# Patient Record
Sex: FEMALE | Race: WHITE | NOT HISPANIC OR LATINO | Employment: FULL TIME | URBAN - METROPOLITAN AREA
[De-identification: names, ages, dates, MRNs, and addresses within clinical notes are randomized per-mention and may not be internally consistent; named-entity substitution may affect disease eponyms.]

---

## 2017-01-05 ENCOUNTER — TELEPHONE (OUTPATIENT)
Dept: INTERNAL MEDICINE | Facility: CLINIC | Age: 47
End: 2017-01-05

## 2017-01-05 NOTE — TELEPHONE ENCOUNTER
----- Message from Ibis Rosales sent at 1/5/2017 12:30 PM EST -----  Pt calling for a refill on her warfarin (COUMADIN) 3 MG tablet    Please send to [facility] [id number] - [facility] [address] AT [facility] - [phone]  - [phone] FX  .  Pt# [phone]

## 2017-01-06 RX ORDER — WARFARIN SODIUM 3 MG/1
3 TABLET ORAL
Qty: 30 TABLET | Refills: 5 | Status: SHIPPED | OUTPATIENT
Start: 2017-01-06 | End: 2017-07-31 | Stop reason: SDUPTHER

## 2017-01-06 RX ORDER — FENOFIBRATE 90 MG/1
CAPSULE ORAL
Qty: 30 CAPSULE | Refills: 5
Start: 2017-01-06 | End: 2017-04-12 | Stop reason: SDUPTHER

## 2017-01-09 ENCOUNTER — ANTICOAGULATION VISIT (OUTPATIENT)
Dept: INTERNAL MEDICINE | Facility: CLINIC | Age: 47
End: 2017-01-09

## 2017-01-09 DIAGNOSIS — I82.401 DEEP VEIN THROMBOSIS (DVT) OF RIGHT LOWER EXTREMITY, UNSPECIFIED CHRONICITY, UNSPECIFIED VEIN (HCC): Primary | ICD-10-CM

## 2017-01-09 DIAGNOSIS — Z79.01 LONG TERM (CURRENT) USE OF ANTICOAGULANTS: ICD-10-CM

## 2017-01-09 LAB — INR PPP: 3.4

## 2017-01-09 PROCEDURE — 85610 PROTHROMBIN TIME: CPT | Performed by: NURSE PRACTITIONER

## 2017-01-09 PROCEDURE — 36416 COLLJ CAPILLARY BLOOD SPEC: CPT | Performed by: NURSE PRACTITIONER

## 2017-01-23 ENCOUNTER — ANTICOAGULATION VISIT (OUTPATIENT)
Dept: INTERNAL MEDICINE | Facility: CLINIC | Age: 47
End: 2017-01-23

## 2017-01-23 DIAGNOSIS — I82.401 DEEP VEIN THROMBOSIS (DVT) OF RIGHT LOWER EXTREMITY, UNSPECIFIED CHRONICITY, UNSPECIFIED VEIN (HCC): Primary | ICD-10-CM

## 2017-01-23 DIAGNOSIS — Z79.01 LONG TERM (CURRENT) USE OF ANTICOAGULANTS: ICD-10-CM

## 2017-01-23 LAB — INR PPP: 2.1

## 2017-01-23 PROCEDURE — 36416 COLLJ CAPILLARY BLOOD SPEC: CPT | Performed by: INTERNAL MEDICINE

## 2017-01-23 PROCEDURE — 85610 PROTHROMBIN TIME: CPT | Performed by: INTERNAL MEDICINE

## 2017-02-01 ENCOUNTER — TELEPHONE (OUTPATIENT)
Dept: INTERNAL MEDICINE | Facility: CLINIC | Age: 47
End: 2017-02-01

## 2017-02-01 NOTE — TELEPHONE ENCOUNTER
----- Message from Ibis Rosales sent at 2/1/2017  9:52 AM EST -----  KEIRA-  Pt would like to speak with you about a medication that she says is a private matter. She is at work and could not tell me about it.  She would like for you to return her call after 4:00 today when she is able to talk.    Pt# [phone]

## 2017-02-03 ENCOUNTER — OFFICE VISIT (OUTPATIENT)
Dept: INTERNAL MEDICINE | Facility: CLINIC | Age: 47
End: 2017-02-03

## 2017-02-03 ENCOUNTER — TELEPHONE (OUTPATIENT)
Dept: INTERNAL MEDICINE | Facility: CLINIC | Age: 47
End: 2017-02-03

## 2017-02-03 VITALS
WEIGHT: 247 LBS | RESPIRATION RATE: 14 BRPM | SYSTOLIC BLOOD PRESSURE: 118 MMHG | BODY MASS INDEX: 43.77 KG/M2 | DIASTOLIC BLOOD PRESSURE: 82 MMHG | HEIGHT: 63 IN

## 2017-02-03 DIAGNOSIS — E03.9 HYPOTHYROIDISM, ADULT: ICD-10-CM

## 2017-02-03 DIAGNOSIS — I82.401 DEEP VEIN THROMBOSIS (DVT) OF RIGHT LOWER EXTREMITY, UNSPECIFIED CHRONICITY, UNSPECIFIED VEIN (HCC): ICD-10-CM

## 2017-02-03 DIAGNOSIS — E66.01 MORBID OBESITY, UNSPECIFIED OBESITY TYPE (HCC): Primary | ICD-10-CM

## 2017-02-03 DIAGNOSIS — E78.2 MIXED HYPERLIPIDEMIA: ICD-10-CM

## 2017-02-03 DIAGNOSIS — Z79.01 LONG TERM (CURRENT) USE OF ANTICOAGULANTS: ICD-10-CM

## 2017-02-03 LAB
ALBUMIN SERPL-MCNC: 4.3 G/DL (ref 3.5–5.2)
ALBUMIN/GLOB SERPL: 1.3 G/DL
ALP SERPL-CCNC: 54 U/L (ref 39–117)
ALT SERPL-CCNC: 34 U/L (ref 1–33)
AST SERPL-CCNC: 27 U/L (ref 1–32)
BILIRUB SERPL-MCNC: 0.2 MG/DL (ref 0.1–1.2)
BUN SERPL-MCNC: 14 MG/DL (ref 6–20)
BUN/CREAT SERPL: 15.4 (ref 7–25)
CALCIUM SERPL-MCNC: 9.4 MG/DL (ref 8.6–10.5)
CHLORIDE SERPL-SCNC: 104 MMOL/L (ref 98–107)
CHOLEST SERPL-MCNC: 179 MG/DL (ref 0–200)
CO2 SERPL-SCNC: 27.2 MMOL/L (ref 22–29)
CREAT SERPL-MCNC: 0.91 MG/DL (ref 0.57–1)
GLOBULIN SER CALC-MCNC: 3.4 GM/DL
GLUCOSE SERPL-MCNC: 121 MG/DL (ref 65–99)
HDLC SERPL-MCNC: 30 MG/DL (ref 40–60)
INR PPP: 2.6
LDLC SERPL CALC-MCNC: 101 MG/DL (ref 0–100)
POTASSIUM SERPL-SCNC: 4.4 MMOL/L (ref 3.5–5.2)
PROT SERPL-MCNC: 7.7 G/DL (ref 6–8.5)
SODIUM SERPL-SCNC: 142 MMOL/L (ref 136–145)
TRIGL SERPL-MCNC: 242 MG/DL (ref 0–150)
TSH SERPL-ACNC: 1.31 MIU/ML (ref 0.27–4.2)
VLDLC SERPL-MCNC: 48.4 MG/DL (ref 5–40)

## 2017-02-03 PROCEDURE — 36416 COLLJ CAPILLARY BLOOD SPEC: CPT | Performed by: INTERNAL MEDICINE

## 2017-02-03 PROCEDURE — 99213 OFFICE O/P EST LOW 20 MIN: CPT | Performed by: INTERNAL MEDICINE

## 2017-02-03 PROCEDURE — 85610 PROTHROMBIN TIME: CPT | Performed by: INTERNAL MEDICINE

## 2017-02-03 NOTE — TELEPHONE ENCOUNTER
The other medication we talked about was Contrave.  She may ask her pharmacy how much that would be.  I have not seen any savings cards for Qsymia or contrave.

## 2017-02-03 NOTE — TELEPHONE ENCOUNTER
----- Message from Christy Gorman sent at 2/3/2017 11:51 AM EST -----  Contact: patient  Patient saw Dr. Salcido this morning.  Was given Rx for     Phentermine-Topiramate (QSYMIA) 7.5-46 MG capsule sustained-release 24 hr   Phentermine-Topiramate 3.75-23 MG capsule sustained-release 24 hr    Patient states insurance only going to pay $14 out of $300 and she cannot afford.  Do we have any coupons, or would another Rx be cheaper?  She stated Dr. Salcido talked to her about another med this morning.  Please advise.     Patient:  [phone]    Pharmacy:  [facility] [address] [place] [address] [address] AT [facility] - [phone]  - [phone] FX

## 2017-02-03 NOTE — PROGRESS NOTES
Chief Complaint   Patient presents with   • discuss medication        Subjective   Li Kimball is a 46 y.o. female     HPI: Obesity: she has been obese for many years. She recently started following the [employer] program but has not lost any weight.  She sits all day at work.  At home, she does not have the energy to exercise. She has pain in her left knee and has difficulty walking at times. Knee has been hurting worse in the last month.  At times, she has sensation it may give way.  She continues to have charley horses.  Gatoraid helps, but she is concerned about the sugar content.  She would like to lose weight.  She knows it would help her knees.  Also she knows that it is good for her general health.    Hyperlipidemia:  No management changes were made at her last appointment. Her most recent lipid panel was done on July 8, 2016.  Total cholesterol was 178, Triglycerides 236, HDL 33, LDL 98.  She is currently taking a fenofibrate acid.  Prudent diet and regular exercise have also been recommended. By report, there is good compliance with treatment and good tolerance.  She has not experienced statin associated myalgias, dyspepsia.  She has not had liver enzyme elevation.        Hypothyroidism: Current treatment levothyroxine. She is compliant with the medication, tolerates it well and reports good control of symptoms of hypothyroidism.  She has not noted any change in tolerance of heat or cold.  No change in hair or skin.  No constipation.  No symptoms of hyperthyroidism.           The following portions of the patient's history were reviewed and updated as appropriate: allergies, current medications, past social history, problem list, past surgical history    Review of Systems   Constitutional: Positive for fatigue. Negative for appetite change and fever.   Gastrointestinal: Negative for abdominal pain.   Musculoskeletal: Positive for arthralgias.       Visit Vitals   • /82 (BP Location: Right arm,  "Patient Position: Sitting, Cuff Size: Adult)   • Resp 14   • Ht 63\" (160 cm)   • Wt 247 lb (112 kg)   • BMI 43.75 kg/m2        Objective   Physical Exam   Constitutional: She appears well-nourished. No distress.   obese   Cardiovascular: Normal rate, regular rhythm and normal heart sounds.    Pulmonary/Chest: Effort normal and breath sounds normal. No respiratory distress. She has no wheezes. She has no rhonchi. She has no rales.   Nursing note and vitals reviewed.      Assessment/Plan   Problem List Items Addressed This Visit        Cardiovascular and Mediastinum    Mixed hyperlipidemia    Relevant Orders    Lipid Panel    DVT (deep venous thrombosis)    Relevant Orders    POC INR (Completed)       Endocrine    Hypothyroidism, adult    Relevant Orders    Comprehensive Metabolic Panel    TSH      Other Visit Diagnoses     Morbid obesity, unspecified obesity type    -  Primary    Relevant Medications    Phentermine-Topiramate (QSYMIA) 7.5-46 MG capsule sustained-release 24 hr    Phentermine-Topiramate 3.75-23 MG capsule sustained-release 24 hr    Long term (current) use of anticoagulants        Relevant Orders    POC INR (Completed)          "

## 2017-02-06 ENCOUNTER — TELEPHONE (OUTPATIENT)
Dept: INTERNAL MEDICINE | Facility: CLINIC | Age: 47
End: 2017-02-06

## 2017-02-06 NOTE — TELEPHONE ENCOUNTER
----- Message from Rachel Goodman MA sent at 2/6/2017  8:10 AM EST -----  Pt calling to say that the best affordable medication is Contrave and asks if you will pls send to local [facility] instead of [facility]

## 2017-02-06 NOTE — TELEPHONE ENCOUNTER
Please let her know that I sent in a prescription for contrite.  The first month, she will titrate up from 1 tablet daily to 2 tablets twice a day.  She should call for refill on contraceptive at the maintenance dose towards the end of this first month.

## 2017-02-08 NOTE — TELEPHONE ENCOUNTER
Clarification:  I sent in a prescription for Contrave.  The first month, she will titrate up from one tablet daily to 2 tablets twice a day.  She should call for a refill on Contrave at the maintenance dose towards the end of this first month.

## 2017-02-10 ENCOUNTER — TELEPHONE (OUTPATIENT)
Dept: INTERNAL MEDICINE | Facility: CLINIC | Age: 47
End: 2017-02-10

## 2017-02-10 NOTE — TELEPHONE ENCOUNTER
Notified patient of the lab results, explained and provided information on a low cholestrol diet and the importance of exercise

## 2017-02-10 NOTE — TELEPHONE ENCOUNTER
----- Message from Ibis Rosales sent at 2/10/2017 12:42 PM EST -----  Pt waiting for lab results from 2/3/2017. Looks like labs were mail 2/7/2017.  Pt would like verbal results called to her.  Please call pt after 4:00 with results.  Pt#[phone]  Thank you

## 2017-02-22 ENCOUNTER — TELEPHONE (OUTPATIENT)
Dept: INTERNAL MEDICINE | Facility: CLINIC | Age: 47
End: 2017-02-22

## 2017-02-22 NOTE — TELEPHONE ENCOUNTER
In order for this medication to work, she needs to be taking 2 tablets twice a day.  She may not see any results until she has been on this dose for at least 2 weeks.

## 2017-02-22 NOTE — TELEPHONE ENCOUNTER
----- Message from Ibis Rosales sent at 2/22/2017 11:15 AM EST -----  Pt taking CONTRAVE 8-90 MG tablet.  Pt is on her third week and taking 2 tablets per day. She does not want to take more than 2 pills per day.  Pt says medication is not working at suppressing her appetite and would like to know how long it will take to start working.    Pt# [phone]

## 2017-02-22 NOTE — TELEPHONE ENCOUNTER
Notified patient she needs to take 2 tablets by mouth twice daily, it may take up to 2 weeks before she sees any results. Patient verbalized understanding and states she will try 2 tablets by mouth twice daily

## 2017-03-06 ENCOUNTER — ANTICOAGULATION VISIT (OUTPATIENT)
Dept: INTERNAL MEDICINE | Facility: CLINIC | Age: 47
End: 2017-03-06

## 2017-03-06 DIAGNOSIS — I82.401 DEEP VEIN THROMBOSIS (DVT) OF RIGHT LOWER EXTREMITY, UNSPECIFIED CHRONICITY, UNSPECIFIED VEIN (HCC): Primary | ICD-10-CM

## 2017-03-06 DIAGNOSIS — Z79.01 LONG TERM (CURRENT) USE OF ANTICOAGULANTS: ICD-10-CM

## 2017-03-06 PROCEDURE — 85610 PROTHROMBIN TIME: CPT | Performed by: INTERNAL MEDICINE

## 2017-03-07 LAB — INR PPP: 2.5

## 2017-03-22 ENCOUNTER — TELEPHONE (OUTPATIENT)
Dept: INTERNAL MEDICINE | Facility: CLINIC | Age: 47
End: 2017-03-22

## 2017-03-22 NOTE — TELEPHONE ENCOUNTER
----- Message from Aruna Thomson sent at 3/22/2017  1:08 PM EDT -----  Contact: pt - Dr Salcido' pt - RE: Excedrin's / Blood thinners  Pt calling and would like a return call regarding pt on blood thinner. Pt was informed not to take Excedrin. Pt is having really bad H/A. Pt is taking Excedrin's as this is the only thing that helps and pt can not miss work. Could you please call pt to discuss what options pt has? Please advise. Thanks      Pt # [phone]

## 2017-04-03 ENCOUNTER — ANTICOAGULATION VISIT (OUTPATIENT)
Dept: INTERNAL MEDICINE | Facility: CLINIC | Age: 47
End: 2017-04-03

## 2017-04-03 DIAGNOSIS — Z79.01 LONG TERM (CURRENT) USE OF ANTICOAGULANTS: ICD-10-CM

## 2017-04-03 DIAGNOSIS — I82.401 DEEP VEIN THROMBOSIS (DVT) OF RIGHT LOWER EXTREMITY, UNSPECIFIED CHRONICITY, UNSPECIFIED VEIN (HCC): Primary | ICD-10-CM

## 2017-04-03 LAB — INR PPP: 3.7

## 2017-04-03 PROCEDURE — 85610 PROTHROMBIN TIME: CPT | Performed by: INTERNAL MEDICINE

## 2017-04-03 PROCEDURE — 36416 COLLJ CAPILLARY BLOOD SPEC: CPT | Performed by: INTERNAL MEDICINE

## 2017-04-04 RX ORDER — FENOFIBRATE 90 MG/1
CAPSULE ORAL
Qty: 30 CAPSULE | Refills: 3 | Status: SHIPPED | OUTPATIENT
Start: 2017-04-04 | End: 2017-10-02

## 2017-04-06 ENCOUNTER — TELEPHONE (OUTPATIENT)
Dept: INTERNAL MEDICINE | Facility: CLINIC | Age: 47
End: 2017-04-06

## 2017-04-06 ENCOUNTER — ANTICOAGULATION VISIT (OUTPATIENT)
Dept: INTERNAL MEDICINE | Facility: CLINIC | Age: 47
End: 2017-04-06

## 2017-04-06 DIAGNOSIS — I82.401 DEEP VEIN THROMBOSIS (DVT) OF RIGHT LOWER EXTREMITY, UNSPECIFIED CHRONICITY, UNSPECIFIED VEIN (HCC): Primary | ICD-10-CM

## 2017-04-06 DIAGNOSIS — R82.90 ABNORMAL FINDING IN URINE: ICD-10-CM

## 2017-04-06 LAB — INR PPP: 2.1

## 2017-04-06 PROCEDURE — 85610 PROTHROMBIN TIME: CPT | Performed by: INTERNAL MEDICINE

## 2017-04-06 PROCEDURE — 36416 COLLJ CAPILLARY BLOOD SPEC: CPT | Performed by: INTERNAL MEDICINE

## 2017-04-06 NOTE — TELEPHONE ENCOUNTER
----- Message from Aruna Thomson sent at 4/6/2017  8:10 AM EDT -----  Contact: pt - Dr Salcido' pt - RE: refills  Pt calling and would like to know why pt is only getting 3 refills instead of 6 months refill. Pt informs keeps getting charged by [facility] and pt does not want to have to deal w/ pharmacy. Could you please call pt to explain reason for only 3 months supply instead of the 6 months? Please advise. Thanks      Pt # [phone]

## 2017-04-12 RX ORDER — FENOFIBRATE 90 MG/1
CAPSULE ORAL
Qty: 30 CAPSULE | Refills: 5 | Status: SHIPPED | OUTPATIENT
Start: 2017-04-12 | End: 2018-05-31 | Stop reason: ALTCHOICE

## 2017-04-20 ENCOUNTER — TELEPHONE (OUTPATIENT)
Dept: INTERNAL MEDICINE | Facility: CLINIC | Age: 47
End: 2017-04-20

## 2017-04-20 NOTE — TELEPHONE ENCOUNTER
----- Message from Christy Gorman sent at 4/20/2017  9:26 AM EDT -----  Contact: Patient  Patient called requesting to speak with Dr. Salcido about her     levothyroxine (SYNTHROID, LEVOTHROID) 50 MCG tablet    She asks that Dr. Salcido call Friday afternoon after 4:00 PM due to work.  Please advise.     Patient:  [phone]    Pharmacy:  [facility] [address] [place] [address] [address] AT [facility] - [phone] [facility] [phone] FX

## 2017-04-21 DIAGNOSIS — R73.01 ELEVATED FASTING GLUCOSE: Primary | ICD-10-CM

## 2017-04-21 DIAGNOSIS — G43.009 NONINTRACTABLE MIGRAINE, UNSPECIFIED MIGRAINE TYPE: ICD-10-CM

## 2017-04-21 RX ORDER — TOPIRAMATE 25 MG/1
25 TABLET ORAL 2 TIMES DAILY
Qty: 60 TABLET | Refills: 2 | Status: SHIPPED | OUTPATIENT
Start: 2017-04-21 | End: 2018-05-31

## 2017-04-21 NOTE — TELEPHONE ENCOUNTER
Discussed with her.  She thought perhaps stopping her thyroid medication might help her lose weight.  Advised her not to stop thyroid.  She's having trouble with migraine headaches.  Topiramate prescribed.  Advised her this should help out with migraines and might help her lose weight.

## 2017-05-01 ENCOUNTER — ANTICOAGULATION VISIT (OUTPATIENT)
Dept: INTERNAL MEDICINE | Facility: CLINIC | Age: 47
End: 2017-05-01

## 2017-05-01 DIAGNOSIS — Z79.01 LONG TERM (CURRENT) USE OF ANTICOAGULANTS: ICD-10-CM

## 2017-05-01 DIAGNOSIS — I82.401 DEEP VEIN THROMBOSIS (DVT) OF RIGHT LOWER EXTREMITY, UNSPECIFIED CHRONICITY, UNSPECIFIED VEIN (HCC): Primary | ICD-10-CM

## 2017-05-01 LAB — INR PPP: 3.1

## 2017-05-01 PROCEDURE — 36416 COLLJ CAPILLARY BLOOD SPEC: CPT | Performed by: INTERNAL MEDICINE

## 2017-05-01 PROCEDURE — 85610 PROTHROMBIN TIME: CPT | Performed by: INTERNAL MEDICINE

## 2017-05-02 DIAGNOSIS — E03.9 HYPOTHYROIDISM: ICD-10-CM

## 2017-05-03 RX ORDER — LEVOTHYROXINE SODIUM 0.05 MG/1
TABLET ORAL
Qty: 30 TABLET | Refills: 3 | Status: SHIPPED | OUTPATIENT
Start: 2017-05-03 | End: 2017-10-03 | Stop reason: SDUPTHER

## 2017-05-15 ENCOUNTER — ANTICOAGULATION VISIT (OUTPATIENT)
Dept: INTERNAL MEDICINE | Facility: CLINIC | Age: 47
End: 2017-05-15

## 2017-05-15 DIAGNOSIS — I82.409 DEEP VEIN THROMBOSIS (DVT) OF LOWER EXTREMITY, UNSPECIFIED CHRONICITY, UNSPECIFIED LATERALITY, UNSPECIFIED VEIN (HCC): Primary | ICD-10-CM

## 2017-05-15 DIAGNOSIS — Z79.01 LONG TERM (CURRENT) USE OF ANTICOAGULANTS: ICD-10-CM

## 2017-05-15 LAB — INR PPP: 2.5

## 2017-05-15 PROCEDURE — 36416 COLLJ CAPILLARY BLOOD SPEC: CPT | Performed by: INTERNAL MEDICINE

## 2017-05-15 PROCEDURE — 85610 PROTHROMBIN TIME: CPT | Performed by: INTERNAL MEDICINE

## 2017-06-12 ENCOUNTER — ANTICOAGULATION VISIT (OUTPATIENT)
Dept: INTERNAL MEDICINE | Facility: CLINIC | Age: 47
End: 2017-06-12

## 2017-06-12 DIAGNOSIS — I82.401 DEEP VEIN THROMBOSIS (DVT) OF RIGHT LOWER EXTREMITY, UNSPECIFIED CHRONICITY, UNSPECIFIED VEIN (HCC): Primary | ICD-10-CM

## 2017-06-12 DIAGNOSIS — Z79.01 LONG TERM (CURRENT) USE OF ANTICOAGULANTS: ICD-10-CM

## 2017-06-12 LAB — INR PPP: 2.5

## 2017-06-12 PROCEDURE — 36416 COLLJ CAPILLARY BLOOD SPEC: CPT | Performed by: INTERNAL MEDICINE

## 2017-06-12 PROCEDURE — 85610 PROTHROMBIN TIME: CPT | Performed by: INTERNAL MEDICINE

## 2017-06-20 ENCOUNTER — TELEPHONE (OUTPATIENT)
Dept: INTERNAL MEDICINE | Facility: CLINIC | Age: 47
End: 2017-06-20

## 2017-06-20 NOTE — TELEPHONE ENCOUNTER
----- Message from Christy Gorman sent at 6/20/2017  3:37 PM EDT -----  Contact: Patient  Patient is requesting a letter she can deliver to her place of employment ([employer]) stating that she cannot stand for long periods of time due to 2 surgeries on her right ankle, and being on life-long warfarin.  She would like us to mail this to her, and to make sure the surgeries and blood thinner are mentioned in the letter.  Please advise.     Li Kimball  [address]   [place] [address] [phone] (H)

## 2017-07-10 ENCOUNTER — ANTICOAGULATION VISIT (OUTPATIENT)
Dept: INTERNAL MEDICINE | Facility: CLINIC | Age: 47
End: 2017-07-10

## 2017-07-10 DIAGNOSIS — I82.401 DEEP VEIN THROMBOSIS (DVT) OF RIGHT LOWER EXTREMITY, UNSPECIFIED CHRONICITY, UNSPECIFIED VEIN (HCC): Primary | ICD-10-CM

## 2017-07-10 DIAGNOSIS — Z79.01 LONG TERM (CURRENT) USE OF ANTICOAGULANTS: ICD-10-CM

## 2017-07-10 LAB — INR PPP: 2.9

## 2017-07-10 PROCEDURE — 85610 PROTHROMBIN TIME: CPT | Performed by: INTERNAL MEDICINE

## 2017-07-10 PROCEDURE — 36416 COLLJ CAPILLARY BLOOD SPEC: CPT | Performed by: INTERNAL MEDICINE

## 2017-07-31 RX ORDER — WARFARIN SODIUM 3 MG/1
TABLET ORAL
Qty: 30 TABLET | Refills: 3 | Status: SHIPPED | OUTPATIENT
Start: 2017-07-31 | End: 2017-11-24 | Stop reason: SDUPTHER

## 2017-08-07 ENCOUNTER — ANTICOAGULATION VISIT (OUTPATIENT)
Dept: INTERNAL MEDICINE | Facility: CLINIC | Age: 47
End: 2017-08-07

## 2017-08-07 DIAGNOSIS — Z79.01 LONG TERM (CURRENT) USE OF ANTICOAGULANTS: ICD-10-CM

## 2017-08-07 DIAGNOSIS — I82.401 DEEP VEIN THROMBOSIS (DVT) OF RIGHT LOWER EXTREMITY, UNSPECIFIED CHRONICITY, UNSPECIFIED VEIN (HCC): Primary | ICD-10-CM

## 2017-08-07 LAB — INR PPP: 2.9

## 2017-08-07 PROCEDURE — 36416 COLLJ CAPILLARY BLOOD SPEC: CPT | Performed by: INTERNAL MEDICINE

## 2017-08-07 PROCEDURE — 85610 PROTHROMBIN TIME: CPT | Performed by: INTERNAL MEDICINE

## 2017-08-16 ENCOUNTER — TELEPHONE (OUTPATIENT)
Dept: INTERNAL MEDICINE | Facility: CLINIC | Age: 47
End: 2017-08-16

## 2017-08-16 NOTE — TELEPHONE ENCOUNTER
Dr Salcido I have called patient to get more detail. She states she isn't going into detail unless its with you. She states its why she said she is off tomorrow to discuss it.

## 2017-08-16 NOTE — TELEPHONE ENCOUNTER
----- Message from Aruna Thomson sent at 8/16/2017  9:47 AM EDT -----  Contact: pt - Dr Salcido' pt - RE: blood thinner  Pt calling and would like a return call regarding pt's blood thinner. Pt would like to discuss. Could you please call pt tomorrow. Pt informs is off work tomorrow & will be easier to discuss. Please advise. thanks        Pt # [phone]  -  Pt would like call tomorrow 08/17/2017 - pt is off work and will be easier to talk.

## 2017-08-17 NOTE — TELEPHONE ENCOUNTER
Discussed with her.  She is thinking about having her eyebrows enhanced.  She has been told that the procedure is kind of like having a tattoo.  She has had tattoos before without stopping her Coumadin.  The eyebrow technician as recommended she hold Coumadin for one day, the day before the procedure.  Advised her that it would be okay to do this.

## 2017-08-21 ENCOUNTER — ANTICOAGULATION VISIT (OUTPATIENT)
Dept: INTERNAL MEDICINE | Facility: CLINIC | Age: 47
End: 2017-08-21

## 2017-08-21 DIAGNOSIS — I82.401 DEEP VEIN THROMBOSIS (DVT) OF RIGHT LOWER EXTREMITY, UNSPECIFIED CHRONICITY, UNSPECIFIED VEIN (HCC): Primary | ICD-10-CM

## 2017-08-21 DIAGNOSIS — Z79.01 LONG TERM (CURRENT) USE OF ANTICOAGULANTS: ICD-10-CM

## 2017-08-21 LAB — INR PPP: 2

## 2017-08-21 PROCEDURE — 36416 COLLJ CAPILLARY BLOOD SPEC: CPT | Performed by: INTERNAL MEDICINE

## 2017-08-21 PROCEDURE — 85610 PROTHROMBIN TIME: CPT | Performed by: INTERNAL MEDICINE

## 2017-09-18 ENCOUNTER — ANTICOAGULATION VISIT (OUTPATIENT)
Dept: INTERNAL MEDICINE | Facility: CLINIC | Age: 47
End: 2017-09-18

## 2017-09-18 DIAGNOSIS — I82.401 DEEP VEIN THROMBOSIS (DVT) OF RIGHT LOWER EXTREMITY, UNSPECIFIED CHRONICITY, UNSPECIFIED VEIN (HCC): Primary | ICD-10-CM

## 2017-09-18 DIAGNOSIS — Z79.01 LONG TERM (CURRENT) USE OF ANTICOAGULANTS: ICD-10-CM

## 2017-09-18 LAB — INR PPP: 2.1

## 2017-09-18 PROCEDURE — 36416 COLLJ CAPILLARY BLOOD SPEC: CPT | Performed by: INTERNAL MEDICINE

## 2017-09-18 PROCEDURE — 85610 PROTHROMBIN TIME: CPT | Performed by: INTERNAL MEDICINE

## 2017-09-28 ENCOUNTER — OFFICE VISIT (OUTPATIENT)
Dept: INTERNAL MEDICINE | Facility: CLINIC | Age: 47
End: 2017-09-28

## 2017-09-28 VITALS
BODY MASS INDEX: 43.77 KG/M2 | SYSTOLIC BLOOD PRESSURE: 122 MMHG | HEIGHT: 63 IN | WEIGHT: 247 LBS | DIASTOLIC BLOOD PRESSURE: 88 MMHG

## 2017-09-28 DIAGNOSIS — E78.2 MIXED HYPERLIPIDEMIA: Primary | ICD-10-CM

## 2017-09-28 DIAGNOSIS — Z79.01 LONG TERM (CURRENT) USE OF ANTICOAGULANTS: ICD-10-CM

## 2017-09-28 DIAGNOSIS — E03.9 HYPOTHYROIDISM, ADULT: ICD-10-CM

## 2017-09-28 DIAGNOSIS — I82.401 DEEP VEIN THROMBOSIS (DVT) OF RIGHT LOWER EXTREMITY, UNSPECIFIED CHRONICITY, UNSPECIFIED VEIN (HCC): ICD-10-CM

## 2017-09-28 DIAGNOSIS — R73.01 ELEVATED FASTING GLUCOSE: ICD-10-CM

## 2017-09-28 LAB
ALBUMIN SERPL-MCNC: 4.5 G/DL (ref 3.5–5.2)
ALBUMIN/GLOB SERPL: 1.5 G/DL
ALP SERPL-CCNC: 43 U/L (ref 39–117)
ALT SERPL W P-5'-P-CCNC: 32 U/L (ref 1–33)
ANION GAP SERPL CALCULATED.3IONS-SCNC: 12.1 MMOL/L
AST SERPL-CCNC: 28 U/L (ref 1–32)
BILIRUB SERPL-MCNC: 0.3 MG/DL (ref 0.1–1.2)
BUN BLD-MCNC: 16 MG/DL (ref 6–20)
BUN/CREAT SERPL: 16.2 (ref 7–25)
CALCIUM SPEC-SCNC: 9.7 MG/DL (ref 8.6–10.5)
CHLORIDE SERPL-SCNC: 104 MMOL/L (ref 98–107)
CHOLEST SERPL-MCNC: 178 MG/DL (ref 0–200)
CO2 SERPL-SCNC: 24.9 MMOL/L (ref 22–29)
CREAT BLD-MCNC: 0.99 MG/DL (ref 0.57–1)
DEPRECATED RDW RBC AUTO: 45.6 FL (ref 37–54)
ERYTHROCYTE [DISTWIDTH] IN BLOOD BY AUTOMATED COUNT: 14.3 % (ref 11.5–15)
GFR SERPL CREATININE-BSD FRML MDRD: 60 ML/MIN/1.73
GLOBULIN UR ELPH-MCNC: 3.1 GM/DL
GLUCOSE BLD-MCNC: 111 MG/DL (ref 65–99)
HBA1C MFR BLD: 6.8 % (ref 4.8–5.6)
HCT VFR BLD AUTO: 39.2 % (ref 34.1–44.9)
HDLC SERPL-MCNC: 23 MG/DL (ref 40–60)
HGB BLD-MCNC: 12.4 G/DL (ref 11.2–15.7)
INR PPP: 1.9
LDLC SERPL CALC-MCNC: 95 MG/DL (ref 0–100)
LDLC/HDLC SERPL: 4.11 {RATIO}
MCH RBC QN AUTO: 28.4 PG (ref 26–34)
MCHC RBC AUTO-ENTMCNC: 31.6 G/DL (ref 31–37)
MCV RBC AUTO: 89.7 FL (ref 80–100)
PLATELET # BLD AUTO: 299 10*3/MM3 (ref 150–450)
PMV BLD AUTO: 9.7 FL (ref 6–12)
POTASSIUM BLD-SCNC: 3.9 MMOL/L (ref 3.5–5.2)
PROT SERPL-MCNC: 7.6 G/DL (ref 6–8.5)
RBC # BLD AUTO: 4.37 10*6/MM3 (ref 3.93–5.22)
SODIUM BLD-SCNC: 141 MMOL/L (ref 136–145)
TRIGL SERPL-MCNC: 302 MG/DL (ref 0–150)
TSH SERPL DL<=0.05 MIU/L-ACNC: 1.32 MIU/ML (ref 0.27–4.2)
VLDLC SERPL-MCNC: 60.4 MG/DL (ref 5–40)
WBC NRBC COR # BLD: 5.07 10*3/MM3 (ref 5–10)

## 2017-09-28 PROCEDURE — 85610 PROTHROMBIN TIME: CPT | Performed by: INTERNAL MEDICINE

## 2017-09-28 PROCEDURE — 85027 COMPLETE CBC AUTOMATED: CPT | Performed by: INTERNAL MEDICINE

## 2017-09-28 PROCEDURE — 80053 COMPREHEN METABOLIC PANEL: CPT | Performed by: INTERNAL MEDICINE

## 2017-09-28 PROCEDURE — 80061 LIPID PANEL: CPT | Performed by: INTERNAL MEDICINE

## 2017-09-28 PROCEDURE — 99213 OFFICE O/P EST LOW 20 MIN: CPT | Performed by: INTERNAL MEDICINE

## 2017-09-28 PROCEDURE — 36415 COLL VENOUS BLD VENIPUNCTURE: CPT | Performed by: INTERNAL MEDICINE

## 2017-09-28 PROCEDURE — 84443 ASSAY THYROID STIM HORMONE: CPT | Performed by: INTERNAL MEDICINE

## 2017-09-28 PROCEDURE — 36416 COLLJ CAPILLARY BLOOD SPEC: CPT | Performed by: INTERNAL MEDICINE

## 2017-09-28 PROCEDURE — 83036 HEMOGLOBIN GLYCOSYLATED A1C: CPT | Performed by: INTERNAL MEDICINE

## 2017-09-28 NOTE — PROGRESS NOTES
"Chief Complaint   Patient presents with   • Hyperlipidemia     follow up   • Hypothyroidism        Subjective   Li Kimball is a 47 y.o. female     HPI: Hyperlipidemia:  This is a chronic problem.   No management changes were made at her last appointment.   Her most recent lipid panel was done on 2/3/2017.  Total cholesterol was 179, Triglycerides 242, HDL 30, .   Current treatment: fibric acid derivative.   Prudent diet and regular exercise have also been recommended. By report, there is good compliance with treatment and good tolerance.    She has not experienced statin associated myalgias, dyspepsia.  She has not had liver enzyme elevation.     Elevated fasting glucose:  She  has had elevated fasting glucose in the past.  She denies polyuria, polydipsia, vision change appetite change, unexplained weight loss.   No results found for: HGBA1C        Hypothyroidism: Current treatment levothyroxine. She is compliant with the medication, tolerates it well and reports good control of symptoms of hypothyroidism.  She has not noted any change in tolerance of heat or cold.  No change in hair or skin.  No constipation.  No symptoms of hyperthyroidism.      History of DVT on chronic anticoagulation: She missed 1 day of Coumadin last week.  She is worried and would like to have her pro time checked.     The following portions of the patient's history were reviewed and updated as appropriate: allergies, current medications, past social history, problem list, past surgical history    Review of Systems   Constitutional: Negative for activity change and appetite change.   HENT: Negative for nosebleeds.    Eyes: Negative for visual disturbance.   Respiratory: Negative for cough and shortness of breath.    Cardiovascular: Negative for chest pain, palpitations and leg swelling.   Neurological: Negative for headaches.   Psychiatric/Behavioral: Negative for sleep disturbance.           Objective     /88  Ht 63\" (160 " cm)  Wt 247 lb (112 kg)  BMI 43.75 kg/m2     Physical Exam   Constitutional: She is oriented to person, place, and time. She appears well-developed and well-nourished. No distress.   Neck: Normal carotid pulses present. Carotid bruit is not present.   Cardiovascular: Normal rate, regular rhythm, S1 normal, S2 normal and intact distal pulses.  Exam reveals no gallop and no friction rub.    No murmur heard.  Pulses:       Carotid pulses are 2+ on the right side, and 2+ on the left side.  Pulmonary/Chest: Effort normal and breath sounds normal. No respiratory distress. She has no wheezes. She has no rhonchi. She has no rales. Chest wall is not dull to percussion.   Musculoskeletal: She exhibits no edema.   Neurological: She is alert and oriented to person, place, and time.   Skin: Skin is warm and dry.   Nursing note and vitals reviewed.      Assessment/Plan   Problem List Items Addressed This Visit        Cardiovascular and Mediastinum    Mixed hyperlipidemia - Primary    Relevant Orders    Comprehensive Metabolic Panel    Lipid Panel    CBC (No Diff)    DVT (deep venous thrombosis)    Relevant Orders    POC INR       Endocrine    Hypothyroidism, adult    Relevant Orders    TSH Rfx On Abnormal To Free T4      Other Visit Diagnoses     Long term (current) use of anticoagulants        Elevated fasting glucose        Relevant Orders    Hemoglobin A1c

## 2017-10-02 ENCOUNTER — DOCUMENTATION (OUTPATIENT)
Dept: INTERNAL MEDICINE | Facility: CLINIC | Age: 47
End: 2017-10-02

## 2017-10-02 RX ORDER — OMEGA-3-ACID ETHYL ESTERS 1 G/1
2 CAPSULE, LIQUID FILLED ORAL DAILY
Qty: 120 CAPSULE | Refills: 5 | Status: SHIPPED | OUTPATIENT
Start: 2017-10-02 | End: 2018-05-31

## 2017-10-02 NOTE — PROGRESS NOTES
Discussed hyperlipidemia, HgbA1c of 6.8.  We will stop Antara and try Lovaza.  Advised her HgbA1c was consistent with diabetes. She will eliminate cokes, use her treadmill daily.  Try to loose weight.

## 2017-10-03 ENCOUNTER — TELEPHONE (OUTPATIENT)
Dept: INTERNAL MEDICINE | Facility: CLINIC | Age: 47
End: 2017-10-03

## 2017-10-03 DIAGNOSIS — E03.9 HYPOTHYROIDISM: ICD-10-CM

## 2017-10-03 RX ORDER — LEVOTHYROXINE SODIUM 0.05 MG/1
TABLET ORAL
Qty: 30 TABLET | Refills: 3 | Status: SHIPPED | OUTPATIENT
Start: 2017-10-03 | End: 2018-02-22 | Stop reason: SDUPTHER

## 2017-10-03 NOTE — TELEPHONE ENCOUNTER
Patient would be ok with continuing the antara but she is worried because the triglycerides are rising more. Asking if maybe there is something that has a discount card too or possibly cheap that would help her.

## 2017-10-03 NOTE — TELEPHONE ENCOUNTER
----- Message from Christy Gorman sent at 10/3/2017 12:09 PM EDT -----  Contact: Patient  Patient called.  She called her pharmacy regarding her cholesterol medicine Dr. Salcido called in yesterday.  States she cannot afford the medicine; it is $100 even after her copay.  Is there anything else that she can try?  States she really cannot afford more than $20 a month.  Please advise.     Patient:  [phone] - cell; available after 4 PM    Pharmacy:  [facility] [id number] [place] [address] [address] AT [facility] - [phone] [facility] [phone] FX

## 2017-10-11 RX ORDER — ICOSAPENT ETHYL 1000 MG/1
2 CAPSULE ORAL 2 TIMES DAILY
Qty: 60 CAPSULE | Refills: 1 | Status: SHIPPED | OUTPATIENT
Start: 2017-10-11 | End: 2018-01-08 | Stop reason: SDUPTHER

## 2017-10-11 NOTE — TELEPHONE ENCOUNTER
----- Message from Christy oGrman sent at 10/11/2017  8:59 AM EDT -----  Contact: Patient  Patient called.  She picked up her samples and coupon card from us for the Vascepa, and is requesting a script to be called in for the medication so she can see how expensive it is going to be.  Asking for it to be called in today.  Please advise.     Patient:  [phone]    Pharmacy:  [facility] [address] [place] [address] [address] AT [facility] [phone] [facility] [phone] FX

## 2017-10-12 ENCOUNTER — TELEPHONE (OUTPATIENT)
Dept: INTERNAL MEDICINE | Facility: CLINIC | Age: 47
End: 2017-10-12

## 2017-10-12 NOTE — TELEPHONE ENCOUNTER
Dr Salcido I have with Ms Kimball to clarify her medication is to be only taken 1 BID and I clarified with pharmacy as well. Patient still states medication is to big and taking it every day is to much, also asking is this something she will have to take the rest of her life. I did inform her it possibly could be something she does have to take for the rest of her life, had she changed her diet or possibly hereditary. Please advise

## 2017-10-12 NOTE — TELEPHONE ENCOUNTER
----- Message from Rachel Goodman MA sent at 10/12/2017  9:26 AM EDT -----  Pt calling and would like a returned call to discuss some concerns she has with medication Vascepa.  Tablet is very large and taking 4 daily is too much.  Pt would like to take 2 daily  Pt would like to know if she can take 2 tabs at the same time  Pt would like to know if this is a life time medication    Pt#[phone] anytime today as she is off wk

## 2017-10-13 NOTE — TELEPHONE ENCOUNTER
It is OK for her to take this one twice a day, or two daily.  I think this will be a life time medication.

## 2017-10-16 ENCOUNTER — ANTICOAGULATION VISIT (OUTPATIENT)
Dept: INTERNAL MEDICINE | Facility: CLINIC | Age: 47
End: 2017-10-16

## 2017-10-16 DIAGNOSIS — Z79.01 LONG-TERM (CURRENT) USE OF ANTICOAGULANTS: ICD-10-CM

## 2017-10-16 DIAGNOSIS — I82.401 DEEP VEIN THROMBOSIS (DVT) OF RIGHT LOWER EXTREMITY, UNSPECIFIED CHRONICITY, UNSPECIFIED VEIN (HCC): Primary | ICD-10-CM

## 2017-10-16 LAB — INR PPP: 1.6

## 2017-10-16 PROCEDURE — 85610 PROTHROMBIN TIME: CPT | Performed by: INTERNAL MEDICINE

## 2017-10-16 PROCEDURE — 36416 COLLJ CAPILLARY BLOOD SPEC: CPT | Performed by: INTERNAL MEDICINE

## 2017-11-01 ENCOUNTER — ANTICOAGULATION VISIT (OUTPATIENT)
Dept: INTERNAL MEDICINE | Facility: CLINIC | Age: 47
End: 2017-11-01

## 2017-11-01 DIAGNOSIS — I82.401 DEEP VEIN THROMBOSIS (DVT) OF RIGHT LOWER EXTREMITY, UNSPECIFIED CHRONICITY, UNSPECIFIED VEIN (HCC): Primary | ICD-10-CM

## 2017-11-01 DIAGNOSIS — Z79.01 LONG-TERM (CURRENT) USE OF ANTICOAGULANTS: ICD-10-CM

## 2017-11-01 LAB — INR PPP: 2.5

## 2017-11-01 PROCEDURE — 85610 PROTHROMBIN TIME: CPT | Performed by: INTERNAL MEDICINE

## 2017-11-01 PROCEDURE — 36416 COLLJ CAPILLARY BLOOD SPEC: CPT | Performed by: INTERNAL MEDICINE

## 2017-11-27 ENCOUNTER — TELEPHONE (OUTPATIENT)
Dept: INTERNAL MEDICINE | Facility: CLINIC | Age: 47
End: 2017-11-27

## 2017-11-27 RX ORDER — WARFARIN SODIUM 3 MG/1
3 TABLET ORAL
Qty: 30 TABLET | Refills: 2 | Status: SHIPPED | OUTPATIENT
Start: 2017-11-27 | End: 2018-05-31 | Stop reason: SDUPTHER

## 2017-11-27 RX ORDER — WARFARIN SODIUM 3 MG/1
TABLET ORAL
Qty: 30 TABLET | Refills: 2 | Status: SHIPPED | OUTPATIENT
Start: 2017-11-27 | End: 2017-11-27 | Stop reason: SDUPTHER

## 2017-11-27 NOTE — TELEPHONE ENCOUNTER
----- Message from Christy Gorman sent at 11/27/2017  8:02 AM EST -----  Contact: Patient  Patient called  over the weekend and left message.  She is out of blood thinner as of Sunday.  Please advise.     warfarin (COUMADIN) 3 MG tablet    Patient:  [phone]    Pharmacy:  [facility] [place] [address] [address] AT [facility] - [phone]  - [phone] FX

## 2017-11-28 ENCOUNTER — ANTICOAGULATION VISIT (OUTPATIENT)
Dept: INTERNAL MEDICINE | Facility: CLINIC | Age: 47
End: 2017-11-28

## 2017-11-28 DIAGNOSIS — Z79.01 LONG-TERM (CURRENT) USE OF ANTICOAGULANTS: ICD-10-CM

## 2017-11-28 DIAGNOSIS — I82.401 DEEP VEIN THROMBOSIS (DVT) OF RIGHT LOWER EXTREMITY, UNSPECIFIED CHRONICITY, UNSPECIFIED VEIN (HCC): Primary | ICD-10-CM

## 2017-11-28 LAB — INR PPP: 2.4

## 2017-11-28 PROCEDURE — 85610 PROTHROMBIN TIME: CPT | Performed by: INTERNAL MEDICINE

## 2017-11-28 PROCEDURE — 36416 COLLJ CAPILLARY BLOOD SPEC: CPT | Performed by: INTERNAL MEDICINE

## 2017-12-04 ENCOUNTER — TELEPHONE (OUTPATIENT)
Dept: INTERNAL MEDICINE | Facility: CLINIC | Age: 47
End: 2017-12-04

## 2017-12-04 NOTE — TELEPHONE ENCOUNTER
----- Message from Christy Gorman sent at 12/4/2017 12:03 PM EST -----  Contact: Patient  Patient states she is calling with a general question.  The patient is on warfarin, and wants to know if she missed a dose, should she double up the next day when she takes regular dose.  States she has not missed a dose, but just curious.  Please advise.       Patient:  [phone], please call after 4 when she is off work.  Thanks.

## 2017-12-04 NOTE — TELEPHONE ENCOUNTER
If she misses a dose of warfarin, she should not double up the next day.  She should just take her regular dose.

## 2017-12-21 ENCOUNTER — TELEPHONE (OUTPATIENT)
Dept: INTERNAL MEDICINE | Facility: CLINIC | Age: 47
End: 2017-12-21

## 2017-12-21 NOTE — TELEPHONE ENCOUNTER
----- Message from Christy Gorman sent at 12/21/2017  9:54 AM EST -----  Contact: Patient   Patient called with a billing issue.  She wants Dr. Salcido to call her because, she states, because if she continues to get bills, she will not be able to have her labs in February.  She is adamant that Dr. Salcido call her.      Patient:  [phone]

## 2017-12-22 ENCOUNTER — ANTICOAGULATION VISIT (OUTPATIENT)
Dept: INTERNAL MEDICINE | Facility: CLINIC | Age: 47
End: 2017-12-22

## 2017-12-22 DIAGNOSIS — Z79.01 LONG-TERM (CURRENT) USE OF ANTICOAGULANTS: ICD-10-CM

## 2017-12-22 DIAGNOSIS — I82.401 DEEP VEIN THROMBOSIS (DVT) OF RIGHT LOWER EXTREMITY, UNSPECIFIED CHRONICITY, UNSPECIFIED VEIN (HCC): Primary | ICD-10-CM

## 2017-12-22 LAB — INR PPP: 2.2

## 2017-12-22 PROCEDURE — 85610 PROTHROMBIN TIME: CPT | Performed by: INTERNAL MEDICINE

## 2017-12-22 PROCEDURE — 36416 COLLJ CAPILLARY BLOOD SPEC: CPT | Performed by: INTERNAL MEDICINE

## 2017-12-27 NOTE — TELEPHONE ENCOUNTER
I am aware of her billing problem and I have been given the information. I have an email to the billing department to check on what exactly is going on. This will be resolved prior to February labs. It is unnecessary to involve Dr. Salcido but I will communicate my findings to her.

## 2018-01-08 ENCOUNTER — TELEPHONE (OUTPATIENT)
Dept: INTERNAL MEDICINE | Facility: CLINIC | Age: 48
End: 2018-01-08

## 2018-01-08 DIAGNOSIS — E78.5 HYPERLIPIDEMIA, UNSPECIFIED HYPERLIPIDEMIA TYPE: Primary | ICD-10-CM

## 2018-01-08 RX ORDER — ICOSAPENT ETHYL 1000 MG/1
2 CAPSULE ORAL DAILY
Qty: 30 CAPSULE | Refills: 0 | COMMUNITY
Start: 2018-01-08 | End: 2018-05-31 | Stop reason: SDUPTHER

## 2018-01-08 NOTE — TELEPHONE ENCOUNTER
----- Message from Nathaly Ken sent at 1/8/2018 12:07 PM EST -----  Contact: pt  Pt would like to see if we have 2018 coupons for  icosapent ethyl (VASCEPA) 1 g capsule capsule    She is out of medication         Pt# [phone]

## 2018-01-22 ENCOUNTER — ANTICOAGULATION VISIT (OUTPATIENT)
Dept: INTERNAL MEDICINE | Facility: CLINIC | Age: 48
End: 2018-01-22

## 2018-01-22 DIAGNOSIS — Z79.01 LONG-TERM (CURRENT) USE OF ANTICOAGULANTS: ICD-10-CM

## 2018-01-22 DIAGNOSIS — I82.401 DEEP VEIN THROMBOSIS (DVT) OF RIGHT LOWER EXTREMITY, UNSPECIFIED CHRONICITY, UNSPECIFIED VEIN (HCC): Primary | ICD-10-CM

## 2018-01-22 LAB — INR PPP: 2.6

## 2018-01-22 PROCEDURE — 36416 COLLJ CAPILLARY BLOOD SPEC: CPT | Performed by: INTERNAL MEDICINE

## 2018-01-22 PROCEDURE — 85610 PROTHROMBIN TIME: CPT | Performed by: INTERNAL MEDICINE

## 2018-02-09 RX ORDER — WARFARIN SODIUM 3 MG/1
TABLET ORAL
Qty: 30 TABLET | Refills: 5 | Status: SHIPPED | OUTPATIENT
Start: 2018-02-09 | End: 2018-07-13 | Stop reason: SDUPTHER

## 2018-02-19 ENCOUNTER — ANTICOAGULATION VISIT (OUTPATIENT)
Dept: INTERNAL MEDICINE | Facility: CLINIC | Age: 48
End: 2018-02-19

## 2018-02-19 DIAGNOSIS — I82.401 DEEP VEIN THROMBOSIS (DVT) OF RIGHT LOWER EXTREMITY, UNSPECIFIED CHRONICITY, UNSPECIFIED VEIN (HCC): Primary | ICD-10-CM

## 2018-02-19 DIAGNOSIS — Z79.01 LONG-TERM (CURRENT) USE OF ANTICOAGULANTS: ICD-10-CM

## 2018-02-19 LAB — INR PPP: 2.4

## 2018-02-19 PROCEDURE — 85610 PROTHROMBIN TIME: CPT | Performed by: INTERNAL MEDICINE

## 2018-02-19 PROCEDURE — 36416 COLLJ CAPILLARY BLOOD SPEC: CPT | Performed by: INTERNAL MEDICINE

## 2018-02-22 DIAGNOSIS — E03.9 HYPOTHYROIDISM: ICD-10-CM

## 2018-02-23 RX ORDER — LEVOTHYROXINE SODIUM 0.05 MG/1
TABLET ORAL
Qty: 30 TABLET | Refills: 0 | Status: SHIPPED | OUTPATIENT
Start: 2018-02-23 | End: 2018-03-29 | Stop reason: SDUPTHER

## 2018-03-26 ENCOUNTER — TELEPHONE (OUTPATIENT)
Dept: INTERNAL MEDICINE | Facility: CLINIC | Age: 48
End: 2018-03-26

## 2018-03-26 NOTE — TELEPHONE ENCOUNTER
----- Message from Christy Gorman sent at 3/26/2018 10:14 AM EDT -----  Contact: Patient   Patient called requesting a note stating she can only wear her black tennis shoes to work.      Patient has hx of right fibula fracture and DVT, and has ankle edema, and this is the only footwear she can tolerate.  Work requires a doctor's note due to the dress code.  Please advise.  Mail to the patient please.      Patient:  [phone] - after 4 PM  [address]   [facility] [address]

## 2018-03-29 DIAGNOSIS — E03.9 HYPOTHYROIDISM: ICD-10-CM

## 2018-03-29 RX ORDER — LEVOTHYROXINE SODIUM 0.05 MG/1
TABLET ORAL
Qty: 30 TABLET | Refills: 5 | Status: SHIPPED | OUTPATIENT
Start: 2018-03-29 | End: 2018-06-20 | Stop reason: SDUPTHER

## 2018-04-26 ENCOUNTER — TELEPHONE (OUTPATIENT)
Dept: INTERNAL MEDICINE | Facility: CLINIC | Age: 48
End: 2018-04-26

## 2018-04-26 NOTE — TELEPHONE ENCOUNTER
----- Message from Nasreen De La O sent at 4/26/2018 12:19 PM EDT -----  Contact: Patient  Patient calling about Hep A wants to know if its safe for her to take because she is on blood thinners. Please advise:    Patient:[phone]

## 2018-05-21 RX ORDER — ICOSAPENT ETHYL 1000 MG/1
CAPSULE ORAL
Qty: 60 CAPSULE | Refills: 3 | Status: SHIPPED | OUTPATIENT
Start: 2018-05-21 | End: 2018-09-28 | Stop reason: SDUPTHER

## 2018-05-31 ENCOUNTER — OFFICE VISIT (OUTPATIENT)
Dept: INTERNAL MEDICINE | Facility: CLINIC | Age: 48
End: 2018-05-31

## 2018-05-31 VITALS
SYSTOLIC BLOOD PRESSURE: 108 MMHG | BODY MASS INDEX: 43.41 KG/M2 | DIASTOLIC BLOOD PRESSURE: 70 MMHG | WEIGHT: 245 LBS | HEIGHT: 63 IN

## 2018-05-31 DIAGNOSIS — E03.9 HYPOTHYROIDISM, ADULT: ICD-10-CM

## 2018-05-31 DIAGNOSIS — R73.01 ELEVATED FASTING GLUCOSE: ICD-10-CM

## 2018-05-31 DIAGNOSIS — E78.2 MIXED HYPERLIPIDEMIA: Primary | ICD-10-CM

## 2018-05-31 DIAGNOSIS — Z79.01 LONG-TERM (CURRENT) USE OF ANTICOAGULANTS: ICD-10-CM

## 2018-05-31 DIAGNOSIS — I82.401 DEEP VEIN THROMBOSIS (DVT) OF RIGHT LOWER EXTREMITY, UNSPECIFIED CHRONICITY, UNSPECIFIED VEIN (HCC): ICD-10-CM

## 2018-05-31 LAB
ALBUMIN SERPL-MCNC: 3.8 G/DL (ref 3.5–5.2)
ALBUMIN/GLOB SERPL: 1.1 G/DL
ALP SERPL-CCNC: 76 U/L (ref 39–117)
ALT SERPL W P-5'-P-CCNC: 44 U/L (ref 1–33)
ANION GAP SERPL CALCULATED.3IONS-SCNC: 14 MMOL/L
AST SERPL-CCNC: 49 U/L (ref 1–32)
BILIRUB SERPL-MCNC: 0.3 MG/DL (ref 0.1–1.2)
BUN BLD-MCNC: 14 MG/DL (ref 6–20)
BUN/CREAT SERPL: 20.9 (ref 7–25)
CALCIUM SPEC-SCNC: 9 MG/DL (ref 8.6–10.5)
CHLORIDE SERPL-SCNC: 104 MMOL/L (ref 98–107)
CHOLEST SERPL-MCNC: 206 MG/DL (ref 0–200)
CO2 SERPL-SCNC: 23 MMOL/L (ref 22–29)
CREAT BLD-MCNC: 0.67 MG/DL (ref 0.57–1)
GFR SERPL CREATININE-BSD FRML MDRD: 94 ML/MIN/1.73
GLOBULIN UR ELPH-MCNC: 3.5 GM/DL
GLUCOSE BLD-MCNC: 126 MG/DL (ref 65–99)
HBA1C MFR BLD: 6.64 % (ref 4.8–5.6)
HDLC SERPL-MCNC: 28 MG/DL (ref 40–60)
LDLC SERPL CALC-MCNC: 117 MG/DL (ref 0–100)
LDLC/HDLC SERPL: 4.16 {RATIO}
POTASSIUM BLD-SCNC: 4.1 MMOL/L (ref 3.5–5.2)
PROT SERPL-MCNC: 7.3 G/DL (ref 6–8.5)
SODIUM BLD-SCNC: 141 MMOL/L (ref 136–145)
TRIGL SERPL-MCNC: 307 MG/DL (ref 0–150)
TSH SERPL DL<=0.05 MIU/L-ACNC: 2.22 MIU/ML (ref 0.27–4.2)
VLDLC SERPL-MCNC: 61.4 MG/DL (ref 5–40)

## 2018-05-31 PROCEDURE — 99213 OFFICE O/P EST LOW 20 MIN: CPT | Performed by: INTERNAL MEDICINE

## 2018-05-31 PROCEDURE — 84443 ASSAY THYROID STIM HORMONE: CPT | Performed by: INTERNAL MEDICINE

## 2018-05-31 PROCEDURE — 80053 COMPREHEN METABOLIC PANEL: CPT | Performed by: INTERNAL MEDICINE

## 2018-05-31 PROCEDURE — 80061 LIPID PANEL: CPT | Performed by: INTERNAL MEDICINE

## 2018-05-31 PROCEDURE — 83036 HEMOGLOBIN GLYCOSYLATED A1C: CPT | Performed by: INTERNAL MEDICINE

## 2018-05-31 NOTE — PROGRESS NOTES
Chief Complaint   Patient presents with   • Hyperlipidemia     follow up   • Hypothyroidism       Subjective   Li Kimball is a 47 y.o. female.     Hyperlipidemia   This is a chronic problem. Recent lipid tests were reviewed and are variable. Exacerbating diseases include hypothyroidism and obesity. There are no known factors aggravating her hyperlipidemia. Associated symptoms include shortness of breath. Pertinent negatives include no chest pain. Current antihyperlipidemic treatment includes herbal therapy. Compliance problems: She is only taking Astepro once a day.  The capsules are large and difficult to swallow.  Risk factors for coronary artery disease include dyslipidemia, obesity and a sedentary lifestyle.   Hypothyroidism   Pertinent negatives include no chest pain or coughing.         Hypothyroidism: Current treatment levothyroxine. She is compliant with the medication, tolerates it well and reports good control of symptoms of hypothyroidism.  She has not noted any change in tolerance of heat or cold.  No change in hair or skin.  No constipation.  No symptoms of hyperthyroidism.        She is only taking Vascepa once a day.  Capsules are too big. Sometimes feels short of breath not associated with any specific time or activity.  Work is stressful and she has headaches at work, not at home. She has nose bleeds.    Elevated fasting glucose:  She  has had elevated fasting glucose in the past.  She denies polyuria, polydipsia, vision change appetite change, unexplained weight loss.   Lab Results   Component Value Date    HGBA1C 6.80 (H) 09/28/2017        The following portions of the patient's history were reviewed and updated as appropriate: allergies, current medications, past family history, past medical history, past social history, past surgical history and problem list.    Review of Systems   Constitutional: Negative for appetite change.   HENT: Positive for nosebleeds.    Eyes: Negative for blurred  "vision and double vision.   Respiratory: Positive for shortness of breath. Negative for cough and wheezing.         She has not noted any specific precipitating, aggravating or relieving factors in regard to shortness of breath.   Cardiovascular: Negative for chest pain, palpitations and leg swelling.   Psychiatric/Behavioral: Negative for sleep disturbance.         Current Outpatient Prescriptions:   •  levothyroxine (SYNTHROID, LEVOTHROID) 50 MCG tablet, TAKE 1 TABLET BY MOUTH DAILY, Disp: 30 tablet, Rfl: 5  •  VASCEPA 1 g capsule capsule, TAKE 1 CAPSULE BY MOUTH TWICE DAILY, Disp: 60 capsule, Rfl: 3  •  warfarin (COUMADIN) 3 MG tablet, TAKE 1 TABLET BY MOUTH DAILY, Disp: 30 tablet, Rfl: 5        Objective     /70   Ht 160 cm (63\")   Wt 111 kg (245 lb)   BMI 43.40 kg/m²     Physical Exam   Constitutional: She is oriented to person, place, and time. She appears well-developed and well-nourished. No distress.   Neck: Normal carotid pulses present. Carotid bruit is not present.   Cardiovascular: Regular rhythm, S1 normal and S2 normal.  Exam reveals no gallop and no friction rub.    No murmur heard.  Pulses:       Carotid pulses are 2+ on the right side, and 2+ on the left side.  Pulmonary/Chest: Effort normal and breath sounds normal. She has no wheezes. She has no rhonchi. She has no rales. Chest wall is not dull to percussion.   Musculoskeletal: She exhibits no edema.   Neurological: She is alert and oriented to person, place, and time.   Skin: Skin is warm and dry.   Nursing note and vitals reviewed.        Assessment/Plan   Li was seen today for hyperlipidemia and hypothyroidism.    Diagnoses and all orders for this visit:    Mixed hyperlipidemia    Hypothyroidism, adult  -     Comprehensive Metabolic Panel; Future  -     Lipid Panel; Future  -     TSH Rfx On Abnormal To Free T4; Future  -     Comprehensive Metabolic Panel  -     Lipid Panel  -     TSH Rfx On Abnormal To Free T4    Elevated fasting " glucose  -     Hemoglobin A1c; Future  -     Hemoglobin A1c    Deep vein thrombosis (DVT) of right lower extremity, unspecified chronicity, unspecified vein    Long-term (current) use of anticoagulants  -     Cancel: POCT INR

## 2018-06-08 ENCOUNTER — TELEPHONE (OUTPATIENT)
Dept: INTERNAL MEDICINE | Facility: CLINIC | Age: 48
End: 2018-06-08

## 2018-06-08 NOTE — TELEPHONE ENCOUNTER
Discussed with her.  Advised her hemoglobin A1c was in the diabetic range.  Encouraged her to follow prudent diet, avoiding sweets, refined carbohydrates.  Triglyceride level was high.  She is trying to take Vascepa twice a day.

## 2018-06-08 NOTE — TELEPHONE ENCOUNTER
Patient has received labs in mail and has questions about her blood sugar and cholesterol wanted to discuss with you! (Dr Salcido), I tried to ask questions but she is insistent

## 2018-06-08 NOTE — TELEPHONE ENCOUNTER
----- Message from Nathaly Ken sent at 6/7/2018 10:23 AM EDT -----  Contact: pt  Pt would like a call tomorrow regarding her lab results.      Pt# Phone:  H:[phone]

## 2018-06-19 ENCOUNTER — OFFICE VISIT (OUTPATIENT)
Dept: INTERNAL MEDICINE | Facility: CLINIC | Age: 48
End: 2018-06-19

## 2018-06-19 VITALS
DIASTOLIC BLOOD PRESSURE: 80 MMHG | HEART RATE: 90 BPM | OXYGEN SATURATION: 98 % | BODY MASS INDEX: 43.05 KG/M2 | SYSTOLIC BLOOD PRESSURE: 126 MMHG | WEIGHT: 243 LBS

## 2018-06-19 DIAGNOSIS — Z86.718 HISTORY OF DVT OF LOWER EXTREMITY: Primary | ICD-10-CM

## 2018-06-19 DIAGNOSIS — H81.13 BENIGN PAROXYSMAL POSITIONAL VERTIGO DUE TO BILATERAL VESTIBULAR DISORDER: ICD-10-CM

## 2018-06-19 DIAGNOSIS — Z86.711 HISTORY OF PULMONARY EMBOLUS (PE): ICD-10-CM

## 2018-06-19 DIAGNOSIS — G43.909 MIGRAINE WITHOUT STATUS MIGRAINOSUS, NOT INTRACTABLE, UNSPECIFIED MIGRAINE TYPE: ICD-10-CM

## 2018-06-19 DIAGNOSIS — E03.9 HYPOTHYROIDISM, ADULT: ICD-10-CM

## 2018-06-19 PROCEDURE — 99214 OFFICE O/P EST MOD 30 MIN: CPT | Performed by: NURSE PRACTITIONER

## 2018-06-19 NOTE — PROGRESS NOTES
Subjective   Li Kimball is a 47 y.o. female who presents for fu regarding history of DVT with bilateral leg/right ankle pain.    She has had 2 ankle surgeries in the past, one in 2014 and on in 2015. She developed a DVT and PE post-operatively and is managed on life-long Coumadin. She is limited on her ability to stand for long periods of time and/or repetitively bend, twist. Her job position is changing which will require her to stand and/or lift and she is concerned about her abilities.      Ankle Pain    The pain is present in the right ankle. The quality of the pain is described as aching. The pain is mild. Associated symptoms include an inability to bear weight (for long periods of time) and a loss of motion. Treatments tried: has had surgery x2.        The following portions of the patient's history were reviewed and updated as appropriate: allergies, current medications, past social history and problem list.    Past Medical History:   Diagnosis Date   • Asthma    • BPPV (benign paroxysmal positional vertigo)    • DVT (deep venous thrombosis)     Right   • Hypothyroid    • Migraines    • Mixed hyperlipidemia          Current Outpatient Prescriptions:   •  levothyroxine (SYNTHROID, LEVOTHROID) 50 MCG tablet, Take 1 tablet by mouth Daily., Disp: 30 tablet, Rfl: 5  •  VASCEPA 1 g capsule capsule, TAKE 1 CAPSULE BY MOUTH TWICE DAILY, Disp: 60 capsule, Rfl: 3  •  warfarin (COUMADIN) 3 MG tablet, TAKE 1 TABLET BY MOUTH DAILY, Disp: 30 tablet, Rfl: 5    Allergies   Allergen Reactions   • Codeine        Review of Systems   Constitutional: Negative for chills, fatigue, fever and unexpected weight change.   HENT: Negative for congestion, ear pain, postnasal drip, sinus pressure, sore throat and trouble swallowing.    Eyes: Negative for visual disturbance.   Respiratory: Negative for cough, chest tightness and wheezing.    Cardiovascular: Negative for chest pain, palpitations and leg swelling.   Gastrointestinal:  Negative for abdominal pain, blood in stool, nausea and vomiting.   Genitourinary: Negative for dysuria, frequency and urgency.   Musculoskeletal: Positive for arthralgias and myalgias. Negative for joint swelling.   Skin: Negative for color change.   Neurological: Positive for weakness. Negative for syncope and headaches.   Hematological: Does not bruise/bleed easily.       Objective   Vitals:    06/19/18 1337   BP: 126/80   BP Location: Left arm   Patient Position: Sitting   Cuff Size: Large Adult   Pulse: 90   SpO2: 98%   Weight: 110 kg (243 lb)     Physical Exam   Constitutional: She appears well-developed and well-nourished. She is cooperative. She does not have a sickly appearance. She does not appear ill.   Pt is crying during office visit and is visibly upset   HENT:   Head: Normocephalic.   Right Ear: Hearing, tympanic membrane and external ear normal.   Left Ear: Hearing, tympanic membrane and external ear normal.   Nose: Nose normal. No mucosal edema, rhinorrhea, sinus tenderness or nasal deformity. Right sinus exhibits no maxillary sinus tenderness and no frontal sinus tenderness. Left sinus exhibits no maxillary sinus tenderness and no frontal sinus tenderness.   Mouth/Throat: Oropharynx is clear and moist and mucous membranes are normal. Normal dentition.   Eyes: Conjunctivae and lids are normal. Right eye exhibits no discharge and no exudate. Left eye exhibits no discharge and no exudate.   Neck: Trachea normal and normal range of motion. Carotid bruit is not present. No edema present. No thyroid mass and no thyromegaly present.   Cardiovascular: Regular rhythm, normal heart sounds and normal pulses.    No murmur heard.  Pulmonary/Chest: Breath sounds normal. No respiratory distress. She has no decreased breath sounds. She has no wheezes. She has no rhonchi. She has no rales.   Lymphadenopathy:        Head (right side): No submental, no submandibular, no tonsillar, no preauricular, no posterior  auricular and no occipital adenopathy present.        Head (left side): No submental, no submandibular, no tonsillar, no preauricular, no posterior auricular and no occipital adenopathy present.   Neurological: She is alert.   Skin: Skin is warm, dry and intact. No cyanosis. Nails show no clubbing.       Assessment/Plan   Diagnoses and all orders for this visit:    History of DVT of lower extremity  Comments:  managed on Coumadin    History of pulmonary embolus (PE)    Migraine without status migrainosus, not intractable, unspecified migraine type  Comments:  reports increased headaches due to increased anxiety re: job    Hypothyroidism, adult  Comments:  TSH 2.2 on 5/2018  Orders:  -     levothyroxine (SYNTHROID, LEVOTHROID) 50 MCG tablet; Take 1 tablet by mouth Daily.    Benign paroxysmal positional vertigo due to bilateral vestibular disorder    She has been well-managed on current medication, limited on her ability to stand for long periods of time. I will dictate letter for her employer.

## 2018-06-20 RX ORDER — LEVOTHYROXINE SODIUM 0.05 MG/1
50 TABLET ORAL DAILY
Qty: 30 TABLET | Refills: 5
Start: 2018-06-20 | End: 2018-11-07 | Stop reason: SDUPTHER

## 2018-07-05 ENCOUNTER — TELEPHONE (OUTPATIENT)
Dept: ORTHOPEDIC SURGERY | Facility: CLINIC | Age: 48
End: 2018-07-05

## 2018-07-05 NOTE — TELEPHONE ENCOUNTER
PATIENT SIGNED A RELEASE FOR RECORDS AND WAS TO COME AND , PATIENT CALLED BACK TODAY AND NOW WANTS RECORDS MAILED, PUT RECORDS IN MAIL TODAY 7-5-18.

## 2018-07-16 RX ORDER — WARFARIN SODIUM 3 MG/1
TABLET ORAL
Qty: 30 TABLET | Refills: 5 | Status: SHIPPED | OUTPATIENT
Start: 2018-07-16 | End: 2018-12-17 | Stop reason: SDUPTHER

## 2018-08-31 DIAGNOSIS — E03.9 HYPOTHYROIDISM: ICD-10-CM

## 2018-08-31 RX ORDER — LEVOTHYROXINE SODIUM 0.05 MG/1
TABLET ORAL
Qty: 90 TABLET | Refills: 1 | Status: SHIPPED | OUTPATIENT
Start: 2018-08-31 | End: 2018-12-21 | Stop reason: SDUPTHER

## 2018-09-05 ENCOUNTER — TELEPHONE (OUTPATIENT)
Dept: INTERNAL MEDICINE | Facility: CLINIC | Age: 48
End: 2018-09-05

## 2018-09-05 NOTE — TELEPHONE ENCOUNTER
----- Message from Aruna Thomson sent at 9/5/2018  8:41 AM EDT -----  Contact: pt - Dr Salcido' pt - RE: pt's Rx's / medication // Changed // covered  Pt calling and would like a return call regarding her medication. She informs has new job, new insurance. She informs is out of medication and pharmacy will not release Rx's to pt. Pharmacy informs called in as 90 day not 30 day, also Rx's are very expensive as [employer] covered to where BC/BS is not covering - due to Blood Pressure Rx is needing a PA. She informs that PA for was faxed to office to be completed. Could you please call either pharmacy or pt to discuss how pt can get Rx's released, changed covered? Please advise. Thanks    [facility] [address] [place] [address] [address] AT [facility] - [phone] [facility] [phone] FX    Pt # [phone] before 12 noon

## 2018-09-05 NOTE — TELEPHONE ENCOUNTER
I have informed patient I will put PA in that we just received request today. Pa has been approved [id number], pharmacy notified cost for patient will be $9 and I have left message on vm for patient

## 2018-09-26 ENCOUNTER — HOSPITAL ENCOUNTER (EMERGENCY)
Facility: HOSPITAL | Age: 48
Discharge: HOME | End: 2018-09-26
Attending: EMERGENCY MEDICINE | Admitting: EMERGENCY MEDICINE

## 2018-09-26 ENCOUNTER — APPOINTMENT (OUTPATIENT)
Dept: CT IMAGING | Facility: HOSPITAL | Age: 48
End: 2018-09-26

## 2018-09-26 VITALS
HEART RATE: 84 BPM | RESPIRATION RATE: 16 BRPM | BODY MASS INDEX: 39.05 KG/M2 | TEMPERATURE: 97.6 F | SYSTOLIC BLOOD PRESSURE: 131 MMHG | WEIGHT: 243 LBS | HEIGHT: 66 IN | DIASTOLIC BLOOD PRESSURE: 77 MMHG | OXYGEN SATURATION: 97 %

## 2018-09-26 DIAGNOSIS — R10.32 LEFT LOWER QUADRANT PAIN: Primary | ICD-10-CM

## 2018-09-26 DIAGNOSIS — N20.0 KIDNEY STONE ON LEFT SIDE: ICD-10-CM

## 2018-09-26 LAB
ALBUMIN SERPL-MCNC: 4.1 G/DL (ref 3.5–5.2)
ALBUMIN/GLOB SERPL: 1.1 G/DL
ALP SERPL-CCNC: 79 U/L (ref 39–117)
ALT SERPL W P-5'-P-CCNC: 47 U/L (ref 1–33)
ANION GAP SERPL CALCULATED.3IONS-SCNC: 12.7 MMOL/L
AST SERPL-CCNC: 44 U/L (ref 1–32)
BACTERIA UR QL AUTO: ABNORMAL /HPF
BASOPHILS # BLD AUTO: 0.03 10*3/MM3 (ref 0–0.2)
BASOPHILS NFR BLD AUTO: 0.3 % (ref 0–1.5)
BILIRUB SERPL-MCNC: 0.4 MG/DL (ref 0.1–1.2)
BILIRUB UR QL STRIP: NEGATIVE
BUN BLD-MCNC: 15 MG/DL (ref 6–20)
BUN/CREAT SERPL: 17.4 (ref 7–25)
CALCIUM SPEC-SCNC: 9.1 MG/DL (ref 8.6–10.5)
CHLORIDE SERPL-SCNC: 104 MMOL/L (ref 98–107)
CLARITY UR: CLEAR
CO2 SERPL-SCNC: 24.3 MMOL/L (ref 22–29)
COLOR UR: YELLOW
CREAT BLD-MCNC: 0.86 MG/DL (ref 0.57–1)
DEPRECATED RDW RBC AUTO: 46 FL (ref 37–54)
EOSINOPHIL # BLD AUTO: 0.07 10*3/MM3 (ref 0–0.7)
EOSINOPHIL NFR BLD AUTO: 0.6 % (ref 0.3–6.2)
ERYTHROCYTE [DISTWIDTH] IN BLOOD BY AUTOMATED COUNT: 14.1 % (ref 11.7–13)
GFR SERPL CREATININE-BSD FRML MDRD: 70 ML/MIN/1.73
GLOBULIN UR ELPH-MCNC: 3.8 GM/DL
GLUCOSE BLD-MCNC: 153 MG/DL (ref 65–99)
GLUCOSE UR STRIP-MCNC: NEGATIVE MG/DL
HCG SERPL QL: NEGATIVE
HCT VFR BLD AUTO: 42 % (ref 35.6–45.5)
HGB BLD-MCNC: 13.1 G/DL (ref 11.9–15.5)
HGB UR QL STRIP.AUTO: ABNORMAL
HOLD SPECIMEN: NORMAL
HYALINE CASTS UR QL AUTO: ABNORMAL /LPF
IMM GRANULOCYTES # BLD: 0.03 10*3/MM3 (ref 0–0.03)
IMM GRANULOCYTES NFR BLD: 0.3 % (ref 0–0.5)
INR PPP: 2.91 (ref 0.9–1.1)
KETONES UR QL STRIP: NEGATIVE
LEUKOCYTE ESTERASE UR QL STRIP.AUTO: NEGATIVE
LIPASE SERPL-CCNC: 40 U/L (ref 13–60)
LYMPHOCYTES # BLD AUTO: 1.8 10*3/MM3 (ref 0.9–4.8)
LYMPHOCYTES NFR BLD AUTO: 15.8 % (ref 19.6–45.3)
MCH RBC QN AUTO: 27.8 PG (ref 26.9–32)
MCHC RBC AUTO-ENTMCNC: 31.2 G/DL (ref 32.4–36.3)
MCV RBC AUTO: 89 FL (ref 80.5–98.2)
MONOCYTES # BLD AUTO: 0.65 10*3/MM3 (ref 0.2–1.2)
MONOCYTES NFR BLD AUTO: 5.7 % (ref 5–12)
NEUTROPHILS # BLD AUTO: 8.82 10*3/MM3 (ref 1.9–8.1)
NEUTROPHILS NFR BLD AUTO: 77.3 % (ref 42.7–76)
NITRITE UR QL STRIP: NEGATIVE
PH UR STRIP.AUTO: 6.5 [PH] (ref 5–8)
PLATELET # BLD AUTO: 282 10*3/MM3 (ref 140–500)
PMV BLD AUTO: 9.9 FL (ref 6–12)
POTASSIUM BLD-SCNC: 4.1 MMOL/L (ref 3.5–5.2)
PROT SERPL-MCNC: 7.9 G/DL (ref 6–8.5)
PROT UR QL STRIP: NEGATIVE
PROTHROMBIN TIME: 29.9 SECONDS (ref 11.7–14.2)
RBC # BLD AUTO: 4.72 10*6/MM3 (ref 3.9–5.2)
RBC # UR: ABNORMAL /HPF
REF LAB TEST METHOD: ABNORMAL
SODIUM BLD-SCNC: 141 MMOL/L (ref 136–145)
SP GR UR STRIP: >=1.03 (ref 1–1.03)
SQUAMOUS #/AREA URNS HPF: ABNORMAL /HPF
UROBILINOGEN UR QL STRIP: ABNORMAL
WBC NRBC COR # BLD: 11.4 10*3/MM3 (ref 4.5–10.7)
WBC UR QL AUTO: ABNORMAL /HPF
WHOLE BLOOD HOLD SPECIMEN: NORMAL
WHOLE BLOOD HOLD SPECIMEN: NORMAL

## 2018-09-26 PROCEDURE — 74177 CT ABD & PELVIS W/CONTRAST: CPT

## 2018-09-26 PROCEDURE — 81001 URINALYSIS AUTO W/SCOPE: CPT | Performed by: NURSE PRACTITIONER

## 2018-09-26 PROCEDURE — 85025 COMPLETE CBC W/AUTO DIFF WBC: CPT | Performed by: NURSE PRACTITIONER

## 2018-09-26 PROCEDURE — 96374 THER/PROPH/DIAG INJ IV PUSH: CPT

## 2018-09-26 PROCEDURE — 25010000002 ONDANSETRON PER 1 MG: Performed by: NURSE PRACTITIONER

## 2018-09-26 PROCEDURE — 84703 CHORIONIC GONADOTROPIN ASSAY: CPT | Performed by: NURSE PRACTITIONER

## 2018-09-26 PROCEDURE — 25010000002 HYDROMORPHONE PER 4 MG: Performed by: NURSE PRACTITIONER

## 2018-09-26 PROCEDURE — 83690 ASSAY OF LIPASE: CPT | Performed by: NURSE PRACTITIONER

## 2018-09-26 PROCEDURE — 96361 HYDRATE IV INFUSION ADD-ON: CPT

## 2018-09-26 PROCEDURE — 25010000002 IOPAMIDOL 61 % SOLUTION: Performed by: EMERGENCY MEDICINE

## 2018-09-26 PROCEDURE — 85610 PROTHROMBIN TIME: CPT | Performed by: NURSE PRACTITIONER

## 2018-09-26 PROCEDURE — 96375 TX/PRO/DX INJ NEW DRUG ADDON: CPT

## 2018-09-26 PROCEDURE — 80053 COMPREHEN METABOLIC PANEL: CPT | Performed by: NURSE PRACTITIONER

## 2018-09-26 PROCEDURE — 99284 EMERGENCY DEPT VISIT MOD MDM: CPT

## 2018-09-26 RX ORDER — HYDROCODONE BITARTRATE AND ACETAMINOPHEN 5; 325 MG/1; MG/1
1 TABLET ORAL EVERY 6 HOURS PRN
Qty: 9 TABLET | Refills: 0 | Status: SHIPPED | OUTPATIENT
Start: 2018-09-26 | End: 2018-11-07

## 2018-09-26 RX ORDER — SODIUM CHLORIDE 0.9 % (FLUSH) 0.9 %
10 SYRINGE (ML) INJECTION AS NEEDED
Status: DISCONTINUED | OUTPATIENT
Start: 2018-09-26 | End: 2018-09-26 | Stop reason: HOSPADM

## 2018-09-26 RX ORDER — ONDANSETRON 2 MG/ML
4 INJECTION INTRAMUSCULAR; INTRAVENOUS ONCE
Status: COMPLETED | OUTPATIENT
Start: 2018-09-26 | End: 2018-09-26

## 2018-09-26 RX ORDER — ONDANSETRON 4 MG/1
4 TABLET, ORALLY DISINTEGRATING ORAL EVERY 8 HOURS PRN
Qty: 10 TABLET | Refills: 0 | Status: SHIPPED | OUTPATIENT
Start: 2018-09-26 | End: 2018-11-07

## 2018-09-26 RX ORDER — TAMSULOSIN HYDROCHLORIDE 0.4 MG/1
1 CAPSULE ORAL NIGHTLY
Qty: 7 CAPSULE | Refills: 0 | Status: SHIPPED | OUTPATIENT
Start: 2018-09-26 | End: 2018-10-03

## 2018-09-26 RX ADMIN — HYDROMORPHONE HYDROCHLORIDE 0.5 MG: 1 INJECTION, SOLUTION INTRAMUSCULAR; INTRAVENOUS; SUBCUTANEOUS at 10:45

## 2018-09-26 RX ADMIN — ONDANSETRON 4 MG: 2 INJECTION INTRAMUSCULAR; INTRAVENOUS at 10:45

## 2018-09-26 RX ADMIN — IOPAMIDOL 85 ML: 612 INJECTION, SOLUTION INTRAVENOUS at 11:30

## 2018-09-26 RX ADMIN — SODIUM CHLORIDE 500 ML: 9 INJECTION, SOLUTION INTRAVENOUS at 10:39

## 2018-10-01 RX ORDER — ICOSAPENT ETHYL 1000 MG/1
CAPSULE ORAL
Qty: 60 CAPSULE | Refills: 5 | Status: SHIPPED | OUTPATIENT
Start: 2018-10-01 | End: 2018-11-14 | Stop reason: SDUPTHER

## 2018-10-16 ENCOUNTER — ANTICOAGULATION VISIT (OUTPATIENT)
Dept: INTERNAL MEDICINE | Facility: CLINIC | Age: 48
End: 2018-10-16

## 2018-10-16 ENCOUNTER — CLINICAL SUPPORT (OUTPATIENT)
Dept: INTERNAL MEDICINE | Facility: CLINIC | Age: 48
End: 2018-10-16

## 2018-10-16 DIAGNOSIS — Z79.01 LONG TERM (CURRENT) USE OF ANTICOAGULANTS: ICD-10-CM

## 2018-10-16 DIAGNOSIS — I82.401 DEEP VEIN THROMBOSIS (DVT) OF RIGHT LOWER EXTREMITY, UNSPECIFIED CHRONICITY, UNSPECIFIED VEIN (HCC): Primary | ICD-10-CM

## 2018-10-16 DIAGNOSIS — Z23 NEED FOR INFLUENZA VACCINATION: Primary | ICD-10-CM

## 2018-10-16 DIAGNOSIS — I82.401 DEEP VEIN THROMBOSIS (DVT) OF RIGHT LOWER EXTREMITY, UNSPECIFIED CHRONICITY, UNSPECIFIED VEIN (HCC): ICD-10-CM

## 2018-10-16 PROCEDURE — 90674 CCIIV4 VAC NO PRSV 0.5 ML IM: CPT | Performed by: INTERNAL MEDICINE

## 2018-10-16 PROCEDURE — 85610 PROTHROMBIN TIME: CPT | Performed by: INTERNAL MEDICINE

## 2018-10-16 PROCEDURE — 90471 IMMUNIZATION ADMIN: CPT | Performed by: INTERNAL MEDICINE

## 2018-10-16 PROCEDURE — 36416 COLLJ CAPILLARY BLOOD SPEC: CPT | Performed by: INTERNAL MEDICINE

## 2018-10-25 LAB — INR PPP: 2.4 (ref 0.9–1.1)

## 2018-11-07 ENCOUNTER — OFFICE VISIT (OUTPATIENT)
Dept: INTERNAL MEDICINE | Facility: CLINIC | Age: 48
End: 2018-11-07

## 2018-11-07 ENCOUNTER — ANTICOAGULATION VISIT (OUTPATIENT)
Dept: INTERNAL MEDICINE | Facility: CLINIC | Age: 48
End: 2018-11-07

## 2018-11-07 ENCOUNTER — APPOINTMENT (OUTPATIENT)
Dept: WOMENS IMAGING | Facility: HOSPITAL | Age: 48
End: 2018-11-07

## 2018-11-07 VITALS
DIASTOLIC BLOOD PRESSURE: 78 MMHG | HEIGHT: 63 IN | BODY MASS INDEX: 43.05 KG/M2 | HEART RATE: 95 BPM | SYSTOLIC BLOOD PRESSURE: 126 MMHG | WEIGHT: 243 LBS | OXYGEN SATURATION: 98 %

## 2018-11-07 DIAGNOSIS — Z79.01 LONG TERM (CURRENT) USE OF ANTICOAGULANTS: ICD-10-CM

## 2018-11-07 DIAGNOSIS — I82.401 DEEP VEIN THROMBOSIS (DVT) OF RIGHT LOWER EXTREMITY, UNSPECIFIED CHRONICITY, UNSPECIFIED VEIN (HCC): Primary | ICD-10-CM

## 2018-11-07 DIAGNOSIS — M25.561 ACUTE PAIN OF RIGHT KNEE: Primary | ICD-10-CM

## 2018-11-07 LAB — INR PPP: 2.4 (ref 0.9–1.1)

## 2018-11-07 PROCEDURE — 85610 PROTHROMBIN TIME: CPT | Performed by: INTERNAL MEDICINE

## 2018-11-07 PROCEDURE — 73560 X-RAY EXAM OF KNEE 1 OR 2: CPT | Performed by: NURSE PRACTITIONER

## 2018-11-07 PROCEDURE — 73560 X-RAY EXAM OF KNEE 1 OR 2: CPT | Performed by: RADIOLOGY

## 2018-11-07 PROCEDURE — 36416 COLLJ CAPILLARY BLOOD SPEC: CPT | Performed by: INTERNAL MEDICINE

## 2018-11-07 PROCEDURE — 99213 OFFICE O/P EST LOW 20 MIN: CPT | Performed by: NURSE PRACTITIONER

## 2018-11-07 PROCEDURE — 93793 ANTICOAG MGMT PT WARFARIN: CPT | Performed by: INTERNAL MEDICINE

## 2018-11-07 NOTE — PATIENT INSTRUCTIONS
Knee Pain, Adult  Knee pain in adults is common. It can be caused by many things, including:  · Arthritis.  · A fluid-filled sac (cyst) or growth in your knee.  · An infection in your knee.  · An injury that will not heal.  · Damage, swelling, or irritation of the tissues that support your knee.    Knee pain is usually not a sign of a serious problem. The pain may go away on its own with time and rest. If it does not, a health care provider may order tests to find the cause of the pain. These may include:  · Imaging tests, such as an X-ray, MRI, or ultrasound.  · Joint aspiration. In this test, fluid is removed from the knee.  · Arthroscopy. In this test, a lighted tube is inserted into knee and an image is projected onto a TV screen.  · A biopsy. In this test, a sample of tissue is removed from the body and studied under a microscope.    Follow these instructions at home:  Pay attention to any changes in your symptoms. Take these actions to relieve your pain.  Activity  · Rest your knee.  · Do not do things that cause pain or make pain worse.  · Avoid high-impact activities or exercises, such as running, jumping rope, or doing jumping jacks.  General instructions  · Take over-the-counter and prescription medicines only as told by your health care provider.  · Raise (elevate) your knee above the level of your heart when you are sitting or lying down.  · Sleep with a pillow under your knee.  · If directed, apply ice to the knee:  ? Put ice in a plastic bag.  ? Place a towel between your skin and the bag.  ? Leave the ice on for 20 minutes, 2-3 times a day.  · Ask your health care provider if you should wear an elastic knee support.  · Lose weight if you are overweight. Extra weight can put pressure on your knee.  · Do not use any products that contain nicotine or tobacco, such as cigarettes and e-cigarettes. Smoking may slow the healing of any bone and joint problems that you may have. If you need help quitting, ask  your health care provider.  Contact a health care provider if:  · Your knee pain continues, changes, or gets worse.  · You have a fever along with knee pain.  · Your knee jonh or locks up.  · Your knee swells, and the swelling becomes worse.  Get help right away if:  · Your knee feels warm to the touch.  · You cannot move your knee.  · You have severe pain in your knee.  · You have chest pain.  · You have trouble breathing.  Summary  · Knee pain in adults is common. It can be caused by many things, including, arthritis, infection, cysts, or injury.  · Knee pain is usually not a sign of a serious problem, but if it does not go away, a health care provider may perform tests to know the cause of the pain.  · Pay attention to any changes in your symptoms. Relieve your pain with rest, medicines, light activity, and use of ice.  · Get help if your pain continues or becomes very severe, or if your knee jonh or locks up, or if you have chest pain or trouble breathing.  This information is not intended to replace advice given to you by your health care provider. Make sure you discuss any questions you have with your health care provider.  Document Released: 10/14/2008 Document Revised: 12/08/2017 Document Reviewed: 12/08/2017  [employer] Interactive Patient Education © 2018 [employer]

## 2018-11-07 NOTE — PROGRESS NOTES
Subjective   Li Kimball is a 48 y.o. female.     She reports no injury to the knee.      Knee Pain    The incident occurred 5 to 7 days ago. There was no injury mechanism. The pain is present in the right knee. The quality of the pain is described as aching. The pain is at a severity of 8/10. The pain is moderate. The pain has been constant since onset. Pertinent negatives include no numbness or tingling. The symptoms are aggravated by weight bearing (bending ). She has tried ice (excedrin, aspercreme, heat, ice ) for the symptoms. The treatment provided mild relief.        The following portions of the patient's history were reviewed and updated as appropriate: allergies, current medications, past family history, past medical history, past social history, past surgical history and problem list.    Review of Systems   Constitutional: Negative for activity change, appetite change, fatigue and fever.   Respiratory: Negative for cough, shortness of breath and wheezing.    Cardiovascular: Negative for chest pain, palpitations and leg swelling.   Musculoskeletal: Positive for arthralgias (right knee ). Negative for joint swelling.   Neurological: Negative for tingling and numbness.       Objective   Physical Exam   Constitutional: She is oriented to person, place, and time. She appears well-developed and well-nourished.   HENT:   Head: Normocephalic.   Nose: Nose normal.   Cardiovascular: Regular rhythm and normal heart sounds.  Exam reveals no S3 and no S4.    No murmur heard.  Pulmonary/Chest: Effort normal and breath sounds normal. She has no decreased breath sounds. She has no wheezes. She has no rhonchi. She has no rales.   Musculoskeletal: She exhibits no edema.        Right knee: She exhibits decreased range of motion. She exhibits no swelling and no erythema. Tenderness found. Patellar tendon tenderness noted.        Left knee: She exhibits normal range of motion, no swelling and no erythema. No tenderness  found. No medial joint line tenderness noted.   No redness, streaking or swelling noted  Pain along patella with extension and flexion of knee    Neurological: She is alert and oriented to person, place, and time. Gait normal.   Reflex Scores:       Patellar reflexes are 2+ on the right side and 2+ on the left side.  Skin: Skin is warm and dry.   Psychiatric: She has a normal mood and affect.       Assessment/Plan   Li was seen today for knee pain.    Diagnoses and all orders for this visit:    Acute pain of right knee  -     XR Knee AP & Lateral  -     diclofenac (VOLTAREN) 1 % gel gel; Apply 4 g topically to the appropriate area as directed 2 (Two) Times a Day.    Xray of knee with mild degenerative changes. Sent for final review. She apply brace for support. Will try topical diclofenac and tylenol prn. If symptoms persist will refer to ortho.

## 2018-11-09 ENCOUNTER — TELEPHONE (OUTPATIENT)
Dept: INTERNAL MEDICINE | Facility: CLINIC | Age: 48
End: 2018-11-09

## 2018-11-09 DIAGNOSIS — M25.561 ACUTE PAIN OF RIGHT KNEE: Primary | ICD-10-CM

## 2018-11-09 NOTE — TELEPHONE ENCOUNTER
Ms. Kimball was given her results today of her x-ray and she asked that Dr. Salcido give her a call as she questions if she has a blood clot.     She stated she hasn't taken any Tylenol only Excedrin Migraine for her knee pain and the diclofenac gel.

## 2018-11-09 NOTE — TELEPHONE ENCOUNTER
Discussed with her.  Recommended PT. She does not think that would help.  She will continue current conservative treatment.  Refer to ortho.

## 2018-11-09 NOTE — TELEPHONE ENCOUNTER
----- Message from Nasreen De La O sent at 11/9/2018 10:27 AM EST -----  Contact: Patient  Patient would like a call back from Dr. Salcido regarding her results from her x-rays and Would like to know if you can get a blood clot being on blood thinner? Patient is in a lot of pain and would like a call as soon as possible. Please advise    Patient:[phone]

## 2018-11-14 RX ORDER — ICOSAPENT ETHYL 1000 MG/1
1 CAPSULE ORAL 2 TIMES DAILY
Qty: 60 CAPSULE | Refills: 5 | Status: SHIPPED | OUTPATIENT
Start: 2018-11-14 | End: 2019-01-17

## 2018-11-14 NOTE — TELEPHONE ENCOUNTER
Please ask her to make an appointment to review knee pain and have labs done. She will need to fast for labs.    ----- Message from Nasreen De La O sent at 11/14/2018  9:18 AM EST -----  Contact: Patient  Patient Calling and would like to talk to Dr. Salcido about her prescription VASCEPA 1 g capsule capsule and her knee pain. Patient would also like to Have labs done. Please advise    Patient:[phone]

## 2018-11-15 ENCOUNTER — TELEPHONE (OUTPATIENT)
Dept: INTERNAL MEDICINE | Facility: CLINIC | Age: 48
End: 2018-11-15

## 2018-11-15 NOTE — TELEPHONE ENCOUNTER
----- Message from Aruna Thomson sent at 11/15/2018  2:07 PM EST -----  Contact: pt - Dr Salcido' pt - RE: return call  Pt calling and would like a return call regarding discussing the med's that are causing joint pain. She would like to discuss other Rx's she can take.    Also, she would like to discuss having labs drawn. She would like to have sugar, cholesterol, etc. Could you please call pt to discuss? Please advise. Thanks    Pt is wanting to have all this done before insurance expires. Could you please call pt to discuss?    Patient:[phone]

## 2018-11-16 DIAGNOSIS — E03.9 HYPOTHYROIDISM, ADULT: ICD-10-CM

## 2018-11-16 DIAGNOSIS — R73.01 ELEVATED FASTING GLUCOSE: ICD-10-CM

## 2018-11-16 DIAGNOSIS — E78.2 MIXED HYPERLIPIDEMIA: Primary | ICD-10-CM

## 2018-11-16 NOTE — TELEPHONE ENCOUNTER
Patient states her insurance is expiring, she needs to come early one morning for labs and then see Dr Salcido. Her insurance ends at the end of November. She would like to be seen and have all labs before then. Please advise when you can see patient. Patient states earlier the better as she can not fast for a long period of time

## 2018-11-16 NOTE — TELEPHONE ENCOUNTER
I put orders in her chart for blood work.  She will need to have a lab appointment.  Okay to schedule her for an  appointment sometime after that.

## 2018-11-19 ENCOUNTER — OFFICE VISIT (OUTPATIENT)
Dept: ORTHOPEDIC SURGERY | Facility: CLINIC | Age: 48
End: 2018-11-19

## 2018-11-19 VITALS — BODY MASS INDEX: 43.05 KG/M2 | HEIGHT: 63 IN | WEIGHT: 243 LBS

## 2018-11-19 DIAGNOSIS — M17.10 ARTHRITIS OF KNEE: Primary | ICD-10-CM

## 2018-11-19 DIAGNOSIS — M70.41 PREPATELLAR BURSITIS OF RIGHT KNEE: ICD-10-CM

## 2018-11-19 PROCEDURE — 99203 OFFICE O/P NEW LOW 30 MIN: CPT | Performed by: ORTHOPAEDIC SURGERY

## 2018-11-19 PROCEDURE — 20610 DRAIN/INJ JOINT/BURSA W/O US: CPT | Performed by: ORTHOPAEDIC SURGERY

## 2018-11-19 RX ADMIN — LIDOCAINE HYDROCHLORIDE 2 ML: 20 INJECTION, SOLUTION EPIDURAL; INFILTRATION; INTRACAUDAL; PERINEURAL at 10:56

## 2018-11-19 RX ADMIN — METHYLPREDNISOLONE ACETATE 80 MG: 80 INJECTION, SUSPENSION INTRA-ARTICULAR; INTRALESIONAL; INTRAMUSCULAR; SOFT TISSUE at 10:56

## 2018-11-19 NOTE — PROGRESS NOTES
"  Patient: Li Kimball    YOB: 1970    Medical Record Number: [id number]    Chief Complaints:  Right knee pain    History of Present Illness:     48 y.o. female patient who presents for evaluation of her right knee pain.  Reports pain began approximately 3 weeks ago without injury or precipitating event.  She describes the pain as mild to moderate, constant, aching, and sharp with movement.  Pain is worse with driving and quick movements.  She gets mild temporary relief from using ice, diclofenac gel, and straightening the leg.  She states, \"it feels like it is going to give out\".  She has associated feeling of tightness, but denies swelling, clicking, or popping.  Overall, she feels the pain is steadily improving.  She has Voltaren gel at home.  She admits that she has not been using that diligently.    Allergies:   Allergies   Allergen Reactions   • Codeine        Home Medications:      Current Outpatient Medications:   •  icosapent ethyl (VASCEPA) 1 g capsule capsule, Take 1 g by mouth 2 (Two) Times a Day., Disp: 60 capsule, Rfl: 5  •  levothyroxine (SYNTHROID, LEVOTHROID) 50 MCG tablet, TAKE 1 TABLET BY MOUTH DAILY, Disp: 90 tablet, Rfl: 1  •  warfarin (COUMADIN) 3 MG tablet, TAKE 1 TABLET BY MOUTH DAILY, Disp: 30 tablet, Rfl: 5    Past Medical History:   Diagnosis Date   • Asthma    • BPPV (benign paroxysmal positional vertigo)    • DVT (deep venous thrombosis) ([facility])     Right   • Hypothyroid    • Migraines    • Mixed hyperlipidemia        Past Surgical History:   Procedure Laterality Date   • ANKLE SURGERY Right 2015    Removal of hardware   • FOOT SURGERY Left 2006    Fracture , postop DVT and PE   • LAPAROSCOPIC TUBAL LIGATION     • ORIF ANKLE FRACTURE Right 2014    DVT and PE perioperatively   • VENA CAVA FILTER INSERTION  2014       Social History     Occupational History   • Not on file   Tobacco Use   • Smoking status: Never Smoker   • Smokeless tobacco: Never Used   Substance and " "Sexual Activity   • Alcohol use: No   • Drug use: No   • Sexual activity: Not on file      Social History     Social History Narrative   • Not on file       Family History   Problem Relation Age of Onset   • Other Mother         Vertigo       Review of Systems:      Constitutional: Denies fever, shaking or chills   Eyes: Denies change in visual acuity   HEENT: Denies nasal congestion or sore throat   Respiratory: Denies cough or shortness of breath   Cardiovascular: Denies chest pain or edema  Endocrine: Denies tremors, palpitations, intolerance of heat or cold, polyuria, polydipsia.  GI: Denies abdominal pain, nausea, vomiting, bloody stools or diarrhea  : Denies frequency, urgency, incontinence, retention, or nocturia.  Musculoskeletal: Denies numbness, tingling or loss of motor function except as above  Integument: Denies rash, lesion or ulceration   Neurologic: Denies headache or focal weakness, deficits  Heme: Denies spontaneous or excessive bleeding, epistaxis, hematuria, melena, fatigue, enlarged or tender lymph nodes.      All other pertinent positives and negatives as noted above in HPI.    Physical Exam: 48 y.o. female    Vitals:    11/19/18 1010   Weight: 110 kg (243 lb)   Height: 160 cm (62.99\")   Heart rate: 80  Respiratory rate: 12    General:  Patient is awake and alert.  Appears in no acute distress or discomfort.    Psych:  Affect and demeanor are appropriate.    Eyes:  Conjunctiva and sclera appear grossly normal.  Eyes track well and EOM seem to be intact.    Ears:  No gross abnormalities.  Hearing adequate for the exam.    Cardiovascular:  Regular rate and rhythm.    Lungs:  Good chest expansion.  Breathing unlabored.    Lymph:  No palpable masses or adenopathy in the affected extremity    Extremities:  Right knee:  Skin is benign.  No gross abnormalities on inspection.  No palpable masses or adenopathy.  No effusion.  Mild to moderate medial joint line tenderness.  Mild anterior tenderness " over the prepatellar bursa where she has some subtle crepitus.  Full motion.  There is discomfort with hyperflexion.  No instability.  Medial and lateral Jose Roberto's both cause her mild discomfort but neither test is frankly positive.  Good strength with hip flexion, knee extension, ankle and toe plantarflexion and dorsiflexion.  Sensation is intact distally.  Brisk capillary refill in the toes with good skin turgor.  Palpable pedal pulses.         Radiology:   Report for right knee AP and lateral x-rays is reviewed.  No images available. She did not want the xrays repeated.  The xrays are read as negative.  The patient tells me that these were non-weight bearing xrays.    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  She has some anterior crepitus and clearly has a component of prepatellar bursitis.  She also seems to have some synovitis of the joint.  I recommend that we try an injection for the knee.  She has a history of clots and thrombophilia and would be potentially high risk with any surgery.  We should do everything we can to avoid surgery for her.  She agrees.  The risks, benefits and alternatives to an injection were discussed.  She consented.  I encouraged her to use the Voltaren gel for the prepatellar bursitis.  If the injection fails to provide her significant relief and her symptoms persist, I told her I'll be happy to see her back and reevaluate.      MD Dena Hoang, APRN    11/19/2018    CC to Jacinta Salcido MD    Large Joint Arthrocentesis: R knee  Date/Time: 11/19/2018 10:56 AM  Consent given by: patient  Site marked: site marked  Timeout: Immediately prior to procedure a time out was called to verify the correct patient, procedure, equipment, support staff and site/side marked as required   Supporting Documentation  Indications: pain and joint swelling    Procedure Details  Location: knee - R knee  Preparation: Patient was prepped and draped in the usual sterile fashion  Needle gauge: 21 g.  Approach: anterolateral  Medications administered: 2 mL lidocaine PF 2% 2 %; 80 mg methylPREDNISolone acetate 80 MG/ML  Patient tolerance: patient tolerated the procedure well with no immediate complications

## 2018-11-20 ENCOUNTER — LAB (OUTPATIENT)
Dept: INTERNAL MEDICINE | Facility: CLINIC | Age: 48
End: 2018-11-20

## 2018-11-20 DIAGNOSIS — E78.2 MIXED HYPERLIPIDEMIA: ICD-10-CM

## 2018-11-20 DIAGNOSIS — R73.01 ELEVATED FASTING GLUCOSE: ICD-10-CM

## 2018-11-20 DIAGNOSIS — E03.9 HYPOTHYROIDISM, ADULT: ICD-10-CM

## 2018-11-20 LAB
ALBUMIN SERPL-MCNC: 4 G/DL (ref 3.5–5.2)
ALBUMIN/GLOB SERPL: 1 G/DL
ALP SERPL-CCNC: 79 U/L (ref 39–117)
ALT SERPL-CCNC: 39 U/L (ref 1–33)
AST SERPL-CCNC: 35 U/L (ref 1–32)
BILIRUB SERPL-MCNC: 0.3 MG/DL (ref 0.1–1.2)
BUN SERPL-MCNC: 13 MG/DL (ref 6–20)
BUN/CREAT SERPL: 19.1 (ref 7–25)
CALCIUM SERPL-MCNC: 9.8 MG/DL (ref 8.6–10.5)
CHLORIDE SERPL-SCNC: 103 MMOL/L (ref 98–107)
CHOLEST SERPL-MCNC: 236 MG/DL (ref 0–200)
CO2 SERPL-SCNC: 28.4 MMOL/L (ref 22–29)
CREAT SERPL-MCNC: 0.68 MG/DL (ref 0.57–1)
DEPRECATED RDW RBC AUTO: 46.3 FL (ref 37–54)
ERYTHROCYTE [DISTWIDTH] IN BLOOD BY AUTOMATED COUNT: 14.6 % (ref 11.5–15)
GLOBULIN SER CALC-MCNC: 3.9 GM/DL
GLUCOSE SERPL-MCNC: 130 MG/DL (ref 65–99)
HBA1C MFR BLD: 6.49 % (ref 4.8–5.6)
HCT VFR BLD AUTO: 40 % (ref 34.1–44.9)
HDLC SERPL-MCNC: 30 MG/DL (ref 40–60)
HGB BLD-MCNC: 13 G/DL (ref 11.2–15.7)
LDLC SERPL CALC-MCNC: 146 MG/DL (ref 0–100)
MCH RBC QN AUTO: 28.6 PG (ref 26–34)
MCHC RBC AUTO-ENTMCNC: 32.5 G/DL (ref 31–37)
MCV RBC AUTO: 87.9 FL (ref 80–100)
PLATELET # BLD AUTO: 327 10*3/MM3 (ref 150–450)
PMV BLD AUTO: 9.9 FL (ref 6–12)
POTASSIUM SERPL-SCNC: 4.3 MMOL/L (ref 3.5–5.2)
PROT SERPL-MCNC: 7.9 G/DL (ref 6–8.5)
RBC # BLD AUTO: 4.55 10*6/MM3 (ref 3.93–5.22)
SODIUM SERPL-SCNC: 141 MMOL/L (ref 136–145)
TRIGL SERPL-MCNC: 298 MG/DL (ref 0–150)
TSH SERPL-ACNC: 1.03 MIU/ML (ref 0.27–4.2)
VLDLC SERPL-MCNC: 59.6 MG/DL (ref 5–40)
WBC NRBC COR # BLD: 5.86 10*3/MM3 (ref 5–10)

## 2018-11-20 PROCEDURE — 85027 COMPLETE CBC AUTOMATED: CPT | Performed by: INTERNAL MEDICINE

## 2018-11-20 RX ORDER — LIDOCAINE HYDROCHLORIDE 20 MG/ML
2 INJECTION, SOLUTION EPIDURAL; INFILTRATION; INTRACAUDAL; PERINEURAL
Status: COMPLETED | OUTPATIENT
Start: 2018-11-19 | End: 2018-11-19

## 2018-11-20 RX ORDER — METHYLPREDNISOLONE ACETATE 80 MG/ML
80 INJECTION, SUSPENSION INTRA-ARTICULAR; INTRALESIONAL; INTRAMUSCULAR; SOFT TISSUE
Status: COMPLETED | OUTPATIENT
Start: 2018-11-19 | End: 2018-11-19

## 2018-12-07 ENCOUNTER — ANTICOAGULATION VISIT (OUTPATIENT)
Dept: INTERNAL MEDICINE | Facility: CLINIC | Age: 48
End: 2018-12-07

## 2018-12-07 DIAGNOSIS — I82.401 DEEP VEIN THROMBOSIS (DVT) OF RIGHT LOWER EXTREMITY, UNSPECIFIED CHRONICITY, UNSPECIFIED VEIN (HCC): Primary | ICD-10-CM

## 2018-12-07 DIAGNOSIS — Z79.01 LONG TERM (CURRENT) USE OF ANTICOAGULANTS: ICD-10-CM

## 2018-12-07 LAB — INR PPP: 2.5 (ref 0.9–1.1)

## 2018-12-07 PROCEDURE — 36416 COLLJ CAPILLARY BLOOD SPEC: CPT | Performed by: INTERNAL MEDICINE

## 2018-12-07 PROCEDURE — 85610 PROTHROMBIN TIME: CPT | Performed by: INTERNAL MEDICINE

## 2018-12-17 RX ORDER — WARFARIN SODIUM 3 MG/1
TABLET ORAL
Qty: 30 TABLET | Refills: 3 | Status: SHIPPED | OUTPATIENT
Start: 2018-12-17 | End: 2019-04-07 | Stop reason: SDUPTHER

## 2018-12-21 ENCOUNTER — TELEPHONE (OUTPATIENT)
Dept: INTERNAL MEDICINE | Facility: CLINIC | Age: 48
End: 2018-12-21

## 2018-12-21 DIAGNOSIS — E03.9 HYPOTHYROIDISM: ICD-10-CM

## 2018-12-21 RX ORDER — LEVOTHYROXINE SODIUM 0.05 MG/1
50 TABLET ORAL DAILY
Qty: 90 TABLET | Refills: 0 | Status: SHIPPED | OUTPATIENT
Start: 2018-12-21 | End: 2019-05-04 | Stop reason: SDUPTHER

## 2018-12-21 NOTE — TELEPHONE ENCOUNTER
----- Message from Nathaly Ken sent at 12/21/2018  9:16 AM EST -----  Contact: pt  Pt is calling for a refill     FOR  levothyroxine (SYNTHROID, LEVOTHROID) 50 MCG tablet---- w/ refills?      Patient requests RX SENT TO   [facility] [address] [facility] [address] AT [facility] - [phone] [facility] [phone] FX      Caller# [phone]     Please and thank you.

## 2018-12-21 NOTE — TELEPHONE ENCOUNTER
Only 90 days given to pt with no refills. Pt is overdue foe f/u giuliana with pcp, last OV 05/31 .

## 2019-01-08 ENCOUNTER — ANTICOAGULATION VISIT (OUTPATIENT)
Dept: INTERNAL MEDICINE | Facility: CLINIC | Age: 49
End: 2019-01-08

## 2019-01-08 DIAGNOSIS — I82.401 DEEP VEIN THROMBOSIS (DVT) OF RIGHT LOWER EXTREMITY, UNSPECIFIED CHRONICITY, UNSPECIFIED VEIN (HCC): Primary | ICD-10-CM

## 2019-01-08 DIAGNOSIS — Z79.01 LONG TERM (CURRENT) USE OF ANTICOAGULANTS: ICD-10-CM

## 2019-01-08 LAB — INR PPP: 2 (ref 0.9–1.1)

## 2019-01-08 PROCEDURE — 93793 ANTICOAG MGMT PT WARFARIN: CPT | Performed by: INTERNAL MEDICINE

## 2019-01-08 PROCEDURE — 85610 PROTHROMBIN TIME: CPT | Performed by: INTERNAL MEDICINE

## 2019-01-08 PROCEDURE — 36416 COLLJ CAPILLARY BLOOD SPEC: CPT | Performed by: INTERNAL MEDICINE

## 2019-01-16 ENCOUNTER — TELEPHONE (OUTPATIENT)
Dept: INTERNAL MEDICINE | Facility: CLINIC | Age: 49
End: 2019-01-16

## 2019-01-16 NOTE — TELEPHONE ENCOUNTER
, I called pharmacy for more info, VASCEPA does not need PA, pt got new insurance this year and only will pay $10 and she has to pay $160 for one month supply. Pt not welling to pay that amount at this point, I offered samples but she said she will run out of it eventually and need more, so she is asking for you to change it.  Please change her VASCEPA .

## 2019-01-16 NOTE — TELEPHONE ENCOUNTER
----- Message from Aruna Thomson sent at 1/14/2019 10:10 AM EST -----  Contact: pt - Dr Salcido' pt - RE: new medication  Pt calling and would like a return call regarding Rx - icosapent ethyl (VASCEPA) 1 g capsule capsule. She informs has new insurance, [employer]. She informs that insurance will not cover and was informed by pharmacy that Rx is $160.00. She informs can not afford. She would like to know if there is a discount card for this medication.     her new insurance will only cover $10 of,   icosapent ethyl (VASCEPA) 1 g capsule capsule   Possibly ROS[facility] jean, alternative.     Pt # [phone]

## 2019-01-17 ENCOUNTER — TELEPHONE (OUTPATIENT)
Dept: INTERNAL MEDICINE | Facility: CLINIC | Age: 49
End: 2019-01-17

## 2019-01-17 RX ORDER — OMEGA-3-ACID ETHYL ESTERS 1 G/1
2 CAPSULE, LIQUID FILLED ORAL 2 TIMES DAILY
Qty: 120 CAPSULE | Refills: 5 | Status: SHIPPED | OUTPATIENT
Start: 2019-01-17 | End: 2020-01-10

## 2019-01-17 NOTE — TELEPHONE ENCOUNTER
----- Message from Nasreen De La O sent at 1/17/2019 11:31 AM EST -----  Contact: Patient  Patient calling states she talked to her insurance company and the lovaza is not covered, but the generic omga3 acid is covered. If this is ok to take she would like a prescription sent to the pharmacy. Please advise    Patient:[phone]    Pharmacy:[facility] [address] [place] [address] CARL ENCISO AT [facility] - [phone] [facility] [phone]

## 2019-01-29 ENCOUNTER — TELEPHONE (OUTPATIENT)
Dept: INTERNAL MEDICINE | Facility: CLINIC | Age: 49
End: 2019-01-29

## 2019-01-29 NOTE — TELEPHONE ENCOUNTER
----- Message from Nathaly Ken sent at 1/29/2019  9:00 AM EST -----  Contact: Pt   Pt will need to have a note for work regarding shoe wear due to ankles.  Her new employer needs an updated copy of a Dr signed note.      Pt# [phone]     Okay to mail.

## 2019-02-07 ENCOUNTER — ANTICOAGULATION VISIT (OUTPATIENT)
Dept: INTERNAL MEDICINE | Facility: CLINIC | Age: 49
End: 2019-02-07

## 2019-02-07 DIAGNOSIS — Z79.01 LONG TERM (CURRENT) USE OF ANTICOAGULANTS: ICD-10-CM

## 2019-02-07 DIAGNOSIS — I82.401 DEEP VEIN THROMBOSIS (DVT) OF RIGHT LOWER EXTREMITY, UNSPECIFIED CHRONICITY, UNSPECIFIED VEIN (HCC): Primary | ICD-10-CM

## 2019-02-07 LAB — INR PPP: 1.7 (ref 0.9–1.1)

## 2019-02-07 PROCEDURE — 93793 ANTICOAG MGMT PT WARFARIN: CPT | Performed by: INTERNAL MEDICINE

## 2019-02-07 PROCEDURE — 36416 COLLJ CAPILLARY BLOOD SPEC: CPT | Performed by: INTERNAL MEDICINE

## 2019-02-07 PROCEDURE — 85610 PROTHROMBIN TIME: CPT | Performed by: INTERNAL MEDICINE

## 2019-02-26 ENCOUNTER — ANTICOAGULATION VISIT (OUTPATIENT)
Dept: INTERNAL MEDICINE | Facility: CLINIC | Age: 49
End: 2019-02-26

## 2019-02-26 DIAGNOSIS — Z79.01 LONG TERM (CURRENT) USE OF ANTICOAGULANTS: ICD-10-CM

## 2019-02-26 DIAGNOSIS — I82.401 DEEP VEIN THROMBOSIS (DVT) OF RIGHT LOWER EXTREMITY, UNSPECIFIED CHRONICITY, UNSPECIFIED VEIN (HCC): Primary | ICD-10-CM

## 2019-02-26 LAB — INR PPP: 3.1 (ref 0.9–1.1)

## 2019-02-26 PROCEDURE — 36416 COLLJ CAPILLARY BLOOD SPEC: CPT | Performed by: INTERNAL MEDICINE

## 2019-02-26 PROCEDURE — 85610 PROTHROMBIN TIME: CPT | Performed by: INTERNAL MEDICINE

## 2019-03-20 ENCOUNTER — ANTICOAGULATION VISIT (OUTPATIENT)
Dept: INTERNAL MEDICINE | Facility: CLINIC | Age: 49
End: 2019-03-20

## 2019-03-20 DIAGNOSIS — Z79.01 LONG TERM (CURRENT) USE OF ANTICOAGULANTS: ICD-10-CM

## 2019-03-20 DIAGNOSIS — I82.401 DEEP VEIN THROMBOSIS (DVT) OF RIGHT LOWER EXTREMITY, UNSPECIFIED CHRONICITY, UNSPECIFIED VEIN (HCC): Primary | ICD-10-CM

## 2019-03-20 LAB — INR PPP: 2.3 (ref 0.9–1.1)

## 2019-03-20 PROCEDURE — 85610 PROTHROMBIN TIME: CPT | Performed by: INTERNAL MEDICINE

## 2019-03-20 PROCEDURE — 36416 COLLJ CAPILLARY BLOOD SPEC: CPT | Performed by: INTERNAL MEDICINE

## 2019-03-20 PROCEDURE — 93793 ANTICOAG MGMT PT WARFARIN: CPT | Performed by: INTERNAL MEDICINE

## 2019-03-29 ENCOUNTER — TELEPHONE (OUTPATIENT)
Dept: INTERNAL MEDICINE | Facility: CLINIC | Age: 49
End: 2019-03-29

## 2019-03-29 NOTE — TELEPHONE ENCOUNTER
----- Message from Aruna Thomson sent at 3/29/2019  9:09 AM EDT -----  Contact: pt - Dr Salcido' pt - RE: new Rx  Pt calling and would like a return call regarding her migraines. She would like to know if Rx: Topamax could be called to her pharmacy. Could you please call pt to discuss? Please advise. Thanks      [facility] [address] [place] [address] CARL ENCISO AT [facility] [phone] [facility] [phone]     Pt # [phone]

## 2019-04-02 ENCOUNTER — TELEPHONE (OUTPATIENT)
Dept: INTERNAL MEDICINE | Facility: CLINIC | Age: 49
End: 2019-04-02

## 2019-04-02 NOTE — TELEPHONE ENCOUNTER
----- Message from Nathaly Ken sent at 4/2/2019  9:10 AM EDT -----  Contact: Pt - KARY  Pt has had a nose bleed yesterday at work and over night and today.  Being that the clinic is almost over this morning and Dr Salcido is out of the office,   Patient was advised to go to urgent care given she is currently on warfarin.    Pt#[phone]

## 2019-04-05 ENCOUNTER — OFFICE VISIT (OUTPATIENT)
Dept: INTERNAL MEDICINE | Facility: CLINIC | Age: 49
End: 2019-04-05

## 2019-04-05 VITALS
WEIGHT: 241 LBS | SYSTOLIC BLOOD PRESSURE: 124 MMHG | BODY MASS INDEX: 42.7 KG/M2 | HEIGHT: 63 IN | DIASTOLIC BLOOD PRESSURE: 88 MMHG | TEMPERATURE: 97.6 F

## 2019-04-05 DIAGNOSIS — I82.402 ACUTE DEEP VEIN THROMBOSIS (DVT) OF LEFT LOWER EXTREMITY, UNSPECIFIED VEIN (HCC): ICD-10-CM

## 2019-04-05 DIAGNOSIS — Z86.718 HISTORY OF DVT OF LOWER EXTREMITY: ICD-10-CM

## 2019-04-05 DIAGNOSIS — R51.9 NONINTRACTABLE HEADACHE, UNSPECIFIED CHRONICITY PATTERN, UNSPECIFIED HEADACHE TYPE: ICD-10-CM

## 2019-04-05 DIAGNOSIS — Z79.01 LONG TERM (CURRENT) USE OF ANTICOAGULANTS: ICD-10-CM

## 2019-04-05 DIAGNOSIS — R04.0 EPISTAXIS, RECURRENT: Primary | ICD-10-CM

## 2019-04-05 LAB
BASOPHILS # BLD AUTO: 0.04 10*3/MM3 (ref 0–0.2)
BASOPHILS NFR BLD AUTO: 0.5 % (ref 0–1.5)
DEPRECATED RDW RBC AUTO: 46.4 FL (ref 37–54)
EOSINOPHIL # BLD AUTO: 0.13 10*3/MM3 (ref 0–0.4)
EOSINOPHIL NFR BLD AUTO: 1.6 % (ref 0.3–6.2)
ERYTHROCYTE [DISTWIDTH] IN BLOOD BY AUTOMATED COUNT: 14.6 % (ref 12.3–15.4)
HCT VFR BLD AUTO: 39.1 % (ref 34–46.6)
HGB BLD-MCNC: 12.4 G/DL (ref 12–15.9)
INR PPP: 2.9 (ref 0.9–1.1)
LYMPHOCYTES # BLD AUTO: 2.83 10*3/MM3 (ref 0.7–3.1)
LYMPHOCYTES NFR BLD AUTO: 35.2 % (ref 19.6–45.3)
MCH RBC QN AUTO: 28.7 PG (ref 26.6–33)
MCHC RBC AUTO-ENTMCNC: 31.7 G/DL (ref 31.5–35.7)
MCV RBC AUTO: 90.5 FL (ref 79–97)
MONOCYTES # BLD AUTO: 0.5 10*3/MM3 (ref 0.1–0.9)
MONOCYTES NFR BLD AUTO: 6.2 % (ref 5–12)
NEUTROPHILS # BLD AUTO: 4.55 10*3/MM3 (ref 1.4–7)
NEUTROPHILS NFR BLD AUTO: 56.5 % (ref 42.7–76)
PLATELET # BLD AUTO: 348 10*3/MM3 (ref 140–450)
PMV BLD AUTO: 9.5 FL (ref 6–12)
RBC # BLD AUTO: 4.32 10*6/MM3 (ref 3.77–5.28)
WBC NRBC COR # BLD: 8.05 10*3/MM3 (ref 3.4–10.8)

## 2019-04-05 PROCEDURE — 85025 COMPLETE CBC W/AUTO DIFF WBC: CPT | Performed by: NURSE PRACTITIONER

## 2019-04-05 PROCEDURE — 99213 OFFICE O/P EST LOW 20 MIN: CPT | Performed by: NURSE PRACTITIONER

## 2019-04-05 PROCEDURE — 36416 COLLJ CAPILLARY BLOOD SPEC: CPT | Performed by: NURSE PRACTITIONER

## 2019-04-05 PROCEDURE — 85610 PROTHROMBIN TIME: CPT | Performed by: NURSE PRACTITIONER

## 2019-04-05 NOTE — PROGRESS NOTES
Subjective   Li Kimball is a 48 y.o. female.     She presents today with nosebleeds and headache that are both occurring daily.  She has been on warfarin long term for prior DVTs.      Headache    This is a new problem. The current episode started 1 to 4 weeks ago. The problem occurs daily. The problem has been unchanged. The pain is located in the bilateral (frontal) region. The quality of the pain is described as band-like and squeezing. The pain is at a severity of 9/10 (at it's worst). Associated symptoms include sinus pressure. Pertinent negatives include no abdominal pain, coughing, fever, nausea, sore throat, swollen glands or vomiting. Associated symptoms comments: congestion. She has tried acetaminophen for the symptoms. The treatment provided significant relief. (On warfarin)   Nose Bleed    The bleeding has been from both nares. This is a new problem. The current episode started in the past 7 days (5 days). The problem occurs daily (2 a day for hours). The problem has been gradually worsening. The bleeding is associated with anticoagulants. Treatments tried: claritin, ocean spray. The treatment provided no relief. on warfarin        The following portions of the patient's history were reviewed and updated as appropriate: allergies, current medications, past family history, past medical history, past social history, past surgical history and problem list.    Review of Systems   Constitutional: Negative for fever.   HENT: Positive for congestion (nasal), nosebleeds, sinus pressure and sinus pain. Negative for postnasal drip and sore throat.    Respiratory: Negative for cough, chest tightness and shortness of breath.    Cardiovascular: Positive for leg swelling. Negative for chest pain and palpitations.   Gastrointestinal: Negative for abdominal pain, nausea and vomiting.   Neurological: Positive for headaches.       Objective   Physical Exam   Constitutional: She appears well-developed and  well-nourished.   HENT:   Head: Normocephalic and atraumatic.   Right Ear: Hearing and tympanic membrane normal.   Left Ear: Hearing and tympanic membrane normal.   Nose: Mucosal edema present. Epistaxis is observed. Right sinus exhibits no maxillary sinus tenderness and no frontal sinus tenderness. Left sinus exhibits no maxillary sinus tenderness and no frontal sinus tenderness.   Cardiovascular: Normal rate, regular rhythm and normal heart sounds.   No murmur heard.  Pulmonary/Chest: Effort normal and breath sounds normal. No respiratory distress.   Musculoskeletal: Normal range of motion.   Neurological: She is alert.   Skin: Skin is warm and dry.   Psychiatric: She has a normal mood and affect. Her behavior is normal. Judgment and thought content normal.   Vitals reviewed.      Assessment/Plan   Li was seen today for headache and nose bleed.    Diagnoses and all orders for this visit:    Epistaxis, recurrent  -     CBC Auto Differential  -   Afrin nasal spray-2 sprays each nostril twice daily x 3 days  -  Use humidifier in bedroom  - Change dosage of warfarin.  Was taking 3 mg x 5 days and 4.5 mg x 2 days. Now take 3 mg x 6 days and 4.5 mg x 1 day.  -  Apply neosporin to nostrils with cotton tip applicator daily.    Acute deep vein thrombosis (DVT) of left lower extremity, unspecified vein (CMS/HCC)  -     Protime-INR, Fingerstick- 2.9 today.  Up from 2.3 on 3/20    Long term (current) use of anticoagulants  -     Protime-INR, Fingerstick; Future  -     Protime-INR, Fingerstick    History of DVT of lower extremity  -     Protime-INR, Fingerstick; Future  -     Protime-INR, Fingerstick    Nonintractable headache, unspecified chronicity pattern, unspecified headache type    - Could be r/t nosebleeds, use Excedrin Migraine with caution due to increased bleeding risk.     She is following up with Dr. Salcido on Monday 4/8.

## 2019-04-08 ENCOUNTER — TELEPHONE (OUTPATIENT)
Dept: INTERNAL MEDICINE | Facility: CLINIC | Age: 49
End: 2019-04-08

## 2019-04-08 ENCOUNTER — OFFICE VISIT (OUTPATIENT)
Dept: INTERNAL MEDICINE | Facility: CLINIC | Age: 49
End: 2019-04-08

## 2019-04-08 VITALS
HEIGHT: 63 IN | DIASTOLIC BLOOD PRESSURE: 74 MMHG | SYSTOLIC BLOOD PRESSURE: 106 MMHG | WEIGHT: 240 LBS | BODY MASS INDEX: 42.52 KG/M2

## 2019-04-08 DIAGNOSIS — R04.0 EPISTAXIS, RECURRENT: Primary | ICD-10-CM

## 2019-04-08 DIAGNOSIS — G43.019 INTRACTABLE MIGRAINE WITHOUT AURA AND WITHOUT STATUS MIGRAINOSUS: ICD-10-CM

## 2019-04-08 PROCEDURE — 99213 OFFICE O/P EST LOW 20 MIN: CPT | Performed by: INTERNAL MEDICINE

## 2019-04-08 RX ORDER — ATORVASTATIN CALCIUM 10 MG/1
10 TABLET, FILM COATED ORAL DAILY
Qty: 30 TABLET | Refills: 3 | Status: SHIPPED | OUTPATIENT
Start: 2019-04-08 | End: 2019-07-28 | Stop reason: SDUPTHER

## 2019-04-08 RX ORDER — TOPIRAMATE 25 MG/1
25 TABLET ORAL NIGHTLY
Qty: 30 TABLET | Refills: 3 | Status: SHIPPED | OUTPATIENT
Start: 2019-04-08 | End: 2019-07-28 | Stop reason: SDUPTHER

## 2019-04-08 RX ORDER — WARFARIN SODIUM 3 MG/1
TABLET ORAL
Qty: 30 TABLET | Refills: 3 | Status: SHIPPED | OUTPATIENT
Start: 2019-04-08 | End: 2019-07-18 | Stop reason: SDUPTHER

## 2019-04-08 NOTE — PROGRESS NOTES
"Chief Complaint   Patient presents with   • Nose Bleed     nose bleeds and headaches       Subjective   Li Kimball is a 48 y.o. female.     History of Present Illness     Nose bleeds stared a week ago.  Start spontaneously.  Warfarin was reduced. INR was 2.9.  Has nasal congestion.  She tried afrin, claritin, humidifier.  She has bleeding from both nostrils. She reports the nose bleeds last several hours.     Migraines:  She has been having migraines for couple of weeks.  Can happen daily across forehead.  No aura. Just starts hurting. She has some photo and phonophobia with the headaches. She sleeps OK.  No diet change. No blurred or double vision. She has been taking excedrin migraine, 2 tablets which usually helps.     The following portions of the patient's history were reviewed and updated as appropriate: allergies, current medications, past family history, past medical history, past social history, past surgical history and problem list.    Review of Systems   Constitutional: Negative for appetite change and fever.   HENT: Negative for postnasal drip and rhinorrhea.    Eyes: Negative for blurred vision and double vision.   Gastrointestinal: Negative for nausea and vomiting.   Neurological: Positive for headache. Negative for dizziness.         Current Outpatient Medications:   •  levothyroxine (SYNTHROID, LEVOTHROID) 50 MCG tablet, Take 1 tablet by mouth Daily., Disp: 90 tablet, Rfl: 0  •  omega-3 acid ethyl esters (LOVAZA) 1 g capsule, Take 2 capsules by mouth 2 (Two) Times a Day., Disp: 120 capsule, Rfl: 5  •  topiramate (TOPAMAX) 25 MG tablet, Take 1 tablet by mouth Every Night., Disp: 30 tablet, Rfl: 3  •  warfarin (COUMADIN) 3 MG tablet, TAKE 1 TABLET BY MOUTH DAILY, Disp: 30 tablet, Rfl: 3        Objective     /74   Ht 158.8 cm (62.5\")   Wt 109 kg (240 lb)   BMI 43.20 kg/m²     Physical Exam   Constitutional: She is oriented to person, place, and time. She appears well-developed and " well-nourished. No distress.   Neck: Normal carotid pulses present. Carotid bruit is not present.   Cardiovascular: Regular rhythm, S1 normal and S2 normal. Exam reveals no gallop and no friction rub.   No murmur heard.  Pulses:       Carotid pulses are 2+ on the right side, and 2+ on the left side.  Pulmonary/Chest: Effort normal and breath sounds normal. She has no wheezes. She has no rhonchi. She has no rales. Chest wall is not dull to percussion.   Musculoskeletal: She exhibits no edema.   Neurological: She is alert and oriented to person, place, and time.   Skin: Skin is warm and dry.   Nursing note and vitals reviewed.        Assessment/Plan   Li was seen today for nose bleed.    Diagnoses and all orders for this visit:    Epistaxis, recurrent  -     Ambulatory Referral to ENT (Otolaryngology)    Intractable migraine without aura and without status migrainosus    Other orders  -     topiramate (TOPAMAX) 25 MG tablet; Take 1 tablet by mouth Every Night.

## 2019-04-08 NOTE — TELEPHONE ENCOUNTER
"----- Message from Juanis Phipps sent at 4/8/2019  2:49 PM EDT -----  Contact: patient  Li Kimball stopped on her way out today.  She talked to Dr. Salcido about her headaches being stressed related during her visit.  She forgot to ask Dr. Salcido about a letter for work stating that she cannot have \"extra stressors at work,\" nothing out of the ordinary, it causes her headaches, makes them worse.  She would like to pick this up tomorrow (Tuesday, 08/09/2019) morning if possible.      Patient call back is:  [phone]    Thank you,    Juanis"

## 2019-04-24 ENCOUNTER — ANTICOAGULATION VISIT (OUTPATIENT)
Dept: INTERNAL MEDICINE | Facility: CLINIC | Age: 49
End: 2019-04-24

## 2019-04-24 DIAGNOSIS — I82.402 ACUTE DEEP VEIN THROMBOSIS (DVT) OF LEFT LOWER EXTREMITY, UNSPECIFIED VEIN (HCC): Primary | ICD-10-CM

## 2019-04-24 DIAGNOSIS — Z79.01 LONG TERM (CURRENT) USE OF ANTICOAGULANTS: ICD-10-CM

## 2019-04-24 LAB — INR PPP: 2.4 (ref 0.9–1.1)

## 2019-04-24 PROCEDURE — 85610 PROTHROMBIN TIME: CPT | Performed by: INTERNAL MEDICINE

## 2019-04-24 PROCEDURE — 36416 COLLJ CAPILLARY BLOOD SPEC: CPT | Performed by: INTERNAL MEDICINE

## 2019-05-04 DIAGNOSIS — E03.9 HYPOTHYROIDISM: ICD-10-CM

## 2019-05-06 RX ORDER — LEVOTHYROXINE SODIUM 0.05 MG/1
TABLET ORAL
Qty: 90 TABLET | Refills: 0 | Status: SHIPPED | OUTPATIENT
Start: 2019-05-06 | End: 2020-01-10 | Stop reason: SDUPTHER

## 2019-05-20 ENCOUNTER — TELEPHONE (OUTPATIENT)
Dept: INTERNAL MEDICINE | Facility: CLINIC | Age: 49
End: 2019-05-20

## 2019-05-20 NOTE — TELEPHONE ENCOUNTER
----- Message from Christy Gorman sent at 5/20/2019  9:23 AM EDT -----  Contact: Patient  Patient called stating insurance will not cover the     omega-3 acid ethyl esters (LOVAZA) 1 g capsule    Patient is still taking the atorvastatin, and would like to know what else she could take in place of Lovaza.  States her mom takes simvastatin 80 and wants to know if this is an option.  Please advise.    Patient:  [phone]; will be at work after 11:30 but can LV    Pharmacy:  [facility] [address] [place] [address] [address] AT [facility] - [phone] [facility] [phone] FX

## 2019-05-22 ENCOUNTER — TELEPHONE (OUTPATIENT)
Dept: INTERNAL MEDICINE | Facility: CLINIC | Age: 49
End: 2019-05-22

## 2019-05-22 NOTE — TELEPHONE ENCOUNTER
She should continue the same dose of atorvastatin.    Her medication list here says she is taking Lovaza not Vascepa,

## 2019-05-22 NOTE — TELEPHONE ENCOUNTER
Dr Salcido patient asking does the atorvastatin need to be increased. She has already taken the vascepa. She would like to speak to you. Is available before 11 am in the morning she works until 8 pm

## 2019-05-22 NOTE — TELEPHONE ENCOUNTER
Patient returning a call that was left on her voice mail.  Patient states she would like to speak with Dr Salcido in regards to her cholesterol medications.  Please contact patient before 11:00am to discuss.    Patient #  [phone]

## 2019-05-22 NOTE — TELEPHONE ENCOUNTER
Please see previous messages.    She should continue atorvastatin.    She was to check to see if her insurance covered Vascepa.

## 2019-05-22 NOTE — TELEPHONE ENCOUNTER
Left message on vm that she is to continue the atorvastatin and check if insurance will cover vascepa

## 2019-05-23 ENCOUNTER — TELEPHONE (OUTPATIENT)
Dept: INTERNAL MEDICINE | Facility: CLINIC | Age: 49
End: 2019-05-23

## 2019-05-23 NOTE — TELEPHONE ENCOUNTER
Dr Salcido I have replied messages to Ms Kimball. She took the vascepa last dose  was October 2018. She wants to be sure the atorvastatin is enough for her Cholesterol without the omega to help. She states she is concerned because her cholesterol is so high. She as well has asked to speak to you.

## 2019-05-24 ENCOUNTER — ANTICOAGULATION VISIT (OUTPATIENT)
Dept: INTERNAL MEDICINE | Facility: CLINIC | Age: 49
End: 2019-05-24

## 2019-05-24 DIAGNOSIS — Z79.01 LONG TERM (CURRENT) USE OF ANTICOAGULANTS: ICD-10-CM

## 2019-05-24 DIAGNOSIS — I82.402 ACUTE DEEP VEIN THROMBOSIS (DVT) OF LEFT LOWER EXTREMITY, UNSPECIFIED VEIN (HCC): Primary | ICD-10-CM

## 2019-05-24 LAB — INR PPP: 2.1 (ref 0.9–1.1)

## 2019-05-24 PROCEDURE — 36416 COLLJ CAPILLARY BLOOD SPEC: CPT | Performed by: INTERNAL MEDICINE

## 2019-05-24 PROCEDURE — 85610 PROTHROMBIN TIME: CPT | Performed by: INTERNAL MEDICINE

## 2019-05-24 NOTE — TELEPHONE ENCOUNTER
I spoke with her.  She needs an appointment to see me in July or August and have fasting lab work.

## 2019-05-24 NOTE — TELEPHONE ENCOUNTER
Dr Salcido has spoke with patient and she needs to be set up for appt in July or August with fasting blood work

## 2019-05-28 DIAGNOSIS — E03.9 HYPOTHYROIDISM: ICD-10-CM

## 2019-05-29 RX ORDER — LEVOTHYROXINE SODIUM 0.05 MG/1
TABLET ORAL
Qty: 90 TABLET | Refills: 1 | Status: SHIPPED | OUTPATIENT
Start: 2019-05-29 | End: 2019-11-17 | Stop reason: SDUPTHER

## 2019-06-13 ENCOUNTER — TELEPHONE (OUTPATIENT)
Dept: INTERNAL MEDICINE | Facility: CLINIC | Age: 49
End: 2019-06-13

## 2019-06-13 NOTE — TELEPHONE ENCOUNTER
----- Message from Nathaly Ken sent at 6/13/2019  9:27 AM EDT -----  Contact: Pt -KARY  Pt is calling regarding BS and Cholesterol.  She is not able to get this until October, due to insurance.      Pt# [phone]

## 2019-06-13 NOTE — TELEPHONE ENCOUNTER
She will need a 30-minute appointment with me in October.  She will need to come fasting and we will do her blood work.

## 2019-06-26 ENCOUNTER — ANTICOAGULATION VISIT (OUTPATIENT)
Dept: INTERNAL MEDICINE | Facility: CLINIC | Age: 49
End: 2019-06-26

## 2019-06-26 DIAGNOSIS — Z79.01 LONG TERM (CURRENT) USE OF ANTICOAGULANTS: ICD-10-CM

## 2019-06-26 DIAGNOSIS — I82.402 ACUTE DEEP VEIN THROMBOSIS (DVT) OF LEFT LOWER EXTREMITY, UNSPECIFIED VEIN (HCC): Primary | ICD-10-CM

## 2019-06-26 LAB — INR PPP: 2 (ref 0.9–1.1)

## 2019-06-26 PROCEDURE — 36416 COLLJ CAPILLARY BLOOD SPEC: CPT | Performed by: INTERNAL MEDICINE

## 2019-06-26 PROCEDURE — 85610 PROTHROMBIN TIME: CPT | Performed by: INTERNAL MEDICINE

## 2019-06-26 PROCEDURE — 93793 ANTICOAG MGMT PT WARFARIN: CPT | Performed by: INTERNAL MEDICINE

## 2019-07-19 RX ORDER — WARFARIN SODIUM 3 MG/1
TABLET ORAL
Qty: 30 TABLET | Refills: 0 | Status: SHIPPED | OUTPATIENT
Start: 2019-07-19 | End: 2019-08-13 | Stop reason: SDUPTHER

## 2019-07-25 ENCOUNTER — ANTICOAGULATION VISIT (OUTPATIENT)
Dept: INTERNAL MEDICINE | Facility: CLINIC | Age: 49
End: 2019-07-25

## 2019-07-25 DIAGNOSIS — Z86.718 HISTORY OF DVT OF LOWER EXTREMITY: Primary | ICD-10-CM

## 2019-07-25 DIAGNOSIS — Z79.01 LONG TERM (CURRENT) USE OF ANTICOAGULANTS: ICD-10-CM

## 2019-07-25 LAB — INR PPP: 3 (ref 0.9–1.1)

## 2019-07-25 PROCEDURE — 36416 COLLJ CAPILLARY BLOOD SPEC: CPT | Performed by: INTERNAL MEDICINE

## 2019-07-25 PROCEDURE — 85610 PROTHROMBIN TIME: CPT | Performed by: INTERNAL MEDICINE

## 2019-07-25 PROCEDURE — 93793 ANTICOAG MGMT PT WARFARIN: CPT | Performed by: INTERNAL MEDICINE

## 2019-07-29 RX ORDER — ATORVASTATIN CALCIUM 10 MG/1
10 TABLET, FILM COATED ORAL DAILY
Qty: 30 TABLET | Refills: 5 | Status: SHIPPED | OUTPATIENT
Start: 2019-07-29 | End: 2020-01-09

## 2019-07-29 RX ORDER — TOPIRAMATE 25 MG/1
25 TABLET ORAL NIGHTLY
Qty: 30 TABLET | Refills: 5 | Status: SHIPPED | OUTPATIENT
Start: 2019-07-29 | End: 2020-01-09

## 2019-08-07 NOTE — TELEPHONE ENCOUNTER
Dr Omari Kimball has not seen you since May 31rst was to come in for 4 month follow up but has been into acute last with Anabelle 11-7-18 for knee pain.   Spoke to patient. He has been taking his Brilinta as prescribed.   Refill sent to his [facility]. Will wait to see if PA is needed.

## 2019-08-13 RX ORDER — WARFARIN SODIUM 3 MG/1
TABLET ORAL
Qty: 30 TABLET | Refills: 0 | Status: SHIPPED | OUTPATIENT
Start: 2019-08-13 | End: 2019-09-08 | Stop reason: SDUPTHER

## 2019-08-29 ENCOUNTER — ANTICOAGULATION VISIT (OUTPATIENT)
Dept: INTERNAL MEDICINE | Facility: CLINIC | Age: 49
End: 2019-08-29

## 2019-08-29 DIAGNOSIS — Z86.718 HISTORY OF DVT OF LOWER EXTREMITY: Primary | ICD-10-CM

## 2019-08-29 DIAGNOSIS — Z79.01 LONG TERM (CURRENT) USE OF ANTICOAGULANTS: ICD-10-CM

## 2019-08-29 LAB — INR PPP: 2.9 (ref 0.9–1.1)

## 2019-08-29 PROCEDURE — 85610 PROTHROMBIN TIME: CPT | Performed by: INTERNAL MEDICINE

## 2019-08-29 PROCEDURE — 36416 COLLJ CAPILLARY BLOOD SPEC: CPT | Performed by: INTERNAL MEDICINE

## 2019-09-09 RX ORDER — WARFARIN SODIUM 3 MG/1
TABLET ORAL
Qty: 30 TABLET | Refills: 5 | Status: SHIPPED | OUTPATIENT
Start: 2019-09-09 | End: 2020-02-24

## 2019-11-17 DIAGNOSIS — E03.9 HYPOTHYROIDISM: ICD-10-CM

## 2019-11-18 RX ORDER — LEVOTHYROXINE SODIUM 0.05 MG/1
TABLET ORAL
Qty: 90 TABLET | Refills: 0 | OUTPATIENT
Start: 2019-11-18

## 2019-11-18 RX ORDER — LEVOTHYROXINE SODIUM 0.05 MG/1
TABLET ORAL
Qty: 90 TABLET | Refills: 0 | Status: SHIPPED | OUTPATIENT
Start: 2019-11-18 | End: 2020-06-02

## 2019-11-26 ENCOUNTER — TELEPHONE (OUTPATIENT)
Dept: INTERNAL MEDICINE | Facility: CLINIC | Age: 49
End: 2019-11-26

## 2019-12-06 ENCOUNTER — ANTICOAGULATION VISIT (OUTPATIENT)
Dept: INTERNAL MEDICINE | Facility: CLINIC | Age: 49
End: 2019-12-06

## 2019-12-06 DIAGNOSIS — Z86.718 HISTORY OF DVT OF LOWER EXTREMITY: Primary | ICD-10-CM

## 2019-12-06 DIAGNOSIS — Z79.01 LONG TERM (CURRENT) USE OF ANTICOAGULANTS: ICD-10-CM

## 2019-12-06 DIAGNOSIS — I82.402 ACUTE DEEP VEIN THROMBOSIS (DVT) OF LEFT LOWER EXTREMITY, UNSPECIFIED VEIN (HCC): ICD-10-CM

## 2019-12-06 LAB — INR PPP: 2.2 (ref 0.9–1.1)

## 2019-12-06 PROCEDURE — 93793 ANTICOAG MGMT PT WARFARIN: CPT | Performed by: INTERNAL MEDICINE

## 2019-12-06 PROCEDURE — 85610 PROTHROMBIN TIME: CPT | Performed by: INTERNAL MEDICINE

## 2019-12-06 PROCEDURE — 36416 COLLJ CAPILLARY BLOOD SPEC: CPT | Performed by: INTERNAL MEDICINE

## 2020-01-02 ENCOUNTER — OFFICE VISIT (OUTPATIENT)
Dept: INTERNAL MEDICINE | Facility: CLINIC | Age: 50
End: 2020-01-02

## 2020-01-02 ENCOUNTER — TELEPHONE (OUTPATIENT)
Dept: INTERNAL MEDICINE | Facility: CLINIC | Age: 50
End: 2020-01-02

## 2020-01-02 VITALS
BODY MASS INDEX: 43.94 KG/M2 | DIASTOLIC BLOOD PRESSURE: 80 MMHG | HEIGHT: 63 IN | TEMPERATURE: 98.1 F | WEIGHT: 248 LBS | SYSTOLIC BLOOD PRESSURE: 142 MMHG | HEART RATE: 99 BPM | OXYGEN SATURATION: 98 %

## 2020-01-02 DIAGNOSIS — G43.019 INTRACTABLE MIGRAINE WITHOUT AURA AND WITHOUT STATUS MIGRAINOSUS: Primary | ICD-10-CM

## 2020-01-02 PROCEDURE — 99213 OFFICE O/P EST LOW 20 MIN: CPT | Performed by: NURSE PRACTITIONER

## 2020-01-02 RX ORDER — FLUTICASONE PROPIONATE 50 MCG
2 SPRAY, SUSPENSION (ML) NASAL DAILY
Qty: 1 BOTTLE | Refills: 3 | Status: SHIPPED | OUTPATIENT
Start: 2020-01-02 | End: 2020-02-01

## 2020-01-02 RX ORDER — SUMATRIPTAN 50 MG/1
TABLET, FILM COATED ORAL
Qty: 2 TABLET | Refills: 0 | Status: SHIPPED | OUTPATIENT
Start: 2020-01-02 | End: 2020-01-10 | Stop reason: SDUPTHER

## 2020-01-02 NOTE — PROGRESS NOTES
"Subjective     Li Kimball is a 49 y.o. female.         Patient presents with:  Headache: on going headache for 9 days    PMH of migraine. Only take topamax. Stopped taking it daily weeks ago but started taking it daily in last 4-5 days. No recent head trauma.    Headache    This is a new problem. Episode onset: 9 days. The problem occurs intermittently. The problem has been unchanged. The pain is located in the right unilateral region. The pain radiates to the right neck. The quality of the pain is described as sharp. Associated symptoms include photophobia. Pertinent negatives include no blurred vision, dizziness, fever, loss of balance, nausea, numbness, sinus pressure or vomiting. Associated symptoms comments: Feels hot. Treatments tried: exedrin migraine. The treatment provided no relief. Her past medical history is significant for migraine headaches.        The following portions of the patient's history were reviewed and updated as appropriate: allergies, current medications, past social history and problem list.    Review of Systems   Constitutional: Positive for fatigue. Negative for fever.   HENT: Negative for postnasal drip, sinus pressure and sinus pain.    Eyes: Positive for photophobia. Negative for blurred vision.   Respiratory: Negative for shortness of breath.    Cardiovascular: Negative for chest pain.   Gastrointestinal: Negative for nausea and vomiting.   Neurological: Positive for headaches. Negative for dizziness, numbness and loss of balance.       Objective     /80   Pulse 99   Temp 98.1 °F (36.7 °C)   Ht 158.8 cm (62.5\")   Wt 112 kg (248 lb)   SpO2 98%   BMI 44.64 kg/m²     Physical Exam   Constitutional: She appears well-developed and well-nourished.   HENT:   Head: Normocephalic and atraumatic.   Nose: Right sinus exhibits frontal sinus tenderness. Right sinus exhibits no maxillary sinus tenderness. Left sinus exhibits no maxillary sinus tenderness and no frontal sinus " tenderness.   Eyes: Pupils are equal, round, and reactive to light. EOM are normal.   Cardiovascular: Normal rate, regular rhythm and normal heart sounds.   No murmur heard.  Pulmonary/Chest: Effort normal and breath sounds normal. No respiratory distress.   Musculoskeletal: Normal range of motion.   Neurological: She is alert. She has normal strength. No cranial nerve deficit. Coordination and gait normal.   Skin: Skin is warm and dry.   Psychiatric: She has a normal mood and affect. Her behavior is normal. Judgment and thought content normal.   Vitals reviewed.      Assessment/Plan     Li was seen today for headache.    Diagnoses and all orders for this visit:    Intractable migraine without aura and without status migrainosus  -     SUMAtriptan (IMITREX) 50 MG tablet; Take one tablet at onset of headache. May repeat dose one time in 2 hours if headache not relieved.  -     fluticasone (FLONASE) 50 MCG/ACT nasal spray; 2 sprays into the nostril(s) as directed by provider Daily for 30 days.    She will also take daily oral antihistamine. There is a possibility her h/a is worsened by sinus problems.    She will call our office on Monday to give up date.     She was given 2 sumatriptans, she will take one today and can take one tomorrow if h/a persists.    If more severe sx develop she will go to ER.    We discussed the possibility of needing head imaging if h/a persists.    She is scheduled for routine f/u with Dr. Salcido in 1 week.       Continue topamax.

## 2020-01-09 RX ORDER — TOPIRAMATE 25 MG/1
25 TABLET ORAL NIGHTLY
Qty: 30 TABLET | Refills: 5 | Status: SHIPPED | OUTPATIENT
Start: 2020-01-09 | End: 2020-09-03

## 2020-01-09 RX ORDER — ATORVASTATIN CALCIUM 10 MG/1
10 TABLET, FILM COATED ORAL DAILY
Qty: 30 TABLET | Refills: 5 | Status: SHIPPED | OUTPATIENT
Start: 2020-01-09 | End: 2020-07-01

## 2020-01-10 ENCOUNTER — OFFICE VISIT (OUTPATIENT)
Dept: INTERNAL MEDICINE | Facility: CLINIC | Age: 50
End: 2020-01-10

## 2020-01-10 ENCOUNTER — ANTICOAGULATION VISIT (OUTPATIENT)
Dept: INTERNAL MEDICINE | Facility: CLINIC | Age: 50
End: 2020-01-10

## 2020-01-10 VITALS
SYSTOLIC BLOOD PRESSURE: 120 MMHG | DIASTOLIC BLOOD PRESSURE: 80 MMHG | BODY MASS INDEX: 43.59 KG/M2 | WEIGHT: 246 LBS | HEIGHT: 63 IN

## 2020-01-10 DIAGNOSIS — Z79.01 LONG TERM (CURRENT) USE OF ANTICOAGULANTS: ICD-10-CM

## 2020-01-10 DIAGNOSIS — Z23 NEED FOR TDAP VACCINATION: ICD-10-CM

## 2020-01-10 DIAGNOSIS — G43.909 MIGRAINE WITHOUT STATUS MIGRAINOSUS, NOT INTRACTABLE, UNSPECIFIED MIGRAINE TYPE: ICD-10-CM

## 2020-01-10 DIAGNOSIS — Z86.718 HISTORY OF DVT OF LOWER EXTREMITY: ICD-10-CM

## 2020-01-10 DIAGNOSIS — E78.2 MIXED HYPERLIPIDEMIA: Primary | ICD-10-CM

## 2020-01-10 DIAGNOSIS — R73.09 ELEVATED GLUCOSE LEVEL: ICD-10-CM

## 2020-01-10 LAB
ALBUMIN SERPL-MCNC: 4.1 G/DL (ref 3.5–5.2)
ALBUMIN/GLOB SERPL: 1.1 G/DL
ALP SERPL-CCNC: 102 U/L (ref 39–117)
ALT SERPL W P-5'-P-CCNC: 31 U/L (ref 1–33)
ANION GAP SERPL CALCULATED.3IONS-SCNC: 13 MMOL/L (ref 5–15)
AST SERPL-CCNC: 22 U/L (ref 1–32)
BILIRUB SERPL-MCNC: 0.3 MG/DL (ref 0.2–1.2)
BUN BLD-MCNC: 12 MG/DL (ref 6–20)
BUN/CREAT SERPL: 17.6 (ref 7–25)
CALCIUM SPEC-SCNC: 9.6 MG/DL (ref 8.6–10.5)
CHLORIDE SERPL-SCNC: 105 MMOL/L (ref 98–107)
CHOLEST SERPL-MCNC: 152 MG/DL (ref 0–200)
CO2 SERPL-SCNC: 23 MMOL/L (ref 22–29)
CREAT BLD-MCNC: 0.68 MG/DL (ref 0.57–1)
GFR SERPL CREATININE-BSD FRML MDRD: 92 ML/MIN/1.73
GLOBULIN UR ELPH-MCNC: 3.9 GM/DL
GLUCOSE BLD-MCNC: 122 MG/DL (ref 65–99)
HBA1C MFR BLD: 6.97 % (ref 4.8–5.6)
HDLC SERPL-MCNC: 31 MG/DL (ref 40–60)
INR PPP: 2.3 (ref 0.9–1.1)
LDLC SERPL CALC-MCNC: 70 MG/DL (ref 0–100)
LDLC/HDLC SERPL: 2.26 {RATIO}
POTASSIUM BLD-SCNC: 4.3 MMOL/L (ref 3.5–5.2)
PROT SERPL-MCNC: 8 G/DL (ref 6–8.5)
SODIUM BLD-SCNC: 141 MMOL/L (ref 136–145)
TRIGL SERPL-MCNC: 255 MG/DL (ref 0–150)
VLDLC SERPL-MCNC: 51 MG/DL (ref 5–40)

## 2020-01-10 PROCEDURE — 80061 LIPID PANEL: CPT | Performed by: INTERNAL MEDICINE

## 2020-01-10 PROCEDURE — 85610 PROTHROMBIN TIME: CPT | Performed by: INTERNAL MEDICINE

## 2020-01-10 PROCEDURE — 80053 COMPREHEN METABOLIC PANEL: CPT | Performed by: INTERNAL MEDICINE

## 2020-01-10 PROCEDURE — 90715 TDAP VACCINE 7 YRS/> IM: CPT | Performed by: INTERNAL MEDICINE

## 2020-01-10 PROCEDURE — 99213 OFFICE O/P EST LOW 20 MIN: CPT | Performed by: INTERNAL MEDICINE

## 2020-01-10 PROCEDURE — 83036 HEMOGLOBIN GLYCOSYLATED A1C: CPT | Performed by: INTERNAL MEDICINE

## 2020-01-10 PROCEDURE — 90471 IMMUNIZATION ADMIN: CPT | Performed by: INTERNAL MEDICINE

## 2020-01-10 PROCEDURE — 36416 COLLJ CAPILLARY BLOOD SPEC: CPT | Performed by: INTERNAL MEDICINE

## 2020-01-10 RX ORDER — SUMATRIPTAN 50 MG/1
TABLET, FILM COATED ORAL
Qty: 9 TABLET | Refills: 5 | Status: SHIPPED | OUTPATIENT
Start: 2020-01-10 | End: 2020-09-03

## 2020-01-10 NOTE — PROGRESS NOTES
Chief Complaint   Patient presents with   • Hyperlipidemia     follow up   • Headache     discuss headaches       Subjective   Li Kimball is a 49 y.o. female.     Hyperlipidemia   This is a chronic problem. Recent lipid tests were reviewed and are variable. Exacerbating diseases include hypothyroidism and obesity. She has no history of diabetes. There are no known factors aggravating her hyperlipidemia. Pertinent negatives include no chest pain or shortness of breath. Current antihyperlipidemic treatment includes diet change, exercise and statins. The current treatment provides moderate improvement of lipids. Compliance problems include adherence to diet and adherence to exercise.    Headache    This is a recurrent problem. The current episode started 1 to 4 weeks ago. The problem occurs daily. The problem has been resolved. Pertinent negatives include no blurred vision, coughing, nausea or vomiting. Her past medical history is significant for obesity.      Pre-diabetes/Elevated fasting glucose:  This is a chronic problem.  Patient has been advised to follow prudent diet, avoid sugar, exercise regularly.  She denies polyuria, polydipsia, vision change appetite change, unexplained weight loss.   Lab Results   Component Value Date    HGBA1C 6.49 (H) 11/20/2018          The following portions of the patient's history were reviewed and updated as appropriate: allergies, current medications, past family history, past medical history, past social history, past surgical history and problem list.    Review of Systems   Constitutional: Negative for appetite change.   HENT: Negative for nosebleeds.    Eyes: Negative for blurred vision and double vision.   Respiratory: Negative for cough and shortness of breath.    Cardiovascular: Negative for chest pain, palpitations and leg swelling.   Gastrointestinal: Negative for nausea and vomiting.   Neurological: Positive for headache.         Current Outpatient Medications:   •   "atorvastatin (LIPITOR) 10 MG tablet, TAKE 1 TABLET BY MOUTH DAILY, Disp: 30 tablet, Rfl: 5  •  fluticasone (FLONASE) 50 MCG/ACT nasal spray, 2 sprays into the nostril(s) as directed by provider Daily for 30 days., Disp: 1 bottle, Rfl: 3  •  levothyroxine (SYNTHROID, LEVOTHROID) 50 MCG tablet, TAKE 1 TABLET BY MOUTH EVERY DAY, Disp: 90 tablet, Rfl: 0  •  SUMAtriptan (IMITREX) 50 MG tablet, Take one tablet at onset of headache. May repeat dose one time in 2 hours if headache not relieved., Disp: 9 tablet, Rfl: 5  •  topiramate (TOPAMAX) 25 MG tablet, TAKE 1 TABLET BY MOUTH EVERY NIGHT, Disp: 30 tablet, Rfl: 5  •  warfarin (COUMADIN) 3 MG tablet, TAKE 1 TABLET BY MOUTH DAILY, Disp: 30 tablet, Rfl: 5        Objective     /80 (BP Location: Right arm, Patient Position: Sitting, Cuff Size: Large Adult)   Ht 158.8 cm (62.5\")   Wt 112 kg (246 lb)   BMI 44.28 kg/m²     Physical Exam   Constitutional: She is oriented to person, place, and time. She appears well-developed and well-nourished. No distress.   Neck: Normal carotid pulses present. Carotid bruit is not present.   Cardiovascular: Regular rhythm, S1 normal and S2 normal. Exam reveals no gallop and no friction rub.   No murmur heard.  Pulses:       Carotid pulses are 2+ on the right side, and 2+ on the left side.  Pulmonary/Chest: Effort normal and breath sounds normal. She has no wheezes. She has no rhonchi. She has no rales. Chest wall is not dull to percussion.   Musculoskeletal: She exhibits no edema.   Neurological: She is alert and oriented to person, place, and time.   Skin: Skin is warm and dry.   Nursing note and vitals reviewed.        Assessment/Plan   Li was seen today for hyperlipidemia and headache.    Diagnoses and all orders for this visit:    Mixed hyperlipidemia  -     Comprehensive Metabolic Panel; Future  -     Lipid Panel; Future    Migraine without status migrainosus, not intractable, unspecified migraine type  -     SUMAtriptan " (IMITREX) 50 MG tablet; Take one tablet at onset of headache. May repeat dose one time in 2 hours if headache not relieved.    Elevated glucose level  -     Hemoglobin A1c; Future    Need for Tdap vaccination  -     Tdap Vaccine Greater Than or Equal To 8yo IM

## 2020-01-14 ENCOUNTER — TELEPHONE (OUTPATIENT)
Dept: INTERNAL MEDICINE | Facility: CLINIC | Age: 50
End: 2020-01-14

## 2020-01-14 DIAGNOSIS — E11.9 DIET-CONTROLLED DIABETES MELLITUS (HCC): Primary | ICD-10-CM

## 2020-01-14 NOTE — TELEPHONE ENCOUNTER
----- Message from Jacinta Salcido MD sent at 1/10/2020  5:10 PM EST -----  Please let her know recent lab work shows a high glucose and high hemoglobin A1c.  The hemoglobin A1c is in the diabetic range.  I recommend she go to diabetic teaching at [facility].    Okay to send her a copy of the results.    INR is good.  Chemistry test results (Comprehensive Metabolic Panel) show: High glucose.  Normal kidney tests, liver tests.  Total cholesterol and LDL cholesterol levels are good.  Triglycerides somewhat high.  HDL low.  Hemoglobin A1c (3 month average blood sugar) is is in the diabetic range.

## 2020-01-14 NOTE — TELEPHONE ENCOUNTER
I put an order in her chart referring her to diabetic teaching at [facility].  I do not know if they have Saturday classes.

## 2020-01-14 NOTE — TELEPHONE ENCOUNTER
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

## 2020-01-15 NOTE — TELEPHONE ENCOUNTER
Left message on vm that she will hear from scheduling for the diabetic teaching. Also that her labs have been sent in mail, that her triglycerides are still high but total cholesterol was slightly better

## 2020-01-15 NOTE — TELEPHONE ENCOUNTER
PT WAS ASKING ABOUT THE DIABETIC  CLASSES AND GETTING THEM STARTED AS SOON AS POSSIBLE     PT ALSO ASKED ABOUT HER CHOLESTEROL TEST AND  THYROID TEST    PLEASE ADVISE AND GIVE CALL

## 2020-01-24 ENCOUNTER — HOSPITAL ENCOUNTER (OUTPATIENT)
Dept: DIABETES SERVICES | Facility: HOSPITAL | Age: 50
Discharge: HOME | End: 2020-01-24

## 2020-02-13 ENCOUNTER — ANTICOAGULATION VISIT (OUTPATIENT)
Dept: INTERNAL MEDICINE | Facility: CLINIC | Age: 50
End: 2020-02-13

## 2020-02-13 DIAGNOSIS — Z79.01 LONG TERM (CURRENT) USE OF ANTICOAGULANTS: ICD-10-CM

## 2020-02-13 DIAGNOSIS — Z86.718 HISTORY OF DVT OF LOWER EXTREMITY: Primary | ICD-10-CM

## 2020-02-13 LAB — INR PPP: 2.1 (ref 0.9–1.1)

## 2020-02-13 PROCEDURE — 36416 COLLJ CAPILLARY BLOOD SPEC: CPT | Performed by: INTERNAL MEDICINE

## 2020-02-13 PROCEDURE — 93793 ANTICOAG MGMT PT WARFARIN: CPT | Performed by: INTERNAL MEDICINE

## 2020-02-13 PROCEDURE — 85610 PROTHROMBIN TIME: CPT | Performed by: INTERNAL MEDICINE

## 2020-02-24 RX ORDER — WARFARIN SODIUM 3 MG/1
TABLET ORAL
Qty: 30 TABLET | Refills: 5 | Status: SHIPPED | OUTPATIENT
Start: 2020-02-24 | End: 2020-03-23

## 2020-03-03 ENCOUNTER — TELEPHONE (OUTPATIENT)
Dept: ORTHOPEDIC SURGERY | Facility: CLINIC | Age: 50
End: 2020-03-03

## 2020-03-03 ENCOUNTER — TELEPHONE (OUTPATIENT)
Dept: INTERNAL MEDICINE | Facility: CLINIC | Age: 50
End: 2020-03-03

## 2020-03-03 NOTE — TELEPHONE ENCOUNTER
PATIENT SAID HER RIGHT KNEE IS HURTING AGAIN. NO INJURY, NXR. SAW YOU IN 2018 FOR SAME THING ONE TIME AND YOU GAVE HER A NAYLA INJ. WANTS TO BE WORKED IN THIS WEEK, IN A LOT OF PAIN. CAN YOU SEE OR [facility]?

## 2020-03-03 NOTE — TELEPHONE ENCOUNTER
I can't see her this week.  OK to work in next week or she could go to [facility] this week.  Leave it up to her.

## 2020-03-03 NOTE — TELEPHONE ENCOUNTER
PATIENT ADVISED [facility] CANNOT SEE HER THIS WEEK, CAN SEE NEXT WEEK OR CAN SEE THE NP IN [facility] THIS WEEK. SHE CHOSE TO GO TO [facility] THIS WEEK

## 2020-03-04 ENCOUNTER — TELEPHONE (OUTPATIENT)
Dept: INTERNAL MEDICINE | Facility: CLINIC | Age: 50
End: 2020-03-04

## 2020-03-04 NOTE — TELEPHONE ENCOUNTER
Patient called stating that she is going to Dr.Scott Fernandez's office tomorrow(03/04) at 11:00 AM to get her knee checked. Patient states he is in the building next to us on the 1st floor.     Patient would like to know if the results from her X-Ray that was completed on 11/07/2018 could be faxed to his office so that they can reference this information. Patient would need this done today because she will be seeing someone in the office tomorrow and the provider that she is seeing will need this information handy. Please advise.     Patient would like someone to leave her a voicemail on her phone when this is done: [phone].    The Xray can be faxed to 's office at: [phone] ( ATTN: Helen Marin ) .

## 2020-03-04 NOTE — TELEPHONE ENCOUNTER
Left message for patient. [facility] is on [employer] and can see 11/2018 report. Advised her that they would be doing new xrays and likely not concerned about 2018 xray reports.

## 2020-03-05 ENCOUNTER — OFFICE VISIT (OUTPATIENT)
Dept: ORTHOPEDIC SURGERY | Facility: CLINIC | Age: 50
End: 2020-03-05

## 2020-03-05 VITALS — HEIGHT: 63 IN | TEMPERATURE: 97.7 F | WEIGHT: 252 LBS | BODY MASS INDEX: 44.65 KG/M2

## 2020-03-05 DIAGNOSIS — Z79.01 ANTICOAGULATED ON COUMADIN: ICD-10-CM

## 2020-03-05 DIAGNOSIS — M17.11 PRIMARY OSTEOARTHRITIS OF RIGHT KNEE: ICD-10-CM

## 2020-03-05 DIAGNOSIS — I82.401 DEEP VEIN THROMBOSIS (DVT) OF RIGHT LOWER EXTREMITY, UNSPECIFIED CHRONICITY, UNSPECIFIED VEIN (HCC): ICD-10-CM

## 2020-03-05 DIAGNOSIS — M25.561 RIGHT KNEE PAIN, UNSPECIFIED CHRONICITY: Primary | ICD-10-CM

## 2020-03-05 PROCEDURE — 99214 OFFICE O/P EST MOD 30 MIN: CPT | Performed by: NURSE PRACTITIONER

## 2020-03-05 PROCEDURE — 20610 DRAIN/INJ JOINT/BURSA W/O US: CPT | Performed by: NURSE PRACTITIONER

## 2020-03-05 PROCEDURE — 73562 X-RAY EXAM OF KNEE 3: CPT | Performed by: NURSE PRACTITIONER

## 2020-03-05 RX ORDER — METHYLPREDNISOLONE ACETATE 80 MG/ML
80 INJECTION, SUSPENSION INTRA-ARTICULAR; INTRALESIONAL; INTRAMUSCULAR; SOFT TISSUE
Status: COMPLETED | OUTPATIENT
Start: 2020-03-05 | End: 2020-03-05

## 2020-03-05 RX ADMIN — METHYLPREDNISOLONE ACETATE 80 MG: 80 INJECTION, SUSPENSION INTRA-ARTICULAR; INTRALESIONAL; INTRAMUSCULAR; SOFT TISSUE at 11:49

## 2020-03-05 NOTE — PATIENT INSTRUCTIONS
"Journal for Nurse Practitioners, 15(4), 263-267. Retrieved October 7, 2019 from [email or web address].com/[email or web address]\">   Knee Exercises  Ask your health care provider which exercises are safe for you. Do exercises exactly as told by your health care provider and adjust them as directed. It is normal to feel mild stretching, pulling, tightness, or discomfort as you do these exercises. Stop right away if you feel sudden pain or your pain gets worse. Do not begin these exercises until told by your health care provider.  Stretching and range-of-motion exercises  These exercises warm up your muscles and joints and improve the movement and flexibility of your knee. These exercises also help to relieve pain and swelling.  Knee extension, prone  1. Lie on your abdomen (prone position) on a bed.  2. Place your left / right knee just beyond the edge of the surface so your knee is not on the bed. You can put a towel under your left / right thigh just above your kneecap for comfort.  3. Relax your leg muscles and allow gravity to straighten your knee (extension). You should feel a stretch behind your left / right knee.  4. Hold this position for __________ seconds.  5. Scoot up so your knee is supported between repetitions.  Repeat __________ times. Complete this exercise __________ times a day.  Knee flexion, active    1. Lie on your back with both legs straight. If this causes back discomfort, bend your left / right knee so your foot is flat on the floor.  2. Slowly slide your left / right heel back toward your buttocks. Stop when you feel a gentle stretch in the front of your knee or thigh (flexion).  3. Hold this position for __________ seconds.  4. Slowly slide your left / right heel back to the starting position.  Repeat __________ times. Complete this exercise __________ times a day.  Quadriceps stretch, prone    1. Lie on your abdomen on a firm surface, such as a bed or padded floor.  2. Bend your left / right knee and hold " your ankle. If you cannot reach your ankle or pant leg, loop a belt around your foot and grab the belt instead.  3. Gently pull your heel toward your buttocks. Your knee should not slide out to the side. You should feel a stretch in the front of your thigh and knee (quadriceps).  4. Hold this position for __________ seconds.  Repeat __________ times. Complete this exercise __________ times a day.  Hamstring, supine  1. Lie on your back (supine position).  2. Loop a belt or towel over the ball of your left / right foot. The ball of your foot is on the walking surface, right under your toes.  3. Straighten your left / right knee and slowly pull on the belt to raise your leg until you feel a gentle stretch behind your knee (hamstring).  ? Do not let your knee bend while you do this.  ? Keep your other leg flat on the floor.  4. Hold this position for __________ seconds.  Repeat __________ times. Complete this exercise __________ times a day.  Strengthening exercises  These exercises build strength and endurance in your knee. Endurance is the ability to use your muscles for a long time, even after they get tired.  Quadriceps, isometric  This exercise stretches the muscles in front of your thigh (quadriceps) without moving your knee joint (isometric).  1. Lie on your back with your left / right leg extended and your other knee bent. Put a rolled towel or small pillow under your knee if told by your health care provider.  2. Slowly tense the muscles in the front of your left / right thigh. You should see your kneecap slide up toward your hip or see increased dimpling just above the knee. This motion will push the back of the knee toward the floor.  3. For __________ seconds, hold the muscle as tight as you can without increasing your pain.  4. Relax the muscles slowly and completely.  Repeat __________ times. Complete this exercise __________ times a day.  Straight leg raises  This exercise stretches the muscles in front  "of your thigh (quadriceps) and the muscles that move your hips (hip flexors).  1. Lie on your back with your left / right leg extended and your other knee bent.  2. Tense the muscles in the front of your left / right thigh. You should see your kneecap slide up or see increased dimpling just above the knee. Your thigh may even shake a bit.  3. Keep these muscles tight as you raise your leg 4-6 inches (10-15 cm) off the floor. Do not let your knee bend.  4. Hold this position for __________ seconds.  5. Keep these muscles tense as you lower your leg.  6. Relax your muscles slowly and completely after each repetition.  Repeat __________ times. Complete this exercise __________ times a day.  Hamstring, isometric  1. Lie on your back on a firm surface.  2. Bend your left / right knee about __________ degrees.  3. Dig your left / right heel into the surface as if you are trying to pull it toward your buttocks. Tighten the muscles in the back of your thighs (hamstring) to \"dig\" as hard as you can without increasing any pain.  4. Hold this position for __________ seconds.  5. Release the tension gradually and allow your muscles to relax completely for __________ seconds after each repetition.  Repeat __________ times. Complete this exercise __________ times a day.  Hamstring curls  If told by your health care provider, do this exercise while wearing ankle weights. Begin with __________ lb weights. Then increase the weight by 1 lb (0.5 kg) increments. Do not wear ankle weights that are more than __________ lb.  1. Lie on your abdomen with your legs straight.  2. Bend your left / right knee as far as you can without feeling pain. Keep your hips flat against the floor.  3. Hold this position for __________ seconds.  4. Slowly lower your leg to the starting position.  Repeat __________ times. Complete this exercise __________ times a day.  Squats  This exercise strengthens the muscles in front of your thigh and knee " (quadriceps).  1.  front of a table, with your feet and knees pointing straight ahead. You may rest your hands on the table for balance but not for support.  2. Slowly bend your knees and lower your hips like you are going to sit in a chair.  ? Keep your weight over your heels, not over your toes.  ? Keep your lower legs upright so they are parallel with the table legs.  ? Do not let your hips go lower than your knees.  ? Do not bend lower than told by your health care provider.  ? If your knee pain increases, do not bend as low.  3. Hold the squat position for __________ seconds.  4. Slowly push with your legs to return to standing. Do not use your hands to pull yourself to standing.  Repeat __________ times. Complete this exercise __________ times a day.  Wall slides  This exercise strengthens the muscles in front of your thigh and knee (quadriceps).  1. Lean your back against a smooth wall or door, and walk your feet out 18-24 inches (46-61 cm) from it.  2. Place your feet hip-width apart.  3. Slowly slide down the wall or door until your knees bend __________ degrees. Keep your knees over your heels, not over your toes. Keep your knees in line with your hips.  4. Hold this position for __________ seconds.  Repeat __________ times. Complete this exercise __________ times a day.  Straight leg raises  This exercise strengthens the muscles that rotate the leg at the hip and move it away from your body (hip abductors).  1. Lie on your side with your left / right leg in the top position. Lie so your head, shoulder, knee, and hip line up. You may bend your bottom knee to help you keep your balance.  2. Roll your hips slightly forward so your hips are stacked directly over each other and your left / right knee is facing forward.  3. Leading with your heel, lift your top leg 4-6 inches (10-15 cm). You should feel the muscles in your outer hip lifting.  ? Do not let your foot drift forward.  ? Do not let your knee  roll toward the ceiling.  4. Hold this position for __________ seconds.  5. Slowly return your leg to the starting position.  6. Let your muscles relax completely after each repetition.  Repeat __________ times. Complete this exercise __________ times a day.  Straight leg raises  This exercise stretches the muscles that move your hips away from the front of the pelvis (hip extensors).  1. Lie on your abdomen on a firm surface. You can put a pillow under your hips if that is more comfortable.  2. Tense the muscles in your buttocks and lift your left / right leg about 4-6 inches (10-15 cm). Keep your knee straight as you lift your leg.  3. Hold this position for __________ seconds.  4. Slowly lower your leg to the starting position.  5. Let your leg relax completely after each repetition.  Repeat __________ times. Complete this exercise __________ times a day.  This information is not intended to replace advice given to you by your health care provider. Make sure you discuss any questions you have with your health care provider.  Document Released: 11/01/2006 Document Revised: 10/08/2019 Document Reviewed: 10/08/2019  Elsevier Interactive Patient Education © 2020 [employer]

## 2020-03-05 NOTE — PROGRESS NOTES
Patient Name: Li Kimball   YOB: 1970  Referring Primary Care Physician: Jacinta Salcido MD  BMI: Body mass index is 44.64 kg/m².    Chief Complaint:    Chief Complaint   Patient presents with   • Right Knee - Establish Care, Pain        HPI: Has seen [facility] in November 2018 and had a cortisone shot and felt better and presents today with new onset right knee pain after twisting. Pt has a desk job.   History of DVT after ankle fracture and is on coumadin permanently/has filter.    Li Kimball is a 49 y.o. female who presents today for evaluation of   Chief Complaint   Patient presents with   • Right Knee - Establish Care, Pain       This problem is new to this examiner.     Subjective   Medications:   Home Medications:  Current Outpatient Medications on File Prior to Visit   Medication Sig   • atorvastatin (LIPITOR) 10 MG tablet TAKE 1 TABLET BY MOUTH DAILY   • levothyroxine (SYNTHROID, LEVOTHROID) 50 MCG tablet TAKE 1 TABLET BY MOUTH EVERY DAY   • SUMAtriptan (IMITREX) 50 MG tablet Take one tablet at onset of headache. May repeat dose one time in 2 hours if headache not relieved.   • topiramate (TOPAMAX) 25 MG tablet TAKE 1 TABLET BY MOUTH EVERY NIGHT   • warfarin (COUMADIN) 3 MG tablet TAKE 1 TABLET BY MOUTH DAILY     No current facility-administered medications on file prior to visit.      Current Medications:  Scheduled Meds:  Continuous Infusions:  No current facility-administered medications for this visit.   PRN Meds:.    I have reviewed the patient's medical history in detail and updated the computerized patient record.  Review and summarization of old records includes:    Past Medical History:   Diagnosis Date   • Asthma    • BPPV (benign paroxysmal positional vertigo)    • DVT (deep venous thrombosis) (CMS/[facility])     Right   • Hypothyroid    • Migraines    • Mixed hyperlipidemia         Past Surgical History:   Procedure Laterality Date   • ANKLE SURGERY Right 2015    Removal of  "hardware   • FOOT SURGERY Left 2006    Fracture , postop DVT and PE   • LAPAROSCOPIC TUBAL LIGATION     • ORIF ANKLE FRACTURE Right 2014    DVT and PE perioperatively   • VENA CAVA FILTER INSERTION  2014        Social History     Occupational History   • Not on file   Tobacco Use   • Smoking status: Never Smoker   • Smokeless tobacco: Never Used   Substance and Sexual Activity   • Alcohol use: No   • Drug use: No   • Sexual activity: Not on file      Social History     Social History Narrative   • Not on file        Family History   Problem Relation Age of Onset   • Other Mother         Vertigo       ROS: 14 point review of systems was performed and all other systems were reviewed and are negative except for documented findings in HPI and today's encounter.     Allergies:   Allergies   Allergen Reactions   • Codeine      Constitutional:  Denies fever, shaking or chills   Eyes:  Denies change in visual acuity   HENT:  Denies nasal congestion or sore throat   Respiratory:  Denies cough or shortness of breath   Cardiovascular:  Denies chest pain or severe LE edema   GI:  Denies abdominal pain, nausea, vomiting, bloody stools or diarrhea   Musculoskeletal:  Numbness, tingling, pain, or loss of motor function only as noted above in history of present illness.  : Denies painful urination or hematuria  Integument:  Denies rash, lesion or ulceration   Neurologic:  Denies headache or focal weakness  Endocrine:  Denies lymphadenopathy  Psych:  Denies confusion or change in mental status   Hem:  Denies active bleeding    OBJECTIVE:  Physical Exam:   Temp 97.7 °F (36.5 °C) (Temporal)   Ht 160 cm (63\")   Wt 114 kg (252 lb)   BMI 44.64 kg/m²     General Appearance:    Alert, cooperative, in no acute distress                  Eyes: conjunctiva clear  ENT: external ears and nose atraumatic  CV: no peripheral edema  Resp: normal respiratory effort  Skin: no rashes or wounds; normal turgor  Psych: mood and affect " appropriate  Lymph: no nodes appreciated  Neuro: gross sensation intact  Vascular:  Palpable peripheral pulse in noted extremity  Musculoskeletal Extremities: Tender to palpation medial joint line with effusion patellofemoral crepitation skin is warm dry intact calves are soft and nontender skin good pulses movement and sensation hips are nontender Jose Roberto's is negative ligamentous exam appears stable    Radiology:   Tricompartmental arthritis no comparison - done for pain right knee 3 views    Assessment:     ICD-10-CM ICD-9-CM   1. Right knee pain, unspecified chronicity M25.561 719.46   2. Primary osteoarthritis of right knee M17.11 715.16   3. Deep vein thrombosis (DVT) of right lower extremity, unspecified chronicity, unspecified vein (CMS/[facility]) I82.401 453.40   4. Anticoagulated on Coumadin Z79.01 V58.61        Large Joint Arthrocentesis: R knee  Date/Time: 3/5/2020 11:49 AM  Consent given by: patient  Site marked: site marked  Timeout: Immediately prior to procedure a time out was called to verify the correct patient, procedure, equipment, support staff and site/side marked as required   Supporting Documentation  Indications: pain and joint swelling   Procedure Details  Location: knee - R knee  Preparation: Patient was prepped and draped in the usual sterile fashion  Needle gauge: 21 G.  Approach: anterolateral  Medications administered: 2 mL lidocaine (cardiac); 80 mg methylPREDNISolone acetate 80 MG/ML  Patient tolerance: patient tolerated the procedure well with no immediate complications             Plan: Biomechanics of pertinent body area discussed.  Risks, benefits, alternatives, comparisons, and complications of accepted medicines, injections, recommendations, surgical procedures, and therapies explained and education provided in laymen's terms. Natural history and expected course of this patient's diagnosis discussed along with evaluation of therapies. Questions answered. When appropriate I also  discussed proper use of cane, walker, trekking poles.   MEDICATIONS:  Prescription, OTC and Monitoring of Medications per orders to address ortho complaints; Evaluation and discussion of safety, precautions, side effects, and warnings given especially of long term NSAID or steroid therapy.    RICE: Rest, ice, compression, and elevation therapy, Cryotherapy/brachy therapy, and or OTC linaments as indicated with instructions.       3/5/2020    Much of this encounter note is an electronic transcription/translation of spoken language to printed text. The electronic translation of spoken language may permit erroneous, or at times, nonsensical words or phrases to be inadvertently transcribed; Although I have reviewed the note for such errors, some may still exist

## 2020-03-13 ENCOUNTER — TELEPHONE (OUTPATIENT)
Dept: INTERNAL MEDICINE | Facility: CLINIC | Age: 50
End: 2020-03-13

## 2020-03-13 ENCOUNTER — ANTICOAGULATION VISIT (OUTPATIENT)
Dept: INTERNAL MEDICINE | Facility: CLINIC | Age: 50
End: 2020-03-13

## 2020-03-13 DIAGNOSIS — Z86.718 HISTORY OF DVT OF LOWER EXTREMITY: Primary | ICD-10-CM

## 2020-03-13 DIAGNOSIS — Z79.01 LONG TERM (CURRENT) USE OF ANTICOAGULANTS: ICD-10-CM

## 2020-03-13 LAB
INR PPP: 1.6 (ref 0.9–1.1)
PROTHROMBIN TIME: ABNORMAL S

## 2020-03-13 PROCEDURE — 36416 COLLJ CAPILLARY BLOOD SPEC: CPT | Performed by: INTERNAL MEDICINE

## 2020-03-13 PROCEDURE — 85610 PROTHROMBIN TIME: CPT | Performed by: INTERNAL MEDICINE

## 2020-03-13 NOTE — TELEPHONE ENCOUNTER
Left message on vm that Dr Salcido can change her medication and as well she needs an annual exam in June or July

## 2020-03-13 NOTE — TELEPHONE ENCOUNTER
We could switch to either Eliquis or Xarelto.  If we switch to Eliquis, she would have to take 2.5 mg twice a day.  If we do Xarelto, she would take 10 mg daily.    She is due for annual exam.  She needs a 45-minute appointment for annual exam and have fasting lab.  She could do this in June or July.

## 2020-03-13 NOTE — TELEPHONE ENCOUNTER
----- Message from Juanis Phipps sent at 3/13/2020 10:01 AM EDT -----  Li stopped at .  She would like to change her blood thinner to one that doesn't have to be monitored each month.  Her insurance covers Eliquis and Xarelto if possible.  If she is eligible for this, she will make an appointment with Dr Salcido but wanted to talk to her or her medical assistant first.    Please try to return call before 3:30, mornings are the best.          [employer] [id number] [facility] [address] AT [facility] [address] - [phone]  - [phone] (Phone)  [phone] (Fax)    Patient call back at:  [phone]    Thank you,    Juanis

## 2020-03-17 ENCOUNTER — TELEPHONE (OUTPATIENT)
Dept: INTERNAL MEDICINE | Facility: CLINIC | Age: 50
End: 2020-03-17

## 2020-03-17 NOTE — TELEPHONE ENCOUNTER
PATIENT IS REQUESTING A PHONE CALL IN THE AM BEFORE 10. PATIENT HAS QUESTIONS IN REGARDS TO SWITCHING HER BLOOD THINNER.     1. HOW OFTEN DO I CHECK BLOOD WHEN I DO THE  SWITCH?    2. HOW DO I KNOW IF ITS (MEDICINE) WORKING?    3. CAN I EAT WHATEVER I WANT TO EAT LIKE GREEN LEAFY FOODS?

## 2020-03-17 NOTE — TELEPHONE ENCOUNTER
You do not have to have regular blood work with either Eliquis or Xarelto.  There is a standard dose that medical trials show is effective.  There are no blood tests to determine the level of the medication.  Yes,  you can eat green leafy vegetables.    If you want to make the switch, you would hold your Coumadin for 1 or 2 days and then start.

## 2020-03-19 NOTE — TELEPHONE ENCOUNTER
It would be okay to wait until after she has her INR checked on 3/30/2020 before starting the new medication.  There are no blood tests for these medications.

## 2020-03-19 NOTE — TELEPHONE ENCOUNTER
Patient states her blood was to thick last time so she is concerned about switching was thinking she could possibly change after she gets it checked on 3-30-20, her question is how will she know if the new med is working can she get her blood checked every 6 months or once a year with her appointment with you.

## 2020-03-23 NOTE — TELEPHONE ENCOUNTER
I sent in a prescription for Xarelto 10 mg, 1 once a day.  She could start this medication without coming in for her INR.    She will need to stop the warfarin.  The next day start the Xarelto.

## 2020-03-24 ENCOUNTER — TELEPHONE (OUTPATIENT)
Dept: INTERNAL MEDICINE | Facility: CLINIC | Age: 50
End: 2020-03-24

## 2020-03-24 NOTE — TELEPHONE ENCOUNTER
Patient marin in regarding her blood thiner FIGUEROA-\  Has way to high if a copay-  Asking for ELIQUIS, since it is much cheaper if she is able to take medication

## 2020-03-30 ENCOUNTER — TELEPHONE (OUTPATIENT)
Dept: INTERNAL MEDICINE | Facility: CLINIC | Age: 50
End: 2020-03-30

## 2020-03-30 ENCOUNTER — ANTICOAGULATION VISIT (OUTPATIENT)
Dept: INTERNAL MEDICINE | Facility: CLINIC | Age: 50
End: 2020-03-30

## 2020-03-30 DIAGNOSIS — R73.09 ELEVATED GLUCOSE LEVEL: Primary | ICD-10-CM

## 2020-03-30 DIAGNOSIS — Z86.718 HISTORY OF DVT OF LOWER EXTREMITY: ICD-10-CM

## 2020-03-30 DIAGNOSIS — Z79.01 LONG TERM (CURRENT) USE OF ANTICOAGULANTS: ICD-10-CM

## 2020-03-30 LAB — INR PPP: 2.3 (ref 0.9–1.1)

## 2020-03-30 PROCEDURE — 85610 PROTHROMBIN TIME: CPT | Performed by: INTERNAL MEDICINE

## 2020-03-30 PROCEDURE — 36416 COLLJ CAPILLARY BLOOD SPEC: CPT | Performed by: INTERNAL MEDICINE

## 2020-03-30 NOTE — TELEPHONE ENCOUNTER
----- Message from Juanis Phipps sent at 3/30/2020  9:41 AM EDT -----  Contact: patient was in for jake Jefferson said that she was told before she starts on the Eliquis that she needs to skip her Warfarin either ONE day or TWO days but she is not sure which.  Please advise.      Her phone is  [phone]    Thank you,    Juanis

## 2020-04-24 ENCOUNTER — TRANSCRIBE ORDERS (OUTPATIENT)
Dept: ADMINISTRATIVE | Facility: HOSPITAL | Age: 50
End: 2020-04-24

## 2020-04-24 DIAGNOSIS — Z95.828 ACQUIRED PORTAL-SYSTEMIC SHUNT: Primary | ICD-10-CM

## 2020-05-11 ENCOUNTER — TELEPHONE (OUTPATIENT)
Dept: INTERNAL MEDICINE | Facility: CLINIC | Age: 50
End: 2020-05-11

## 2020-05-13 ENCOUNTER — TELEPHONE (OUTPATIENT)
Dept: INTERNAL MEDICINE | Facility: CLINIC | Age: 50
End: 2020-05-13

## 2020-05-13 NOTE — TELEPHONE ENCOUNTER
"PATIENT WAS CALLING TO SEE IF DR CROCKETT WOULD ADVISE HER TO START TAKING METFORMIN.      PATIENT STATED \" I KNOW MY SUGAR IS NOT GOOD AND I'VE HEARD IT HELPS PATIENTS LOSE WEIGHT ALSO\"      PATIENT WAS ALSO CURIOUS IF THIS IS A MEDICATION SHE CAN TAKE EVEN WITH HER BEING ON A ELIQUIS. PATIENT THOUGHT SHE WAS ON METFORMIN PREVIOUSLY AND THOUGHT SHE REMEMBERED GETTING NAUSEATED BUT SHE WANTED TO TRY IT AGAIN.      PATIENT CAN BE REACHED [phone]   [facility]   "

## 2020-05-17 NOTE — TELEPHONE ENCOUNTER
We could start metformin.  She is due for CPE and fasting lab in July. She should make an appointment and we can discuss further

## 2020-05-19 RX ORDER — METFORMIN HYDROCHLORIDE 500 MG/1
500 TABLET, EXTENDED RELEASE ORAL
Qty: 90 TABLET | Refills: 1 | Status: SHIPPED | OUTPATIENT
Start: 2020-05-19 | End: 2020-09-03

## 2020-05-19 NOTE — TELEPHONE ENCOUNTER
PATIENT CALLED STATING THAT SHE LEFT A MESSAGE LAST WEEK FOR DR. CROCKETT LAST WEDNESDAY (SEE NOTE BELOW FROM SHAHEED ON 5/13/20), BUT SHE HAS NOT HEARD ANYTHING IN RESPONSE YET SO SHE WANTED TO CHECK ON THE STATUS OF THE MESSAGE.    THE PATIENT STATED THAT SHE HAD MENTIONED TO SHAHEED THAT SHE WOULD LIKE DR. CROCKETT TO WRITE HER A PRESCRIPTION FOR METFORMIN TO HELP CONTROL HER BLOOD SUGAR LEVEL AND HELP HER LOSE WEIGHT.    THE PATIENT STATED THAT DR. CROCKETT HAS NEVER PRESCRIBED HER METFORMIN, BUT SHE HAS TAKEN IT BEFORE (THROUGH [facility]) AND WOULD LIKE TO TRY IT AGAIN. THE PATIENT STATED THAT WHEN SHE WENT TO [facility], THEY PRESCRIBED HER METFORMIN AND ANOTHER MEDICATION (SHE DOES NOT KNOW THE NAME OF IT), TO TAKE IN COMBINATION WITH EACH OTHER, BUT IT MADE HER NAUSEATED, SO SHE STOPPED TAKING IT. THE PATIENT STATED THAT SHE DOES NOT KNOW THE DOSAGE OF THE METFORMIN THAT SHE TOOK OR HOW LONG SHE TOOK IT FOR (LESS THAN A WEEK), HOWEVER, SHE STATED THAT IT HAS BEEN ABOUT 6 OR 7 MONTHS AGO SINCE SHE STOPPED TAKING IT.    THE PATIENT STATED THAT SHE DOES NOT FEEL LIKE SHE TOOK THE METFORMIN LONG ENOUGH FOR IT TO BE EFFECTIVE, BUT SHE WOULD LIKE TO START TAKING IT NOW, SPECIFICALLY BECAUSE SHE IS CURRENTLY WORKING FROM HOME DUE TO COVID-19. THE PATIENT STATED THAT SHE WOULD LIKE TO START TAKING THIS MEDICATION WHILE SHE IS WORKING FROM HOME, SO THAT SHE CAN DEAL WITH THE SIDE EFFECTS AND THE NAUSEA AT HOME, RATHER THAN AT WORK.    ADDITIONALLY, THE PATIENT STATED THAT HER BLOOD SUGAR HAS BEEN VERY HIGH RECENTLY (SHE IS NOT SURE EXACTLY HOW HIGH) AND SHE HAS NOT BEEN DOING WELL WITH HER DIET AT ALL DUE TO BEING QUARANTINED. THE PATIENT STATED THAT SHE KNOWS THAT HER BLOOD SUGAR WAS HIGH THE LAST TIME THAT SHE WAS IN OFFICE, AND SHE KNOWS THAT IT IS NOT ANY BETTER RIGHT NOW, SO SHE WOULD LIKE TO START TAKING THE METFORMIN AS SOON AS POSSIBLE TO GET HER SUGAR TO A MANAGEABLE LEVEL. THE PATIENT STATED  THAT SHE IS ALSO GOING THROUGH [facility] RIGHT NOW, AND SHE READ ONLINE THAT IT CAN CAUSE YOU TO GAIN 10-15 POUNDS. THE PATIENT STATED THAT SHE BELIEVES THE METFORMIN WILL HELP HER LOSE WEIGHT AND, IN TURN, THAT WILL HELP HER SUGAR LEVELS TO COME DOWN.    THE PATIENT REQUESTED FOR THE PRESCRIPTION FOR THE METFORMIN TO BE SENT TO THE PHARMACY AT [facility] [address] (PHONE NUMBER: [phone]).      LASTLY, THE PATIENT STATED THAT SHE HAS 3 QUESTIONS FOR DR. CROCKETT ABOUT TAKING METFORMIN:    1. THE PATIENT STATED THAT SHE WOULD LIKE TO KNOW HOW LONG, AFTER STARTING TO TAKE METFORMIN, THE SIDE EFFECTS AND NAUSEA TYPICALLY LASTS.    2. THE PATIENT STATED THAT SHE TAKES A BLOOD THINNER AND WOULD LIKE TO KNOW IF METFORMIN WOULD INTERACT WITH THAT MEDICATION IN ANY WAY.    3. THE PATIENT STATED THAT SHE WOULD LIKE TO KNOW IF METFORMIN IS SOMETHING THAT SHE COULD TAKE TEMPORARILY, FOR ABOUT 5 OR 6 MONTHS, TO GET HER BLOOD SUGAR AND WEIGHT TO MANAGEABLE LEVEL AND THEN WEAN OFF OF. THE PATIENT STATED THAT SHE DOES NOT WANT TO TAKE THIS MEDICATION FOR THE REST OF HER LIFE.      PLEASE CALL THE PATIENT [phone] WHEN THIS MESSAGE HAS BEEN RECEIVED AND ADVISE HER REGARDING THIS PRESCRIPTION REQUEST AND THE QUESTIONS ASSOCIATED WITH IT.

## 2020-05-19 NOTE — TELEPHONE ENCOUNTER
Discussed at length with patient. Physical made for October with fasting labs to be done that day.

## 2020-05-26 ENCOUNTER — DOCUMENTATION (OUTPATIENT)
Dept: INTERNAL MEDICINE | Facility: CLINIC | Age: 50
End: 2020-05-26

## 2020-05-26 NOTE — PROGRESS NOTES
She called tonight with pain similar to previous kidney stone. I advised her to go to ER for eval & treatment.

## 2020-06-02 DIAGNOSIS — E03.9 HYPOTHYROIDISM: ICD-10-CM

## 2020-06-02 RX ORDER — LEVOTHYROXINE SODIUM 0.05 MG/1
TABLET ORAL
Qty: 90 TABLET | Refills: 1 | Status: SHIPPED | OUTPATIENT
Start: 2020-06-02 | End: 2020-11-25

## 2020-06-21 ENCOUNTER — HOSPITAL ENCOUNTER (EMERGENCY)
Facility: HOSPITAL | Age: 50
Discharge: HOME | End: 2020-06-21
Attending: EMERGENCY MEDICINE | Admitting: EMERGENCY MEDICINE

## 2020-06-21 ENCOUNTER — APPOINTMENT (OUTPATIENT)
Dept: CT IMAGING | Facility: HOSPITAL | Age: 50
End: 2020-06-21

## 2020-06-21 VITALS
TEMPERATURE: 99 F | RESPIRATION RATE: 16 BRPM | BODY MASS INDEX: 42.52 KG/M2 | SYSTOLIC BLOOD PRESSURE: 121 MMHG | DIASTOLIC BLOOD PRESSURE: 76 MMHG | HEIGHT: 63 IN | WEIGHT: 240 LBS | HEART RATE: 90 BPM | OXYGEN SATURATION: 99 %

## 2020-06-21 DIAGNOSIS — R10.30 LOWER ABDOMINAL PAIN: Primary | ICD-10-CM

## 2020-06-21 LAB
ALBUMIN SERPL-MCNC: 4.2 G/DL (ref 3.5–5.2)
ALBUMIN/GLOB SERPL: 1.2 G/DL
ALP SERPL-CCNC: 94 U/L (ref 39–117)
ALT SERPL W P-5'-P-CCNC: 38 U/L (ref 1–33)
ANION GAP SERPL CALCULATED.3IONS-SCNC: 13.4 MMOL/L (ref 5–15)
AST SERPL-CCNC: 36 U/L (ref 1–32)
BASOPHILS # BLD AUTO: 0.04 10*3/MM3 (ref 0–0.2)
BASOPHILS NFR BLD AUTO: 0.4 % (ref 0–1.5)
BILIRUB SERPL-MCNC: 0.4 MG/DL (ref 0.2–1.2)
BILIRUB UR QL STRIP: NEGATIVE
BUN BLD-MCNC: 12 MG/DL (ref 6–20)
BUN/CREAT SERPL: 18.2 (ref 7–25)
CALCIUM SPEC-SCNC: 9 MG/DL (ref 8.6–10.5)
CHLORIDE SERPL-SCNC: 103 MMOL/L (ref 98–107)
CLARITY UR: CLEAR
CO2 SERPL-SCNC: 23.6 MMOL/L (ref 22–29)
COLOR UR: YELLOW
CREAT BLD-MCNC: 0.66 MG/DL (ref 0.57–1)
DEPRECATED RDW RBC AUTO: 43.1 FL (ref 37–54)
EOSINOPHIL # BLD AUTO: 0.05 10*3/MM3 (ref 0–0.4)
EOSINOPHIL NFR BLD AUTO: 0.5 % (ref 0.3–6.2)
ERYTHROCYTE [DISTWIDTH] IN BLOOD BY AUTOMATED COUNT: 13.8 % (ref 12.3–15.4)
GFR SERPL CREATININE-BSD FRML MDRD: 95 ML/MIN/1.73
GLOBULIN UR ELPH-MCNC: 3.4 GM/DL
GLUCOSE BLD-MCNC: 133 MG/DL (ref 65–99)
GLUCOSE UR STRIP-MCNC: NEGATIVE MG/DL
HCT VFR BLD AUTO: 37.3 % (ref 34–46.6)
HGB BLD-MCNC: 12.5 G/DL (ref 12–15.9)
HGB UR QL STRIP.AUTO: NEGATIVE
HOLD SPECIMEN: NORMAL
HOLD SPECIMEN: NORMAL
IMM GRANULOCYTES # BLD AUTO: 0.06 10*3/MM3 (ref 0–0.05)
IMM GRANULOCYTES NFR BLD AUTO: 0.6 % (ref 0–0.5)
KETONES UR QL STRIP: NEGATIVE
LEUKOCYTE ESTERASE UR QL STRIP.AUTO: NEGATIVE
LIPASE SERPL-CCNC: 25 U/L (ref 13–60)
LYMPHOCYTES # BLD AUTO: 2.4 10*3/MM3 (ref 0.7–3.1)
LYMPHOCYTES NFR BLD AUTO: 22.3 % (ref 19.6–45.3)
MCH RBC QN AUTO: 28.9 PG (ref 26.6–33)
MCHC RBC AUTO-ENTMCNC: 33.5 G/DL (ref 31.5–35.7)
MCV RBC AUTO: 86.1 FL (ref 79–97)
MONOCYTES # BLD AUTO: 0.67 10*3/MM3 (ref 0.1–0.9)
MONOCYTES NFR BLD AUTO: 6.2 % (ref 5–12)
NEUTROPHILS # BLD AUTO: 7.55 10*3/MM3 (ref 1.7–7)
NEUTROPHILS NFR BLD AUTO: 70 % (ref 42.7–76)
NITRITE UR QL STRIP: NEGATIVE
NRBC BLD AUTO-RTO: 0 /100 WBC (ref 0–0.2)
PH UR STRIP.AUTO: 7.5 [PH] (ref 5–8)
PLATELET # BLD AUTO: 313 10*3/MM3 (ref 140–450)
PMV BLD AUTO: 9.3 FL (ref 6–12)
POTASSIUM BLD-SCNC: 3.8 MMOL/L (ref 3.5–5.2)
PROT SERPL-MCNC: 7.6 G/DL (ref 6–8.5)
PROT UR QL STRIP: NEGATIVE
RBC # BLD AUTO: 4.33 10*6/MM3 (ref 3.77–5.28)
SODIUM BLD-SCNC: 140 MMOL/L (ref 136–145)
SP GR UR STRIP: 1.02 (ref 1–1.03)
UROBILINOGEN UR QL STRIP: NORMAL
WBC NRBC COR # BLD: 10.77 10*3/MM3 (ref 3.4–10.8)
WHOLE BLOOD HOLD SPECIMEN: NORMAL
WHOLE BLOOD HOLD SPECIMEN: NORMAL

## 2020-06-21 PROCEDURE — 74177 CT ABD & PELVIS W/CONTRAST: CPT

## 2020-06-21 PROCEDURE — 85025 COMPLETE CBC W/AUTO DIFF WBC: CPT

## 2020-06-21 PROCEDURE — 99283 EMERGENCY DEPT VISIT LOW MDM: CPT

## 2020-06-21 PROCEDURE — 25010000002 KETOROLAC TROMETHAMINE PER 15 MG: Performed by: PHYSICIAN ASSISTANT

## 2020-06-21 PROCEDURE — 80053 COMPREHEN METABOLIC PANEL: CPT

## 2020-06-21 PROCEDURE — 81003 URINALYSIS AUTO W/O SCOPE: CPT

## 2020-06-21 PROCEDURE — 96374 THER/PROPH/DIAG INJ IV PUSH: CPT

## 2020-06-21 PROCEDURE — 25010000002 IOPAMIDOL 61 % SOLUTION: Performed by: EMERGENCY MEDICINE

## 2020-06-21 PROCEDURE — 83690 ASSAY OF LIPASE: CPT

## 2020-06-21 RX ORDER — METHOCARBAMOL 750 MG/1
750 TABLET, FILM COATED ORAL 3 TIMES DAILY
Qty: 21 TABLET | Refills: 0 | Status: SHIPPED | OUTPATIENT
Start: 2020-06-21 | End: 2020-06-21

## 2020-06-21 RX ORDER — SODIUM CHLORIDE 0.9 % (FLUSH) 0.9 %
10 SYRINGE (ML) INJECTION AS NEEDED
Status: DISCONTINUED | OUTPATIENT
Start: 2020-06-21 | End: 2020-06-22 | Stop reason: HOSPADM

## 2020-06-21 RX ORDER — CYCLOBENZAPRINE HCL 10 MG
10 TABLET ORAL 3 TIMES DAILY
Qty: 21 TABLET | Refills: 0 | Status: SHIPPED | OUTPATIENT
Start: 2020-06-21 | End: 2020-09-03

## 2020-06-21 RX ORDER — KETOROLAC TROMETHAMINE 15 MG/ML
15 INJECTION, SOLUTION INTRAMUSCULAR; INTRAVENOUS ONCE
Status: COMPLETED | OUTPATIENT
Start: 2020-06-21 | End: 2020-06-21

## 2020-06-21 RX ADMIN — IOPAMIDOL 85 ML: 612 INJECTION, SOLUTION INTRAVENOUS at 21:06

## 2020-06-21 RX ADMIN — KETOROLAC TROMETHAMINE 15 MG: 15 INJECTION, SOLUTION INTRAMUSCULAR; INTRAVENOUS at 21:25

## 2020-06-21 RX ADMIN — SODIUM CHLORIDE 1000 ML: 9 INJECTION, SOLUTION INTRAVENOUS at 21:25

## 2020-06-22 NOTE — ED PROVIDER NOTES
Pt presents to the ED c/o  intermittent left lower quadrant pain for the last month, was seen approximately 3 weeks ago at [facility] and had a CT scan that was reportedly unremarkable.  Had some improvement for couple weeks and then pain worsened earlier today.  No significant fevers, nausea, vomiting.  Has had some diarrhea as well.  Pains are worse with certain movements.     On exam,   General: Awake, alert, no acute distress  HEENT: Mucous membranes moist, atraumatic, normocephalic, EOMI  Neck: Full ROM  Pulm: Symmetric, nonlabored, lungs CTAB  Cardiovascular: Regular rate and rhythm, normal S1/S2, intact distal pulses  GI: Soft, mild left lower quadrant tenderness, nondistended, no rebound, no guarding, bowel sounds present  MSK: Full ROM, no deformity  Skin: Warm, dry  Neuro: Alert and oriented x 3, GCS 15, moving all extremities, no focal deficits  Psych: Calm, cooperative      Surgical mask and gloves used during this encounter. Patient in surgical mask.      Plan:   ED Course as of Jun 21 2228   Sun Jun 21, 2020 2216 WBC: 10.77 [RC]   2216 Lipase: 25 [RC]   2217 Leukocytes, UA: Negative [RC]   2217 Nitrite, UA: Negative [RC]   2217 Protein, UA: Negative [RC]   2217 Glucose(!): 133 [RC]   2217 I have viewed the CT abdomen pelvis with IV contrast and see no acute findings.    [RC]      ED Course User Index  [RC] Pradeep Khan III, PA     This point no emergent abnormalities on the labs or imaging today, no evidence of any diverticulitis, bowel obstruction, colitis, UTI, kidney stones, ovarian cyst, among others.  Muscle strain certainly higher possibility at this point.  Have advised heating pads, light stretching, and close PCP follow-up this week.  Emergency department return for worsening symptoms as needed.     Attestation:  The JOHN and I have discussed this patient's history, physical exam, and treatment plan.  I have reviewed the documentation and personally had a face to face  interaction with the patient. I affirm the documentation and agree with the treatment and plan.  The attached note describes my personal findings.          David Rapp MD  06/22/20 0020

## 2020-06-22 NOTE — DISCHARGE INSTRUCTIONS
Apply heating pad/warm compresses to the affected area 3-4 times a day for the next 5 days as needed.    Return to the ER with any further concerns, should your condition change/worsen, or should you develop a fever.

## 2020-06-22 NOTE — ED PROVIDER NOTES
EMERGENCY DEPARTMENT ENCOUNTER    Room Number:  05/05  Date of encounter:  6/21/2020  PCP: Jacinta Salcido MD  Historian: Patient      HPI:  Chief Complaint: Left lower quadrant abdominal pain diarrhea  A complete HPI/ROS/PMH/PSH/SH/FH are unobtainable due to: Nothing    Context: Li Kimball is a 49 y.o. female who presents to the ED c/o left lower quadrant pain for approximately a month.  Patient describes the pain as a sharp 8 out of 10 in the left lower quadrant that radiates to the back.  Over the past 24 hours she has developed diarrhea associated with the pain.  She denies associated dysuria, fever, chills chest pain, cough, shortness of breath, nausea, vomiting.  She denies being on antibiotics recently.  He states he was seen approximately 3 weeks ago and [facility] had a CT scan of the abdomen pelvis and was told there were no abnormal findings.      PAST MEDICAL HISTORY  Active Ambulatory Problems     Diagnosis Date Noted   • Hypothyroidism, adult    • Mixed hyperlipidemia    • BPPV (benign paroxysmal positional vertigo)    • Asthma    • Migraines    • DVT (deep venous thrombosis) (CMS/[facility])      Resolved Ambulatory Problems     Diagnosis Date Noted   • No Resolved Ambulatory Problems     Past Medical History:   Diagnosis Date   • Hypothyroid          PAST SURGICAL HISTORY  Past Surgical History:   Procedure Laterality Date   • ANKLE SURGERY Right 2015    Removal of hardware   • FOOT SURGERY Left 2006    Fracture , postop DVT and PE   • LAPAROSCOPIC TUBAL LIGATION     • ORIF ANKLE FRACTURE Right 2014    DVT and PE perioperatively   • VENA CAVA FILTER INSERTION  2014         FAMILY HISTORY  Family History   Problem Relation Age of Onset   • Other Mother         Vertigo         SOCIAL HISTORY  Social History     Socioeconomic History   • Marital status:      Spouse name: Not on file   • Number of children: Not on file   • Years of education: Not on file   • Highest education level: Not on  file   Tobacco Use   • Smoking status: Never Smoker   • Smokeless tobacco: Never Used   Substance and Sexual Activity   • Alcohol use: No   • Drug use: No         ALLERGIES  Codeine        REVIEW OF SYSTEMS  Review of Systems     All systems reviewed and negative except for those discussed in HPI.       PHYSICAL EXAM    I have reviewed the triage vital signs and nursing notes.    ED Triage Vitals   Temp Heart Rate Resp BP SpO2   06/21/20 1917 06/21/20 1917 06/21/20 1917 06/21/20 1934 06/21/20 1917   99 °F (37.2 °C) 95 18 (!) 167/107 100 %      Temp src Heart Rate Source Patient Position BP Location FiO2 (%)   06/21/20 1917 06/21/20 1917 06/21/20 1934 06/21/20 1934 --   Tympanic Monitor Lying Right arm        Physical Exam  GENERAL: No acute distress  HENT: nares patent, mucous membranes normal  EYES: no scleral icterus, conjunctiva normal  CV: regular rhythm, regular rate, sounds normal, +2 palpable pedal pulses bilaterally, no pedal edema RESPIRATORY: normal effort, lungs CTA B  ABDOMEN: TTP left lower quadrant no guarding no rebound, no masses/hernia, BS normal  : No CVA tenderness  MUSCULOSKELETAL: no deformity  NEURO: alert, moves all extremities, follows commands, face symmetrical, speech normal  SKIN: warm, dry, no obvious skin rash to the area of concern        LAB RESULTS  Recent Results (from the past 24 hour(s))   Comprehensive Metabolic Panel    Collection Time: 06/21/20  7:50 PM   Result Value Ref Range    Glucose 133 (H) 65 - 99 mg/dL    BUN 12 6 - 20 mg/dL    Creatinine 0.66 0.57 - 1.00 mg/dL    Sodium 140 136 - 145 mmol/L    Potassium 3.8 3.5 - 5.2 mmol/L    Chloride 103 98 - 107 mmol/L    CO2 23.6 22.0 - 29.0 mmol/L    Calcium 9.0 8.6 - 10.5 mg/dL    Total Protein 7.6 6.0 - 8.5 g/dL    Albumin 4.20 3.50 - 5.20 g/dL    ALT (SGPT) 38 (H) 1 - 33 U/L    AST (SGOT) 36 (H) 1 - 32 U/L    Alkaline Phosphatase 94 39 - 117 U/L    Total Bilirubin 0.4 0.2 - 1.2 mg/dL    eGFR Non African Amer 95 >60 mL/min/1.73     Globulin 3.4 gm/dL    A/G Ratio 1.2 g/dL    BUN/Creatinine Ratio 18.2 7.0 - 25.0    Anion Gap 13.4 5.0 - 15.0 mmol/L   Lipase    Collection Time: 06/21/20  7:50 PM   Result Value Ref Range    Lipase 25 13 - 60 U/L   Urinalysis With Microscopic If Indicated (No Culture) - Urine, Clean Catch    Collection Time: 06/21/20  7:50 PM   Result Value Ref Range    Color, UA Yellow Yellow, Straw    Appearance, UA Clear Clear    pH, UA 7.5 5.0 - 8.0    Specific Gravity, UA 1.020 1.005 - 1.030    Glucose, UA Negative Negative    Ketones, UA Negative Negative    Bilirubin, UA Negative Negative    Blood, UA Negative Negative    Protein, UA Negative Negative    Leuk Esterase, UA Negative Negative    Nitrite, UA Negative Negative    Urobilinogen, UA 0.2 E.U./dL 0.2 - 1.0 E.U./dL   Light Blue Top    Collection Time: 06/21/20  7:50 PM   Result Value Ref Range    Extra Tube hold for add-on    Green Top (Gel)    Collection Time: 06/21/20  7:50 PM   Result Value Ref Range    Extra Tube Hold for add-ons.    Lavender Top    Collection Time: 06/21/20  7:50 PM   Result Value Ref Range    Extra Tube hold for add-on    Gold Top - SST    Collection Time: 06/21/20  7:50 PM   Result Value Ref Range    Extra Tube Hold for add-ons.    CBC Auto Differential    Collection Time: 06/21/20  7:50 PM   Result Value Ref Range    WBC 10.77 3.40 - 10.80 10*3/mm3    RBC 4.33 3.77 - 5.28 10*6/mm3    Hemoglobin 12.5 12.0 - 15.9 g/dL    Hematocrit 37.3 34.0 - 46.6 %    MCV 86.1 79.0 - 97.0 fL    MCH 28.9 26.6 - 33.0 pg    MCHC 33.5 31.5 - 35.7 g/dL    RDW 13.8 12.3 - 15.4 %    RDW-SD 43.1 37.0 - 54.0 fl    MPV 9.3 6.0 - 12.0 fL    Platelets 313 140 - 450 10*3/mm3    Neutrophil % 70.0 42.7 - 76.0 %    Lymphocyte % 22.3 19.6 - 45.3 %    Monocyte % 6.2 5.0 - 12.0 %    Eosinophil % 0.5 0.3 - 6.2 %    Basophil % 0.4 0.0 - 1.5 %    Immature Grans % 0.6 (H) 0.0 - 0.5 %    Neutrophils, Absolute 7.55 (H) 1.70 - 7.00 10*3/mm3    Lymphocytes, Absolute 2.40 0.70 - 3.10  10*3/mm3    Monocytes, Absolute 0.67 0.10 - 0.90 10*3/mm3    Eosinophils, Absolute 0.05 0.00 - 0.40 10*3/mm3    Basophils, Absolute 0.04 0.00 - 0.20 10*3/mm3    Immature Grans, Absolute 0.06 (H) 0.00 - 0.05 10*3/mm3    nRBC 0.0 0.0 - 0.2 /100 WBC       Ordered the above labs and independently reviewed the results.        RADIOLOGY  Ct Abdomen Pelvis With Contrast    Result Date: 6/21/2020  CT ABDOMEN PELVIS W CONTRAST-  CLINICAL HISTORY: Left lower quadrant pain  TECHNIQUE: Spiral CT images were acquired through the abdomen and pelvis with IV contrast and were reconstructed in 3 mm thick axial slices.  Radiation dose reduction techniques were utilized, including automated exposure control and exposure modulation based on body size.  COMPARISON: CT scan of the abdomen and pelvis dated 09/26/2018.  FINDINGS: The liver, spleen, pancreas, and adrenal glands appear within normal limits. There is single tiny nonobstructing calculi in the lower poles of each kidney. There is no hydronephrosis or hydroureter. The stomach and small and large bowel are unremarkable except for a few diverticuli in the sigmoid colon. There is no CT evidence of diverticulitis. There are no hernias. No abnormal masses or fluid collections are identified in the abdomen or pelvis. An inferior vena cava embolus filter is in place.      Minimal sigmoid diverticulosis without evidence of diverticulitis. Solitary tiny bilateral lower pole nonobstructing renal calculi. Otherwise unremarkable CT scan of the abdomen and pelvis.  This report was finalized on 6/21/2020 9:39 PM by Dr. Elmer Melissa M.D.        I ordered the above noted radiological studies. Reviewed by me and discussed with radiologist.  See dictation for official radiology interpretation.      PROCEDURES    Procedures      MEDICATIONS GIVEN IN ER    Medications   sodium chloride 0.9 % flush 10 mL (has no administration in time range)   sodium chloride 0.9 % bolus 1,000 mL (1,000 mL  Intravenous New Bag 6/21/20 2125)   ketorolac (TORADOL) injection 15 mg (15 mg Intravenous Given 6/21/20 2125)   iopamidol (ISOVUE-300) 61 % injection 100 mL (85 mL Intravenous Given by Other 6/21/20 2106)         PROGRESS, DATA ANALYSIS, CONSULTS, AND MEDICAL DECISION MAKING    All labs have been independently reviewed by me.  All radiology studies have been reviewed by me and discussed with radiologist dictating the report.   EKG's independently viewed and interpreted by me.  Discussion below represents my analysis of pertinent findings related to patient's condition, differential diagnosis, treatment plan and final disposition.    DDX includes but is not limited to: Ovarian torsion, ovarian cyst, diverticulitis, colitis, hernia, appendicitis, epiploic appendagitis, musculoskeletal pain.  CT abdomen pelvis is unremarkable.  Given that this pain is been ongoing for a month I suspect that the pain is a more benign process such as musculoskeletal in nature.  We will treat conservatively and have follow-up with her PCP for further evaluation.  Dr. Rapp my supervising physician has seen and evaluated the patient and agrees with this course care at this time.         Prior to seeing patient I performed extensive hand washing, saw to it that the patient was wearing a face mask.  Before entering into the room I wore gloves, glasses, and a face mask.  Prior to leaving the room I doffed my gear and performed handwashing.    AS OF 22:15 VITALS:    BP - (!) 167/107  HR - 95  TEMP - 99 °F (37.2 °C) (Tympanic)  O2 SATS - 100%        DIAGNOSIS  Final diagnoses:   Lower abdominal pain         DISPOSITION  DISCHARGE    Patient discharged in stable condition.    Reviewed implications of results, diagnosis, meds, responsibility to follow up, warning signs and symptoms of possible worsening, potential complications and reasons to return to ER.    Patient/Family voiced understanding of above instructions.    Discussed plan for  discharge, as there is no emergent indication for admission. Patient referred to primary care provider for BP management due to today's BP. Pt/family is agreeable and understands need for follow up and repeat testing.  Pt is aware that discharge does not mean that nothing is wrong but it indicates no emergency is present that requires admission and they must continue care with follow-up as given below or physician of their choice.     FOLLOW-UP  Jacinta Salcido MD  [address] [address]  [phone]    Schedule an appointment as soon as possible for a visit   For further evaluation and treatment         Medication List      New Prescriptions    methocarbamol 750 MG tablet  Commonly known as:  ROBAXIN  Take 1 tablet by mouth 3 (Three) Times a Day.                   Pradeep Khan III, PA  06/21/20 1067

## 2020-06-23 ENCOUNTER — TELEPHONE (OUTPATIENT)
Dept: INTERNAL MEDICINE | Facility: CLINIC | Age: 50
End: 2020-06-23

## 2020-06-23 NOTE — TELEPHONE ENCOUNTER
Patient scheduled for tomorrow with Bridgette Paez, she has been to two different ER departments without any relief. Patient advised if any worsening of pain or symptoms she is to go straight to ER. On going one month, no fever, nausea or vomiting. 30 minutes provided for patient

## 2020-06-23 NOTE — TELEPHONE ENCOUNTER
PATIENT STATE: left of body and part of her back are in pain. its been going on for a month. She says she in a lot of pain. She would love a call back asap Please advise     PATIENT CAN BE REACHED ON:[phone]

## 2020-06-24 ENCOUNTER — OFFICE VISIT (OUTPATIENT)
Dept: INTERNAL MEDICINE | Facility: CLINIC | Age: 50
End: 2020-06-24

## 2020-06-24 ENCOUNTER — HOSPITAL ENCOUNTER (OUTPATIENT)
Dept: ULTRASOUND IMAGING | Facility: HOSPITAL | Age: 50
Discharge: HOME | End: 2020-06-24
Admitting: NURSE PRACTITIONER

## 2020-06-24 VITALS
OXYGEN SATURATION: 99 % | BODY MASS INDEX: 43.41 KG/M2 | SYSTOLIC BLOOD PRESSURE: 122 MMHG | HEART RATE: 82 BPM | HEIGHT: 63 IN | DIASTOLIC BLOOD PRESSURE: 80 MMHG | WEIGHT: 245 LBS

## 2020-06-24 DIAGNOSIS — R10.32 LLQ PAIN: ICD-10-CM

## 2020-06-24 DIAGNOSIS — R10.9 ABDOMINAL PAIN, UNSPECIFIED ABDOMINAL LOCATION: ICD-10-CM

## 2020-06-24 DIAGNOSIS — R10.32 LLQ PAIN: Primary | ICD-10-CM

## 2020-06-24 DIAGNOSIS — R82.90 ABNORMAL URINE FINDINGS: ICD-10-CM

## 2020-06-24 LAB
BACTERIA UR QL AUTO: ABNORMAL /HPF
BASOPHILS # BLD AUTO: 0.04 10*3/MM3 (ref 0–0.2)
BASOPHILS NFR BLD AUTO: 0.6 % (ref 0–1.5)
BILIRUB UR QL CFM: NEGATIVE
BILIRUB UR QL STRIP: ABNORMAL
CLARITY UR: ABNORMAL
COLOR UR: YELLOW
DEPRECATED RDW RBC AUTO: 45.6 FL (ref 37–54)
EOSINOPHIL # BLD AUTO: 0.13 10*3/MM3 (ref 0–0.4)
EOSINOPHIL NFR BLD AUTO: 2 % (ref 0.3–6.2)
ERYTHROCYTE [DISTWIDTH] IN BLOOD BY AUTOMATED COUNT: 14.2 % (ref 12.3–15.4)
GLUCOSE UR STRIP-MCNC: NEGATIVE MG/DL
HCT VFR BLD AUTO: 38.8 % (ref 34–46.6)
HGB BLD-MCNC: 12.6 G/DL (ref 12–15.9)
HGB UR QL STRIP.AUTO: NEGATIVE
HYALINE CASTS UR QL AUTO: ABNORMAL /LPF
KETONES UR QL STRIP: NEGATIVE
LEUKOCYTE ESTERASE UR QL STRIP.AUTO: NEGATIVE
LYMPHOCYTES # BLD AUTO: 2.34 10*3/MM3 (ref 0.7–3.1)
LYMPHOCYTES NFR BLD AUTO: 36.1 % (ref 19.6–45.3)
MCH RBC QN AUTO: 28.7 PG (ref 26.6–33)
MCHC RBC AUTO-ENTMCNC: 32.5 G/DL (ref 31.5–35.7)
MCV RBC AUTO: 88.4 FL (ref 79–97)
MONOCYTES # BLD AUTO: 0.59 10*3/MM3 (ref 0.1–0.9)
MONOCYTES NFR BLD AUTO: 9.1 % (ref 5–12)
MUCOUS THREADS URNS QL MICRO: ABNORMAL /HPF
NEUTROPHILS # BLD AUTO: 3.38 10*3/MM3 (ref 1.7–7)
NEUTROPHILS NFR BLD AUTO: 52.2 % (ref 42.7–76)
NITRITE UR QL STRIP: NEGATIVE
PH UR STRIP.AUTO: 5.5 [PH] (ref 5–8)
PLATELET # BLD AUTO: 336 10*3/MM3 (ref 140–450)
PMV BLD AUTO: 9.6 FL (ref 6–12)
PROT UR QL STRIP: ABNORMAL
RBC # BLD AUTO: 4.39 10*6/MM3 (ref 3.77–5.28)
RBC # UR: ABNORMAL /HPF
REF LAB TEST METHOD: ABNORMAL
SP GR UR STRIP: >=1.03 (ref 1–1.03)
SQUAMOUS #/AREA URNS HPF: ABNORMAL /HPF
UROBILINOGEN UR QL STRIP: ABNORMAL
WBC NRBC COR # BLD: 6.48 10*3/MM3 (ref 3.4–10.8)
WBC UR QL AUTO: ABNORMAL /HPF

## 2020-06-24 PROCEDURE — 85025 COMPLETE CBC W/AUTO DIFF WBC: CPT | Performed by: NURSE PRACTITIONER

## 2020-06-24 PROCEDURE — 81001 URINALYSIS AUTO W/SCOPE: CPT | Performed by: NURSE PRACTITIONER

## 2020-06-24 PROCEDURE — 93975 VASCULAR STUDY: CPT

## 2020-06-24 PROCEDURE — 76856 US EXAM PELVIC COMPLETE: CPT

## 2020-06-24 PROCEDURE — 76830 TRANSVAGINAL US NON-OB: CPT

## 2020-06-24 PROCEDURE — 36415 COLL VENOUS BLD VENIPUNCTURE: CPT | Performed by: NURSE PRACTITIONER

## 2020-06-24 PROCEDURE — 99214 OFFICE O/P EST MOD 30 MIN: CPT | Performed by: NURSE PRACTITIONER

## 2020-06-24 RX ORDER — HYDROCODONE BITARTRATE AND ACETAMINOPHEN 5; 325 MG/1; MG/1
1 TABLET ORAL EVERY 6 HOURS PRN
COMMUNITY
End: 2020-12-03

## 2020-06-24 RX ORDER — DICYCLOMINE HCL 20 MG
20 TABLET ORAL EVERY 6 HOURS PRN
Qty: 30 TABLET | Refills: 1 | Status: SHIPPED | OUTPATIENT
Start: 2020-06-24 | End: 2020-09-03

## 2020-06-24 NOTE — PROGRESS NOTES
Subjective     Li Kimball is a 49 y.o. female.         She presents with intermittent sharp LLQ pain x 1 month. Pt has been seen in ER twice for the pain. Labs, urine, and CT abdomen have all been unremarkable. CT did show diverticulosis without diverticulosis. She was given [place] at [facility] ER and given muscle relaxer at [facility] ER. Pain pill seems to take edge off and muscle relaxer does not help. She has not had a BM in 4 days. She did take ex-lax yesterday and had a small BM but hasn't felt relief. Up until 4 days ago BMs have been normal. Pain is not associated with movement, it is random. Pain is starting to radiate to L flank.   Abdominal Pain   This is a new problem. The current episode started 1 to 4 weeks ago. The onset quality is sudden. The problem occurs intermittently. The problem has been gradually worsening. The pain is located in the LLQ. The pain is at a severity of 10/10 (at it's worst). The pain is severe. Associated symptoms include constipation. Pertinent negatives include no diarrhea, dysuria, fever, frequency, hematuria, melena, myalgias, nausea or vomiting. The pain is relieved by nothing. Prior diagnostic workup includes CT scan.        The following portions of the patient's history were reviewed and updated as appropriate: allergies, current medications, past social history and problem list.    Past Medical History:   Diagnosis Date   • Asthma    • BPPV (benign paroxysmal positional vertigo)    • DVT (deep venous thrombosis) (CMS/[facility])     Right   • Hypothyroid    • Migraines    • Mixed hyperlipidemia          Current Outpatient Medications:   •  apixaban (ELIQUIS) 2.5 MG tablet tablet, Take 1 tablet by mouth Every 12 (Twelve) Hours., Disp: 60 tablet, Rfl: 5  •  atorvastatin (LIPITOR) 10 MG tablet, TAKE 1 TABLET BY MOUTH DAILY, Disp: 30 tablet, Rfl: 5  •  cyclobenzaprine (FLEXERIL) 10 MG tablet, Take 1 tablet by mouth 3 (Three) Times a Day., Disp: 21 tablet, Rfl: 0  •   "levothyroxine (SYNTHROID, LEVOTHROID) 50 MCG tablet, TAKE 1 TABLET BY MOUTH EVERY DAY, Disp: 90 tablet, Rfl: 1  •  SUMAtriptan (IMITREX) 50 MG tablet, Take one tablet at onset of headache. May repeat dose one time in 2 hours if headache not relieved., Disp: 9 tablet, Rfl: 5  •  topiramate (TOPAMAX) 25 MG tablet, TAKE 1 TABLET BY MOUTH EVERY NIGHT, Disp: 30 tablet, Rfl: 5  •  metFORMIN ER (GLUCOPHAGE-XR) 500 MG 24 hr tablet, Take 1 tablet by mouth Daily With Breakfast., Disp: 90 tablet, Rfl: 1    Allergies   Allergen Reactions   • Codeine Nausea And Vomiting       Review of Systems   Constitutional: Negative for fatigue and fever.   Respiratory: Negative for cough and shortness of breath.    Cardiovascular: Negative for chest pain and palpitations.   Gastrointestinal: Positive for abdominal pain and constipation. Negative for abdominal distention, blood in stool, diarrhea, melena, nausea and vomiting.   Genitourinary: Negative for difficulty urinating, dysuria, flank pain, frequency and hematuria.   Musculoskeletal: Negative for myalgias.       Objective     /80   Pulse 82   Ht 160 cm (63\")   Wt 111 kg (245 lb)   LMP  (LMP Unknown)   SpO2 99%   BMI 43.40 kg/m²   Wt Readings from Last 3 Encounters:   06/24/20 111 kg (245 lb)   06/21/20 109 kg (240 lb)   03/05/20 114 kg (252 lb)     Temp Readings from Last 3 Encounters:   06/21/20 99 °F (37.2 °C) (Tympanic)   03/05/20 97.7 °F (36.5 °C) (Temporal)   01/02/20 98.1 °F (36.7 °C)     BP Readings from Last 3 Encounters:   06/24/20 122/80   06/21/20 121/76   01/10/20 120/80     Pulse Readings from Last 3 Encounters:   06/24/20 82   06/21/20 90   01/02/20 99       Physical Exam   Constitutional: She appears well-developed and well-nourished.   HENT:   Head: Normocephalic and atraumatic.   Cardiovascular: Normal rate, regular rhythm and normal heart sounds.   No murmur heard.  Pulmonary/Chest: Effort normal and breath sounds normal. No respiratory distress. "   Abdominal: Normal appearance and bowel sounds are normal. There is no tenderness.   Musculoskeletal: Normal range of motion.   Neurological: She is alert.   Skin: Skin is warm and dry.   Psychiatric: She has a normal mood and affect. Her behavior is normal. Judgment and thought content normal.   Vitals reviewed.      Assessment/Plan     Li was seen today for abdominal pain.    Diagnoses and all orders for this visit:    LLQ pain  -     CBC & Differential  -     US Pelvis Complete; Future  -     CBC Auto Differential  -     US Non-ob Transvaginal; Future  -     US Testicular or Ovarian Vascular Complete; Future  -     Urine Culture - Urine, Urine, Clean Catch  -     US Testicular or Ovarian Vascular Complete; Future  -     US Non-ob Transvaginal; Future  -     dicyclomine (BENTYL) 20 MG tablet; Take 1 tablet by mouth Every 6 (Six) Hours As Needed (abdominal pain).  -     Ambulatory Referral to Gastroenterology    Abdominal pain, unspecified abdominal location  -     Urinalysis With Microscopic If Indicated (No Culture) - Urine, Clean Catch; Future  -     Urinalysis With Microscopic If Indicated (No Culture) - Urine, Clean Catch  -     Urinalysis, Microscopic Only - Urine, Clean Catch; Future  -     Urinalysis, Microscopic Only - Urine, Clean Catch  -     Ictotest, Urine - Urine, Clean Catch; Future  -     Ictotest, Urine - Urine, Clean Catch  -     Urine Culture - Urine, Urine, Clean Catch    Abnormal urine findings  -     Urine Culture - Urine, Urine, Clean Catch    Stat US results called to me-L ovary size on upper end of normal. Discussed with pt over the phone.    We will get her in with GI and move her appt with GYN up.    Will try Bentyl to see if this helps with pain for now.    Return for worsening of sx.    I personally reviewed ER notes and other pertinent information.    I spent over 30 minutes reviewing charts, completing work up, over 15 minutes of the 30 was spent in direct communication with the  pt.

## 2020-06-25 ENCOUNTER — TELEPHONE (OUTPATIENT)
Dept: INTERNAL MEDICINE | Facility: CLINIC | Age: 50
End: 2020-06-25

## 2020-06-25 NOTE — TELEPHONE ENCOUNTER
Bridgette, I saw the ultrasounds are in chart.  Is it okay to go ahead and fax to Dr. Chou?  I also will tell her the GI referral takes longer than one day to complete and they should call her to schedule.

## 2020-06-25 NOTE — TELEPHONE ENCOUNTER
Patient states she was seen yesterday for pain.  She states she had 3 ultrasounds at the hospital also.  She is wanting the results of the ultrasounds sent to Dr. Sepideh Chou @[address] [place] [address] [address]. [phone].    Patient has an appointment with Dr. Chou on 07/07/20      She is also checking on a referral to a GI Specialist.    Patient call back [phone]

## 2020-06-26 LAB
BACTERIA UR CULT: NORMAL
BACTERIA UR CULT: NORMAL

## 2020-07-01 RX ORDER — ATORVASTATIN CALCIUM 10 MG/1
10 TABLET, FILM COATED ORAL DAILY
Qty: 30 TABLET | Refills: 5 | Status: SHIPPED | OUTPATIENT
Start: 2020-07-01 | End: 2020-09-28

## 2020-07-28 ENCOUNTER — APPOINTMENT (OUTPATIENT)
Dept: CT IMAGING | Facility: HOSPITAL | Age: 50
End: 2020-07-28

## 2020-07-28 ENCOUNTER — APPOINTMENT (OUTPATIENT)
Dept: GENERAL RADIOLOGY | Facility: HOSPITAL | Age: 50
End: 2020-07-28

## 2020-07-28 ENCOUNTER — HOSPITAL ENCOUNTER (EMERGENCY)
Facility: HOSPITAL | Age: 50
Discharge: HOME | End: 2020-07-28
Attending: EMERGENCY MEDICINE | Admitting: EMERGENCY MEDICINE

## 2020-07-28 VITALS
DIASTOLIC BLOOD PRESSURE: 96 MMHG | OXYGEN SATURATION: 100 % | HEART RATE: 92 BPM | TEMPERATURE: 97.7 F | SYSTOLIC BLOOD PRESSURE: 118 MMHG | RESPIRATION RATE: 16 BRPM

## 2020-07-28 DIAGNOSIS — R10.9 RIGHT-SIDED ABDOMINAL PAIN OF UNKNOWN CAUSE: Primary | ICD-10-CM

## 2020-07-28 LAB
ALBUMIN SERPL-MCNC: 4.2 G/DL (ref 3.5–5.2)
ALBUMIN/GLOB SERPL: 1.1 G/DL
ALP SERPL-CCNC: 98 U/L (ref 39–117)
ALT SERPL W P-5'-P-CCNC: 36 U/L (ref 1–33)
ANION GAP SERPL CALCULATED.3IONS-SCNC: 8.1 MMOL/L (ref 5–15)
AST SERPL-CCNC: 29 U/L (ref 1–32)
BACTERIA UR QL AUTO: ABNORMAL /HPF
BASOPHILS # BLD AUTO: 0.03 10*3/MM3 (ref 0–0.2)
BASOPHILS NFR BLD AUTO: 0.3 % (ref 0–1.5)
BILIRUB SERPL-MCNC: 0.3 MG/DL (ref 0–1.2)
BILIRUB UR QL STRIP: NEGATIVE
BUN SERPL-MCNC: 11 MG/DL (ref 6–20)
BUN/CREAT SERPL: 16.7 (ref 7–25)
CALCIUM SPEC-SCNC: 9.4 MG/DL (ref 8.6–10.5)
CHLORIDE SERPL-SCNC: 106 MMOL/L (ref 98–107)
CLARITY UR: ABNORMAL
CO2 SERPL-SCNC: 26.9 MMOL/L (ref 22–29)
COLOR UR: YELLOW
CREAT SERPL-MCNC: 0.66 MG/DL (ref 0.57–1)
DEPRECATED RDW RBC AUTO: 44.2 FL (ref 37–54)
EOSINOPHIL # BLD AUTO: 0.06 10*3/MM3 (ref 0–0.4)
EOSINOPHIL NFR BLD AUTO: 0.7 % (ref 0.3–6.2)
ERYTHROCYTE [DISTWIDTH] IN BLOOD BY AUTOMATED COUNT: 13.9 % (ref 12.3–15.4)
GFR SERPL CREATININE-BSD FRML MDRD: 95 ML/MIN/1.73
GLOBULIN UR ELPH-MCNC: 3.7 GM/DL
GLUCOSE SERPL-MCNC: 156 MG/DL (ref 65–99)
GLUCOSE UR STRIP-MCNC: NEGATIVE MG/DL
HCG SERPL QL: NEGATIVE
HCT VFR BLD AUTO: 41.1 % (ref 34–46.6)
HGB BLD-MCNC: 13 G/DL (ref 12–15.9)
HGB UR QL STRIP.AUTO: ABNORMAL
HOLD SPECIMEN: NORMAL
HOLD SPECIMEN: NORMAL
HYALINE CASTS UR QL AUTO: ABNORMAL /LPF
IMM GRANULOCYTES # BLD AUTO: 0.04 10*3/MM3 (ref 0–0.05)
IMM GRANULOCYTES NFR BLD AUTO: 0.4 % (ref 0–0.5)
INR PPP: 1.04 (ref 0.9–1.1)
KETONES UR QL STRIP: NEGATIVE
LEUKOCYTE ESTERASE UR QL STRIP.AUTO: NEGATIVE
LYMPHOCYTES # BLD AUTO: 1.88 10*3/MM3 (ref 0.7–3.1)
LYMPHOCYTES NFR BLD AUTO: 20.7 % (ref 19.6–45.3)
MCH RBC QN AUTO: 27.7 PG (ref 26.6–33)
MCHC RBC AUTO-ENTMCNC: 31.6 G/DL (ref 31.5–35.7)
MCV RBC AUTO: 87.6 FL (ref 79–97)
MONOCYTES # BLD AUTO: 0.52 10*3/MM3 (ref 0.1–0.9)
MONOCYTES NFR BLD AUTO: 5.7 % (ref 5–12)
NEUTROPHILS NFR BLD AUTO: 6.55 10*3/MM3 (ref 1.7–7)
NEUTROPHILS NFR BLD AUTO: 72.2 % (ref 42.7–76)
NITRITE UR QL STRIP: NEGATIVE
NRBC BLD AUTO-RTO: 0 /100 WBC (ref 0–0.2)
PH UR STRIP.AUTO: 6 [PH] (ref 5–8)
PLATELET # BLD AUTO: 321 10*3/MM3 (ref 140–450)
PMV BLD AUTO: 9.5 FL (ref 6–12)
POTASSIUM SERPL-SCNC: 4 MMOL/L (ref 3.5–5.2)
PROT SERPL-MCNC: 7.9 G/DL (ref 6–8.5)
PROT UR QL STRIP: NEGATIVE
PROTHROMBIN TIME: 13.5 SECONDS (ref 11.7–14.2)
RBC # BLD AUTO: 4.69 10*6/MM3 (ref 3.77–5.28)
RBC # UR: ABNORMAL /HPF
REF LAB TEST METHOD: ABNORMAL
SODIUM SERPL-SCNC: 141 MMOL/L (ref 136–145)
SP GR UR STRIP: >=1.03 (ref 1–1.03)
SQUAMOUS #/AREA URNS HPF: ABNORMAL /HPF
UROBILINOGEN UR QL STRIP: ABNORMAL
WBC # BLD AUTO: 9.08 10*3/MM3 (ref 3.4–10.8)
WBC UR QL AUTO: ABNORMAL /HPF
WHOLE BLOOD HOLD SPECIMEN: NORMAL
WHOLE BLOOD HOLD SPECIMEN: NORMAL

## 2020-07-28 PROCEDURE — 96375 TX/PRO/DX INJ NEW DRUG ADDON: CPT

## 2020-07-28 PROCEDURE — 25010000002 KETOROLAC TROMETHAMINE PER 15 MG: Performed by: NURSE PRACTITIONER

## 2020-07-28 PROCEDURE — 85025 COMPLETE CBC W/AUTO DIFF WBC: CPT | Performed by: NURSE PRACTITIONER

## 2020-07-28 PROCEDURE — 25010000002 ONDANSETRON PER 1 MG: Performed by: NURSE PRACTITIONER

## 2020-07-28 PROCEDURE — 85610 PROTHROMBIN TIME: CPT | Performed by: NURSE PRACTITIONER

## 2020-07-28 PROCEDURE — 80053 COMPREHEN METABOLIC PANEL: CPT | Performed by: NURSE PRACTITIONER

## 2020-07-28 PROCEDURE — 84703 CHORIONIC GONADOTROPIN ASSAY: CPT | Performed by: NURSE PRACTITIONER

## 2020-07-28 PROCEDURE — 74177 CT ABD & PELVIS W/CONTRAST: CPT

## 2020-07-28 PROCEDURE — 99284 EMERGENCY DEPT VISIT MOD MDM: CPT

## 2020-07-28 PROCEDURE — 25010000002 IOPAMIDOL 61 % SOLUTION: Performed by: EMERGENCY MEDICINE

## 2020-07-28 PROCEDURE — 71045 X-RAY EXAM CHEST 1 VIEW: CPT

## 2020-07-28 PROCEDURE — 81001 URINALYSIS AUTO W/SCOPE: CPT | Performed by: NURSE PRACTITIONER

## 2020-07-28 PROCEDURE — 96374 THER/PROPH/DIAG INJ IV PUSH: CPT

## 2020-07-28 RX ORDER — ONDANSETRON 2 MG/ML
4 INJECTION INTRAMUSCULAR; INTRAVENOUS ONCE
Status: COMPLETED | OUTPATIENT
Start: 2020-07-28 | End: 2020-07-28

## 2020-07-28 RX ORDER — SODIUM CHLORIDE 0.9 % (FLUSH) 0.9 %
10 SYRINGE (ML) INJECTION AS NEEDED
Status: DISCONTINUED | OUTPATIENT
Start: 2020-07-28 | End: 2020-07-28 | Stop reason: HOSPADM

## 2020-07-28 RX ORDER — KETOROLAC TROMETHAMINE 15 MG/ML
15 INJECTION, SOLUTION INTRAMUSCULAR; INTRAVENOUS ONCE
Status: COMPLETED | OUTPATIENT
Start: 2020-07-28 | End: 2020-07-28

## 2020-07-28 RX ORDER — HYDROCODONE BITARTRATE AND ACETAMINOPHEN 7.5; 325 MG/1; MG/1
1 TABLET ORAL ONCE
Status: COMPLETED | OUTPATIENT
Start: 2020-07-28 | End: 2020-07-28

## 2020-07-28 RX ADMIN — IOPAMIDOL 85 ML: 612 INJECTION, SOLUTION INTRAVENOUS at 16:35

## 2020-07-28 RX ADMIN — HYDROCODONE BITARTRATE AND ACETAMINOPHEN 1 TABLET: 7.5; 325 TABLET ORAL at 18:37

## 2020-07-28 RX ADMIN — KETOROLAC TROMETHAMINE 15 MG: 15 INJECTION, SOLUTION INTRAMUSCULAR; INTRAVENOUS at 15:27

## 2020-07-28 RX ADMIN — ONDANSETRON 4 MG: 2 INJECTION INTRAMUSCULAR; INTRAVENOUS at 15:27

## 2020-07-28 NOTE — ED PROVIDER NOTES
EMERGENCY DEPARTMENT ENCOUNTER    Room Number:  06/06  Date seen:  7/28/2020  Time seen: 3:00 PM  PCP: Jacinta Salcido MD  Historian: patient, EMS, mother, prior records    HPI:  Chief complaint:right sided abdominal pain  A complete HPI/ROS/PMH/PSH/SH/FH are unobtainable due to: not applicable  Context:Li Kimball is a 49 y.o. female who presents to the ED with c/o acute onset of right sided abdominal pain that began 4 hours ago, described as severe and stabbing.  It is not made better or worse by anything.  States she has tried pain pills, muscle relaxors.  States these are only a bandaid and she wants to find out what is wrong.  She states she was seen here 3 weeks ago for similar but it was on left side.  She denies strenuous activity, no vomiting or diarrhea and has no urinary or vaginal complaints.  Had not had period for 1 year but had a day of spotting about 2 weeks ago.  She has also seen her PCP about this.     Patient was placed in face mask in first look. Patient was wearing facemask when I entered the room and throughout our encounter. I wore full protective equipment throughout this patient encounter including a face mask, eye shield and gloves. Hand hygiene/washing of hands was performed before donning protective equipment and after removal when leaving the room.    MEDICAL RECORD REVIEW  Pt with visit here several weeks ago for left sided abdominal pain    ALLERGIES  Codeine    PAST MEDICAL HISTORY  Active Ambulatory Problems     Diagnosis Date Noted   • Hypothyroidism, adult    • Mixed hyperlipidemia    • BPPV (benign paroxysmal positional vertigo)    • Asthma    • Migraines    • DVT (deep venous thrombosis) (CMS/[facility])      Resolved Ambulatory Problems     Diagnosis Date Noted   • No Resolved Ambulatory Problems     Past Medical History:   Diagnosis Date   • Hypothyroid        PAST SURGICAL HISTORY  Past Surgical History:   Procedure Laterality Date   • ANKLE SURGERY Right 2015    Removal of  hardware   • FOOT SURGERY Left 2006    Fracture , postop DVT and PE   • LAPAROSCOPIC TUBAL LIGATION     • ORIF ANKLE FRACTURE Right 2014    DVT and PE perioperatively   • VENA CAVA FILTER INSERTION  2014       FAMILY HISTORY  Family History   Problem Relation Age of Onset   • Other Mother         Vertigo       SOCIAL HISTORY  Social History     Socioeconomic History   • Marital status:      Spouse name: Not on file   • Number of children: Not on file   • Years of education: Not on file   • Highest education level: Not on file   Tobacco Use   • Smoking status: Never Smoker   • Smokeless tobacco: Never Used   Substance and Sexual Activity   • Alcohol use: No   • Drug use: No       REVIEW OF SYSTEMS  Review of Systems    All systems reviewed and negative except for those discussed in HPI.     PHYSICAL EXAM    ED Triage Vitals [07/28/20 1434]   Temp Heart Rate Resp BP SpO2   97.7 °F (36.5 °C) 86 -- 153/93 100 %      Temp src Heart Rate Source Patient Position BP Location FiO2 (%)   Tympanic -- -- -- --     Physical Exam    I have reviewed the triage vital signs and nursing notes.      GENERAL: appears very uncomfortable, not toxic  HENT: nares patent, mm moist  EYES: no scleral icterus  NECK: no ROM limitations  CV: regular rhythm, regular rate, no murmur, no rubs and no gallups  RESPIRATORY: normal effort, CTAB  ABDOMEN: soft, no CVA tenderness, BS normal, no focal abdominal pain.   : deferred  MUSCULOSKELETAL: no deformity  NEURO: alert, moves all extremities, follows commands  SKIN: warm, dry    LAB RESULTS  Recent Results (from the past 24 hour(s))   Urinalysis With Microscopic If Indicated (No Culture) - Urine, Clean Catch    Collection Time: 07/28/20  3:16 PM   Result Value Ref Range    Color, UA Yellow Yellow, Straw    Appearance, UA Hazy (A) Clear    pH, UA 6.0 5.0 - 8.0    Specific Gravity, UA >=1.030 1.005 - 1.030    Glucose, UA Negative Negative    Ketones, UA Negative Negative    Bilirubin, UA  Negative Negative    Blood, UA Large (3+) (A) Negative    Protein, UA Negative Negative    Leuk Esterase, UA Negative Negative    Nitrite, UA Negative Negative    Urobilinogen, UA 0.2 E.U./dL 0.2 - 1.0 E.U./dL   Urinalysis, Microscopic Only - Urine, Clean Catch    Collection Time: 07/28/20  3:16 PM   Result Value Ref Range    RBC, UA 21-30 (A) None Seen, 0-2 /HPF    WBC, UA 3-5 (A) None Seen, 0-2 /HPF    Bacteria, UA None Seen None Seen /HPF    Squamous Epithelial Cells, UA 3-6 (A) None Seen, 0-2 /HPF    Hyaline Casts, UA 0-2 None Seen /LPF    Methodology Automated Microscopy    Comprehensive Metabolic Panel    Collection Time: 07/28/20  3:22 PM   Result Value Ref Range    Glucose 156 (H) 65 - 99 mg/dL    BUN 11 6 - 20 mg/dL    Creatinine 0.66 0.57 - 1.00 mg/dL    Sodium 141 136 - 145 mmol/L    Potassium 4.0 3.5 - 5.2 mmol/L    Chloride 106 98 - 107 mmol/L    CO2 26.9 22.0 - 29.0 mmol/L    Calcium 9.4 8.6 - 10.5 mg/dL    Total Protein 7.9 6.0 - 8.5 g/dL    Albumin 4.20 3.50 - 5.20 g/dL    ALT (SGPT) 36 (H) 1 - 33 U/L    AST (SGOT) 29 1 - 32 U/L    Alkaline Phosphatase 98 39 - 117 U/L    Total Bilirubin 0.3 0.0 - 1.2 mg/dL    eGFR Non African Amer 95 >60 mL/min/1.73    Globulin 3.7 gm/dL    A/G Ratio 1.1 g/dL    BUN/Creatinine Ratio 16.7 7.0 - 25.0    Anion Gap 8.1 5.0 - 15.0 mmol/L   Protime-INR    Collection Time: 07/28/20  3:22 PM   Result Value Ref Range    Protime 13.5 11.7 - 14.2 Seconds    INR 1.04 0.90 - 1.10   hCG, Serum, Qualitative    Collection Time: 07/28/20  3:22 PM   Result Value Ref Range    HCG Qualitative Negative Negative   CBC Auto Differential    Collection Time: 07/28/20  3:22 PM   Result Value Ref Range    WBC 9.08 3.40 - 10.80 10*3/mm3    RBC 4.69 3.77 - 5.28 10*6/mm3    Hemoglobin 13.0 12.0 - 15.9 g/dL    Hematocrit 41.1 34.0 - 46.6 %    MCV 87.6 79.0 - 97.0 fL    MCH 27.7 26.6 - 33.0 pg    MCHC 31.6 31.5 - 35.7 g/dL    RDW 13.9 12.3 - 15.4 %    RDW-SD 44.2 37.0 - 54.0 fl    MPV 9.5 6.0 -  12.0 fL    Platelets 321 140 - 450 10*3/mm3    Neutrophil % 72.2 42.7 - 76.0 %    Lymphocyte % 20.7 19.6 - 45.3 %    Monocyte % 5.7 5.0 - 12.0 %    Eosinophil % 0.7 0.3 - 6.2 %    Basophil % 0.3 0.0 - 1.5 %    Immature Grans % 0.4 0.0 - 0.5 %    Neutrophils, Absolute 6.55 1.70 - 7.00 10*3/mm3    Lymphocytes, Absolute 1.88 0.70 - 3.10 10*3/mm3    Monocytes, Absolute 0.52 0.10 - 0.90 10*3/mm3    Eosinophils, Absolute 0.06 0.00 - 0.40 10*3/mm3    Basophils, Absolute 0.03 0.00 - 0.20 10*3/mm3    Immature Grans, Absolute 0.04 0.00 - 0.05 10*3/mm3    nRBC 0.0 0.0 - 0.2 /100 WBC   Light Blue Top    Collection Time: 07/28/20  3:22 PM   Result Value Ref Range    Extra Tube hold for add-on    Green Top (Gel)    Collection Time: 07/28/20  3:22 PM   Result Value Ref Range    Extra Tube Hold for add-ons.    Lavender Top    Collection Time: 07/28/20  3:22 PM   Result Value Ref Range    Extra Tube hold for add-on    Gold Top - SST    Collection Time: 07/28/20  3:22 PM   Result Value Ref Range    Extra Tube Hold for add-ons.          RADIOLOGY RESULTS  XR Chest 1 View   Final Result   No focal pulmonary consolidation. Follow-up/further   evaluation can be obtained as clinical indications persist.       This report was finalized on 7/28/2020 4:00 PM by Dr. Andrez Vance M.D.          CT Abdomen Pelvis With Contrast    (Results Pending)         PROGRESS, DATA ANALYSIS, CONSULTS AND MEDICAL DECISION MAKING  All labs have been independently reviewed by me.  All radiology studies have been reviewed by me and discussed with radiologist dictating the report.  EKG's independently viewed and interpreted by me unless stated otherwise. Discussion below represents my analysis of pertinent findings related to patient's condition, differential diagnosis, treatment plan and final disposition.     ED Course as of Jul 28 1729   Tue Jul 28, 2020   1553 Discussed patient with Dr. Raman.    Per patient, her pain is improved at this time.      [EW]   1636 I viewed chest x-ray on PACS.  No acute infiltrates.    [EW]   1718 Discussed CT scan with Dr. García.  No acute findings.  She has some renal stones, diverticulosis and IVC filter in place.  Will discuss with patient and provide reassurance.     [EW]   1724 I updated patient on results of imaging.  She could have passed a stone PTA or while here, but no stones present.  OB/GYN has seen patient and recommended GI follow up.  I will give her this information as well. Her abdominal reexam is normal.     [EW]      ED Course User Index  [EW] Van Babita Monte, APRN     DDX: Differential diagnosis includes but is not limited to:  - hepatobiliary pathology such as cholecystitis, cholangitis, and symptomatic cholelithiasis  - Pancreatitis  - Dyspepsia  - Small bowel obstruction  - Appendicitis  - Diverticulitis  - UTI including pyelonephritis  - Ureteral stone  - Zoster  - Colitis, including infectious and ischemic  - Atypical ACS    MDM:  Pt's pain improved greatly while in ER.  Abdominal exam and reexam are not focal.  This is not new situation.  CT scan not worrisome.  Will plan for her to follow up with GI.      1530:  Reviewed pt's history and workup with Dr. Raman.  After a bedside evaluation, Dr. Raman agrees with the plan of care.    The patient's history, physical exam, and lab findings were discussed with the physician, who also performed a face to face history and physical exam.  I discussed all results and noted any abnormalities with patient.  Discussed absoute need to recheck abnormalities with their family physician.  I answered any of the patient's questions.  Discussed plan for discharge, as there is no emergent indication for admission.  Pt is agreeable and understands need for follow up and repeat testing.  Pt is aware that discharge does not mean that nothing is wrong but it indicates no emergency is present and they must continue care with their family physician.  Pt is discharged with  instructions to follow up with primary care doctor to have their blood pressure rechecked.         Disposition vitals:  /74   Pulse 79   Temp 97.7 °F (36.5 °C) (Tympanic)   SpO2 98%       DIAGNOSIS  Final diagnoses:   Right-sided abdominal pain of unknown cause       FOLLOW UP   Bridgette Webb MD  [address] [id number]  [phone]    Schedule an appointment as soon as possible for a visit            Babita Araujo, DANIEL  07/28/20 3489

## 2020-07-28 NOTE — ED NOTES
Patient to er with c/o right flank pain and some left flank pain that started today and increasing. Patient reported increase in nausea. Patient transported per ems from home. Patient has mask on in triage along with with staff.      Degonda, Janet, RN  07/28/20 4919

## 2020-07-28 NOTE — ED PROVIDER NOTES
Brief history of present illness: 49-year-old female with intermittent bilateral lower abdominal pain for 2 months had been pain-free for about 3 weeks until this morning.  No exacerbating relieving factors.  Pain was moderate upon arrival but is significantly improved now.  No change in bowel habits, dysuria or hematuria, vomiting or diarrhea.  Patient is previously been seen in the emergency department and by her OB/GYN for similar.    Physical exam:   No acute distress.  Nontoxic-appearing.  No respiratory distress.  Pink warm and well-perfused.  Morbid obesity is noted with large habitus and soft abdomen.  No focal tenderness or rebound.    MDM:    Results for orders placed or performed during the hospital encounter of 07/28/20   Urinalysis With Microscopic If Indicated (No Culture) - Urine, Clean Catch   Result Value Ref Range    Color, UA Yellow Yellow, Straw    Appearance, UA Hazy (A) Clear    pH, UA 6.0 5.0 - 8.0    Specific Gravity, UA >=1.030 1.005 - 1.030    Glucose, UA Negative Negative    Ketones, UA Negative Negative    Bilirubin, UA Negative Negative    Blood, UA Large (3+) (A) Negative    Protein, UA Negative Negative    Leuk Esterase, UA Negative Negative    Nitrite, UA Negative Negative    Urobilinogen, UA 0.2 E.U./dL 0.2 - 1.0 E.U./dL   CBC Auto Differential   Result Value Ref Range    WBC 9.08 3.40 - 10.80 10*3/mm3    RBC 4.69 3.77 - 5.28 10*6/mm3    Hemoglobin 13.0 12.0 - 15.9 g/dL    Hematocrit 41.1 34.0 - 46.6 %    MCV 87.6 79.0 - 97.0 fL    MCH 27.7 26.6 - 33.0 pg    MCHC 31.6 31.5 - 35.7 g/dL    RDW 13.9 12.3 - 15.4 %    RDW-SD 44.2 37.0 - 54.0 fl    MPV 9.5 6.0 - 12.0 fL    Platelets 321 140 - 450 10*3/mm3    Neutrophil % 72.2 42.7 - 76.0 %    Lymphocyte % 20.7 19.6 - 45.3 %    Monocyte % 5.7 5.0 - 12.0 %    Eosinophil % 0.7 0.3 - 6.2 %    Basophil % 0.3 0.0 - 1.5 %    Immature Grans % 0.4 0.0 - 0.5 %    Neutrophils, Absolute 6.55 1.70 - 7.00 10*3/mm3    Lymphocytes, Absolute 1.88 0.70 -  3.10 10*3/mm3    Monocytes, Absolute 0.52 0.10 - 0.90 10*3/mm3    Eosinophils, Absolute 0.06 0.00 - 0.40 10*3/mm3    Basophils, Absolute 0.03 0.00 - 0.20 10*3/mm3    Immature Grans, Absolute 0.04 0.00 - 0.05 10*3/mm3    nRBC 0.0 0.0 - 0.2 /100 WBC   Urinalysis, Microscopic Only - Urine, Clean Catch   Result Value Ref Range    RBC, UA 21-30 (A) None Seen, 0-2 /HPF    WBC, UA 3-5 (A) None Seen, 0-2 /HPF    Bacteria, UA None Seen None Seen /HPF    Squamous Epithelial Cells, UA 3-6 (A) None Seen, 0-2 /HPF    Hyaline Casts, UA 0-2 None Seen /LPF    Methodology Automated Microscopy      Agree with plan for CT evaluation of this recurrent discomfort.  If no clear life threat is identified today, recommend close outpatient follow-up with gastroenterology has already recommended previously.    I have seen and personally evaluated this patient, discussed the case with the treating advanced practice provider, and reviewed their note. I was involved in the medical decision making during the evaluation, testing and disposition planning for this patient.     Suleiman Raman MD  07/28/20 2875

## 2020-07-28 NOTE — DISCHARGE INSTRUCTIONS
Increase your water intake    Take your home medications as needed for discomfort    Return Precautions    Although you are being discharged from the ED today, I encourage you to return for worsening symptoms.  Things can, and do, change such that treatment at home with medication may not be adequate.      Specifically, return for any of the following:    Chest pain, shortness of breath, pain or nausea and vomiting not controlled by medications provided.    Please make a follow up with your Primary Care Provider for a blood pressure recheck.

## 2020-07-29 ENCOUNTER — DOCUMENTATION (OUTPATIENT)
Dept: INTERNAL MEDICINE | Facility: CLINIC | Age: 50
End: 2020-07-29

## 2020-07-29 ENCOUNTER — TELEPHONE (OUTPATIENT)
Dept: INTERNAL MEDICINE | Facility: CLINIC | Age: 50
End: 2020-07-29

## 2020-07-29 DIAGNOSIS — N20.0 NEPHROLITHIASIS: Primary | ICD-10-CM

## 2020-07-29 NOTE — TELEPHONE ENCOUNTER
Discussed  with her extensively.  Reviewed CT scan and lab results from her room visits.  Advised her that although the CT scans showed diverticulosis, no diverticulitis was identified.  Yesterday, she did have a significant amount of blood on urine microscopic.  The CT scans have demonstrated some small kidney stones.  It is possible she might of passed one yesterday.  She is still having some discomfort.  Encouraged her to drink plenty of water.  She has a prescription for Bentyl at home.  Advised her she could take that.  She reports constipation.  Advised her she could take senna for that.  She will keep her appointment with gastroenterology next week.  Referral placed for urology consultation as well.

## 2020-07-29 NOTE — TELEPHONE ENCOUNTER
Pt informed, states she does not eat those things anyway.  I did advise to increase fiber intake.  She wanted to know if there is anything she can do or take until she sees Dr. Darby.  She said she has been to the ER twice in three weeks and both times they told her she had diverticulitis.  They have never sent her home with scripts for pain or antibiotic.  Pt also concerned about blood in urine.

## 2020-07-29 NOTE — TELEPHONE ENCOUNTER
Patient called and stated she is still having sharp pain on right side. Patient went to the ER yesterday. Patient was told she is having blood in urine and that she has Diverticulitis. Patient is concerned because the only thing they tell her to do for the diverticulitis is to change her diet, but she doesn't know what she should be eating. Patient would like education on it.  Patient has appointment on 8/4 with Dr. Darby to do a scope     Please advise     [phone]

## 2020-08-04 ENCOUNTER — OFFICE VISIT (OUTPATIENT)
Dept: GASTROENTEROLOGY | Facility: CLINIC | Age: 50
End: 2020-08-04

## 2020-08-04 VITALS — BODY MASS INDEX: 44.1 KG/M2 | TEMPERATURE: 97 F | HEIGHT: 63 IN | WEIGHT: 248.9 LBS

## 2020-08-04 DIAGNOSIS — D12.6 ADENOMATOUS POLYP OF COLON, UNSPECIFIED PART OF COLON: Primary | ICD-10-CM

## 2020-08-04 PROCEDURE — 99203 OFFICE O/P NEW LOW 30 MIN: CPT | Performed by: INTERNAL MEDICINE

## 2020-08-04 NOTE — PROGRESS NOTES
Chief Complaint   Patient presents with   • Abdominal Pain       Li Kimball is a 49 y.o. female who presents with abdominal pain constipation    49-year-old female with intermittent abdominal pain of 6 months duration.  She has visited the emergency room 3 times.  Found to have blood in her urine on urinalysis, and kidney stones.  Otherwise normal CAT scan.  Pain is lower quadrants bilaterally does not radiate.  Worse with movement better at rest.  Pain is relieved with bowel movements.  There is no blood in the stool.  No nausea, vomiting.    Urology consultation pending  Gynecology work-up unrevealing per patient      Past Medical History:   Diagnosis Date   • Asthma    • BPPV (benign paroxysmal positional vertigo)    • Chronic kidney disease     kidney stones   • DVT (deep venous thrombosis) (CMS/HCC)     Right   • Hypothyroid    • Migraines    • Mixed hyperlipidemia        Past Surgical History:   Procedure Laterality Date   • ANKLE SURGERY Right 2015    Removal of hardware   • FOOT SURGERY Left 2006    Fracture , postop DVT and PE   • LAPAROSCOPIC TUBAL LIGATION     • ORIF ANKLE FRACTURE Right 2014    DVT and PE perioperatively   • VENA CAVA FILTER INSERTION  2014         Current Outpatient Medications:   •  apixaban (ELIQUIS) 2.5 MG tablet tablet, Take 1 tablet by mouth Every 12 (Twelve) Hours., Disp: 60 tablet, Rfl: 5  •  atorvastatin (LIPITOR) 10 MG tablet, TAKE 1 TABLET BY MOUTH DAILY, Disp: 30 tablet, Rfl: 5  •  cyclobenzaprine (FLEXERIL) 10 MG tablet, Take 1 tablet by mouth 3 (Three) Times a Day., Disp: 21 tablet, Rfl: 0  •  dicyclomine (BENTYL) 20 MG tablet, Take 1 tablet by mouth Every 6 (Six) Hours As Needed (abdominal pain)., Disp: 30 tablet, Rfl: 1  •  HYDROcodone-acetaminophen (NORCO) 5-325 MG per tablet, Take 1 tablet by mouth Every 6 (Six) Hours As Needed., Disp: , Rfl:   •  levothyroxine (SYNTHROID, LEVOTHROID) 50 MCG tablet, TAKE 1 TABLET BY MOUTH EVERY DAY, Disp: 90 tablet, Rfl: 1  •   metFORMIN ER (GLUCOPHAGE-XR) 500 MG 24 hr tablet, Take 1 tablet by mouth Daily With Breakfast., Disp: 90 tablet, Rfl: 1  •  SUMAtriptan (IMITREX) 50 MG tablet, Take one tablet at onset of headache. May repeat dose one time in 2 hours if headache not relieved., Disp: 9 tablet, Rfl: 5  •  topiramate (TOPAMAX) 25 MG tablet, TAKE 1 TABLET BY MOUTH EVERY NIGHT, Disp: 30 tablet, Rfl: 5    Allergies   Allergen Reactions   • Codeine Nausea And Vomiting       Social History     Socioeconomic History   • Marital status:      Spouse name: Not on file   • Number of children: Not on file   • Years of education: Not on file   • Highest education level: Not on file   Tobacco Use   • Smoking status: Never Smoker   • Smokeless tobacco: Never Used   Substance and Sexual Activity   • Alcohol use: No   • Drug use: No       Family History   Problem Relation Age of Onset   • Other Mother         Vertigo       Review of Systems   Gastrointestinal: Positive for abdominal distention, abdominal pain and constipation.   All other systems reviewed and are negative.      Vitals:    08/04/20 1015   Temp: 97 °F (36.1 °C)       Physical Exam   Constitutional: She is oriented to person, place, and time. She appears well-developed and well-nourished.   HENT:   Head: Normocephalic and atraumatic.   Eyes: Pupils are equal, round, and reactive to light.   Cardiovascular: Normal rate, regular rhythm and normal heart sounds.   Pulmonary/Chest: Effort normal and breath sounds normal.   Abdominal: Soft. Bowel sounds are normal. She exhibits no shifting dullness, no distension, no pulsatile liver, no fluid wave, no abdominal bruit, no ascites, no pulsatile midline mass and no mass. There is no hepatosplenomegaly. There is no tenderness. There is no rigidity and no guarding. No hernia.   Musculoskeletal: Normal range of motion.   Neurological: She is alert and oriented to person, place, and time.   Skin: Skin is warm and dry.   Psychiatric: She has a  normal mood and affect. Her behavior is normal. Thought content normal.   Nursing note and vitals reviewed.      Problem list    Lower abdominal cramping bilateral  Kidney stones  Blood in the urine  Constipation      Assessment/Plan    Ct Abdomen Pelvis With Contrast    Result Date: 7/28/2020  Hepatic steatosis, nephrolithiasis, and incidentally noted inferior vena cava filter. No acute abnormality identified. I called the findings to Babita Araujo in the emergency department at around 5:20 PM.  This report was finalized on 7/28/2020 7:39 PM by Dr. Glen García M.D.      Xr Chest 1 View    Result Date: 7/28/2020  No focal pulmonary consolidation. Follow-up/further evaluation can be obtained as clinical indications persist.  This report was finalized on 7/28/2020 4:00 PM by Dr. Andrez Vance M.D.        No notes on file       Plan for colonoscopy for abdominal pain  She should follow-up with urology consultants  Further recommendations after colonoscopy is complete

## 2020-08-13 ENCOUNTER — OUTSIDE FACILITY SERVICE (OUTPATIENT)
Dept: GASTROENTEROLOGY | Facility: CLINIC | Age: 50
End: 2020-08-13

## 2020-08-13 PROCEDURE — 45381 COLONOSCOPY SUBMUCOUS NJX: CPT | Performed by: INTERNAL MEDICINE

## 2020-08-13 PROCEDURE — 45380 COLONOSCOPY AND BIOPSY: CPT | Performed by: INTERNAL MEDICINE

## 2020-08-14 ENCOUNTER — TELEPHONE (OUTPATIENT)
Dept: GASTROENTEROLOGY | Facility: CLINIC | Age: 50
End: 2020-08-14

## 2020-08-14 DIAGNOSIS — K56.699 SIGMOID STRICTURE (HCC): Primary | ICD-10-CM

## 2020-08-14 NOTE — TELEPHONE ENCOUNTER
----- Message from [place] Mason sent at 8/14/2020 11:03 AM EDT -----  Regarding: Questions   Contact: [phone]  Had a procedure yesterday, had really bad stomach cramps and they told her it would go away in a couple hours, states her stomach pretty much hurt all day yesterday, and it's feeling a little better today but it still hurts. Would like the details of what the doctor said because she doesn't fully remember. Thinks he said something about more x-rays and another surgery?

## 2020-08-14 NOTE — TELEPHONE ENCOUNTER
Called pt and pt reports that her stomach hurt really bad yesterday . Today it is a little better but is still having intermittent pain.  Pt states she did not talk with MD but her mother told she needs an xray and some kind of surgery.  Asked pt if she has looked at dc papers and pt states no.  Advised pt will send message to Dr Darby and in the meantime if pain increases to go to ER.  Also advised pt to find her dc papers and the answers to her questions may be in there.  Pt verb understanding.

## 2020-08-17 NOTE — TELEPHONE ENCOUNTER
Please tell Li I did talk to her she just was on propofol and does not remember but I spent a long time with her mother and the mother seemed to understand that the plan      is as follows:       Please schedule single contrast barium enema for sigmoid stricture   Please refer 1 week after the barium enema is scheduled to [facility] any doctor for mid sigmoid stricture and abdominal pain     Please let me know when you have talked to her and scheduled the barium enema  --per Dr olson.     Called pt and advised of the above. Advised she can call [facility] scheduling at [phone] to arrange barium enema.  Verb understanding.     Message sent to Asha GRANT to send surgical referral.

## 2020-08-18 NOTE — TELEPHONE ENCOUNTER
Barium Enema is scheduled for 8/24.    Referral has been placed with Dr. Verdin's office, their office will contact patient to schedule.

## 2020-08-21 NOTE — PROGRESS NOTES
Patient is scheduled for barium enema and appointment with Dr. Verdin no need to call her at this time

## 2020-08-24 ENCOUNTER — HOSPITAL ENCOUNTER (OUTPATIENT)
Dept: GENERAL RADIOLOGY | Facility: HOSPITAL | Age: 50
Discharge: HOME | End: 2020-08-24
Admitting: INTERNAL MEDICINE

## 2020-08-24 PROCEDURE — 74280 X-RAY XM COLON 2CNTRST STD: CPT

## 2020-08-24 RX ADMIN — BARIUM SULFATE 1500 ML: 1.05 SUSPENSION ORAL; RECTAL at 09:45

## 2020-08-31 ENCOUNTER — TELEPHONE (OUTPATIENT)
Dept: GASTROENTEROLOGY | Facility: CLINIC | Age: 50
End: 2020-08-31

## 2020-09-03 ENCOUNTER — TELEPHONE (OUTPATIENT)
Dept: SURGERY | Facility: CLINIC | Age: 50
End: 2020-09-03

## 2020-09-03 ENCOUNTER — OFFICE VISIT (OUTPATIENT)
Dept: SURGERY | Facility: CLINIC | Age: 50
End: 2020-09-03

## 2020-09-03 VITALS — WEIGHT: 247 LBS | BODY MASS INDEX: 43.77 KG/M2 | HEIGHT: 63 IN

## 2020-09-03 DIAGNOSIS — K56.699 COLONIC STRICTURE (HCC): Primary | ICD-10-CM

## 2020-09-03 PROCEDURE — 99244 OFF/OP CNSLTJ NEW/EST MOD 40: CPT | Performed by: SURGERY

## 2020-09-03 NOTE — TELEPHONE ENCOUNTER
"Pt called inquiring about what medication to hold prior to procedure. Also regarding weight loss, she has in the past used \"[employer]\". She is wanting to know if this would be ok to start again.  "

## 2020-09-04 ENCOUNTER — TELEPHONE (OUTPATIENT)
Dept: INTERNAL MEDICINE | Facility: CLINIC | Age: 50
End: 2020-09-04

## 2020-09-04 NOTE — TELEPHONE ENCOUNTER
Patient called in and is requesting a call back from doctor arguelles  Regarding a surgery and procedure about her large intestines .  Patient needs to loose weight for surgery . Patient stated their are pills that control her appitite and is on blood thinner    apixaban (ELIQUIS) 2.5 MG tablet tablet      she is looking to add [employer] weight probiotics and prebiotic's and wants to know if it will effect her blood thinner .            Patient call back [phone]

## 2020-09-04 NOTE — H&P (VIEW-ONLY)
SUMMARY (A/P):    49-year-old lady with likely diverticulitis induced stricture of the sigmoid colon.  Although there was a long segment of narrowing of the sigmoid colon, there is only a very short segment of significant narrowing on barium enema (which was traversed by the colonoscope).  Ultimately, the best treatment for her would be laparoscopic sigmoid colectomy.  She is at fairly high risk of any surgical intervention due to her BMI of 43.8 and history of DVT and pulmonary embolism.  She understands the risk of surgical intervention to include but not be limited to bleeding, infection, conversion to open procedure, and postoperative anastomotic leak requiring reoperation and temporary colostomy.  I did discuss with her the option of colonoscopy with possible balloon dilation of the area of significant stricturing.  She understands that this comes with the risk of perforation that would require emergency surgery.  She also understands that this would be a temporary and not permanent solution if it worked at all.  The only benefit of doing this would be that if reasonable dilation could be achieved then we could avoid immediate surgical intervention and perhaps allow her time to improve dietary habits and lose weight to decrease her risks associated with surgical intervention.  She has scheduled accordingly.      CC:    Colonic stricture, referred for consultation by Dr. Darby    HPI:    49-year-old lady presents with 2-month history of initially severe left lower quadrant pain associated with constipation.  In the last few weeks her abdominal pain has substantially decreased to the point of near resolution now.  She is having bowel function although her stools seem smaller and thinner.  She reports no nausea or vomiting.    RADIOLOGY/ENDOSCOPY:    • Colonoscopy 8/13/2020: Severe diverticulosis in the rectosigmoid and sigmoid colon, narrowing of the colon in association with the diverticular opening, evidence  of diverticular spasm, stricture at 28 cm proximal to the anus biopsied and tattooed.  Report describes that the stricture could be traversed with an EGD scope and the level of the hepatic flexure was reached.  • Barium enema 8/24/2020: Few typical diverticula arising from the sigmoid colon.  There is a focal smooth stricture noted just at the on the diverticula with no associated mass or mucosal abnormality.  In addition the caliber of the mid sigmoid is less than anticipated compared to the remaining colon.  I reviewed the images and concur.    LABS:    • Pathology on colonic stricture biopsies demonstrated mucosal architectural alterations, no malignancy.  • CMP 7/28/2020: Glucose 156, ALT 36, otherwise normal  • CBC 7/28/2020 normal    PHYSICAL EXAM:   • Constitutional: Well-developed well-nourished, no acute distress  • Vital signs: Weight 247 pounds, height 63 inches, BMI 43.8  • Eyes: Conjunctiva normal, sclera nonicteric  • ENMT: Hearing grossly normal, oral mucosa moist  • Neck: Supple, no palpable mass, trachea midline  • Respiratory: Clear to auscultation, normal inspiratory effort  • Cardiovascular: Regular rate, no murmur, no carotid bruit, no peripheral edema, no jugular venous distention  • Gastrointestinal: Soft, marked truncal obesity, nontender, no palpable mass, no hepatosplenomegaly, negative for hernia, bowel sounds normal  • Lymphatics (palpable nodes):  cervical-negative, axillary-negative  • Skin:  Warm, dry, no rash on visualized skin surfaces  • Musculoskeletal: Symmetric strength, normal gait  • Psychiatric: Alert and oriented ×3, normal affect     ALLERGIES:   • Codeine-nausea, vomiting    MEDICATIONS:   • Eliquis  • Lipitor  • Synthroid    PMH:    • DVT and pulmonary embolism 6 years ago  • Hypothyroid  • Migraines  • Hyperlipidemia    PSH:    • Right ankle surgery  • Vena cava filter  • Laparoscopic tubal ligation    FAMILY HISTORY:    • Negative for colorectal cancer (possibly maternal  aunt but she is not sure)    SOCIAL HISTORY:   • Denies tobacco use  • Denies alcohol use    ROS:    Influenza Like Illness: no fever, no  cough, no  sore throat, no  body aches, no loss of sense of taste or smell, no known exposure to person with Covid-19.  All other systems reviewed and negative other than presenting complaints.    ROBERTO SABA M.D.

## 2020-09-08 RX ORDER — APIXABAN 2.5 MG/1
TABLET, FILM COATED ORAL
Qty: 60 TABLET | Refills: 5 | Status: SHIPPED | OUTPATIENT
Start: 2020-09-08 | End: 2021-02-08 | Stop reason: SDUPTHER

## 2020-09-11 ENCOUNTER — TRANSCRIBE ORDERS (OUTPATIENT)
Dept: SLEEP MEDICINE | Facility: HOSPITAL | Age: 50
End: 2020-09-11

## 2020-09-11 DIAGNOSIS — Z01.818 OTHER SPECIFIED PRE-OPERATIVE EXAMINATION: Primary | ICD-10-CM

## 2020-09-15 ENCOUNTER — LAB (OUTPATIENT)
Dept: LAB | Facility: HOSPITAL | Age: 50
End: 2020-09-15

## 2020-09-15 DIAGNOSIS — Z01.818 OTHER SPECIFIED PRE-OPERATIVE EXAMINATION: ICD-10-CM

## 2020-09-15 PROCEDURE — C9803 HOPD COVID-19 SPEC COLLECT: HCPCS

## 2020-09-15 PROCEDURE — U0004 COV-19 TEST NON-CDC HGH THRU: HCPCS

## 2020-09-16 LAB — SARS-COV-2 RNA RESP QL NAA+PROBE: NOT DETECTED

## 2020-09-17 ENCOUNTER — ANESTHESIA EVENT (OUTPATIENT)
Dept: GASTROENTEROLOGY | Facility: HOSPITAL | Age: 50
End: 2020-09-17

## 2020-09-17 ENCOUNTER — HOSPITAL ENCOUNTER (OUTPATIENT)
Facility: HOSPITAL | Age: 50
Setting detail: HOSPITAL OUTPATIENT SURGERY
Discharge: HOME | End: 2020-09-17
Attending: SURGERY | Admitting: SURGERY

## 2020-09-17 ENCOUNTER — ANESTHESIA (OUTPATIENT)
Dept: GASTROENTEROLOGY | Facility: HOSPITAL | Age: 50
End: 2020-09-17

## 2020-09-17 VITALS
DIASTOLIC BLOOD PRESSURE: 94 MMHG | TEMPERATURE: 98.1 F | SYSTOLIC BLOOD PRESSURE: 140 MMHG | OXYGEN SATURATION: 99 % | RESPIRATION RATE: 16 BRPM | BODY MASS INDEX: 42.16 KG/M2 | HEART RATE: 82 BPM | WEIGHT: 238 LBS

## 2020-09-17 PROCEDURE — 45386 COLONOSCOPY W/BALLOON DILAT: CPT | Performed by: SURGERY

## 2020-09-17 PROCEDURE — 25010000002 PROPOFOL 10 MG/ML EMULSION: Performed by: ANESTHESIOLOGY

## 2020-09-17 PROCEDURE — C1726 CATH, BAL DIL, NON-VASCULAR: HCPCS | Performed by: SURGERY

## 2020-09-17 RX ORDER — SODIUM CHLORIDE 0.9 % (FLUSH) 0.9 %
10 SYRINGE (ML) INJECTION AS NEEDED
Status: DISCONTINUED | OUTPATIENT
Start: 2020-09-17 | End: 2020-09-17 | Stop reason: HOSPADM

## 2020-09-17 RX ORDER — LIDOCAINE HYDROCHLORIDE 10 MG/ML
0.5 INJECTION, SOLUTION INFILTRATION; PERINEURAL ONCE AS NEEDED
Status: DISCONTINUED | OUTPATIENT
Start: 2020-09-17 | End: 2020-09-17 | Stop reason: HOSPADM

## 2020-09-17 RX ORDER — PROPOFOL 10 MG/ML
VIAL (ML) INTRAVENOUS CONTINUOUS PRN
Status: DISCONTINUED | OUTPATIENT
Start: 2020-09-17 | End: 2020-09-17 | Stop reason: SURG

## 2020-09-17 RX ORDER — LIDOCAINE HYDROCHLORIDE 20 MG/ML
INJECTION, SOLUTION INFILTRATION; PERINEURAL AS NEEDED
Status: DISCONTINUED | OUTPATIENT
Start: 2020-09-17 | End: 2020-09-17 | Stop reason: SURG

## 2020-09-17 RX ORDER — PROPOFOL 10 MG/ML
VIAL (ML) INTRAVENOUS AS NEEDED
Status: DISCONTINUED | OUTPATIENT
Start: 2020-09-17 | End: 2020-09-17 | Stop reason: SURG

## 2020-09-17 RX ORDER — SODIUM CHLORIDE, SODIUM LACTATE, POTASSIUM CHLORIDE, CALCIUM CHLORIDE 600; 310; 30; 20 MG/100ML; MG/100ML; MG/100ML; MG/100ML
1000 INJECTION, SOLUTION INTRAVENOUS CONTINUOUS
Status: DISCONTINUED | OUTPATIENT
Start: 2020-09-17 | End: 2020-09-17 | Stop reason: HOSPADM

## 2020-09-17 RX ADMIN — PROPOFOL 150 MG: 10 INJECTION, EMULSION INTRAVENOUS at 14:01

## 2020-09-17 RX ADMIN — SODIUM CHLORIDE, POTASSIUM CHLORIDE, SODIUM LACTATE AND CALCIUM CHLORIDE 1000 ML: 600; 310; 30; 20 INJECTION, SOLUTION INTRAVENOUS at 13:33

## 2020-09-17 RX ADMIN — LIDOCAINE HYDROCHLORIDE 100 MG: 20 INJECTION, SOLUTION INFILTRATION; PERINEURAL at 14:02

## 2020-09-17 RX ADMIN — PROPOFOL 150 MCG/KG/MIN: 10 INJECTION, EMULSION INTRAVENOUS at 14:02

## 2020-09-17 NOTE — OP NOTE
PREOPERATIVE DIAGNOSIS:  Colonic stricture    POSTOPERATIVE DIAGNOSIS AND FINDINGS:  Sigmoid colon stricture    PROCEDURE:  Colonoscopy to cecum balloon dilation to 20 mm    SURGEON:  Walter Verdin MD    ANESTHESIA:  MAC    SPECIMEN(S):  none    DESCRIPTION:  In decubitus position digital rectal exam was normal. Colonoscope inserted under direct visualization of lumen to level of submucosal tattoo immediately distal to tight stricture of the colon.  I could not pass the adult colonoscope through the stricture.  A balloon was inserted and inflated sequentially to 20 mm and held for 45 seconds.  Following removal of the balloon I was able to relatively easily navigate past the stricture and get to the cecum confirmed by visualization of the appendiceal orifice and ileocecal valve.  The scope was slowly withdrawn circumferentially examining all mucosal surfaces and there were no other abnormalities.  The area of the stricture was again evaluated and again the scope could be easily passed back and forth through that area.  Remainder of the colon appeared normal.  Tolerated well.    RECOMMENDATIONS:  We will follow clinical response to balloon dilation    Walter Verdin M.D.

## 2020-09-17 NOTE — ANESTHESIA PREPROCEDURE EVALUATION
Anesthesia Evaluation     Patient summary reviewed and Nursing notes reviewed   NPO Solid Status: > 8 hours  NPO Liquid Status: > 2 hours           Airway   Mallampati: II  Neck ROM: full  No difficulty expected  Dental - normal exam     Pulmonary     breath sounds clear to auscultation  Cardiovascular     Rhythm: regular    (+) DVT, hyperlipidemia,       Neuro/Psych  (+) headaches,     GI/Hepatic/Renal/Endo    (+) obesity, morbid obesity,  renal disease,     Musculoskeletal     Abdominal   (+) obese,    Substance History      OB/GYN          Other                        Anesthesia Plan    ASA 3     MAC     intravenous induction     Anesthetic plan, all risks, benefits, and alternatives have been provided, discussed and informed consent has been obtained with: patient.

## 2020-09-17 NOTE — ANESTHESIA POSTPROCEDURE EVALUATION
Patient: Li Kimball    Procedure Summary     Date: 09/17/20 Room / [facility]    Anesthesia Start: 1357 Anesthesia Stop: 1429    Procedure: COLONOSCOPY to cecum with 20mm colonic balloon dilation (N/A ) Diagnosis:       Colonic stricture (CMS/HCC)      (Colonic stricture (CMS/HCC) [K56.699])    Surgeon: Walter Verdin MD Provider: Ahmet Perez MD    Anesthesia Type: MAC ASA Status: 3          Anesthesia Type: MAC    Vitals  Vitals Value Taken Time   /87 09/17/20 1438   Temp     Pulse 85 09/17/20 1438   Resp 16 09/17/20 1438   SpO2 100 % 09/17/20 1438           Post Anesthesia Care and Evaluation    Patient location during evaluation: bedside  Patient participation: complete - patient participated  Level of consciousness: sleepy but conscious  Pain score: 0  Pain management: adequate  Airway patency: patent  Anesthetic complications: No anesthetic complications    Cardiovascular status: acceptable  Respiratory status: acceptable  Hydration status: acceptable    Comments: /87 (BP Location: Left arm, Patient Position: Lying)   Pulse 85   Temp 36.7 °C (98.1 °F) (Oral)   Resp 16   Wt 108 kg (238 lb)   SpO2 100%   BMI 42.16 kg/m²

## 2020-09-17 NOTE — DISCHARGE INSTRUCTIONS
For the next 24 hours patient needs to be with a responsible adult.    For 24 hours DO NOT drive, operate machinery, appliances, drink alcohol, make important decisions or sign legal documents.    Start with a light or bland diet if you are feeling sick to your stomach otherwise advance to regular diet as tolerated.    Follow recommendations on procedure report if provided by your doctor.    Call Dr Verdin for problems [phone]    Problems may include but not limited to: large amounts of bleeding, trouble breathing, repeated vomiting, severe unrelieved pain, fever or chills.

## 2020-09-18 ENCOUNTER — TELEPHONE (OUTPATIENT)
Dept: INTERNAL MEDICINE | Facility: CLINIC | Age: 50
End: 2020-09-18

## 2020-09-18 NOTE — TELEPHONE ENCOUNTER
PATIENT CALLED TO ASK IF SHE CAN TAKE KETO TRIM WITH HER BLOOD THINNER. WANTS TO MAKE SURE THAT IT DOESN'T INTERFERE WITH HER MEDICATION     IT IS OK TO LEAVE A MESSAGE.     PLEASE ADVISE    [phone]

## 2020-09-21 NOTE — TELEPHONE ENCOUNTER
Pt informed.  She was asking if the KetoTrim may be more effective than the Health Weight Pre and Probiotic she has been using.  I advised we weren't sure, but that she could try the KetoTrim for a couple of weeks to see how she does with it.  She has lost 7-8 lbs since changing diet and taking the probiotic.   She is decreasing calorie intake, walking, increased fiber intake.

## 2020-09-21 NOTE — TELEPHONE ENCOUNTER
I looked online and it is a weight loss supplement that is supposed to help with reaching ketosis.

## 2020-09-28 RX ORDER — ATORVASTATIN CALCIUM 10 MG/1
10 TABLET, FILM COATED ORAL DAILY
Qty: 30 TABLET | Refills: 5 | Status: SHIPPED | OUTPATIENT
Start: 2020-09-28 | End: 2020-12-03

## 2020-09-30 ENCOUNTER — TELEPHONE (OUTPATIENT)
Dept: INTERNAL MEDICINE | Facility: CLINIC | Age: 50
End: 2020-09-30

## 2020-09-30 NOTE — TELEPHONE ENCOUNTER
PATIENT IS CALLING BECAUSE SHE NEEDS A CERTIFIED MEDICAL CERTIFICATE; THIS NEEDS TO HAVE PATIENT'S NAME AND . SHE WOULD LIKE THIS INFO MAILED TO ADDRESS ON FILE.    IF MORE INFO WAS NEEDED PLEASE CALL PATIENT.    BEST #: [phone]

## 2020-10-07 NOTE — TELEPHONE ENCOUNTER
Pt informed this was sent about a week ago and it sometimes takes the mail 7-10 days to be delivered since it goes through hospital.

## 2020-10-13 ENCOUNTER — TELEPHONE (OUTPATIENT)
Dept: SURGERY | Facility: CLINIC | Age: 50
End: 2020-10-13

## 2020-10-13 NOTE — TELEPHONE ENCOUNTER
Called the patient and informed her of Dr. Verdin's response and recommendation. Put in 1 year recall for a c-scope. She knows to call prior to then should she develop constipation.

## 2020-10-13 NOTE — TELEPHONE ENCOUNTER
----- Message from Walter Verdin MD sent at 10/13/2020  1:27 PM EDT -----  Regarding: RE: Follow-up  The reason I wanted her to call with a follow-up is that she has a significant stricture in her colon likely related to chronic diverticulitis.  I was able to perform balloon dilation and I just wanted to make sure that this resulted in good symptomatic relief.  Let her know that if she starts feeling constipated again to call sooner than later so we can repeat the procedure and hopefully still avoid surgery.  She should get another colonoscopy in 1 year either way to check this area-put in the computer for reminder.  ----- Message -----  From: Maria G Chaparro MA  Sent: 10/12/2020   9:35 AM EDT  To: Walter Verdin MD  Subject: Follow-up                                        Patient called the office with the understanding she should contact our office to follow-up after her c-scope on 9/17/20. States she is feeling well-not having any symptoms currently. She does not want to have surgery. She is a bit confused on what she needed to follow-up for. I understand the c-scope was intended to help avoid surgery while she tries to lose weight. Does she need any other follow-up with you or should she simply call the office if she develops abd pain again?

## 2020-11-25 DIAGNOSIS — E03.9 HYPOTHYROIDISM: ICD-10-CM

## 2020-11-25 RX ORDER — LEVOTHYROXINE SODIUM 0.05 MG/1
TABLET ORAL
Qty: 90 TABLET | Refills: 0 | Status: SHIPPED | OUTPATIENT
Start: 2020-11-25 | End: 2021-02-08 | Stop reason: SDUPTHER

## 2020-12-03 ENCOUNTER — OFFICE VISIT (OUTPATIENT)
Dept: INTERNAL MEDICINE | Facility: CLINIC | Age: 50
End: 2020-12-03

## 2020-12-03 VITALS
BODY MASS INDEX: 40.57 KG/M2 | HEART RATE: 81 BPM | SYSTOLIC BLOOD PRESSURE: 114 MMHG | OXYGEN SATURATION: 100 % | DIASTOLIC BLOOD PRESSURE: 78 MMHG | WEIGHT: 229 LBS

## 2020-12-03 DIAGNOSIS — E03.9 HYPOTHYROIDISM, UNSPECIFIED TYPE: ICD-10-CM

## 2020-12-03 DIAGNOSIS — E78.2 MIXED HYPERLIPIDEMIA: Primary | ICD-10-CM

## 2020-12-03 PROCEDURE — 99213 OFFICE O/P EST LOW 20 MIN: CPT | Performed by: INTERNAL MEDICINE

## 2020-12-03 RX ORDER — PRAVASTATIN SODIUM 10 MG
10 TABLET ORAL DAILY
Qty: 90 TABLET | Refills: 1 | Status: SHIPPED | OUTPATIENT
Start: 2020-12-03 | End: 2021-02-08 | Stop reason: SDUPTHER

## 2020-12-03 NOTE — PROGRESS NOTES
Chief Complaint   Patient presents with   • Hyperlipidemia   • Hypothyroidism       Subjective   Li Kimball is a 50 y.o. female.     History of Present Illness     Hyperlipidemia:    This is a chronic problem.  Her most recent lipid panel showed:  Lab Results   Component Value Date    CHOL 152 01/10/2020    CHLPL 160 10/03/2019    TRIG 255 (H) 01/10/2020    HDL 31 (L) 01/10/2020    LDL 70 01/10/2020     Current treatment: atorvastatin.  Prudent diet and regular exercise have also been recommended.  No management changes were made at her last appointment.   By report, there is good compliance with treatment, good tolerance of treatment.  She has not experienced statin associated myalgias, dyspepsia.  She has not had liver enzyme elevation. She has noticed hair loss.       Hypothyroidism:  This is a chronic problem.  Symptoms do not include unexpected weight changes, changes in heat/cold tolerance, fatigue, weakness, constipation, goiter, appetite change, change in hair/skin.  Current treatment: Levothyroxine.  By report, there is good compliance with treatment, good tolerance of treatment and good symptom control.             The following portions of the patient's history were reviewed and updated as appropriate: allergies, current medications, past family history, past medical history, past social history, past surgical history and problem list.    Review of Systems   Constitutional: Negative for appetite change.   HENT: Negative for nosebleeds.    Eyes: Negative for blurred vision and double vision.   Respiratory: Negative for cough and shortness of breath.    Cardiovascular: Negative for chest pain, palpitations and leg swelling.   Skin:        Hair loss         Current Outpatient Medications:   •  ELIQUIS 2.5 MG tablet tablet, TAKE 1 TABLET BY MOUTH EVERY 12 HOURS, Disp: 60 tablet, Rfl: 5  •  levothyroxine (SYNTHROID, LEVOTHROID) 50 MCG tablet, TAKE 1 TABLET BY MOUTH EVERY DAY, Disp: 90 tablet, Rfl: 0  •   Unable to find, 1 each 1 (One) Time. Med Name: weight loss medication from [facility], Disp: , Rfl:   •  pravastatin (PRAVACHOL) 10 MG tablet, Take 1 tablet by mouth Daily., Disp: 90 tablet, Rfl: 1        Objective     /78 (BP Location: Right arm, Patient Position: Sitting, Cuff Size: Large Adult)   Pulse 81   Wt 104 kg (229 lb)   SpO2 100%   BMI 40.57 kg/m²     Physical Exam  Vitals signs and nursing note reviewed.   Constitutional:       General: She is not in acute distress.     Appearance: She is well-developed.   Neck:      Vascular: Normal carotid pulses. No carotid bruit.   Cardiovascular:      Rate and Rhythm: Regular rhythm.      Pulses:           Carotid pulses are 2+ on the right side and 2+ on the left side.     Heart sounds: S1 normal and S2 normal. No murmur. No friction rub. No gallop.    Pulmonary:      Effort: Pulmonary effort is normal.      Breath sounds: Normal breath sounds. No wheezing, rhonchi or rales.   Chest:      Chest wall: There is no dullness to percussion.   Skin:     General: Skin is warm and dry.   Neurological:      Mental Status: She is alert and oriented to person, place, and time.           Assessment/Plan   Diagnoses and all orders for this visit:    1. Mixed hyperlipidemia (Primary)  -     Comprehensive Metabolic Panel  -     Lipid Panel With / Chol / HDL Ratio    2. Hypothyroidism, unspecified type  -     T3, Free  -     T4, Free  -     TSH    Other orders  -     pravastatin (PRAVACHOL) 10 MG tablet; Take 1 tablet by mouth Daily.  Dispense: 90 tablet; Refill: 1      Discussed hair loss.  May be due to atorvastatin. Will stop this and start pravastatin.

## 2020-12-04 LAB
ALBUMIN SERPL-MCNC: 4 G/DL (ref 3.5–5.2)
ALBUMIN/GLOB SERPL: 1.3 G/DL
ALP SERPL-CCNC: 103 U/L (ref 39–117)
ALT SERPL-CCNC: 29 U/L (ref 1–33)
AST SERPL-CCNC: 22 U/L (ref 1–32)
BILIRUB SERPL-MCNC: 0.5 MG/DL (ref 0–1.2)
BUN SERPL-MCNC: 15 MG/DL (ref 6–20)
BUN/CREAT SERPL: 20.8 (ref 7–25)
CALCIUM SERPL-MCNC: 9.3 MG/DL (ref 8.6–10.5)
CHLORIDE SERPL-SCNC: 104 MMOL/L (ref 98–107)
CHOLEST SERPL-MCNC: 114 MG/DL (ref 0–200)
CHOLEST/HDLC SERPL: 3.45 {RATIO}
CO2 SERPL-SCNC: 25.8 MMOL/L (ref 22–29)
CREAT SERPL-MCNC: 0.72 MG/DL (ref 0.57–1)
GLOBULIN SER CALC-MCNC: 3 GM/DL
GLUCOSE SERPL-MCNC: 108 MG/DL (ref 65–99)
HDLC SERPL-MCNC: 33 MG/DL (ref 40–60)
LDLC SERPL CALC-MCNC: 54 MG/DL (ref 0–100)
POTASSIUM SERPL-SCNC: 4 MMOL/L (ref 3.5–5.2)
PROT SERPL-MCNC: 7 G/DL (ref 6–8.5)
SODIUM SERPL-SCNC: 140 MMOL/L (ref 136–145)
T3FREE SERPL-MCNC: 2.9 PG/ML (ref 2–4.4)
T4 FREE SERPL-MCNC: 0.98 NG/DL (ref 0.93–1.7)
TRIGL SERPL-MCNC: 156 MG/DL (ref 0–150)
TSH SERPL DL<=0.005 MIU/L-ACNC: 1.62 UIU/ML (ref 0.27–4.2)
VLDLC SERPL CALC-MCNC: 27 MG/DL (ref 5–40)

## 2020-12-11 ENCOUNTER — TELEPHONE (OUTPATIENT)
Dept: INTERNAL MEDICINE | Facility: CLINIC | Age: 50
End: 2020-12-11

## 2020-12-11 NOTE — TELEPHONE ENCOUNTER
It is possible atorvastatin was causing hair loss so we stopped atorvastatin and started pravastatin instead.  It will take a few weeks to see if this makes a difference.  If it does not, I will need to recommend a dermatology consultation.

## 2020-12-11 NOTE — TELEPHONE ENCOUNTER
PATIENT RECEIVED HER LAB RESULTS TODAY AND WANTED TO ASK A FEW QUESTIONS ABOUT GLUCOSE LEVELS    SHE SAYS SHE HAS LOST 20 POUNDS AND WANTED TO KNOW IF HER NUMBERS HAD IMPROVED THIS TIME OVER LAST TIME     [phone]

## 2020-12-11 NOTE — TELEPHONE ENCOUNTER
Discussed lab questions. With everything being better she still has concerns of her hair loss and what can be causing it

## 2020-12-14 DIAGNOSIS — L65.9 HAIR LOSS: Primary | ICD-10-CM

## 2020-12-14 NOTE — TELEPHONE ENCOUNTER
More questions, regarding hair loss, asked about her thyroid medication, I informed her that its not the medication but people that has thyroid problems do have some hair loss. She asked why she is on the medication if her labs are ok, I informed her that she has hypothyroidism. She also ask which dermatologist are you recommending because she wants to get it done before the end of the year

## 2021-02-08 ENCOUNTER — TELEPHONE (OUTPATIENT)
Dept: INTERNAL MEDICINE | Facility: CLINIC | Age: 51
End: 2021-02-08

## 2021-02-08 DIAGNOSIS — E03.9 HYPOTHYROIDISM: ICD-10-CM

## 2021-02-08 RX ORDER — LEVOTHYROXINE SODIUM 0.05 MG/1
50 TABLET ORAL DAILY
Qty: 90 TABLET | Refills: 0 | Status: SHIPPED | OUTPATIENT
Start: 2021-02-08 | End: 2021-02-17

## 2021-02-08 RX ORDER — PRAVASTATIN SODIUM 10 MG
10 TABLET ORAL DAILY
Qty: 90 TABLET | Refills: 1 | Status: SHIPPED | OUTPATIENT
Start: 2021-02-08 | End: 2021-06-29 | Stop reason: SDUPTHER

## 2021-02-08 NOTE — TELEPHONE ENCOUNTER
Caller: Li Kimball    Relationship to patient: Self    Best call back number: [phone]    Patient is needing:    PATIENT CALLED IN STATING SHE NEEDS A REFILL ON HER METFORMIN, MEDICATION IS NOT ON MEDICATION LIST AND IM UN ABLE TO REQUEST A REFILL.    PATIENT STATED DR CROCKETT PRESCRIBED THE METFORMIN MEDICATION IN MAY 2020 AND PATIENT IS NEEDING A REFILL.       PLEASE CALL AND ADVISE PATIENT:

## 2021-02-08 NOTE — TELEPHONE ENCOUNTER
Caller: Li Kimball    Relationship: Self    Best call back number: [phone]    Medication needed:   Requested Prescriptions     Pending Prescriptions Disp Refills   • apixaban (Eliquis) 2.5 MG tablet tablet 60 tablet 5     Sig: Take 1 tablet by mouth Every 12 (Twelve) Hours.   • pravastatin (PRAVACHOL) 10 MG tablet 90 tablet 1     Sig: Take 1 tablet by mouth Daily.   • levothyroxine (SYNTHROID, LEVOTHROID) 50 MCG tablet 90 tablet 0     Sig: Take 1 tablet by mouth Daily.       What details did the patient provide when requesting the medication:     PATIENT ISN'T RUNNING LOW ON THESE 3 BOTTLES. PATIENT HAS ENOUGH FOR THE NEXT WEEK OR 2. PATIENT STATED SHE TAKES THESE MEDICATIONS EVERY SINGLE DAY AND THE BOTTLES ALL 3, SAY NO REFILLS.    Does the patient have less than a 3 day supply:  [] Yes  [x] No    What is the patient's preferred pharmacy:      [id number] [address] AT [facility] - [phone]

## 2021-02-08 NOTE — TELEPHONE ENCOUNTER
Caller: Li Kimball    Relationship to patient: Self    Best call back number: [phone]    Patient is needing:     PATIENT STATED SHE'S BEEN GOING TO   [facility] AND THEY CHECKED HER AC1 AND IT READ 6.1, A MONTH AGO.     PATIENT STATED DR CROCKETT  PRESCRIBED THE PATIENT WITH:  METFORMIN 1X A DAY   AND [facility] IS WANTING HER TO TAKE IT 2X A DAY AND PATIENT IS CURIOUS IF THIS IS OKAY.     PLEASE CALL AND ADVISE PATIENT.

## 2021-02-09 NOTE — TELEPHONE ENCOUNTER
Spoke with patient, she is aware she will need an appointment. Patient states she will make an appointment if her weight loss center will not prescribe the medication.     She wanted to make sure Dr arguelles was aware that she did try metformin back in May of 2020 but had GI intolerance, but she started taking the medication again in December at the recommendation of weight loss center but she started taking it with the largest meal of the day and was able to tolerate the medication.   Notified patient she will need an appointment for follow up before Dr Arguelles would give an refills and patient verbalized understanding

## 2021-02-17 DIAGNOSIS — E03.9 HYPOTHYROIDISM: ICD-10-CM

## 2021-02-17 RX ORDER — LEVOTHYROXINE SODIUM 0.05 MG/1
TABLET ORAL
Qty: 90 TABLET | Refills: 0 | Status: SHIPPED | OUTPATIENT
Start: 2021-02-17 | End: 2021-09-13 | Stop reason: SDUPTHER

## 2021-03-10 ENCOUNTER — TRANSCRIBE ORDERS (OUTPATIENT)
Dept: ADMINISTRATIVE | Facility: HOSPITAL | Age: 51
End: 2021-03-10

## 2021-03-10 DIAGNOSIS — Z95.828 PRESENCE OF VENA CAVA FILTER: Primary | ICD-10-CM

## 2021-03-18 ENCOUNTER — TELEPHONE (OUTPATIENT)
Dept: INTERNAL MEDICINE | Facility: CLINIC | Age: 51
End: 2021-03-18

## 2021-03-18 NOTE — TELEPHONE ENCOUNTER
PATIENT STATES DR PALOMO IS RETIRING. DR PALOMO PLACED PATIENTS FILTER.    IF PATIENT WANTS TO GET A CT SCAN ON HER FILTER IN THE FUTURE, PATIENT WANTS TO KNOW IF DR CROCKETT WILL PLACE THOSE ORDERS SINCE DR PALOMO IS RETIRING.    PATIENT WOULD LIKE A CALL BACK.    PLEASE ADVISE: [phone]

## 2021-03-18 NOTE — TELEPHONE ENCOUNTER
Do you want to follow her for the filter or should she get established with a new Vascular Dr @ Dr Hall's office?

## 2021-04-09 ENCOUNTER — APPOINTMENT (OUTPATIENT)
Dept: CT IMAGING | Facility: HOSPITAL | Age: 51
End: 2021-04-09

## 2021-04-11 ENCOUNTER — IMMUNIZATION (OUTPATIENT)
Dept: VACCINE CLINIC | Facility: HOSPITAL | Age: 51
End: 2021-04-11

## 2021-04-11 PROCEDURE — 91300 HC SARSCOV02 VAC 30MCG/0.3ML IM: CPT | Performed by: INTERNAL MEDICINE

## 2021-04-11 PROCEDURE — 0001A: CPT | Performed by: INTERNAL MEDICINE

## 2021-04-26 ENCOUNTER — APPOINTMENT (OUTPATIENT)
Dept: CT IMAGING | Facility: HOSPITAL | Age: 51
End: 2021-04-26

## 2021-05-02 ENCOUNTER — IMMUNIZATION (OUTPATIENT)
Dept: VACCINE CLINIC | Facility: HOSPITAL | Age: 51
End: 2021-05-02

## 2021-05-02 PROCEDURE — 0002A: CPT | Performed by: INTERNAL MEDICINE

## 2021-05-02 PROCEDURE — 91300 HC SARSCOV02 VAC 30MCG/0.3ML IM: CPT | Performed by: INTERNAL MEDICINE

## 2021-06-14 ENCOUNTER — OFFICE VISIT (OUTPATIENT)
Dept: INTERNAL MEDICINE | Facility: CLINIC | Age: 51
End: 2021-06-14

## 2021-06-14 VITALS
HEART RATE: 88 BPM | BODY MASS INDEX: 36.5 KG/M2 | SYSTOLIC BLOOD PRESSURE: 108 MMHG | TEMPERATURE: 98 F | HEIGHT: 63 IN | WEIGHT: 206 LBS | DIASTOLIC BLOOD PRESSURE: 64 MMHG | OXYGEN SATURATION: 99 %

## 2021-06-14 DIAGNOSIS — E11.9 TYPE 2 DIABETES MELLITUS WITHOUT COMPLICATION, WITHOUT LONG-TERM CURRENT USE OF INSULIN (HCC): Primary | ICD-10-CM

## 2021-06-14 DIAGNOSIS — E03.9 HYPOTHYROIDISM, ADULT: ICD-10-CM

## 2021-06-14 DIAGNOSIS — E78.2 MIXED HYPERLIPIDEMIA: ICD-10-CM

## 2021-06-14 PROCEDURE — 99214 OFFICE O/P EST MOD 30 MIN: CPT | Performed by: INTERNAL MEDICINE

## 2021-06-14 RX ORDER — PHENTERMINE HYDROCHLORIDE 37.5 MG/1
37.5 TABLET ORAL
COMMUNITY
Start: 2021-04-03 | End: 2022-03-04

## 2021-06-14 RX ORDER — METFORMIN HYDROCHLORIDE 500 MG/1
500 TABLET, EXTENDED RELEASE ORAL DAILY
Qty: 90 TABLET | Refills: 1 | Status: SHIPPED | OUTPATIENT
Start: 2021-06-14 | End: 2022-03-23 | Stop reason: SDUPTHER

## 2021-06-14 NOTE — PROGRESS NOTES
Chief Complaint   Patient presents with   • Hyperlipidemia   • Hypothyroidism   • Diabetes       Subjective   Li Kimball is a 50 y.o. female.     History of Present Illness     Diabetes:  This is a chronic problem, currently treated with metformin.    She denies polyuria, polydipsia, vision change appetite change, unexplained weight loss.   Lab Results   Component Value Date    HGBA1C 6.97 (H) 01/10/2020    She recently resumed metformin.       Hypothyroidism:  This is a chronic problem.  Current treatment: Levothyroxine.  By report, there is good compliance with treatment, good tolerance of treatment and good symptom control.   Lab Results   Component Value Date    TSH 1.620 12/03/2020     Hyperlipidemia:    This is a chronic problem currently treated with pravastatin.  Her most recent lipid panel showed:  Lab Results   Component Value Date    CHOL 152 01/10/2020    CHLPL 114 12/03/2020    TRIG 156 (H) 12/03/2020    HDL 33 (L) 12/03/2020    LDL 54 12/03/2020                The following portions of the patient's history were reviewed and updated as appropriate: allergies, current medications, past family history, past medical history, past social history, past surgical history and problem list.      Current Outpatient Medications:   •  apixaban (Eliquis) 2.5 MG tablet tablet, Take 1 tablet by mouth Every 12 (Twelve) Hours., Disp: 60 tablet, Rfl: 5  •  levothyroxine (SYNTHROID, LEVOTHROID) 50 MCG tablet, TAKE 1 TABLET BY MOUTH EVERY DAY, Disp: 90 tablet, Rfl: 0  •  phentermine (ADIPEX-P) 37.5 MG tablet, , Disp: , Rfl:   •  pravastatin (PRAVACHOL) 10 MG tablet, Take 1 tablet by mouth Daily., Disp: 90 tablet, Rfl: 1  •  Unable to find, 1 each 1 (One) Time. Med Name: weight loss medication from [facility], Disp: , Rfl:   •  metFORMIN ER (GLUCOPHAGE-XR) 500 MG 24 hr tablet, Take 1 tablet by mouth Daily., Disp: 90 tablet, Rfl: 1          Review of Systems   Constitutional: Negative for appetite change.  "  HENT: Negative for nosebleeds.    Eyes: Negative for blurred vision and double vision.   Respiratory: Negative for cough and shortness of breath.    Cardiovascular: Negative for chest pain, palpitations and leg swelling.           Objective     /64   Pulse 88   Temp 98 °F (36.7 °C)   Ht 160 cm (63\")   Wt 93.4 kg (206 lb)   SpO2 99%   BMI 36.49 kg/m²       Physical Exam  Vitals and nursing note reviewed.   Constitutional:       General: She is not in acute distress.     Appearance: She is well-developed.   Neck:      Vascular: Normal carotid pulses. No carotid bruit.   Cardiovascular:      Rate and Rhythm: Regular rhythm.      Pulses:           Carotid pulses are 2+ on the right side and 2+ on the left side.     Heart sounds: S1 normal and S2 normal. No murmur heard.   No friction rub. No gallop.    Pulmonary:      Effort: Pulmonary effort is normal.      Breath sounds: Normal breath sounds. No wheezing, rhonchi or rales.   Skin:     General: Skin is warm and dry.   Neurological:      Mental Status: She is alert and oriented to person, place, and time.           Assessment/Plan   Diagnoses and all orders for this visit:    1. Type 2 diabetes mellitus without complication, without long-term current use of insulin (CMS/[facility]) (Primary)  -     Hemoglobin A1c  -     Microalbumin / Creatinine Urine Ratio - Urine, Clean Catch    2. Mixed hyperlipidemia  -     Comprehensive Metabolic Panel  -     Lipid Panel With / Chol / HDL Ratio    3. Hypothyroidism, adult  -     T4, Free  -     TSH    Other orders  -     metFORMIN ER (GLUCOPHAGE-XR) 500 MG 24 hr tablet; Take 1 tablet by mouth Daily.  Dispense: 90 tablet; Refill: 1               "

## 2021-06-15 LAB
ALBUMIN SERPL-MCNC: 4.4 G/DL (ref 3.5–5.2)
ALBUMIN/CREAT UR: 8 MG/G CREAT (ref 0–29)
ALBUMIN/GLOB SERPL: 1.5 G/DL
ALP SERPL-CCNC: 102 U/L (ref 39–117)
ALT SERPL-CCNC: 19 U/L (ref 1–33)
AST SERPL-CCNC: 15 U/L (ref 1–32)
BILIRUB SERPL-MCNC: 0.4 MG/DL (ref 0–1.2)
BUN SERPL-MCNC: 16 MG/DL (ref 6–20)
BUN/CREAT SERPL: 21.9 (ref 7–25)
CALCIUM SERPL-MCNC: 9.2 MG/DL (ref 8.6–10.5)
CHLORIDE SERPL-SCNC: 106 MMOL/L (ref 98–107)
CHOLEST SERPL-MCNC: 166 MG/DL (ref 0–200)
CHOLEST/HDLC SERPL: 4.74 {RATIO}
CO2 SERPL-SCNC: 26.1 MMOL/L (ref 22–29)
CREAT SERPL-MCNC: 0.73 MG/DL (ref 0.57–1)
CREAT UR-MCNC: 175.9 MG/DL
GLOBULIN SER CALC-MCNC: 2.9 GM/DL
GLUCOSE SERPL-MCNC: 95 MG/DL (ref 65–99)
HBA1C MFR BLD: 6 % (ref 4.8–5.6)
HDLC SERPL-MCNC: 35 MG/DL (ref 40–60)
LDLC SERPL CALC-MCNC: 101 MG/DL (ref 0–100)
MICROALBUMIN UR-MCNC: 14.6 UG/ML
POTASSIUM SERPL-SCNC: 4.2 MMOL/L (ref 3.5–5.2)
PROT SERPL-MCNC: 7.3 G/DL (ref 6–8.5)
SODIUM SERPL-SCNC: 142 MMOL/L (ref 136–145)
T4 FREE SERPL-MCNC: 0.88 NG/DL (ref 0.93–1.7)
TRIGL SERPL-MCNC: 173 MG/DL (ref 0–150)
TSH SERPL DL<=0.005 MIU/L-ACNC: 1.77 UIU/ML (ref 0.27–4.2)
VLDLC SERPL CALC-MCNC: 30 MG/DL (ref 5–40)

## 2021-06-25 ENCOUNTER — TELEPHONE (OUTPATIENT)
Dept: INTERNAL MEDICINE | Facility: CLINIC | Age: 51
End: 2021-06-25

## 2021-06-25 DIAGNOSIS — E78.2 MIXED HYPERLIPIDEMIA: Primary | ICD-10-CM

## 2021-06-25 NOTE — TELEPHONE ENCOUNTER
TEST RESULTS IN MAIL AND WANTED TO KNOW:     WHAT A1C LAST TIME SO SHE CAN COMPARE THE TWO     DO YOU WANT TO INCREASE HER CHOLESTEROL MEDICATION (PRAVOSTATIN) OR CHANGE, AS HER CHOLESTEROL IS HIGHER THIS TIME     PLEASE ADVISE   Li Kimball (Self) [phone] (H)     [id number] [address] AT [facility] - [phone] [facility] [phone]   [phone]

## 2021-06-29 RX ORDER — PRAVASTATIN SODIUM 20 MG
20 TABLET ORAL DAILY
Qty: 90 TABLET | Refills: 1 | Status: SHIPPED | OUTPATIENT
Start: 2021-06-29 | End: 2021-11-29

## 2021-06-29 NOTE — TELEPHONE ENCOUNTER
Pt advised and would like a new RX for Pravastatin 20mg sent to her [facility] which I have attached

## 2021-07-12 ENCOUNTER — TELEPHONE (OUTPATIENT)
Dept: INTERNAL MEDICINE | Facility: CLINIC | Age: 51
End: 2021-07-12

## 2021-07-12 NOTE — TELEPHONE ENCOUNTER
Call made to [facility] (Dr. David Duong). Pt reports she is having a surgical procedure on 7/27/21 and was instructed to stop taking her Eliquis 2.5 mg 2 days prior to her procedure.    I told Ms. Kimball I would have contact Dr. Duong office's to see if there is an official form that has to signed by Dr. Salcido giving the ok to stop the Eliquis.     I spoke to the surgery scheduler and was informed she has 4 spots that will need to be removed 2 on her face 1 on her chest and 1 on her leg.    She will have two procedures at two different times to remove the spots and biopsy.    The form will be faxed to our office tomorrow per the surgery scheduler.

## 2021-07-12 NOTE — TELEPHONE ENCOUNTER
PATIENT STATES THAT IT IS RECOMMENDED SHE GOES OFF OF BLOOD THINNER MEDICATION FOR FOR 2 DAYS FOR SKIN CANCER REMOVAL ON CHEST ON 7/27/2021 AND ANOTHER TO BE DETERMINED FOR REMOVAL FROM FACE.     PLEASE ADVISE [phone]

## 2021-07-16 ENCOUNTER — TELEPHONE (OUTPATIENT)
Dept: INTERNAL MEDICINE | Facility: CLINIC | Age: 51
End: 2021-07-16

## 2021-07-16 NOTE — TELEPHONE ENCOUNTER
Appointment was not scheduled as a physical. It was a scheduled office visit. A physical was not completed by Dr. Salcido. Unable to code as a physical if a CPE was not truly done. HUB may share if patient calls back.

## 2021-07-16 NOTE — TELEPHONE ENCOUNTER
Caller: Li Kimball    Relationship: Self    Best call back number: [phone] (H)    What is the best time to reach you: ANYTIME    Who are you requesting to speak with (clinical staff, provider,  specific staff member): DR CROCKETT    What was the call regarding: June 14,21 OFFICE VISIT PATIENT STATES RECEIVED A BILL STATING VISIT WAS CODED WRONG PATIENT STATES NEEDS TO BE LISTED AS A WELLNESS VISIT    Do you require a callback: YES

## 2021-07-27 NOTE — TELEPHONE ENCOUNTER
PATIENT CALLED STATING SHE HAS YET TO HEAR BACK REGARDING THIS BILLING ISSUE. HUB INFORMED PATIENT OF MESSAGE. PATIENT VERBALIZED UNDERSTANDING BUT IS STILL UPSET AS SHE HAS TO PAY THE BILL AND HER INSURANCE DOES NOT COVER THE BILL. PATIENT STATES FROM NOW ON SHE CAN ONLY COME IN ONCE A YEAR SO INSURANCE CAN COVER HER APPOINTMENTS.     PLEASE ADVISE     PT CALL BACK   [phone]

## 2021-07-28 ENCOUNTER — PREP FOR SURGERY (OUTPATIENT)
Dept: OTHER | Facility: HOSPITAL | Age: 51
End: 2021-07-28

## 2021-07-28 ENCOUNTER — TELEPHONE (OUTPATIENT)
Dept: INTERNAL MEDICINE | Facility: CLINIC | Age: 51
End: 2021-07-28

## 2021-07-28 DIAGNOSIS — Z12.11 SCREEN FOR COLON CANCER: Primary | ICD-10-CM

## 2021-07-28 NOTE — TELEPHONE ENCOUNTER
Caller: Li Kimball    Relationship to patient: Self    Best call back number: [phone]    Patient is needing: WOMEN'S HEALTH PREBIOTIC AND PROBIOTIC. PATIENT WANTS TO KNOW IF SHE CAN TAKE THIS DIETARY SUPPLEMENT WITH THE BLOOD THINNER AS WELL. CALLBACK REQUESTED

## 2021-08-02 ENCOUNTER — TELEPHONE (OUTPATIENT)
Dept: INTERNAL MEDICINE | Facility: CLINIC | Age: 51
End: 2021-08-02

## 2021-08-02 NOTE — TELEPHONE ENCOUNTER
Pt's results were mailed but she never received, we reviewed together on the phone and I also mailed her the letter again.

## 2021-08-02 NOTE — TELEPHONE ENCOUNTER
Caller: Li Kimball    Relationship: Self    Best call back number: [phone]    What form or medical record are you requesting: LAB RESULTS FROM June ESPECIALLY THE A1C       How would you like to receive the form or medical records (pick-up, mail, fax): MAIL     If mail, what is the address:   [address]  [place] [address]    Timeframe paperwork needed: ASAP

## 2021-08-11 PROBLEM — Z12.11 SCREEN FOR COLON CANCER: Status: ACTIVE | Noted: 2021-08-11

## 2021-08-13 RX ORDER — APIXABAN 2.5 MG/1
TABLET, FILM COATED ORAL
Qty: 60 TABLET | Refills: 5 | Status: SHIPPED | OUTPATIENT
Start: 2021-08-13 | End: 2021-09-13

## 2021-09-13 DIAGNOSIS — E03.9 HYPOTHYROIDISM: ICD-10-CM

## 2021-09-13 RX ORDER — APIXABAN 2.5 MG/1
TABLET, FILM COATED ORAL
Qty: 60 TABLET | Refills: 2 | Status: SHIPPED | OUTPATIENT
Start: 2021-09-13 | End: 2021-09-20

## 2021-09-13 RX ORDER — LEVOTHYROXINE SODIUM 0.05 MG/1
50 TABLET ORAL DAILY
Qty: 90 TABLET | Refills: 0 | Status: SHIPPED | OUTPATIENT
Start: 2021-09-13 | End: 2022-01-14

## 2021-09-13 NOTE — TELEPHONE ENCOUNTER
Caller: Li Kimball    Relationship: Self    Best call back number: [phone]     Medication needed:   Requested Prescriptions     Pending Prescriptions Disp Refills   • levothyroxine (SYNTHROID, LEVOTHROID) 50 MCG tablet 90 tablet 0     Sig: Take 1 tablet by mouth Daily.       When do you need the refill by: 09/14/21    What additional details did the patient provide when requesting the medication:     Does the patient have less than a 3 day supply:  [] Yes  [x] No    What is the patient's preferred pharmacy: [id number] [address] [facility] [address] [facility] [phone] [facility] [phone]

## 2021-09-15 ENCOUNTER — TELEPHONE (OUTPATIENT)
Dept: INTERNAL MEDICINE | Facility: CLINIC | Age: 51
End: 2021-09-15

## 2021-09-20 RX ORDER — WARFARIN SODIUM 5 MG/1
TABLET ORAL
Qty: 30 TABLET | Refills: 1 | Status: SHIPPED | OUTPATIENT
Start: 2021-09-20 | End: 2021-09-21

## 2021-10-27 RX ORDER — BISACODYL 5 MG/1
5 TABLET, DELAYED RELEASE ORAL DAILY PRN
COMMUNITY
End: 2022-03-04

## 2021-10-28 ENCOUNTER — ANESTHESIA (OUTPATIENT)
Dept: GASTROENTEROLOGY | Facility: HOSPITAL | Age: 51
End: 2021-10-28

## 2021-10-28 ENCOUNTER — HOSPITAL ENCOUNTER (OUTPATIENT)
Facility: HOSPITAL | Age: 51
Setting detail: HOSPITAL OUTPATIENT SURGERY
Discharge: HOME | End: 2021-10-28
Attending: SURGERY | Admitting: SURGERY

## 2021-10-28 ENCOUNTER — ANESTHESIA EVENT (OUTPATIENT)
Dept: GASTROENTEROLOGY | Facility: HOSPITAL | Age: 51
End: 2021-10-28

## 2021-10-28 VITALS
WEIGHT: 195.7 LBS | OXYGEN SATURATION: 100 % | SYSTOLIC BLOOD PRESSURE: 162 MMHG | BODY MASS INDEX: 34.68 KG/M2 | HEART RATE: 91 BPM | HEIGHT: 63 IN | DIASTOLIC BLOOD PRESSURE: 45 MMHG | RESPIRATION RATE: 16 BRPM

## 2021-10-28 DIAGNOSIS — Z12.11 SCREEN FOR COLON CANCER: ICD-10-CM

## 2021-10-28 LAB
B-HCG UR QL: NEGATIVE
EXPIRATION DATE: NORMAL
INTERNAL NEGATIVE CONTROL: NEGATIVE
INTERNAL POSITIVE CONTROL: POSITIVE
Lab: NORMAL

## 2021-10-28 PROCEDURE — 25010000002 GLUCAGON (RDNA) PER 1 MG: Performed by: SURGERY

## 2021-10-28 PROCEDURE — 25010000002 PROPOFOL 10 MG/ML EMULSION: Performed by: ANESTHESIOLOGY

## 2021-10-28 PROCEDURE — 45380 COLONOSCOPY AND BIOPSY: CPT | Performed by: SURGERY

## 2021-10-28 PROCEDURE — S0260 H&P FOR SURGERY: HCPCS | Performed by: SURGERY

## 2021-10-28 PROCEDURE — 88305 TISSUE EXAM BY PATHOLOGIST: CPT | Performed by: SURGERY

## 2021-10-28 PROCEDURE — 81025 URINE PREGNANCY TEST: CPT | Performed by: SURGERY

## 2021-10-28 RX ORDER — PROPOFOL 10 MG/ML
VIAL (ML) INTRAVENOUS CONTINUOUS PRN
Status: DISCONTINUED | OUTPATIENT
Start: 2021-10-28 | End: 2021-10-28 | Stop reason: SURG

## 2021-10-28 RX ORDER — PROPOFOL 10 MG/ML
VIAL (ML) INTRAVENOUS AS NEEDED
Status: DISCONTINUED | OUTPATIENT
Start: 2021-10-28 | End: 2021-10-28 | Stop reason: SURG

## 2021-10-28 RX ORDER — SODIUM CHLORIDE, SODIUM LACTATE, POTASSIUM CHLORIDE, CALCIUM CHLORIDE 600; 310; 30; 20 MG/100ML; MG/100ML; MG/100ML; MG/100ML
1000 INJECTION, SOLUTION INTRAVENOUS CONTINUOUS
Status: DISCONTINUED | OUTPATIENT
Start: 2021-10-28 | End: 2021-10-28 | Stop reason: HOSPADM

## 2021-10-28 RX ORDER — LIDOCAINE HYDROCHLORIDE 20 MG/ML
INJECTION, SOLUTION INFILTRATION; PERINEURAL AS NEEDED
Status: DISCONTINUED | OUTPATIENT
Start: 2021-10-28 | End: 2021-10-28 | Stop reason: SURG

## 2021-10-28 RX ADMIN — SODIUM CHLORIDE, POTASSIUM CHLORIDE, SODIUM LACTATE AND CALCIUM CHLORIDE 1000 ML: 600; 310; 30; 20 INJECTION, SOLUTION INTRAVENOUS at 10:46

## 2021-10-28 RX ADMIN — Medication 150 MCG/KG/MIN: at 12:20

## 2021-10-28 RX ADMIN — PROPOFOL 75 MG: 10 INJECTION, EMULSION INTRAVENOUS at 12:19

## 2021-10-28 RX ADMIN — LIDOCAINE HYDROCHLORIDE 60 MG: 20 INJECTION, SOLUTION INFILTRATION; PERINEURAL at 12:18

## 2021-10-28 RX ADMIN — PROPOFOL 50 MG: 10 INJECTION, EMULSION INTRAVENOUS at 12:25

## 2021-10-28 NOTE — ANESTHESIA POSTPROCEDURE EVALUATION
Patient: Li Kimball    Procedure Summary     Date: 10/28/21 Room / Location:  [facility]    Anesthesia Start: 1215 Anesthesia Stop: 1238    Procedure: COLONOSCOPY into cecum with bx (N/A ) Diagnosis:       Screen for colon cancer      (Screen for colon cancer [Z12.11])    Surgeons: Walter Verdin MD Provider: Сергей Kaplan MD    Anesthesia Type: MAC ASA Status: 2          Anesthesia Type: MAC    Vitals  Vitals Value Taken Time   /45 10/28/21 1256   Temp     Pulse 91 10/28/21 1256   Resp 16 10/28/21 1256   SpO2 100 % 10/28/21 1256           Post Anesthesia Care and Evaluation    Patient location during evaluation: PHASE II  Patient participation: complete - patient participated  Level of consciousness: awake and alert  Pain management: adequate  Airway patency: patent  Anesthetic complications: No anesthetic complications  PONV Status: none  Cardiovascular status: acceptable and hemodynamically stable  Respiratory status: acceptable, nonlabored ventilation and spontaneous ventilation  Hydration status: acceptable

## 2021-10-28 NOTE — ANESTHESIA PREPROCEDURE EVALUATION
Anesthesia Evaluation     Patient summary reviewed                Airway   No difficulty expected  Dental      Pulmonary    (+) asthma,  Cardiovascular     Rhythm: regular    (+) DVT,       Neuro/Psych  GI/Hepatic/Renal/Endo    (+)   thyroid problem hypothyroidism    Musculoskeletal     Abdominal    Substance History      OB/GYN          Other                        Anesthesia Plan    ASA 2     MAC       Anesthetic plan, all risks, benefits, and alternatives have been provided, discussed and informed consent has been obtained with: patient.

## 2021-11-01 LAB
LAB AP CASE REPORT: NORMAL
PATH REPORT.FINAL DX SPEC: NORMAL
PATH REPORT.GROSS SPEC: NORMAL

## 2021-11-08 NOTE — PROGRESS NOTES
Spoke with patient regarding the results of her colonoscopy.  Surprisingly her descending colon stricture that we dilated at her initial colonoscopy a year ago has stayed widely patent and she is having no significant symptoms.  She is to call me if her symptoms recur.  I recommended 5-year surveillance colonoscopy-put in computer for reminder.

## 2021-11-27 DIAGNOSIS — E78.2 MIXED HYPERLIPIDEMIA: ICD-10-CM

## 2021-11-29 RX ORDER — PRAVASTATIN SODIUM 20 MG
20 TABLET ORAL DAILY
Qty: 90 TABLET | Refills: 1 | Status: SHIPPED | OUTPATIENT
Start: 2021-11-29 | End: 2022-02-16 | Stop reason: SDUPTHER

## 2021-12-14 ENCOUNTER — OFFICE VISIT (OUTPATIENT)
Dept: INTERNAL MEDICINE | Facility: CLINIC | Age: 51
End: 2021-12-14

## 2021-12-14 VITALS
OXYGEN SATURATION: 100 % | RESPIRATION RATE: 16 BRPM | TEMPERATURE: 97.3 F | HEIGHT: 63 IN | WEIGHT: 194.2 LBS | BODY MASS INDEX: 34.41 KG/M2 | SYSTOLIC BLOOD PRESSURE: 132 MMHG | DIASTOLIC BLOOD PRESSURE: 80 MMHG | HEART RATE: 102 BPM

## 2021-12-14 DIAGNOSIS — L65.9 HAIR LOSS: ICD-10-CM

## 2021-12-14 DIAGNOSIS — E78.2 MIXED HYPERLIPIDEMIA: ICD-10-CM

## 2021-12-14 DIAGNOSIS — Z85.828 HISTORY OF SKIN CANCER: ICD-10-CM

## 2021-12-14 DIAGNOSIS — Z76.89 ENCOUNTER TO ESTABLISH CARE: Primary | ICD-10-CM

## 2021-12-14 PROBLEM — Z79.01 CHRONIC ANTICOAGULATION: Status: ACTIVE | Noted: 2019-10-03

## 2021-12-14 PROBLEM — I82.409 RECURRENT DEEP VEIN THROMBOSIS (DVT): Status: ACTIVE | Noted: 2019-10-03

## 2021-12-14 PROCEDURE — 99213 OFFICE O/P EST LOW 20 MIN: CPT | Performed by: FAMILY MEDICINE

## 2021-12-14 NOTE — ASSESSMENT & PLAN NOTE
Using minoxidil which has not help as much, using for about 8-9 months.   Seen Dermatology three times.    Taking hair skin and nail vitamins.   Follow up with Dermatology as scheduled

## 2021-12-14 NOTE — PROGRESS NOTES
"Chief Complaint  Establish Care and Hyperlipidemia (med refill )    Subjective    History of Present Illness {CC  Problem List  Visit  Diagnosis   Encounters  Notes  Medications  Labs  Result Review Imaging  Media :23}     Li Kimball presents to [facility] for to establish care.    History of Present Illness   50 Yo female present to establish care.  Pt is new to me, previously seeing Dr. Salcido. She has a history of hypothyroidism, hyperlipidemia, DVT, PE, and colon stricture. She is trying to loss weight and advise to take metformin to help loss weight.  She states taking twice day. Has had elevated A1c in past, last 6.0 after starting metformin.   On phentermine to help with weight loss.        Social History     Socioeconomic History   • Marital status:    Tobacco Use   • Smoking status: Never Smoker   • Smokeless tobacco: Never Used   Vaping Use   • Vaping Use: Never used   Substance and Sexual Activity   • Alcohol use: No   • Drug use: No   • Sexual activity: Defer     Objective     Vital Signs:   /80 (BP Location: Left arm, Patient Position: Sitting, Cuff Size: Adult)   Pulse 102   Temp 97.3 °F (36.3 °C) (Temporal)   Resp 16   Ht 160 cm (63\")   Wt 88.1 kg (194 lb 3.2 oz)   SpO2 100%   BMI 34.40 kg/m²   Physical Exam  Vitals reviewed.   Constitutional:       General: She is not in acute distress.     Appearance: She is obese. She is not ill-appearing.   HENT:      Head: Normocephalic.   Eyes:      Conjunctiva/sclera: Conjunctivae normal.   Cardiovascular:      Rate and Rhythm: Regular rhythm.      Heart sounds: Normal heart sounds.   Pulmonary:      Effort: Pulmonary effort is normal. No respiratory distress.      Breath sounds: Normal breath sounds. No wheezing.   Musculoskeletal:      Right lower leg: No edema.      Left lower leg: No edema.   Neurological:      Mental Status: She is alert.   Psychiatric:         Mood and Affect: Mood " normal.        Result Review  Data Reviewed:{ Labs  Result Review  Imaging  Med Tab  Media :23}   The following data was reviewed by: Christy Grey MD on 12/14/2021  Lab Results - Last 18 Months   Lab Units 06/14/21  1412 12/03/20  0926 07/28/20  1522 06/24/20  0851 06/21/20  1950 06/21/20  1950   BUN mg/dL 16 15 11  --    < > 12   CREATININE mg/dL 0.73 0.72 0.66  --    < > 0.66   EGFR IF NONAFRICN AM mL/min/1.73 84 86 95  --    < > 95   EGFR IF AFRICN AM mL/min/1.73 102 104  --   --   --   --    SODIUM mmol/L 142 140 141  --    < > 140   POTASSIUM mmol/L 4.2 4.0 4.0  --    < > 3.8   CHLORIDE mmol/L 106 104 106  --    < > 103   CALCIUM mg/dL 9.2 9.3 9.4  --    < > 9.0   ALBUMIN g/dL 4.40 4.00 4.20  --    < > 4.20   BILIRUBIN mg/dL 0.4 0.5 0.3  --    < > 0.4   ALK PHOS U/L 102 103 98  --    < > 94   AST (SGOT) U/L 15 22 29  --    < > 36*   ALT (SGPT) U/L 19 29 36*  --    < > 38*   CHOLESTEROL mg/dL 166 114  --   --   --   --    TRIGLYCERIDES mg/dL 173* 156*  --   --   --   --    HDL CHOL mg/dL 35* 33*  --   --   --   --    VLDL CHOLESTEROL MALLIKA mg/dL 30 27  --   --   --   --    LDL CHOL mg/dL 101* 54  --   --   --   --    HEMOGLOBIN A1C % 6.00*  --   --   --   --   --    MICROALB UR ug/mL 14.6  --   --   --   --   --    WBC 10*3/mm3  --   --  9.08 6.48  --  10.77   RBC 10*6/mm3  --   --  4.69 4.39  --  4.33   HEMATOCRIT %  --   --  41.1 38.8  --  37.3   MCV fL  --   --  87.6 88.4  --  86.1   MCH pg  --   --  27.7 28.7  --  28.9   TSH uIU/mL 1.770 1.620  --   --   --   --    FREE T4 ng/dL 0.88* 0.98  --   --   --   --    INR   --   --  1.04  --   --   --     < > = values in this interval not displayed.            Current Outpatient Medications:   •  apixaban (Eliquis) 2.5 MG tablet tablet, Take 1 tablet by mouth Every 12 (Twelve) Hours., Disp: 60 tablet, Rfl: 2  •  bisacodyl (DULCOLAX) 5 MG EC tablet, Take 5 mg by mouth Daily As Needed for Constipation., Disp: , Rfl:   •  levothyroxine (SYNTHROID, LEVOTHROID) 50  MCG tablet, Take 1 tablet by mouth Daily., Disp: 90 tablet, Rfl: 0  •  metFORMIN ER (GLUCOPHAGE-XR) 500 MG 24 hr tablet, Take 1 tablet by mouth Daily., Disp: 90 tablet, Rfl: 1  •  phentermine (ADIPEX-P) 37.5 MG tablet, Take 37.5 mg by mouth Every Morning Before Breakfast., Disp: , Rfl:   •  pravastatin (PRAVACHOL) 20 MG tablet, TAKE 1 TABLET BY MOUTH DAILY, Disp: 90 tablet, Rfl: 1     Assessment and Plan {CC Problem List  Visit Diagnosis  ROS  Review (Popup)  Health Maintenance  Quality  BestPractice  Medications  SmartSets  SnapShot Encounters  Media :23}   Problem List Items Addressed This Visit        Cardiac and Vasculature    Mixed hyperlipidemia    Current Assessment & Plan     Lipid abnormalities are Chronic, continue current medication.  continue current medication  Lipids will be reassessed next visit.            Hematology and Neoplasia    History of skin cancer    Current Assessment & Plan     Pt has two areas in her scalp she has recently seen.    Advise to have Dermatology take a look at up coming appointment.             Skin    Hair loss    Current Assessment & Plan     Using minoxidil which has not help as much, using for about 8-9 months.   Seen Dermatology three times.    Taking hair skin and nail vitamins.   Follow up with Dermatology as scheduled           Other Visit Diagnoses     Encounter to establish care    -  Primary          Follow Up   Return in about 2 months (around 2/14/2022) for Annual physical.  Patient was given instructions and counseling regarding her condition or for health maintenance advice. Please see specific information pulled into the AVS if appropriate.    EpicAct:MR_WS_AMB_ORDERS,RunParams:STARTUPTYPE=FOLLOW    MR_WS_AMB_DISCHARGE

## 2021-12-15 PROBLEM — Z85.828 HISTORY OF SKIN CANCER: Status: ACTIVE | Noted: 2021-12-15

## 2021-12-15 NOTE — ASSESSMENT & PLAN NOTE
Pt has two areas in her scalp she has recently seen.    Advise to have Dermatology take a look at up coming appointment.

## 2021-12-15 NOTE — ASSESSMENT & PLAN NOTE
Lipid abnormalities are Chronic, continue current medication.  continue current medication  Lipids will be reassessed next visit.

## 2022-01-01 NOTE — ED NOTES
Pt in CT.     Moni Mcclure, RN  06/21/20 4491     Feeding and  care were discussed today and parent questions were answered

## 2022-01-13 DIAGNOSIS — E03.9 HYPOTHYROIDISM: ICD-10-CM

## 2022-01-14 RX ORDER — LEVOTHYROXINE SODIUM 0.05 MG/1
50 TABLET ORAL DAILY
Qty: 90 TABLET | Refills: 0 | Status: SHIPPED | OUTPATIENT
Start: 2022-01-14 | End: 2022-02-16 | Stop reason: SDUPTHER

## 2022-01-18 ENCOUNTER — TELEPHONE (OUTPATIENT)
Dept: INTERNAL MEDICINE | Facility: CLINIC | Age: 52
End: 2022-01-18

## 2022-01-18 NOTE — TELEPHONE ENCOUNTER
PATIENT CALLED AND NEEDS A PRIOR AUTHORIZATION FOR   apixaban (Eliquis) 2.5 MG tablet tablet    SHE HAS NEW INSURANCE WITH [employer] NEW INFORMATION IS NOW ON FILE    CALL BACK NUMBER [phone]    [facility] #[id number] - [place], [place] - [address] [facility] [address] [facility] - [phone]  - [phone] FX  [phone]    SHE USES A COPAY CARD FROM [employer].  PLEASE CALL PATIENT WHEN PRE AUTH IF FINALIZED

## 2022-02-16 DIAGNOSIS — E03.9 HYPOTHYROIDISM: ICD-10-CM

## 2022-02-16 DIAGNOSIS — E78.2 MIXED HYPERLIPIDEMIA: ICD-10-CM

## 2022-02-16 RX ORDER — PRAVASTATIN SODIUM 20 MG
20 TABLET ORAL DAILY
Qty: 90 TABLET | Refills: 1 | Status: SHIPPED | OUTPATIENT
Start: 2022-02-16 | End: 2023-03-21 | Stop reason: SDUPTHER

## 2022-02-16 RX ORDER — LEVOTHYROXINE SODIUM 0.05 MG/1
50 TABLET ORAL DAILY
Qty: 90 TABLET | Refills: 0 | Status: SHIPPED | OUTPATIENT
Start: 2022-02-16 | End: 2022-05-09

## 2022-02-16 NOTE — TELEPHONE ENCOUNTER
Caller: Li Kimball    Relationship: Self    Best call back number: [phone]    Requested Prescriptions:   Requested Prescriptions     Pending Prescriptions Disp Refills   • levothyroxine (SYNTHROID, LEVOTHROID) 50 MCG tablet 90 tablet 0     Sig: Take 1 tablet by mouth Daily.   • pravastatin (PRAVACHOL) 20 MG tablet 90 tablet 1     Sig: Take 1 tablet by mouth Daily.   • apixaban (Eliquis) 2.5 MG tablet tablet 60 tablet 2     Sig: Take 1 tablet by mouth Every 12 (Twelve) Hours.        [id number] [address] [facility] [address] [facility] [phone] [facility] [phone] FX     Additional details provided by patient: PREVIOUSLY PRESCRIBED BY DR. CROCKETT, NEEDS DR. HIGHTOWER TO TAKE OVER PRESCRIPTIONS    Does the patient have less than a 3 day supply:  [] Yes  [x] No    Iron Luna Rep   02/16/22 10:29 EST

## 2022-02-21 ENCOUNTER — TELEPHONE (OUTPATIENT)
Dept: INTERNAL MEDICINE | Facility: CLINIC | Age: 52
End: 2022-02-21

## 2022-02-21 NOTE — TELEPHONE ENCOUNTER
Provider: SYLVESTER HIGHTOWER    Caller: J LUIS HOPPER    Relationship to Patient: SELF      Phone Number: [phone]    REASON FOR CALL: PATIENT IS CALLING IN WITH CONCERNS OF MEDICATION INTERACTION. SHE IS TAKING WEGOVY AND IS WORRIED ABOUT INTERACTION WITH HER apixaban (Eliquis) 2.5 MG tablet tablet. PATIENT STATES SHE IS NOT HAVING ANY ISSUES, JUST WANTED TO MAKE SURE IT WAS OK. STARTED THE MEDICATION ON Friday 02/18/22. WOULD JUST FEEL BETTER IF DR HIGHTOWER WOULD LET HER KNOW IT IS OK TO TAKE.     PATIENT ALSO IS CHECKING ON A PRIOR AUTHORIZATION FOR HER INSURANCE ON HER apixaban (Eliquis) 2.5 MG tablet tablet. SHE HAS NOT HEARD BACK FROM ANYONE STATING THIS HAS BEEN TAKEN CARE OF.

## 2022-02-23 ENCOUNTER — TELEPHONE (OUTPATIENT)
Dept: INTERNAL MEDICINE | Facility: CLINIC | Age: 52
End: 2022-02-23

## 2022-02-23 NOTE — TELEPHONE ENCOUNTER
Spoke with patient in regards to PA on eliquis, she states that she was told by company it was covered for 2 years. Her question is is it ok for her to take wegovy, a weight loss medication with the eliquis  Please advise

## 2022-02-24 NOTE — TELEPHONE ENCOUNTER
Please advise patient, I do not see that it can interaction between those medications.   Dr. Grey

## 2022-03-04 ENCOUNTER — OFFICE VISIT (OUTPATIENT)
Dept: INTERNAL MEDICINE | Facility: CLINIC | Age: 52
End: 2022-03-04

## 2022-03-04 ENCOUNTER — TELEPHONE (OUTPATIENT)
Dept: INTERNAL MEDICINE | Facility: CLINIC | Age: 52
End: 2022-03-04

## 2022-03-04 VITALS
SYSTOLIC BLOOD PRESSURE: 112 MMHG | TEMPERATURE: 97.1 F | HEIGHT: 63 IN | BODY MASS INDEX: 34.02 KG/M2 | WEIGHT: 192 LBS | OXYGEN SATURATION: 99 % | HEART RATE: 92 BPM | DIASTOLIC BLOOD PRESSURE: 86 MMHG

## 2022-03-04 DIAGNOSIS — Z11.59 ENCOUNTER FOR HEPATITIS C SCREENING TEST FOR LOW RISK PATIENT: ICD-10-CM

## 2022-03-04 DIAGNOSIS — Z00.00 ANNUAL PHYSICAL EXAM: Primary | ICD-10-CM

## 2022-03-04 DIAGNOSIS — R73.9 ELEVATED BLOOD SUGAR: ICD-10-CM

## 2022-03-04 DIAGNOSIS — Z23 NEED FOR SHINGLES VACCINE: ICD-10-CM

## 2022-03-04 DIAGNOSIS — Z79.01 CHRONIC ANTICOAGULATION: ICD-10-CM

## 2022-03-04 DIAGNOSIS — E78.2 MIXED HYPERLIPIDEMIA: ICD-10-CM

## 2022-03-04 DIAGNOSIS — E03.9 HYPOTHYROIDISM, ADULT: ICD-10-CM

## 2022-03-04 PROCEDURE — 99396 PREV VISIT EST AGE 40-64: CPT | Performed by: FAMILY MEDICINE

## 2022-03-04 PROCEDURE — 90750 HZV VACC RECOMBINANT IM: CPT | Performed by: FAMILY MEDICINE

## 2022-03-04 PROCEDURE — 90471 IMMUNIZATION ADMIN: CPT | Performed by: FAMILY MEDICINE

## 2022-03-04 NOTE — TELEPHONE ENCOUNTER
PATIENT LEFT THE OFFICE AND WAS LOOKING AT HER VISIT SUMMARY AND WANTED TO KNOW WHY THE DOCTOR NEEDS TO FIND OUT ABOUT HER HEP C STATUS. LAB ORDER SAYS BEING TESTED FOR HEP C ANTIBODIES.  PT STATING THIS WAS NEVER DISCUSSED WITH HER.    PLEASE ADVISE    [phone]

## 2022-03-04 NOTE — TELEPHONE ENCOUNTER
Spoke to patient.  She understands screening test for Hep c.  Will mail copies of all lab reports once resulted.

## 2022-03-04 NOTE — PROGRESS NOTES
Chief Complaint  Annual Exam, Diabetes, and Hyperlipidemia    Subjective            Li Kimball presents to [facility]  History of Present Illness    CPE- Patient reporting that health is doing well overall.  Patient is here today for annual physical.  She is working with figure weight loss for weight loss, started on Wegovy injection once a week.  Trying to Eat a balanced diet, not eating a lot of veggies.  Does not eat a lot of meat.  Walking on treadmill every day.  Need to increase water.     Labs: Due for routine labs  Immunizations: shingles,   Colorectal cancer screening: up to date, next due 2026  Breast cancer screening: up to date  Cervical cancer screening: up to date  Vision exam- due  Dental- up to date    PHQ-2 Depression Screening  Little interest or pleasure in doing things? 0   Feeling down, depressed, or hopeless? 0   PHQ-2 Total Score 0         Social History     Socioeconomic History   • Marital status:    Tobacco Use   • Smoking status: Never Smoker   • Smokeless tobacco: Never Used   Vaping Use   • Vaping Use: Never used   Substance and Sexual Activity   • Alcohol use: No   • Drug use: No   • Sexual activity: Defer       Review of Systems   Constitutional: Negative for appetite change and fever.   HENT: Negative for congestion, rhinorrhea and sneezing.    Eyes: Negative for itching and visual disturbance.   Respiratory: Negative for cough, chest tightness, shortness of breath and wheezing.    Cardiovascular: Negative for chest pain and palpitations.   Gastrointestinal: Negative for abdominal pain, constipation, diarrhea, nausea and vomiting.   Genitourinary: Positive for menstrual problem. Negative for difficulty urinating and vaginal discharge.   Musculoskeletal: Positive for arthralgias. Negative for back pain and myalgias.        L hip   Skin: Negative for rash.   Neurological: Positive for headaches. Negative for dizziness.   Hematological: Does  "not bruise/bleed easily.   Psychiatric/Behavioral: Negative for behavioral problems and decreased concentration.          Objective   Vital Signs:   /86   Pulse 92   Temp 97.1 °F (36.2 °C)   Ht 160 cm (62.99\")   Wt 87.1 kg (192 lb)   SpO2 99%   BMI 34.02 kg/m²     Physical Exam  Vitals reviewed.   Constitutional:       General: She is not in acute distress.     Appearance: She is obese. She is not ill-appearing.   HENT:      Head: Normocephalic.      Right Ear: Tympanic membrane normal.      Left Ear: Tympanic membrane normal.      Mouth/Throat:      Mouth: Mucous membranes are moist.      Pharynx: No oropharyngeal exudate.   Eyes:      Conjunctiva/sclera: Conjunctivae normal.   Neck:      Comments: L submandibular adenopathy  Cardiovascular:      Rate and Rhythm: Regular rhythm.      Heart sounds: Normal heart sounds.   Pulmonary:      Effort: Pulmonary effort is normal.      Breath sounds: Normal breath sounds.   Abdominal:      General: Bowel sounds are normal. There is no distension.      Palpations: Abdomen is soft.      Tenderness: There is no abdominal tenderness.   Musculoskeletal:      Right lower leg: No edema.      Left lower leg: No edema.   Skin:     General: Skin is warm and dry.   Neurological:      Mental Status: She is alert.   Psychiatric:         Mood and Affect: Mood normal.         Judgment: Judgment normal.        Result Review :   The following data was reviewed by: Christy Grey MD on 03/04/2022:  Common labs    Common Labsle 6/14/21 6/14/21 6/14/21 6/14/21    1412 1412 1412 1412   Glucose 95      BUN 16      Creatinine 0.73      eGFR Non  Am 84      eGFR African Am 102      Sodium 142      Potassium 4.2      Chloride 106      Calcium 9.2      Total Protein 7.3      Albumin 4.40      Total Bilirubin 0.4      Alkaline Phosphatase 102      AST (SGOT) 15      ALT (SGPT) 19      Total Cholesterol  166     Triglycerides  173 (A)     HDL Cholesterol  35 (A)     LDL Cholesterol "   101 (A)     Hemoglobin A1C   6.00 (A)    Microalbumin, Urine    14.6   (A) Abnormal value       Comments are available for some flowsheets but are not being displayed.                  Current Outpatient Medications:   •  apixaban (Eliquis) 2.5 MG tablet tablet, Take 1 tablet by mouth Every 12 (Twelve) Hours., Disp: 60 tablet, Rfl: 2  •  levothyroxine (SYNTHROID, LEVOTHROID) 50 MCG tablet, Take 1 tablet by mouth Daily., Disp: 90 tablet, Rfl: 0  •  metFORMIN ER (GLUCOPHAGE-XR) 500 MG 24 hr tablet, Take 1 tablet by mouth Daily. (Patient taking differently: Take 500 mg by mouth 2 (Two) Times a Day.), Disp: 90 tablet, Rfl: 1  •  pravastatin (PRAVACHOL) 20 MG tablet, Take 1 tablet by mouth Daily., Disp: 90 tablet, Rfl: 1     Assessment and Plan    Diagnoses and all orders for this visit:    1. Annual physical exam (Primary)  -     Lipid panel  -     Comprehensive metabolic panel  -     Hemoglobin A1c  -     TSH  -     Microalbumin / Creatinine Urine Ratio - Urine, Clean Catch  -     Urinalysis With Microscopic - Urine, Clean Catch  -     CBC w AUTO Differential  -     Hepatitis C antibody    2. Mixed hyperlipidemia  -     Lipid panel  -     Comprehensive metabolic panel    3. Hypothyroidism, adult  -     TSH    4. Elevated blood sugar  -     Hemoglobin A1c  -     Microalbumin / Creatinine Urine Ratio - Urine, Clean Catch    5. Encounter for hepatitis C screening test for low risk patient  -     Hepatitis C antibody    6. Chronic anticoagulation  -     CBC w AUTO Differential    7. Need for shingles vaccine  -     Shingrix Vaccine          The patient was counseled regarding nutrition, physical activity, healthy weight, injury prevention, misuse of tobacco, alcohol and illicit drugs, mental health, immunizations, and screenings.      Follow Up   Return in about 4 months (around 7/4/2022) for will need to get second shingles at that talon. .  Patient was given instructions and counseling regarding her condition or for  health maintenance advice. Please see specific information pulled into the AVS if appropriate.

## 2022-03-05 LAB
ALBUMIN SERPL-MCNC: 4.1 G/DL (ref 3.8–4.9)
ALBUMIN/CREAT UR: 12 MG/G CREAT (ref 0–29)
ALBUMIN/GLOB SERPL: 1.3 {RATIO} (ref 1.2–2.2)
ALP SERPL-CCNC: 72 IU/L (ref 44–121)
ALT SERPL-CCNC: 13 IU/L (ref 0–32)
APPEARANCE UR: CLEAR
AST SERPL-CCNC: 14 IU/L (ref 0–40)
BACTERIA #/AREA URNS HPF: ABNORMAL /[HPF]
BASOPHILS # BLD AUTO: 0.1 X10E3/UL (ref 0–0.2)
BASOPHILS NFR BLD AUTO: 1 %
BILIRUB SERPL-MCNC: 0.2 MG/DL (ref 0–1.2)
BILIRUB UR QL STRIP: NEGATIVE
BUN SERPL-MCNC: 15 MG/DL (ref 6–24)
BUN/CREAT SERPL: 22 (ref 9–23)
CALCIUM SERPL-MCNC: 9.3 MG/DL (ref 8.7–10.2)
CASTS URNS MICRO: ABNORMAL
CASTS URNS QL MICRO: PRESENT /LPF
CHLORIDE SERPL-SCNC: 107 MMOL/L (ref 96–106)
CHOLEST SERPL-MCNC: 139 MG/DL (ref 100–199)
CO2 SERPL-SCNC: 20 MMOL/L (ref 20–29)
COLOR UR: YELLOW
CREAT SERPL-MCNC: 0.68 MG/DL (ref 0.57–1)
CREAT UR-MCNC: 140.1 MG/DL
CRYSTALS URNS MICRO: ABNORMAL
EGFR GENE MUT ANL BLD/T: 105 ML/MIN/1.73
EOSINOPHIL # BLD AUTO: 0.1 X10E3/UL (ref 0–0.4)
EOSINOPHIL NFR BLD AUTO: 1 %
EPI CELLS #/AREA URNS HPF: >10 /HPF (ref 0–10)
ERYTHROCYTE [DISTWIDTH] IN BLOOD BY AUTOMATED COUNT: 13 % (ref 11.7–15.4)
GLOBULIN SER CALC-MCNC: 3.2 G/DL (ref 1.5–4.5)
GLUCOSE SERPL-MCNC: 92 MG/DL (ref 65–99)
GLUCOSE UR QL STRIP: NEGATIVE
HBA1C MFR BLD: 5.9 % (ref 4.8–5.6)
HCT VFR BLD AUTO: 39.7 % (ref 34–46.6)
HCV AB S/CO SERPL IA: 0.1 S/CO RATIO (ref 0–0.9)
HDLC SERPL-MCNC: 41 MG/DL
HGB BLD-MCNC: 13.1 G/DL (ref 11.1–15.9)
HGB UR QL STRIP: ABNORMAL
IMM GRANULOCYTES # BLD AUTO: 0 X10E3/UL (ref 0–0.1)
IMM GRANULOCYTES NFR BLD AUTO: 0 %
KETONES UR QL STRIP: NEGATIVE
LDLC SERPL CALC-MCNC: 74 MG/DL (ref 0–99)
LEUKOCYTE ESTERASE UR QL STRIP: NEGATIVE
LYMPHOCYTES # BLD AUTO: 2.3 X10E3/UL (ref 0.7–3.1)
LYMPHOCYTES NFR BLD AUTO: 31 %
MCH RBC QN AUTO: 29.2 PG (ref 26.6–33)
MCHC RBC AUTO-ENTMCNC: 33 G/DL (ref 31.5–35.7)
MCV RBC AUTO: 88 FL (ref 79–97)
MICRO URNS: ABNORMAL
MICROALBUMIN UR-MCNC: 17.3 UG/ML
MONOCYTES # BLD AUTO: 0.6 X10E3/UL (ref 0.1–0.9)
MONOCYTES NFR BLD AUTO: 8 %
MUCOUS THREADS URNS QL MICRO: PRESENT
NEUTROPHILS # BLD AUTO: 4.4 X10E3/UL (ref 1.4–7)
NEUTROPHILS NFR BLD AUTO: 59 %
NITRITE UR QL STRIP: NEGATIVE
PH UR STRIP: 5.5 [PH] (ref 5–7.5)
PLATELET # BLD AUTO: 370 X10E3/UL (ref 150–450)
POTASSIUM SERPL-SCNC: 3.9 MMOL/L (ref 3.5–5.2)
PROT SERPL-MCNC: 7.3 G/DL (ref 6–8.5)
PROT UR QL STRIP: NEGATIVE
RBC # BLD AUTO: 4.49 X10E6/UL (ref 3.77–5.28)
RBC #/AREA URNS HPF: ABNORMAL /HPF (ref 0–2)
SODIUM SERPL-SCNC: 143 MMOL/L (ref 134–144)
SP GR UR STRIP: 1.02 (ref 1–1.03)
TRIGL SERPL-MCNC: 136 MG/DL (ref 0–149)
TSH SERPL DL<=0.005 MIU/L-ACNC: 0.85 UIU/ML (ref 0.45–4.5)
UNIDENT CRYS URNS QL MICRO: PRESENT
UROBILINOGEN UR STRIP-MCNC: 0.2 MG/DL (ref 0.2–1)
VLDLC SERPL CALC-MCNC: 24 MG/DL (ref 5–40)
WBC # BLD AUTO: 7.5 X10E3/UL (ref 3.4–10.8)
WBC #/AREA URNS HPF: ABNORMAL /HPF (ref 0–5)

## 2022-03-11 ENCOUNTER — TELEPHONE (OUTPATIENT)
Dept: INTERNAL MEDICINE | Facility: CLINIC | Age: 52
End: 2022-03-11

## 2022-03-11 NOTE — TELEPHONE ENCOUNTER
Caller: Li Kimball    Relationship: Self    Best call back number: [phone]     Caller requesting test results:     LAB RESULTS FROM LAST WEEK            Additional notes:     PATIENT IS WANTING TO KNOW THE RESULTS OF HER LABS.  TRIED TO WARM TRANSFER TO OFFICE NO ANSWER.    PLEASE CALL PATIENT BACK.

## 2022-03-11 NOTE — TELEPHONE ENCOUNTER
Caller: Li Kimball    Relationship: Self    Best call back number:[phone]    What is the best time to reach you: ANY    Who are you requesting to speak with (clinical staff, provider,  specific staff member): DR HIGHTOWER    What was the call regarding: PATIENT IS REQUESTING A CALL BACK. SHE STATES DR HIGHTOWER TOLD HER SHE HAS A RECORD OF DIABETES ON HER CHART BUT PATIENT IS CONFUSED. SHE HAS NEVER HAD DIABETES. INSURANCE COMPANY WAS ON [address] AND THERE IS A BIG MISUNDERSTANDING. PLEASE CALL TO CLARIFY.     Do you require a callback: YES

## 2022-03-15 ENCOUNTER — TELEPHONE (OUTPATIENT)
Dept: INTERNAL MEDICINE | Facility: CLINIC | Age: 52
End: 2022-03-15

## 2022-03-15 NOTE — TELEPHONE ENCOUNTER
Caller: Li Kimball    Relationship to patient: Self    Best call back number: [phone]    Patient is needing: PT IS REQUESTING TO HAVE HER MOST RECENT LABS MAILED TO HER         [address] [place] [address]   [facility] [address]

## 2022-03-23 RX ORDER — METFORMIN HYDROCHLORIDE 500 MG/1
500 TABLET, EXTENDED RELEASE ORAL DAILY
Qty: 90 TABLET | Refills: 1 | Status: SHIPPED | OUTPATIENT
Start: 2022-03-23 | End: 2022-05-17 | Stop reason: SDUPTHER

## 2022-03-23 NOTE — TELEPHONE ENCOUNTER
Caller: Li Kimball    Relationship: Self    Best call back number: [phone]     Requested Prescriptions:   Requested Prescriptions     Pending Prescriptions Disp Refills   • metFORMIN ER (GLUCOPHAGE-XR) 500 MG 24 hr tablet 90 tablet 1     Sig: Take 1 tablet by mouth Daily.        Pharmacy where request should be sent: [facility] #[id number] [facility] [address] [facility] [address] [facility] [phone] [facility] [phone] FX     Additional details provided by patient: PATIENT HAS ABOUT A 2 WEEK SUPPLY LEFT - SHE WILL BE OUT OF REFILLS     PATIENT ALSO WANTED TO INFORM DR. HIGHTOWER THAT SHE BELIEVES THE BLOOD IN HER URINE IS DUE TO HAVING KIDNEY STONES.    Does the patient have less than a 3 day supply:  [] Yes  [x] No    Iron HOPPER Rep   03/23/22 10:19 EDT

## 2022-03-24 ENCOUNTER — LAB (OUTPATIENT)
Dept: INTERNAL MEDICINE | Facility: CLINIC | Age: 52
End: 2022-03-24

## 2022-03-24 ENCOUNTER — TELEPHONE (OUTPATIENT)
Dept: INTERNAL MEDICINE | Facility: CLINIC | Age: 52
End: 2022-03-24

## 2022-03-24 DIAGNOSIS — R82.90 ABNORMAL URINE FINDINGS: Primary | ICD-10-CM

## 2022-03-24 LAB
BACTERIA UR QL AUTO: ABNORMAL /HPF
BILIRUB UR QL STRIP: NEGATIVE
CLARITY UR: ABNORMAL
COD CRY URNS QL: ABNORMAL /HPF
COLOR UR: YELLOW
GLUCOSE UR STRIP-MCNC: NEGATIVE MG/DL
HGB UR QL STRIP.AUTO: ABNORMAL
HYALINE CASTS UR QL AUTO: ABNORMAL /LPF
KETONES UR QL STRIP: NEGATIVE
LEUKOCYTE ESTERASE UR QL STRIP.AUTO: NEGATIVE
MUCOUS THREADS URNS QL MICRO: ABNORMAL /HPF
NITRITE UR QL STRIP: NEGATIVE
PH UR STRIP.AUTO: 5.5 [PH] (ref 5–8)
PROT UR QL STRIP: ABNORMAL
RBC # UR STRIP: ABNORMAL /HPF
REF LAB TEST METHOD: ABNORMAL
SP GR UR STRIP: >=1.03 (ref 1–1.03)
SQUAMOUS #/AREA URNS HPF: ABNORMAL /HPF
UROBILINOGEN UR QL STRIP: ABNORMAL
WBC # UR STRIP: ABNORMAL /HPF

## 2022-03-24 NOTE — TELEPHONE ENCOUNTER
Patient was in to leave a urine.  She stated that the last time she was in, blood was found in her urine and she had kidney stones.  Two days ago she ended her period and wanted to let us know.  She just wanted Dr. Grey to be aware of this.

## 2022-03-26 LAB
BACTERIA UR CULT: NORMAL
BACTERIA UR CULT: NORMAL

## 2022-04-04 ENCOUNTER — TELEPHONE (OUTPATIENT)
Dept: INTERNAL MEDICINE | Facility: CLINIC | Age: 52
End: 2022-04-04

## 2022-04-04 NOTE — TELEPHONE ENCOUNTER
Notified patient Dr Grey is out of the office until next week. Asked if she would like to make an appointment for her return and patient declined. Patient is aware that message will have to wait for her return as she did not wish to discuss any further.

## 2022-04-04 NOTE — TELEPHONE ENCOUNTER
Caller: Li Kimball    Relationship: Self    Best call back number: [phone]     What is the best time to reach you: ANY TIME    Who are you requesting to speak with (clinical staff, provider,  specific staff member):DR. HIGHTOWER    What was the call regarding: PATIENT WOULD LIKE A CALLBACK TO DISCUSS HER RECENT URINALYSIS RESULTS AND KIDNEY STONES. SHE WANTS TO SPEAK WITH DR. HIGHTOWER SPECIFICALLY, NOT SUE OR MEDICAL ASSISTANT.    PLEASE ADVISE    Do you require a callback: YES

## 2022-04-11 NOTE — TELEPHONE ENCOUNTER
Spoke with patient. Answered all questions. She is seeing GYN in a few weeks, US at that time. No issues with pain or gross blood. Has stones seen on previous imaging but very small.  Will recheck next appt. Advise to call if worsening symptoms.

## 2022-05-03 ENCOUNTER — APPOINTMENT (OUTPATIENT)
Dept: CT IMAGING | Facility: HOSPITAL | Age: 52
End: 2022-05-03

## 2022-05-03 ENCOUNTER — HOSPITAL ENCOUNTER (EMERGENCY)
Facility: HOSPITAL | Age: 52
Discharge: HOME | End: 2022-05-03
Attending: EMERGENCY MEDICINE | Admitting: EMERGENCY MEDICINE

## 2022-05-03 VITALS
RESPIRATION RATE: 16 BRPM | TEMPERATURE: 97.1 F | HEART RATE: 67 BPM | SYSTOLIC BLOOD PRESSURE: 126 MMHG | DIASTOLIC BLOOD PRESSURE: 79 MMHG | OXYGEN SATURATION: 100 %

## 2022-05-03 DIAGNOSIS — N20.0 KIDNEY STONE: Primary | ICD-10-CM

## 2022-05-03 DIAGNOSIS — N23 RENAL COLIC ON LEFT SIDE: ICD-10-CM

## 2022-05-03 LAB
ALBUMIN SERPL-MCNC: 4.2 G/DL (ref 3.5–5.2)
ALBUMIN/GLOB SERPL: 1.2 G/DL
ALP SERPL-CCNC: 73 U/L (ref 39–117)
ALT SERPL W P-5'-P-CCNC: 12 U/L (ref 1–33)
ANION GAP SERPL CALCULATED.3IONS-SCNC: 9.6 MMOL/L (ref 5–15)
AST SERPL-CCNC: 12 U/L (ref 1–32)
BACTERIA UR QL AUTO: ABNORMAL /HPF
BASOPHILS # BLD AUTO: 0.05 10*3/MM3 (ref 0–0.2)
BASOPHILS NFR BLD AUTO: 0.5 % (ref 0–1.5)
BILIRUB SERPL-MCNC: 0.4 MG/DL (ref 0–1.2)
BILIRUB UR QL STRIP: NEGATIVE
BUN SERPL-MCNC: 20 MG/DL (ref 6–20)
BUN/CREAT SERPL: 26.7 (ref 7–25)
CALCIUM SPEC-SCNC: 9.7 MG/DL (ref 8.6–10.5)
CHLORIDE SERPL-SCNC: 105 MMOL/L (ref 98–107)
CLARITY UR: ABNORMAL
CO2 SERPL-SCNC: 25.4 MMOL/L (ref 22–29)
COD CRY URNS QL: ABNORMAL /HPF
COLOR UR: YELLOW
CREAT SERPL-MCNC: 0.75 MG/DL (ref 0.57–1)
DEPRECATED RDW RBC AUTO: 42.5 FL (ref 37–54)
EGFRCR SERPLBLD CKD-EPI 2021: 96.5 ML/MIN/1.73
EOSINOPHIL # BLD AUTO: 0.04 10*3/MM3 (ref 0–0.4)
EOSINOPHIL NFR BLD AUTO: 0.4 % (ref 0.3–6.2)
ERYTHROCYTE [DISTWIDTH] IN BLOOD BY AUTOMATED COUNT: 13 % (ref 12.3–15.4)
GLOBULIN UR ELPH-MCNC: 3.5 GM/DL
GLUCOSE SERPL-MCNC: 112 MG/DL (ref 65–99)
GLUCOSE UR STRIP-MCNC: NEGATIVE MG/DL
HCG SERPL QL: NEGATIVE
HCT VFR BLD AUTO: 41.8 % (ref 34–46.6)
HGB BLD-MCNC: 13.5 G/DL (ref 12–15.9)
HGB UR QL STRIP.AUTO: ABNORMAL
HYALINE CASTS UR QL AUTO: ABNORMAL /LPF
IMM GRANULOCYTES # BLD AUTO: 0.03 10*3/MM3 (ref 0–0.05)
IMM GRANULOCYTES NFR BLD AUTO: 0.3 % (ref 0–0.5)
KETONES UR QL STRIP: ABNORMAL
LEUKOCYTE ESTERASE UR QL STRIP.AUTO: ABNORMAL
LIPASE SERPL-CCNC: 43 U/L (ref 13–60)
LYMPHOCYTES # BLD AUTO: 1.43 10*3/MM3 (ref 0.7–3.1)
LYMPHOCYTES NFR BLD AUTO: 13.3 % (ref 19.6–45.3)
MCH RBC QN AUTO: 29.2 PG (ref 26.6–33)
MCHC RBC AUTO-ENTMCNC: 32.3 G/DL (ref 31.5–35.7)
MCV RBC AUTO: 90.5 FL (ref 79–97)
MONOCYTES # BLD AUTO: 0.38 10*3/MM3 (ref 0.1–0.9)
MONOCYTES NFR BLD AUTO: 3.5 % (ref 5–12)
NEUTROPHILS NFR BLD AUTO: 8.81 10*3/MM3 (ref 1.7–7)
NEUTROPHILS NFR BLD AUTO: 82 % (ref 42.7–76)
NITRITE UR QL STRIP: NEGATIVE
NRBC BLD AUTO-RTO: 0 /100 WBC (ref 0–0.2)
PH UR STRIP.AUTO: 5.5 [PH] (ref 5–8)
PLATELET # BLD AUTO: 327 10*3/MM3 (ref 140–450)
PMV BLD AUTO: 9.4 FL (ref 6–12)
POTASSIUM SERPL-SCNC: 3.5 MMOL/L (ref 3.5–5.2)
PROT SERPL-MCNC: 7.7 G/DL (ref 6–8.5)
PROT UR QL STRIP: ABNORMAL
RBC # BLD AUTO: 4.62 10*6/MM3 (ref 3.77–5.28)
RBC # UR STRIP: ABNORMAL /HPF
REF LAB TEST METHOD: ABNORMAL
SODIUM SERPL-SCNC: 140 MMOL/L (ref 136–145)
SP GR UR STRIP: 1.03 (ref 1–1.03)
SQUAMOUS #/AREA URNS HPF: ABNORMAL /HPF
UROBILINOGEN UR QL STRIP: ABNORMAL
WBC # UR STRIP: ABNORMAL /HPF
WBC NRBC COR # BLD: 10.74 10*3/MM3 (ref 3.4–10.8)

## 2022-05-03 PROCEDURE — 96376 TX/PRO/DX INJ SAME DRUG ADON: CPT

## 2022-05-03 PROCEDURE — 99284 EMERGENCY DEPT VISIT MOD MDM: CPT

## 2022-05-03 PROCEDURE — 81001 URINALYSIS AUTO W/SCOPE: CPT | Performed by: EMERGENCY MEDICINE

## 2022-05-03 PROCEDURE — 25010000002 ONDANSETRON PER 1 MG: Performed by: EMERGENCY MEDICINE

## 2022-05-03 PROCEDURE — 83690 ASSAY OF LIPASE: CPT | Performed by: EMERGENCY MEDICINE

## 2022-05-03 PROCEDURE — 96374 THER/PROPH/DIAG INJ IV PUSH: CPT

## 2022-05-03 PROCEDURE — 96375 TX/PRO/DX INJ NEW DRUG ADDON: CPT

## 2022-05-03 PROCEDURE — 74176 CT ABD & PELVIS W/O CONTRAST: CPT

## 2022-05-03 PROCEDURE — 80053 COMPREHEN METABOLIC PANEL: CPT | Performed by: EMERGENCY MEDICINE

## 2022-05-03 PROCEDURE — 84703 CHORIONIC GONADOTROPIN ASSAY: CPT | Performed by: EMERGENCY MEDICINE

## 2022-05-03 PROCEDURE — 85025 COMPLETE CBC W/AUTO DIFF WBC: CPT | Performed by: EMERGENCY MEDICINE

## 2022-05-03 PROCEDURE — 25010000002 HYDROMORPHONE PER 4 MG: Performed by: EMERGENCY MEDICINE

## 2022-05-03 PROCEDURE — 99283 EMERGENCY DEPT VISIT LOW MDM: CPT

## 2022-05-03 RX ORDER — ONDANSETRON 2 MG/ML
4 INJECTION INTRAMUSCULAR; INTRAVENOUS ONCE
Status: COMPLETED | OUTPATIENT
Start: 2022-05-03 | End: 2022-05-03

## 2022-05-03 RX ORDER — HYDROMORPHONE HYDROCHLORIDE 1 MG/ML
0.5 INJECTION, SOLUTION INTRAMUSCULAR; INTRAVENOUS; SUBCUTANEOUS ONCE
Status: COMPLETED | OUTPATIENT
Start: 2022-05-03 | End: 2022-05-03

## 2022-05-03 RX ORDER — HYDROCODONE BITARTRATE AND ACETAMINOPHEN 5; 325 MG/1; MG/1
1 TABLET ORAL EVERY 6 HOURS PRN
Qty: 12 TABLET | Refills: 0 | Status: SHIPPED | OUTPATIENT
Start: 2022-05-03 | End: 2022-11-01

## 2022-05-03 RX ORDER — ONDANSETRON 4 MG/1
4 TABLET, ORALLY DISINTEGRATING ORAL 4 TIMES DAILY PRN
Qty: 15 TABLET | Refills: 0 | Status: SHIPPED | OUTPATIENT
Start: 2022-05-03 | End: 2022-11-01

## 2022-05-03 RX ORDER — SODIUM CHLORIDE 0.9 % (FLUSH) 0.9 %
10 SYRINGE (ML) INJECTION AS NEEDED
Status: DISCONTINUED | OUTPATIENT
Start: 2022-05-03 | End: 2022-05-03 | Stop reason: HOSPADM

## 2022-05-03 RX ADMIN — SODIUM CHLORIDE 1000 ML: 9 INJECTION, SOLUTION INTRAVENOUS at 10:44

## 2022-05-03 RX ADMIN — SODIUM CHLORIDE 1000 ML: 9 INJECTION, SOLUTION INTRAVENOUS at 08:05

## 2022-05-03 RX ADMIN — HYDROMORPHONE HYDROCHLORIDE 0.5 MG: 1 INJECTION, SOLUTION INTRAMUSCULAR; INTRAVENOUS; SUBCUTANEOUS at 10:40

## 2022-05-03 RX ADMIN — HYDROMORPHONE HYDROCHLORIDE 0.5 MG: 1 INJECTION, SOLUTION INTRAMUSCULAR; INTRAVENOUS; SUBCUTANEOUS at 12:48

## 2022-05-03 RX ADMIN — HYDROMORPHONE HYDROCHLORIDE 0.5 MG: 1 INJECTION, SOLUTION INTRAMUSCULAR; INTRAVENOUS; SUBCUTANEOUS at 08:00

## 2022-05-03 RX ADMIN — ONDANSETRON 4 MG: 2 INJECTION INTRAMUSCULAR; INTRAVENOUS at 08:00

## 2022-05-03 RX ADMIN — HYDROMORPHONE HYDROCHLORIDE 0.5 MG: 1 INJECTION, SOLUTION INTRAMUSCULAR; INTRAVENOUS; SUBCUTANEOUS at 09:09

## 2022-05-03 NOTE — ED PROVIDER NOTES
EMERGENCY DEPARTMENT ENCOUNTER    Room Number:  02/02  Date of encounter:  5/3/2022  PCP: Christy Grey MD  Historian: Patient, EMS      I used full protective equipment while examining this patient.  This includes face mask, gloves and protective eyewear.  I washed my hands before entering the room and immediately upon leaving the room      HPI:  Chief Complaint: Flank pain  A complete HPI/ROS/PMH/PSH/SH/FH are unobtainable due to: Nothing    Context: Li Kimball is a 51 y.o. female who presents to the ED c/o sudden onset of flank pain at 4 AM this morning.  Patient states this pain awoke her from her sleep.  She describes a sharp, stabbing, constant sensation over the left flank.  There is mild radiation around the left side of her lower abdomen.  She has had associated nausea or denies any true vomiting.  She denies any gross hematuria, dysuria.  She denies any radiation of pain down her leg.  She denies any precipitating or alleviating factors.  She states this pain is fairly consistent with her prior history of kidney stones.  She is on Eliquis for history of DVT.    Review of Medical Records  Reviewed patient's last office visit with her internal medicine doctor.  Patient seen on 3/4/2022 for history of hyperlipidemia, hypothyroidism, anticoagulation.    PAST MEDICAL HISTORY  Active Ambulatory Problems     Diagnosis Date Noted   • Hypothyroidism, adult    • Mixed hyperlipidemia    • BPPV (benign paroxysmal positional vertigo)    • Asthma    • Migraines    • DVT (deep venous thrombosis) ([facility])    • Colonic stricture (HCC) 09/03/2020   • Screen for colon cancer 08/11/2021   • Chronic anticoagulation 10/03/2019   • Recurrent deep vein thrombosis (DVT) (HCC) 10/03/2019   • Hair loss 12/14/2021   • History of skin cancer 12/15/2021     Resolved Ambulatory Problems     Diagnosis Date Noted   • No Resolved Ambulatory Problems     Past Medical History:   Diagnosis Date   • Diverticulitis    • Hypothyroid    •  Kidney stones    • Slow to wake up after anesthesia    • Weight loss          PAST SURGICAL HISTORY  Past Surgical History:   Procedure Laterality Date   • ANKLE SURGERY Right 2015    Removal of hardware   • COLONOSCOPY N/A 08/2020   • COLONOSCOPY N/A 9/17/2020    Procedure: COLONOSCOPY to cecum with 20mm colonic balloon dilation;  Surgeon: Walter Verdin MD;  Location: [facility] ENDOSCOPY;  Service: General;  Laterality: N/A;  pre - diverticulitis induced stricture   post - colonic stricture   • COLONOSCOPY N/A 10/28/2021    Procedure: COLONOSCOPY into cecum with bx;  Surgeon: Walter Verdin MD;  Location: [facility] ENDOSCOPY;  Service: General;  Laterality: N/A;  pre: hx of colonic stricture   post: diverticulosis    • ENDOMETRIAL ABLATION     • FOOT SURGERY Left 2006    Fracture , postop DVT and PE   • JOINT REPLACEMENT      ankle, s/p two surgery   • LAPAROSCOPIC TUBAL LIGATION     • ORIF ANKLE FRACTURE Right 2014    DVT and PE perioperatively   • TUBAL ABDOMINAL LIGATION     • VENA CAVA FILTER INSERTION  2014         FAMILY HISTORY  Family History   Problem Relation Age of Onset   • Other Mother         Vertigo   • Hyperlipidemia Mother    • Cancer Father         hodgkins disease   • Heart disease Brother    • Malig Hyperthermia Neg Hx          SOCIAL HISTORY  Social History     Socioeconomic History   • Marital status:    Tobacco Use   • Smoking status: Never Smoker   • Smokeless tobacco: Never Used   Vaping Use   • Vaping Use: Never used   Substance and Sexual Activity   • Alcohol use: No   • Drug use: No   • Sexual activity: Defer         ALLERGIES  Codeine        REVIEW OF SYSTEMS  All systems reviewed and negative except for those discussed in HPI.       PHYSICAL EXAM    I have reviewed the triage vital signs and nursing notes.    ED Triage Vitals [05/03/22 0733]   Temp Heart Rate Resp BP SpO2   97.1 °F (36.2 °C) 89 16 (!) 163/110 95 %      Temp src Heart Rate Source Patient Position BP Location  FiO2 (%)   -- -- -- -- --       Physical Exam  GENERAL: Alert, oriented, not distressed  HENT: head atraumatic, no nuchal rigidity  EYES: no scleral icterus, EOMI  CV: regular rhythm, regular rate, no murmur  RESPIRATORY: normal effort, CTA  ABDOMEN: soft, mild left lower abdominal tenderness without guarding or rebound.  Mild CVA tenderness.  MUSCULOSKELETAL: no deformity, FROM, no calf swelling or tenderness  NEURO: alert, moves all extremities, follows commands  SKIN: warm, dry        LAB RESULTS  Recent Results (from the past 24 hour(s))   Comprehensive Metabolic Panel    Collection Time: 05/03/22  7:58 AM    Specimen: Blood   Result Value Ref Range    Glucose 112 (H) 65 - 99 mg/dL    BUN 20 6 - 20 mg/dL    Creatinine 0.75 0.57 - 1.00 mg/dL    Sodium 140 136 - 145 mmol/L    Potassium 3.5 3.5 - 5.2 mmol/L    Chloride 105 98 - 107 mmol/L    CO2 25.4 22.0 - 29.0 mmol/L    Calcium 9.7 8.6 - 10.5 mg/dL    Total Protein 7.7 6.0 - 8.5 g/dL    Albumin 4.20 3.50 - 5.20 g/dL    ALT (SGPT) 12 1 - 33 U/L    AST (SGOT) 12 1 - 32 U/L    Alkaline Phosphatase 73 39 - 117 U/L    Total Bilirubin 0.4 0.0 - 1.2 mg/dL    Globulin 3.5 gm/dL    A/G Ratio 1.2 g/dL    BUN/Creatinine Ratio 26.7 (H) 7.0 - 25.0    Anion Gap 9.6 5.0 - 15.0 mmol/L    eGFR 96.5 >60.0 mL/min/1.73   hCG, Serum, Qualitative    Collection Time: 05/03/22  7:58 AM    Specimen: Blood   Result Value Ref Range    HCG Qualitative Negative Negative   Lipase    Collection Time: 05/03/22  7:58 AM    Specimen: Blood   Result Value Ref Range    Lipase 43 13 - 60 U/L   CBC Auto Differential    Collection Time: 05/03/22  7:59 AM    Specimen: Blood   Result Value Ref Range    WBC 10.74 3.40 - 10.80 10*3/mm3    RBC 4.62 3.77 - 5.28 10*6/mm3    Hemoglobin 13.5 12.0 - 15.9 g/dL    Hematocrit 41.8 34.0 - 46.6 %    MCV 90.5 79.0 - 97.0 fL    MCH 29.2 26.6 - 33.0 pg    MCHC 32.3 31.5 - 35.7 g/dL    RDW 13.0 12.3 - 15.4 %    RDW-SD 42.5 37.0 - 54.0 fl    MPV 9.4 6.0 - 12.0 fL     Platelets 327 140 - 450 10*3/mm3    Neutrophil % 82.0 (H) 42.7 - 76.0 %    Lymphocyte % 13.3 (L) 19.6 - 45.3 %    Monocyte % 3.5 (L) 5.0 - 12.0 %    Eosinophil % 0.4 0.3 - 6.2 %    Basophil % 0.5 0.0 - 1.5 %    Immature Grans % 0.3 0.0 - 0.5 %    Neutrophils, Absolute 8.81 (H) 1.70 - 7.00 10*3/mm3    Lymphocytes, Absolute 1.43 0.70 - 3.10 10*3/mm3    Monocytes, Absolute 0.38 0.10 - 0.90 10*3/mm3    Eosinophils, Absolute 0.04 0.00 - 0.40 10*3/mm3    Basophils, Absolute 0.05 0.00 - 0.20 10*3/mm3    Immature Grans, Absolute 0.03 0.00 - 0.05 10*3/mm3    nRBC 0.0 0.0 - 0.2 /100 WBC   Urinalysis With Microscopic If Indicated (No Culture) - Urine, Clean Catch    Collection Time: 05/03/22  8:07 AM    Specimen: Urine, Clean Catch   Result Value Ref Range    Color, UA Yellow Yellow, Straw    Appearance, UA Cloudy (A) Clear    pH, UA 5.5 5.0 - 8.0    Specific Gravity, UA 1.029 1.005 - 1.030    Glucose, UA Negative Negative    Ketones, UA Trace (A) Negative    Bilirubin, UA Negative Negative    Blood, UA Large (3+) (A) Negative    Protein, UA 30 mg/dL (1+) (A) Negative    Leuk Esterase, UA Trace (A) Negative    Nitrite, UA Negative Negative    Urobilinogen, UA 1.0 E.U./dL 0.2 - 1.0 E.U./dL   Urinalysis, Microscopic Only - Urine, Clean Catch    Collection Time: 05/03/22  8:07 AM    Specimen: Urine, Clean Catch   Result Value Ref Range    RBC, UA 21-30 (A) None Seen, 0-2 /HPF    WBC, UA 3-5 (A) None Seen, 0-2 /HPF    Bacteria, UA None Seen None Seen /HPF    Squamous Epithelial Cells, UA 3-6 (A) None Seen, 0-2 /HPF    Hyaline Casts, UA 0-2 None Seen /LPF    Calcium Oxalate Crystals, UA Moderate/2+ None Seen /HPF    Methodology Manual Light Microscopy        Ordered the above labs and independently reviewed the results.        RADIOLOGY  CT Abdomen Pelvis Without Contrast    Result Date: 5/3/2022  CT ABDOMEN AND PELVIS WITHOUT IV CONTRAST  HISTORY: Left flank pain  TECHNIQUE: Radiation dose reduction techniques were utilized,  including automated exposure control and exposure modulation based on body size. 3 mm images were obtained through the abdomen and pelvis without the administration of IV contrast. Lack of IV contrast limits evaluation of solid, visceral, and vascular structures. Sensitivity for underlying lesions and infection decrease.  COMPARISON: 07/28/2020  FINDINGS:  LOWER CHEST: Dependent bibasilar atelectasis. Calcified granulomas..  ABDOMEN: Liver/Biliary Tract: Lobular contour of the liver similar to the prior. Gallbladder within normal limits.  Spleen: Within normal limits. Splenule.  Pancreas: Within normal limits.  Adrenals: Nodular thickening right greater than left similar to the prior.  Kidneys:  Left perinephric and urothelial stranding with mild left-sided hydroureteronephrosis with a punctate stone in the distal ureter and a 5 mm stone in the urinary bladder just beyond the UV junction. 2 mm nonobstructing stone left lower pole. Nonobstructing right renal stones the largest in the interpolar region measuring 6 mm. Renal sinus cysts.  Bowel:  The stomach is moderately distended. No obstruction. Colonic diverticulosis without acute diverticulitis. Please refer to the prior barium enema for further detail and ensure up-to-date with colonoscopy. Normal appendix. The colon is interposed anterior to the liver.  Peritoneum: No free fluid or free air.  Vasculature:    IVC filter in stable position with struts extending beyond the lumen. Minimal calcific atherosclerosis.  Lymph Nodes:  Scattered subcentimeter nodes.                             PELVIS:                                 Pelvic organs: Within normal noncontrast limits.   Abdominal/Pelvic Wall: Small fat-containing umbilical hernia.  BONES: Multilevel degenerative changes thoracolumbar spine and pelvis.       1. Mild left-sided hydroureteronephrosis with a punctate stone in the distal ureter and a 5 mm stone in the urinary bladder just beyond the ureterovesical  junction. Perinephric/urothelial stranding likely secondary to recently passed stone. Please correlate with urinalysis to exclude superimposed infection. 2. Additional nonobstructing bilateral nephrolithiasis as above. 3. Bibasilar atelectasis. 4. Please see above for additional chronic findings.  This report was finalized on 5/3/2022 10:00 AM by Dr. Pb Chaudhry M.D.        I ordered the above noted radiological studies. Reviewed by me and discussed with radiologist.  See dictation for official radiology interpretation.      MEDICATIONS GIVEN IN ER    Medications   sodium chloride 0.9 % bolus 1,000 mL (0 mL Intravenous Stopped 5/3/22 1040)   ondansetron (ZOFRAN) injection 4 mg (4 mg Intravenous Given 5/3/22 0800)   HYDROmorphone (DILAUDID) injection 0.5 mg (0.5 mg Intravenous Given 5/3/22 0800)   HYDROmorphone (DILAUDID) injection 0.5 mg (0.5 mg Intravenous Given 5/3/22 0909)   HYDROmorphone (DILAUDID) injection 0.5 mg (0.5 mg Intravenous Given 5/3/22 1040)   sodium chloride 0.9 % bolus 1,000 mL (0 mL Intravenous Stopped 5/3/22 1249)   HYDROmorphone (DILAUDID) injection 0.5 mg (0.5 mg Intravenous Given 5/3/22 1248)         PROGRESS, DATA ANALYSIS, CONSULTS, AND MEDICAL DECISION MAKING    All labs have been independently reviewed by me.  All radiology studies have been reviewed by me and discussed with radiologist dictating the report.   EKG's independently viewed and interpreted by me.  Discussion below represents my analysis of pertinent findings related to patient's condition, differential diagnosis, treatment plan and final disposition.    I have discussed case with Dr. Nj, emergency room physician.  He has performed his own bedside examination and agrees with treatment plan.    ED Course as of 05/03/22 1508   Tue May 03, 2022   0743 Patient presents with sudden onset of left flank pain at 4 AM this morning.  Differential diagnoses include not limited to ureterolithiasis, UTI, diverticulitis, colitis,  muscular strain. [EE]   0830 WBC: 10.74 [EE]   0830 Hemoglobin: 13.5 [EE]   0842 Creatinine: 0.75 [EE]   0905 Blood, UA(!): Large (3+) [EE]   1218 Recheck of patient.  She states her pain is improved.   Will write for [place] [facility] and patient follow-up with urology. [EE]      ED Course User Index  [EE] Sylvester Vázquez PA       AS OF 15:08 EDT VITALS:    BP - 126/79  HR - 67  TEMP - 97.1 °F (36.2 °C)  O2 SATS - 100%        DIAGNOSIS  Final diagnoses:   Kidney stone   Renal colic on left side         DISPOSITION  Discharged      Dictated utilizing Dragon dictation     Sylvester Vázquez PA  05/03/22 8676

## 2022-05-03 NOTE — ED NOTES
Patient from home via EMS; c/o left side pain that radiates to her left lower back. Pain started at 4AM. Patient has hx of kidney stones and feels similar.

## 2022-05-03 NOTE — ED PROVIDER NOTES
MD ATTESTATION NOTE    The JOHN and I have discussed this patient's history, physical exam, and treatment plan.  I have reviewed the documentation and personally had a face to face interaction with the patient. I affirm the documentation and agree with the treatment and plan.  The attached note describes my personal findings.      I provided a substantive portion of the care of the patient.  I personally performed the physical exam in its entirety, and below are my findings.  For this patient encounter, the patient wore surgical mask, I wore full protective PPE including N95 and eye protection.      Brief HPI: Patient complains of constant left flank pain that began suddenly around 4 AM.  Pain is sharp.  Reports mild nausea but denies fever, chills, vomiting, diarrhea, dysuria, or hematuria.  She has a history of kidney stones and this pain feels similar.    PHYSICAL EXAM  ED Triage Vitals [05/03/22 0733]   Temp Heart Rate Resp BP SpO2   97.1 °F (36.2 °C) 89 16 (!) 163/110 95 %      Temp src Heart Rate Source Patient Position BP Location FiO2 (%)   -- -- -- -- --         GENERAL: Awake, alert, oriented x3.  Well-developed, well-nourished female.  Appears uncomfortable.    HENT: nares patent  EYES: no scleral icterus  CV: regular rhythm, normal rate  RESPIRATORY: normal effort, clear to auscultation bilaterally  ABDOMEN: soft, nontender, no CVA tenderness  MUSCULOSKELETAL: no deformity  NEURO: alert, moves all extremities, follows commands  PSYCH:  calm, cooperative  SKIN: warm, dry    Vital signs and nursing notes reviewed.        Plan: Symptoms are suggestive of renal colic.  Will obtain labs and CT scan for further evaluation.  Patient has been given IV medications for pain and nausea.     Real Nj MD  05/03/22 1541

## 2022-05-04 ENCOUNTER — TELEPHONE (OUTPATIENT)
Dept: INTERNAL MEDICINE | Facility: CLINIC | Age: 52
End: 2022-05-04

## 2022-05-04 NOTE — TELEPHONE ENCOUNTER
PATIENT CALLED AND STATES SHE HAD TO GO TO THE [facility] ER FOR KIDNEY STONES. CAT SCAN WAS DONE,  SHE HAS A LOT OF THEM. SHE WAS REFERRED TO DR. NICO KUMAR, UROLOGIST AND WILL MAKE AN APPOINTMENT.     [facility] PAPERWORK WILL BE FAXED TO OFFICE.     CALL BACK NUMBER [phone]

## 2022-05-04 NOTE — TELEPHONE ENCOUNTER
I called and spoke with the pt and she was told from her employer that she had to have [place] paperwork filled out because she missed the last 2 days.  She isn't scheduled to see Dr Martinez on 5/16/22 but has to have her paperwork completed in the next 3 days. Please advise if she needs to be seen or if you are ok with completing when they are faxed in to the office.

## 2022-05-05 NOTE — TELEPHONE ENCOUNTER
PATIENT STATES THAT SHE NEEDS TO KNOW WHAT SHE NEEDS TO DO ASAP. HER JOB KEEPS ASKING IF SHE HAS DONE THIS. PLEASE REACH OUT TO PATIENT.

## 2022-05-09 DIAGNOSIS — E03.9 HYPOTHYROIDISM: ICD-10-CM

## 2022-05-09 RX ORDER — LEVOTHYROXINE SODIUM 0.05 MG/1
50 TABLET ORAL DAILY
Qty: 90 TABLET | Refills: 1 | Status: SHIPPED | OUTPATIENT
Start: 2022-05-09 | End: 2022-11-07

## 2022-05-10 NOTE — TELEPHONE ENCOUNTER
She states she wears tennis shoes   Start time: 1314
Anastomosis: 1317
TI intubation: no 
End time:1323

[place] Bowel Prep Score:	4*
-right colon:			         NA*
-transverse colon:	        2
-left colon:                             2

## 2022-05-12 ENCOUNTER — TELEPHONE (OUTPATIENT)
Dept: INTERNAL MEDICINE | Facility: CLINIC | Age: 52
End: 2022-05-12

## 2022-05-12 NOTE — TELEPHONE ENCOUNTER
Patient FMLA is regards to the reoccurring kidney stones she is going to urologist next month, has to have paper work to secure job

## 2022-05-12 NOTE — TELEPHONE ENCOUNTER
Left message on vm that Dr Grey is out the rest of this week  The eliquis we can take care of and we will check on paper work

## 2022-05-12 NOTE — TELEPHONE ENCOUNTER
Caller: J Luis Kimball    Relationship: Self    Best call back number: [phone] (H)    What is the best time to reach you: ANYTIME, ASAP    Who are you requesting to speak with (clinical staff, provider,  specific staff member): CLINICAL STAFF    Do you know the name of the person who called: J LUIS KIMBALL    What was the call regarding: PATIENT STATES THE PHARMACY KEEPS HAVING ISSUES AND GETTING INSURANCE CONFUSED WITH HER COPAY CARD ON HER BLOOD THINNER ELIQUIS AND THEY HAD REACHED OUT TO DR HIGHTOWER IN [place] AND ASKED FOR A DIFFERENT BLOOD THINNER BUT THE PATIENT STATES SHE DOES NOT DIFFERENT BLOOD THINNER BECAUSE SHE WAS APPROVED FOR A COPAY CARD FOR ELIQUIS AND THE PHARMACY KEEPS FORGETTING ABOUT THE COPAY CARD, PLEASE DO NOT CHANGE THE PATIENT'S BLOOD THINNER MEDICATION PER THE PATIENT'S REQUEST    PATIENT ALSO NEEDS THE STATUS OF HER FMLA PAPERWORK THAT WAS FAXED TO DR KATJA ASAP      Do you require a callback: YES, ASAP

## 2022-05-17 RX ORDER — METFORMIN HYDROCHLORIDE 500 MG/1
1000 TABLET, EXTENDED RELEASE ORAL DAILY
Qty: 180 TABLET | Refills: 1 | Status: SHIPPED | OUTPATIENT
Start: 2022-05-17 | End: 2022-08-28

## 2022-05-17 NOTE — TELEPHONE ENCOUNTER
Caller: Li Kimball    Relationship: Self    Best call back number: [phone]     Requested Prescriptions:   Requested Prescriptions     Pending Prescriptions Disp Refills   • metFORMIN ER (GLUCOPHAGE-XR) 500 MG 24 hr tablet 90 tablet 1     Sig: Take 1 tablet by mouth Daily.        Pharmacy where request should be sent: [id number] [address] [facility] [address] [facility] [phone] [facility] [phone] FX     Additional details provided by patient: PATIENT CALLING STATING THAT SHE TAKES TWO TABLETS A DAY    Does the patient have less than a 3 day supply:  [] Yes  [x] No    Iron Parker Rep   05/17/22 12:17 EDT

## 2022-06-06 ENCOUNTER — TELEPHONE (OUTPATIENT)
Dept: INTERNAL MEDICINE | Facility: CLINIC | Age: 52
End: 2022-06-06

## 2022-06-06 NOTE — TELEPHONE ENCOUNTER
Caller: Li Kimball    Relationship to patient: Self    Best call back number: [phone]    Patient is needing: PATIENT HAD BLOOD WORK DONE ON 3/24/22.  SHE GOES TO [employer] AND THEY ARE NEEDING A COPY OF HER MOST RECENT LAB WORK TO INCLUDE TSH, CBC, CMP, A1C AND LIPID PANEL.  PLEASE FAX IT OVER TO #[phone].  PLEASE CALL AND ADVISE WHEN FAXED SO SHE CAN MAKE SURE THEY GET IT.

## 2022-06-29 ENCOUNTER — HOSPITAL ENCOUNTER (OUTPATIENT)
Facility: HOSPITAL | Age: 52
Discharge: HOME | End: 2022-07-02
Attending: EMERGENCY MEDICINE | Admitting: UROLOGY

## 2022-06-29 ENCOUNTER — APPOINTMENT (OUTPATIENT)
Dept: CT IMAGING | Facility: HOSPITAL | Age: 52
End: 2022-06-29

## 2022-06-29 DIAGNOSIS — N20.1 RIGHT URETERAL STONE: Primary | ICD-10-CM

## 2022-06-29 DIAGNOSIS — R52 INTRACTABLE PAIN: ICD-10-CM

## 2022-06-29 DIAGNOSIS — N20.1 URETERAL STONE: ICD-10-CM

## 2022-06-29 LAB
ALBUMIN SERPL-MCNC: 4.4 G/DL (ref 3.5–5.2)
ALBUMIN/GLOB SERPL: 1.2 G/DL
ALP SERPL-CCNC: 100 U/L (ref 39–117)
ALT SERPL W P-5'-P-CCNC: 17 U/L (ref 1–33)
ANION GAP SERPL CALCULATED.3IONS-SCNC: 13 MMOL/L (ref 5–15)
AST SERPL-CCNC: 9 U/L (ref 1–32)
BACTERIA UR QL AUTO: ABNORMAL /HPF
BASOPHILS # BLD AUTO: 0.06 10*3/MM3 (ref 0–0.2)
BASOPHILS NFR BLD AUTO: 0.5 % (ref 0–1.5)
BILIRUB SERPL-MCNC: 0.4 MG/DL (ref 0–1.2)
BILIRUB UR QL STRIP: NEGATIVE
BUN SERPL-MCNC: 15 MG/DL (ref 6–20)
BUN/CREAT SERPL: 20.5 (ref 7–25)
CALCIUM SPEC-SCNC: 9.8 MG/DL (ref 8.6–10.5)
CHLORIDE SERPL-SCNC: 103 MMOL/L (ref 98–107)
CLARITY UR: CLEAR
CO2 SERPL-SCNC: 26 MMOL/L (ref 22–29)
COLOR UR: YELLOW
CREAT SERPL-MCNC: 0.73 MG/DL (ref 0.57–1)
DEPRECATED RDW RBC AUTO: 40.3 FL (ref 37–54)
EGFRCR SERPLBLD CKD-EPI 2021: 99.7 ML/MIN/1.73
EOSINOPHIL # BLD AUTO: 0.08 10*3/MM3 (ref 0–0.4)
EOSINOPHIL NFR BLD AUTO: 0.6 % (ref 0.3–6.2)
ERYTHROCYTE [DISTWIDTH] IN BLOOD BY AUTOMATED COUNT: 13 % (ref 12.3–15.4)
GLOBULIN UR ELPH-MCNC: 3.6 GM/DL
GLUCOSE SERPL-MCNC: 110 MG/DL (ref 65–99)
GLUCOSE UR STRIP-MCNC: NEGATIVE MG/DL
HCG SERPL QL: NEGATIVE
HCT VFR BLD AUTO: 42.1 % (ref 34–46.6)
HGB BLD-MCNC: 14 G/DL (ref 12–15.9)
HGB UR QL STRIP.AUTO: ABNORMAL
HOLD SPECIMEN: NORMAL
HYALINE CASTS UR QL AUTO: ABNORMAL /LPF
IMM GRANULOCYTES # BLD AUTO: 0.12 10*3/MM3 (ref 0–0.05)
IMM GRANULOCYTES NFR BLD AUTO: 0.9 % (ref 0–0.5)
KETONES UR QL STRIP: ABNORMAL
LEUKOCYTE ESTERASE UR QL STRIP.AUTO: ABNORMAL
LIPASE SERPL-CCNC: 30 U/L (ref 13–60)
LYMPHOCYTES # BLD AUTO: 2.84 10*3/MM3 (ref 0.7–3.1)
LYMPHOCYTES NFR BLD AUTO: 22.5 % (ref 19.6–45.3)
MCH RBC QN AUTO: 29 PG (ref 26.6–33)
MCHC RBC AUTO-ENTMCNC: 33.3 G/DL (ref 31.5–35.7)
MCV RBC AUTO: 87.3 FL (ref 79–97)
MONOCYTES # BLD AUTO: 0.9 10*3/MM3 (ref 0.1–0.9)
MONOCYTES NFR BLD AUTO: 7.1 % (ref 5–12)
NEUTROPHILS NFR BLD AUTO: 68.4 % (ref 42.7–76)
NEUTROPHILS NFR BLD AUTO: 8.65 10*3/MM3 (ref 1.7–7)
NITRITE UR QL STRIP: NEGATIVE
NRBC BLD AUTO-RTO: 0 /100 WBC (ref 0–0.2)
PH UR STRIP.AUTO: 6 [PH] (ref 5–8)
PLATELET # BLD AUTO: 470 10*3/MM3 (ref 140–450)
PMV BLD AUTO: 9 FL (ref 6–12)
POTASSIUM SERPL-SCNC: 3.6 MMOL/L (ref 3.5–5.2)
PROT SERPL-MCNC: 8 G/DL (ref 6–8.5)
PROT UR QL STRIP: ABNORMAL
RBC # BLD AUTO: 4.82 10*6/MM3 (ref 3.77–5.28)
RBC # UR STRIP: ABNORMAL /HPF
REF LAB TEST METHOD: ABNORMAL
SARS-COV-2 RNA RESP QL NAA+PROBE: NOT DETECTED
SODIUM SERPL-SCNC: 142 MMOL/L (ref 136–145)
SP GR UR STRIP: 1.02 (ref 1–1.03)
SQUAMOUS #/AREA URNS HPF: ABNORMAL /HPF
UROBILINOGEN UR QL STRIP: ABNORMAL
WBC # UR STRIP: ABNORMAL /HPF
WBC NRBC COR # BLD: 12.65 10*3/MM3 (ref 3.4–10.8)
WHOLE BLOOD HOLD COAG: NORMAL
WHOLE BLOOD HOLD SPECIMEN: NORMAL

## 2022-06-29 PROCEDURE — 85025 COMPLETE CBC W/AUTO DIFF WBC: CPT | Performed by: EMERGENCY MEDICINE

## 2022-06-29 PROCEDURE — 83690 ASSAY OF LIPASE: CPT | Performed by: EMERGENCY MEDICINE

## 2022-06-29 PROCEDURE — 96365 THER/PROPH/DIAG IV INF INIT: CPT

## 2022-06-29 PROCEDURE — 80053 COMPREHEN METABOLIC PANEL: CPT | Performed by: EMERGENCY MEDICINE

## 2022-06-29 PROCEDURE — C9803 HOPD COVID-19 SPEC COLLECT: HCPCS

## 2022-06-29 PROCEDURE — 74176 CT ABD & PELVIS W/O CONTRAST: CPT

## 2022-06-29 PROCEDURE — G0378 HOSPITAL OBSERVATION PER HR: HCPCS

## 2022-06-29 PROCEDURE — 84703 CHORIONIC GONADOTROPIN ASSAY: CPT | Performed by: EMERGENCY MEDICINE

## 2022-06-29 PROCEDURE — 96376 TX/PRO/DX INJ SAME DRUG ADON: CPT

## 2022-06-29 PROCEDURE — 25010000002 KETOROLAC TROMETHAMINE PER 15 MG: Performed by: EMERGENCY MEDICINE

## 2022-06-29 PROCEDURE — 96375 TX/PRO/DX INJ NEW DRUG ADDON: CPT

## 2022-06-29 PROCEDURE — U0003 INFECTIOUS AGENT DETECTION BY NUCLEIC ACID (DNA OR RNA); SEVERE ACUTE RESPIRATORY SYNDROME CORONAVIRUS 2 (SARS-COV-2) (CORONAVIRUS DISEASE [COVID-19]), AMPLIFIED PROBE TECHNIQUE, MAKING USE OF HIGH THROUGHPUT TECHNOLOGIES AS DESCRIBED BY CMS-2020-01-R: HCPCS | Performed by: EMERGENCY MEDICINE

## 2022-06-29 PROCEDURE — 25010000002 ONDANSETRON PER 1 MG: Performed by: EMERGENCY MEDICINE

## 2022-06-29 PROCEDURE — 25010000002 CEFTRIAXONE PER 250 MG: Performed by: EMERGENCY MEDICINE

## 2022-06-29 PROCEDURE — 99285 EMERGENCY DEPT VISIT HI MDM: CPT

## 2022-06-29 PROCEDURE — 81001 URINALYSIS AUTO W/SCOPE: CPT | Performed by: EMERGENCY MEDICINE

## 2022-06-29 PROCEDURE — 25010000002 ONDANSETRON PER 1 MG: Performed by: UROLOGY

## 2022-06-29 PROCEDURE — 25010000002 HYDROMORPHONE PER 4 MG: Performed by: EMERGENCY MEDICINE

## 2022-06-29 RX ORDER — HYDROCODONE BITARTRATE AND ACETAMINOPHEN 7.5; 325 MG/1; MG/1
1 TABLET ORAL EVERY 6 HOURS PRN
Status: DISCONTINUED | OUTPATIENT
Start: 2022-06-29 | End: 2022-07-02 | Stop reason: HOSPADM

## 2022-06-29 RX ORDER — TAMSULOSIN HYDROCHLORIDE 0.4 MG/1
0.4 CAPSULE ORAL DAILY
Status: DISCONTINUED | OUTPATIENT
Start: 2022-06-29 | End: 2022-07-01

## 2022-06-29 RX ORDER — HYDROMORPHONE HYDROCHLORIDE 1 MG/ML
0.5 INJECTION, SOLUTION INTRAMUSCULAR; INTRAVENOUS; SUBCUTANEOUS ONCE
Status: COMPLETED | OUTPATIENT
Start: 2022-06-29 | End: 2022-06-29

## 2022-06-29 RX ORDER — ONDANSETRON 2 MG/ML
4 INJECTION INTRAMUSCULAR; INTRAVENOUS ONCE
Status: COMPLETED | OUTPATIENT
Start: 2022-06-29 | End: 2022-06-29

## 2022-06-29 RX ORDER — LEVOTHYROXINE SODIUM 0.05 MG/1
50 TABLET ORAL
Status: DISCONTINUED | OUTPATIENT
Start: 2022-06-30 | End: 2022-07-02 | Stop reason: HOSPADM

## 2022-06-29 RX ORDER — ONDANSETRON 2 MG/ML
4 INJECTION INTRAMUSCULAR; INTRAVENOUS EVERY 4 HOURS PRN
Status: DISCONTINUED | OUTPATIENT
Start: 2022-06-29 | End: 2022-07-02 | Stop reason: HOSPADM

## 2022-06-29 RX ORDER — HYDROMORPHONE HYDROCHLORIDE 1 MG/ML
0.5 INJECTION, SOLUTION INTRAMUSCULAR; INTRAVENOUS; SUBCUTANEOUS EVERY 4 HOURS PRN
Status: DISCONTINUED | OUTPATIENT
Start: 2022-06-29 | End: 2022-07-02 | Stop reason: HOSPADM

## 2022-06-29 RX ORDER — SODIUM CHLORIDE AND POTASSIUM CHLORIDE 150; 450 MG/100ML; MG/100ML
100 INJECTION, SOLUTION INTRAVENOUS CONTINUOUS
Status: DISCONTINUED | OUTPATIENT
Start: 2022-06-29 | End: 2022-07-02 | Stop reason: HOSPADM

## 2022-06-29 RX ORDER — PRAVASTATIN SODIUM 20 MG
20 TABLET ORAL DAILY
Status: DISCONTINUED | OUTPATIENT
Start: 2022-06-30 | End: 2022-07-02 | Stop reason: HOSPADM

## 2022-06-29 RX ORDER — SODIUM CHLORIDE 0.9 % (FLUSH) 0.9 %
10 SYRINGE (ML) INJECTION AS NEEDED
Status: DISCONTINUED | OUTPATIENT
Start: 2022-06-29 | End: 2022-07-02 | Stop reason: HOSPADM

## 2022-06-29 RX ORDER — KETOROLAC TROMETHAMINE 15 MG/ML
15 INJECTION, SOLUTION INTRAMUSCULAR; INTRAVENOUS ONCE
Status: COMPLETED | OUTPATIENT
Start: 2022-06-29 | End: 2022-06-29

## 2022-06-29 RX ADMIN — SODIUM CHLORIDE 1000 ML: 9 INJECTION, SOLUTION INTRAVENOUS at 16:48

## 2022-06-29 RX ADMIN — HYDROMORPHONE HYDROCHLORIDE 0.5 MG: 1 INJECTION, SOLUTION INTRAMUSCULAR; INTRAVENOUS; SUBCUTANEOUS at 18:17

## 2022-06-29 RX ADMIN — Medication 10 ML: at 16:50

## 2022-06-29 RX ADMIN — HYDROMORPHONE HYDROCHLORIDE 0.5 MG: 1 INJECTION, SOLUTION INTRAMUSCULAR; INTRAVENOUS; SUBCUTANEOUS at 16:49

## 2022-06-29 RX ADMIN — ONDANSETRON 4 MG: 2 INJECTION INTRAMUSCULAR; INTRAVENOUS at 16:47

## 2022-06-29 RX ADMIN — HYDROCODONE BITARTRATE AND ACETAMINOPHEN 1 TABLET: 7.5; 325 TABLET ORAL at 23:42

## 2022-06-29 RX ADMIN — KETOROLAC TROMETHAMINE 15 MG: 15 INJECTION, SOLUTION INTRAMUSCULAR; INTRAVENOUS at 16:47

## 2022-06-29 RX ADMIN — CEFTRIAXONE 1 G: 1 INJECTION, POWDER, FOR SOLUTION INTRAMUSCULAR; INTRAVENOUS at 18:46

## 2022-06-29 RX ADMIN — ONDANSETRON 4 MG: 2 INJECTION INTRAMUSCULAR; INTRAVENOUS at 22:52

## 2022-06-30 ENCOUNTER — APPOINTMENT (OUTPATIENT)
Dept: GENERAL RADIOLOGY | Facility: HOSPITAL | Age: 52
End: 2022-06-30

## 2022-06-30 LAB
ANION GAP SERPL CALCULATED.3IONS-SCNC: 9.2 MMOL/L (ref 5–15)
BASOPHILS # BLD AUTO: 0.05 10*3/MM3 (ref 0–0.2)
BASOPHILS NFR BLD AUTO: 0.4 % (ref 0–1.5)
BUN SERPL-MCNC: 16 MG/DL (ref 6–20)
BUN/CREAT SERPL: 18 (ref 7–25)
CALCIUM SPEC-SCNC: 8.7 MG/DL (ref 8.6–10.5)
CHLORIDE SERPL-SCNC: 105 MMOL/L (ref 98–107)
CO2 SERPL-SCNC: 24.8 MMOL/L (ref 22–29)
CREAT SERPL-MCNC: 0.89 MG/DL (ref 0.57–1)
DEPRECATED RDW RBC AUTO: 40.2 FL (ref 37–54)
EGFRCR SERPLBLD CKD-EPI 2021: 78.6 ML/MIN/1.73
EOSINOPHIL # BLD AUTO: 0.05 10*3/MM3 (ref 0–0.4)
EOSINOPHIL NFR BLD AUTO: 0.4 % (ref 0.3–6.2)
ERYTHROCYTE [DISTWIDTH] IN BLOOD BY AUTOMATED COUNT: 12.7 % (ref 12.3–15.4)
GLUCOSE SERPL-MCNC: 97 MG/DL (ref 65–99)
HCT VFR BLD AUTO: 36.6 % (ref 34–46.6)
HGB BLD-MCNC: 12.3 G/DL (ref 12–15.9)
IMM GRANULOCYTES # BLD AUTO: 0.1 10*3/MM3 (ref 0–0.05)
IMM GRANULOCYTES NFR BLD AUTO: 0.7 % (ref 0–0.5)
LYMPHOCYTES # BLD AUTO: 2.8 10*3/MM3 (ref 0.7–3.1)
LYMPHOCYTES NFR BLD AUTO: 21 % (ref 19.6–45.3)
MCH RBC QN AUTO: 29.3 PG (ref 26.6–33)
MCHC RBC AUTO-ENTMCNC: 33.6 G/DL (ref 31.5–35.7)
MCV RBC AUTO: 87.1 FL (ref 79–97)
MONOCYTES # BLD AUTO: 1.22 10*3/MM3 (ref 0.1–0.9)
MONOCYTES NFR BLD AUTO: 9.1 % (ref 5–12)
NEUTROPHILS NFR BLD AUTO: 68.4 % (ref 42.7–76)
NEUTROPHILS NFR BLD AUTO: 9.13 10*3/MM3 (ref 1.7–7)
NRBC BLD AUTO-RTO: 0 /100 WBC (ref 0–0.2)
PLATELET # BLD AUTO: 386 10*3/MM3 (ref 140–450)
PMV BLD AUTO: 9.1 FL (ref 6–12)
POTASSIUM SERPL-SCNC: 4.1 MMOL/L (ref 3.5–5.2)
RBC # BLD AUTO: 4.2 10*6/MM3 (ref 3.77–5.28)
SODIUM SERPL-SCNC: 139 MMOL/L (ref 136–145)
WBC NRBC COR # BLD: 13.35 10*3/MM3 (ref 3.4–10.8)

## 2022-06-30 PROCEDURE — 96376 TX/PRO/DX INJ SAME DRUG ADON: CPT

## 2022-06-30 PROCEDURE — C1769 GUIDE WIRE: HCPCS | Performed by: UROLOGY

## 2022-06-30 PROCEDURE — 25010000002 HYDROMORPHONE PER 4 MG: Performed by: UROLOGY

## 2022-06-30 PROCEDURE — 80048 BASIC METABOLIC PNL TOTAL CA: CPT | Performed by: UROLOGY

## 2022-06-30 PROCEDURE — 74018 RADEX ABDOMEN 1 VIEW: CPT

## 2022-06-30 PROCEDURE — 96361 HYDRATE IV INFUSION ADD-ON: CPT

## 2022-06-30 PROCEDURE — 0 POTASSIUM CHLORIDE PER 2 MEQ: Performed by: UROLOGY

## 2022-06-30 PROCEDURE — 25010000002 ONDANSETRON PER 1 MG: Performed by: UROLOGY

## 2022-06-30 PROCEDURE — 25010000002 KETOROLAC TROMETHAMINE PER 15 MG: Performed by: UROLOGY

## 2022-06-30 PROCEDURE — G0378 HOSPITAL OBSERVATION PER HR: HCPCS

## 2022-06-30 PROCEDURE — 85025 COMPLETE CBC W/AUTO DIFF WBC: CPT | Performed by: UROLOGY

## 2022-06-30 RX ORDER — KETOROLAC TROMETHAMINE 30 MG/ML
30 INJECTION, SOLUTION INTRAMUSCULAR; INTRAVENOUS EVERY 6 HOURS PRN
Status: DISCONTINUED | OUTPATIENT
Start: 2022-06-30 | End: 2022-07-02 | Stop reason: HOSPADM

## 2022-06-30 RX ADMIN — ONDANSETRON 4 MG: 2 INJECTION INTRAMUSCULAR; INTRAVENOUS at 08:52

## 2022-06-30 RX ADMIN — ONDANSETRON 4 MG: 2 INJECTION INTRAMUSCULAR; INTRAVENOUS at 13:50

## 2022-06-30 RX ADMIN — ONDANSETRON 4 MG: 2 INJECTION INTRAMUSCULAR; INTRAVENOUS at 21:39

## 2022-06-30 RX ADMIN — PRAVASTATIN SODIUM 20 MG: 20 TABLET ORAL at 10:50

## 2022-06-30 RX ADMIN — HYDROMORPHONE HYDROCHLORIDE 0.5 MG: 1 INJECTION, SOLUTION INTRAMUSCULAR; INTRAVENOUS; SUBCUTANEOUS at 21:39

## 2022-06-30 RX ADMIN — HYDROMORPHONE HYDROCHLORIDE 0.5 MG: 1 INJECTION, SOLUTION INTRAMUSCULAR; INTRAVENOUS; SUBCUTANEOUS at 16:30

## 2022-06-30 RX ADMIN — HYDROMORPHONE HYDROCHLORIDE 0.5 MG: 1 INJECTION, SOLUTION INTRAMUSCULAR; INTRAVENOUS; SUBCUTANEOUS at 10:50

## 2022-06-30 RX ADMIN — ONDANSETRON 4 MG: 2 INJECTION INTRAMUSCULAR; INTRAVENOUS at 02:26

## 2022-06-30 RX ADMIN — HYDROMORPHONE HYDROCHLORIDE 0.5 MG: 1 INJECTION, SOLUTION INTRAMUSCULAR; INTRAVENOUS; SUBCUTANEOUS at 05:39

## 2022-06-30 RX ADMIN — POTASSIUM CHLORIDE AND SODIUM CHLORIDE 100 ML/HR: 450; 150 INJECTION, SOLUTION INTRAVENOUS at 21:04

## 2022-06-30 RX ADMIN — HYDROCODONE BITARTRATE AND ACETAMINOPHEN 1 TABLET: 7.5; 325 TABLET ORAL at 13:50

## 2022-06-30 RX ADMIN — LEVOTHYROXINE SODIUM 50 MCG: 0.05 TABLET ORAL at 05:26

## 2022-06-30 RX ADMIN — TAMSULOSIN HYDROCHLORIDE 0.4 MG: 0.4 CAPSULE ORAL at 00:41

## 2022-06-30 RX ADMIN — KETOROLAC TROMETHAMINE 30 MG: 30 INJECTION, SOLUTION INTRAMUSCULAR at 15:00

## 2022-06-30 RX ADMIN — POTASSIUM CHLORIDE AND SODIUM CHLORIDE 100 ML/HR: 450; 150 INJECTION, SOLUTION INTRAVENOUS at 10:52

## 2022-06-30 RX ADMIN — POTASSIUM CHLORIDE AND SODIUM CHLORIDE 100 ML/HR: 450; 150 INJECTION, SOLUTION INTRAVENOUS at 00:41

## 2022-06-30 RX ADMIN — HYDROMORPHONE HYDROCHLORIDE 0.5 MG: 1 INJECTION, SOLUTION INTRAMUSCULAR; INTRAVENOUS; SUBCUTANEOUS at 02:26

## 2022-07-01 ENCOUNTER — APPOINTMENT (OUTPATIENT)
Dept: GENERAL RADIOLOGY | Facility: HOSPITAL | Age: 52
End: 2022-07-01

## 2022-07-01 ENCOUNTER — ANESTHESIA EVENT (OUTPATIENT)
Dept: PERIOP | Facility: HOSPITAL | Age: 52
End: 2022-07-01

## 2022-07-01 ENCOUNTER — ANESTHESIA (OUTPATIENT)
Dept: PERIOP | Facility: HOSPITAL | Age: 52
End: 2022-07-01

## 2022-07-01 PROCEDURE — 25010000002 ONDANSETRON PER 1 MG: Performed by: UROLOGY

## 2022-07-01 PROCEDURE — 25010000002 MIDAZOLAM PER 1 MG: Performed by: ANESTHESIOLOGY

## 2022-07-01 PROCEDURE — C1769 GUIDE WIRE: HCPCS | Performed by: UROLOGY

## 2022-07-01 PROCEDURE — 96376 TX/PRO/DX INJ SAME DRUG ADON: CPT

## 2022-07-01 PROCEDURE — 96361 HYDRATE IV INFUSION ADD-ON: CPT

## 2022-07-01 PROCEDURE — 0 POTASSIUM CHLORIDE PER 2 MEQ: Performed by: UROLOGY

## 2022-07-01 PROCEDURE — 25010000002 ONDANSETRON PER 1 MG: Performed by: NURSE ANESTHETIST, CERTIFIED REGISTERED

## 2022-07-01 PROCEDURE — G0378 HOSPITAL OBSERVATION PER HR: HCPCS

## 2022-07-01 PROCEDURE — 25010000002 KETOROLAC TROMETHAMINE PER 15 MG: Performed by: UROLOGY

## 2022-07-01 PROCEDURE — 25010000002 PROPOFOL 10 MG/ML EMULSION: Performed by: NURSE ANESTHETIST, CERTIFIED REGISTERED

## 2022-07-01 PROCEDURE — C2617 STENT, NON-COR, TEM W/O DEL: HCPCS | Performed by: UROLOGY

## 2022-07-01 PROCEDURE — 74420 UROGRAPHY RTRGR +-KUB: CPT

## 2022-07-01 PROCEDURE — 25010000002 CEFTRIAXONE PER 250 MG: Performed by: UROLOGY

## 2022-07-01 PROCEDURE — 25010000002 FENTANYL CITRATE (PF) 50 MCG/ML SOLUTION: Performed by: NURSE ANESTHETIST, CERTIFIED REGISTERED

## 2022-07-01 PROCEDURE — 82365 CALCULUS SPECTROSCOPY: CPT | Performed by: UROLOGY

## 2022-07-01 PROCEDURE — 25010000002 HYDROMORPHONE PER 4 MG: Performed by: UROLOGY

## 2022-07-01 DEVICE — URETERAL STENT
Type: IMPLANTABLE DEVICE | Site: URETER | Status: FUNCTIONAL
Brand: CONTOUR™

## 2022-07-01 RX ORDER — MAGNESIUM HYDROXIDE 1200 MG/15ML
LIQUID ORAL AS NEEDED
Status: DISCONTINUED | OUTPATIENT
Start: 2022-07-01 | End: 2022-07-01 | Stop reason: HOSPADM

## 2022-07-01 RX ORDER — NALOXONE HCL 0.4 MG/ML
0.2 VIAL (ML) INJECTION AS NEEDED
Status: DISCONTINUED | OUTPATIENT
Start: 2022-07-01 | End: 2022-07-01 | Stop reason: HOSPADM

## 2022-07-01 RX ORDER — HYDROCODONE BITARTRATE AND ACETAMINOPHEN 7.5; 325 MG/1; MG/1
1 TABLET ORAL ONCE AS NEEDED
Status: DISCONTINUED | OUTPATIENT
Start: 2022-07-01 | End: 2022-07-01 | Stop reason: HOSPADM

## 2022-07-01 RX ORDER — SODIUM CHLORIDE, SODIUM LACTATE, POTASSIUM CHLORIDE, CALCIUM CHLORIDE 600; 310; 30; 20 MG/100ML; MG/100ML; MG/100ML; MG/100ML
INJECTION, SOLUTION INTRAVENOUS CONTINUOUS PRN
Status: DISCONTINUED | OUTPATIENT
Start: 2022-07-01 | End: 2022-07-01 | Stop reason: SURG

## 2022-07-01 RX ORDER — PROMETHAZINE HYDROCHLORIDE 25 MG/1
25 SUPPOSITORY RECTAL ONCE AS NEEDED
Status: DISCONTINUED | OUTPATIENT
Start: 2022-07-01 | End: 2022-07-01 | Stop reason: HOSPADM

## 2022-07-01 RX ORDER — DIPHENHYDRAMINE HCL 25 MG
25 CAPSULE ORAL
Status: DISCONTINUED | OUTPATIENT
Start: 2022-07-01 | End: 2022-07-01 | Stop reason: HOSPADM

## 2022-07-01 RX ORDER — EPHEDRINE SULFATE 50 MG/ML
5 INJECTION, SOLUTION INTRAVENOUS ONCE AS NEEDED
Status: DISCONTINUED | OUTPATIENT
Start: 2022-07-01 | End: 2022-07-01 | Stop reason: HOSPADM

## 2022-07-01 RX ORDER — PROPOFOL 10 MG/ML
VIAL (ML) INTRAVENOUS AS NEEDED
Status: DISCONTINUED | OUTPATIENT
Start: 2022-07-01 | End: 2022-07-01 | Stop reason: SURG

## 2022-07-01 RX ORDER — FENTANYL CITRATE 50 UG/ML
INJECTION, SOLUTION INTRAMUSCULAR; INTRAVENOUS AS NEEDED
Status: DISCONTINUED | OUTPATIENT
Start: 2022-07-01 | End: 2022-07-01 | Stop reason: SURG

## 2022-07-01 RX ORDER — SULFAMETHOXAZOLE AND TRIMETHOPRIM 800; 160 MG/1; MG/1
1 TABLET ORAL 2 TIMES DAILY
Qty: 10 TABLET | Refills: 0 | Status: SHIPPED | OUTPATIENT
Start: 2022-07-01 | End: 2022-11-01

## 2022-07-01 RX ORDER — LIDOCAINE HYDROCHLORIDE 20 MG/ML
INJECTION, SOLUTION INFILTRATION; PERINEURAL AS NEEDED
Status: DISCONTINUED | OUTPATIENT
Start: 2022-07-01 | End: 2022-07-01 | Stop reason: SURG

## 2022-07-01 RX ORDER — SODIUM CHLORIDE 0.9 % (FLUSH) 0.9 %
3-10 SYRINGE (ML) INJECTION AS NEEDED
Status: DISCONTINUED | OUTPATIENT
Start: 2022-07-01 | End: 2022-07-01 | Stop reason: HOSPADM

## 2022-07-01 RX ORDER — ONDANSETRON 2 MG/ML
4 INJECTION INTRAMUSCULAR; INTRAVENOUS ONCE AS NEEDED
Status: DISCONTINUED | OUTPATIENT
Start: 2022-07-01 | End: 2022-07-01 | Stop reason: HOSPADM

## 2022-07-01 RX ORDER — MIDAZOLAM HYDROCHLORIDE 1 MG/ML
1 INJECTION INTRAMUSCULAR; INTRAVENOUS
Status: DISCONTINUED | OUTPATIENT
Start: 2022-07-01 | End: 2022-07-01 | Stop reason: HOSPADM

## 2022-07-01 RX ORDER — HYDRALAZINE HYDROCHLORIDE 20 MG/ML
5 INJECTION INTRAMUSCULAR; INTRAVENOUS
Status: DISCONTINUED | OUTPATIENT
Start: 2022-07-01 | End: 2022-07-01 | Stop reason: HOSPADM

## 2022-07-01 RX ORDER — PROMETHAZINE HYDROCHLORIDE 25 MG/1
25 TABLET ORAL ONCE AS NEEDED
Status: DISCONTINUED | OUTPATIENT
Start: 2022-07-01 | End: 2022-07-01 | Stop reason: HOSPADM

## 2022-07-01 RX ORDER — SODIUM CHLORIDE 9 MG/ML
8 INJECTION, SOLUTION INTRAVENOUS CONTINUOUS
Status: DISCONTINUED | OUTPATIENT
Start: 2022-07-01 | End: 2022-07-02 | Stop reason: HOSPADM

## 2022-07-01 RX ORDER — HYDROMORPHONE HYDROCHLORIDE 1 MG/ML
0.5 INJECTION, SOLUTION INTRAMUSCULAR; INTRAVENOUS; SUBCUTANEOUS
Status: DISCONTINUED | OUTPATIENT
Start: 2022-07-01 | End: 2022-07-01 | Stop reason: HOSPADM

## 2022-07-01 RX ORDER — FENTANYL CITRATE 50 UG/ML
50 INJECTION, SOLUTION INTRAMUSCULAR; INTRAVENOUS
Status: DISCONTINUED | OUTPATIENT
Start: 2022-07-01 | End: 2022-07-01 | Stop reason: HOSPADM

## 2022-07-01 RX ORDER — SODIUM CHLORIDE, SODIUM LACTATE, POTASSIUM CHLORIDE, CALCIUM CHLORIDE 600; 310; 30; 20 MG/100ML; MG/100ML; MG/100ML; MG/100ML
9 INJECTION, SOLUTION INTRAVENOUS CONTINUOUS
Status: DISCONTINUED | OUTPATIENT
Start: 2022-07-01 | End: 2022-07-01

## 2022-07-01 RX ORDER — DIPHENHYDRAMINE HYDROCHLORIDE 50 MG/ML
12.5 INJECTION INTRAMUSCULAR; INTRAVENOUS
Status: DISCONTINUED | OUTPATIENT
Start: 2022-07-01 | End: 2022-07-01 | Stop reason: HOSPADM

## 2022-07-01 RX ORDER — FLUMAZENIL 0.1 MG/ML
0.2 INJECTION INTRAVENOUS AS NEEDED
Status: DISCONTINUED | OUTPATIENT
Start: 2022-07-01 | End: 2022-07-01 | Stop reason: HOSPADM

## 2022-07-01 RX ORDER — LIDOCAINE HYDROCHLORIDE 10 MG/ML
0.5 INJECTION, SOLUTION EPIDURAL; INFILTRATION; INTRACAUDAL; PERINEURAL ONCE AS NEEDED
Status: DISCONTINUED | OUTPATIENT
Start: 2022-07-01 | End: 2022-07-01 | Stop reason: HOSPADM

## 2022-07-01 RX ORDER — PHENAZOPYRIDINE HYDROCHLORIDE 200 MG/1
200 TABLET, FILM COATED ORAL
Status: DISCONTINUED | OUTPATIENT
Start: 2022-07-02 | End: 2022-07-02 | Stop reason: HOSPADM

## 2022-07-01 RX ORDER — FAMOTIDINE 10 MG/ML
20 INJECTION, SOLUTION INTRAVENOUS ONCE
Status: COMPLETED | OUTPATIENT
Start: 2022-07-01 | End: 2022-07-01

## 2022-07-01 RX ORDER — LABETALOL HYDROCHLORIDE 5 MG/ML
5 INJECTION, SOLUTION INTRAVENOUS
Status: DISCONTINUED | OUTPATIENT
Start: 2022-07-01 | End: 2022-07-01 | Stop reason: HOSPADM

## 2022-07-01 RX ORDER — ONDANSETRON 2 MG/ML
INJECTION INTRAMUSCULAR; INTRAVENOUS AS NEEDED
Status: DISCONTINUED | OUTPATIENT
Start: 2022-07-01 | End: 2022-07-01 | Stop reason: SURG

## 2022-07-01 RX ORDER — IPRATROPIUM BROMIDE AND ALBUTEROL SULFATE 2.5; .5 MG/3ML; MG/3ML
3 SOLUTION RESPIRATORY (INHALATION) ONCE AS NEEDED
Status: DISCONTINUED | OUTPATIENT
Start: 2022-07-01 | End: 2022-07-01 | Stop reason: HOSPADM

## 2022-07-01 RX ORDER — OXYCODONE AND ACETAMINOPHEN 7.5; 325 MG/1; MG/1
1 TABLET ORAL EVERY 4 HOURS PRN
Status: DISCONTINUED | OUTPATIENT
Start: 2022-07-01 | End: 2022-07-01 | Stop reason: HOSPADM

## 2022-07-01 RX ORDER — HYDROCODONE BITARTRATE AND ACETAMINOPHEN 5; 325 MG/1; MG/1
1 TABLET ORAL EVERY 6 HOURS PRN
Qty: 20 TABLET | Refills: 0 | Status: SHIPPED | OUTPATIENT
Start: 2022-07-01 | End: 2022-11-01

## 2022-07-01 RX ORDER — SODIUM CHLORIDE 0.9 % (FLUSH) 0.9 %
3 SYRINGE (ML) INJECTION EVERY 12 HOURS SCHEDULED
Status: DISCONTINUED | OUTPATIENT
Start: 2022-07-01 | End: 2022-07-01 | Stop reason: HOSPADM

## 2022-07-01 RX ADMIN — FAMOTIDINE 20 MG: 10 INJECTION, SOLUTION INTRAVENOUS at 16:45

## 2022-07-01 RX ADMIN — LEVOTHYROXINE SODIUM 50 MCG: 0.05 TABLET ORAL at 06:42

## 2022-07-01 RX ADMIN — FENTANYL CITRATE 50 MCG: 50 INJECTION INTRAMUSCULAR; INTRAVENOUS at 18:38

## 2022-07-01 RX ADMIN — HYDROMORPHONE HYDROCHLORIDE 0.5 MG: 1 INJECTION, SOLUTION INTRAMUSCULAR; INTRAVENOUS; SUBCUTANEOUS at 03:12

## 2022-07-01 RX ADMIN — ONDANSETRON 4 MG: 2 INJECTION INTRAMUSCULAR; INTRAVENOUS at 18:19

## 2022-07-01 RX ADMIN — POTASSIUM CHLORIDE AND SODIUM CHLORIDE 100 ML/HR: 450; 150 INJECTION, SOLUTION INTRAVENOUS at 09:13

## 2022-07-01 RX ADMIN — PROPOFOL 200 MG: 10 INJECTION, EMULSION INTRAVENOUS at 18:13

## 2022-07-01 RX ADMIN — KETOROLAC TROMETHAMINE 30 MG: 30 INJECTION, SOLUTION INTRAMUSCULAR at 12:17

## 2022-07-01 RX ADMIN — FENTANYL CITRATE 50 MCG: 50 INJECTION INTRAMUSCULAR; INTRAVENOUS at 18:13

## 2022-07-01 RX ADMIN — KETOROLAC TROMETHAMINE 30 MG: 30 INJECTION, SOLUTION INTRAMUSCULAR at 01:26

## 2022-07-01 RX ADMIN — ONDANSETRON 4 MG: 2 INJECTION INTRAMUSCULAR; INTRAVENOUS at 07:43

## 2022-07-01 RX ADMIN — HYDROMORPHONE HYDROCHLORIDE 0.5 MG: 1 INJECTION, SOLUTION INTRAMUSCULAR; INTRAVENOUS; SUBCUTANEOUS at 07:43

## 2022-07-01 RX ADMIN — SODIUM CHLORIDE, POTASSIUM CHLORIDE, SODIUM LACTATE AND CALCIUM CHLORIDE: 600; 310; 30; 20 INJECTION, SOLUTION INTRAVENOUS at 18:08

## 2022-07-01 RX ADMIN — MIDAZOLAM 1 MG: 1 INJECTION INTRAMUSCULAR; INTRAVENOUS at 17:17

## 2022-07-01 RX ADMIN — SODIUM CHLORIDE 8 ML/HR: 9 INJECTION, SOLUTION INTRAVENOUS at 21:55

## 2022-07-01 RX ADMIN — CEFTRIAXONE 1 G: 1 INJECTION, POWDER, FOR SOLUTION INTRAMUSCULAR; INTRAVENOUS at 18:01

## 2022-07-01 RX ADMIN — SODIUM CHLORIDE 8 ML/HR: 9 INJECTION, SOLUTION INTRAVENOUS at 16:45

## 2022-07-01 RX ADMIN — LIDOCAINE HYDROCHLORIDE 50 MG: 20 INJECTION, SOLUTION INFILTRATION; PERINEURAL at 18:13

## 2022-07-01 RX ADMIN — ONDANSETRON 4 MG: 2 INJECTION INTRAMUSCULAR; INTRAVENOUS at 03:12

## 2022-07-01 NOTE — PLAN OF CARE
Goal Outcome Evaluation:              Outcome Evaluation: VSS. IV fluids infusing. Straining urine.Toradol given for pain. Having right stent placed.

## 2022-07-01 NOTE — PLAN OF CARE
Goal Outcome Evaluation:              Outcome Evaluation: Tachy at times, other VSS, pain controlled w/ dilaudid, zofran for nausea, KUB today, surgery scheduled tomorrow. IVFs as ordered, SCDs on, encouraged pt to do foot pumps in bed, up to bathroom w/ assist x1, emesis x1, slept on and off, mother at bedside for part of shift. urine strained, tolerating small bites of regular diet. NPO at midnight.

## 2022-07-01 NOTE — ANESTHESIA PROCEDURE NOTES
Airway  Urgency: elective    Date/Time: 7/1/2022 6:14 PM  Airway not difficult    General Information and Staff    Patient location during procedure: OR    Indications and Patient Condition  Indications for airway management: airway protection    Preoxygenated: yes  Mask difficulty assessment: 0 - not attempted    Final Airway Details  Final airway type: supraglottic airway      Successful airway: classic  Size 4    Number of attempts at approach: 1  Assessment: lips, teeth, and gum same as pre-op and atraumatic intubation

## 2022-07-01 NOTE — OP NOTE
PREOPERATIVE DIAGNOSIS: Right distal ureteral calculus.    POSTOPERATIVE DIAGNOSIS: Same    PROCEDURE: Cystoscopy right ureteroscopy laser lithotripsy stone extraction and stent placement.    SURGEON:  Cyrus Au MD    ASSISTANT: None    ANESTHESIA: General    EBL: None    DRAINS: 6 English by 26 cm double-J ureteral stent.    COMPLICATIONS: None    FINDINGS: Distal R ureteral stone.    INDICATIONS FOR PROCEDURE: History of a 6 mm distal right ureteral stone.  Patient presents today for cystoscopy right ureteroscopy stone extraction stent placement.  Risk and benefits were explained include but not limited to bleeding, infection, damage to kidney and ureter.  Patient consented to the procedure.    DESCRIPTION OF PROCEDURE: After receiving IV antibiotics he was taken to the cystoscopy suite underwent a general anesthesia.  After adequate anesthesia was obtained she was placed in dorsal lithotomy position.  Her groin was prepped and draped sterile fashion.  A 21 English cystoscope was introduced into the urethra and into the bladder.  Once into the bladder there were no masses or stones.  An 035 Bentson guidewire was placed into the right orifice and into the right renal pelvis which was confirmed by fluoroscopy.  I then dilated the orifice to 10 English using loop glide dilators.  A semirigid ureteroscope inserted into the ureter and the stone was encountered in the distal ureter.  A 240 holmium laser was then used to fragment the stone in multiple pieces.  0 tip basket was then used to remove the stones and passed off table specimen.  A 21 English cystoscope was reinserted in the bladder.  A 6 English by 26 cm double-J ureteral stent was then placed over the guidewire Seldinger technique.  There was good coil noted the right renal pelvis which was confirmed by fluoroscopy.  Good coil in the bladder confirmed by cystoscopy.  The bladder was drained cystoscope was removed string was removed.  She was extubated taken  recovery in satisfactory condition.  She tolerated the procedure well.

## 2022-07-01 NOTE — PLAN OF CARE
Goal Outcome Evaluation:  Plan of Care Reviewed With: patient        Progress: no change  Outcome Evaluation: VSS, voiding freely, straining urine, c/o pain treated w/ IV dilaudid and tordadol, zofran given for nausea, NPO since midnight, plan is for rt stent placement today

## 2022-07-01 NOTE — ANESTHESIA POSTPROCEDURE EVALUATION
Patient: Li Kimball    Procedure Summary     Date: 07/01/22 Room / Location: [facility] OR 01 / [facility] MAIN OR    Anesthesia Start: 1808 Anesthesia Stop: 1847    Procedure: CYSTOSCOPY, RIGHT URETEROSCOPY RIGHT STENT PLACEMENT, STONE BASKET EXTRACTION, LASER LITHOTRIPSY (Right ) Diagnosis:     Surgeons: Cyrus Au MD Provider: Rachel Evans MD    Anesthesia Type: general ASA Status: 2          Anesthesia Type: general    Vitals  Vitals Value Taken Time   /86 07/01/22 1916   Temp 36.6 °C (97.9 °F) 07/01/22 1843   Pulse 84 07/01/22 1926   Resp 16 07/01/22 1915   SpO2 96 % 07/01/22 1926   Vitals shown include unvalidated device data.        Post Anesthesia Care and Evaluation    Patient location during evaluation: bedside  Patient participation: complete - patient participated  Level of consciousness: awake  Pain management: adequate    Airway patency: patent  Anesthetic complications: No anesthetic complications    Cardiovascular status: acceptable  Respiratory status: acceptable  Hydration status: acceptable

## 2022-07-02 ENCOUNTER — READMISSION MANAGEMENT (OUTPATIENT)
Dept: CALL CENTER | Facility: HOSPITAL | Age: 52
End: 2022-07-02

## 2022-07-02 VITALS
OXYGEN SATURATION: 94 % | HEIGHT: 63 IN | DIASTOLIC BLOOD PRESSURE: 64 MMHG | TEMPERATURE: 98.4 F | RESPIRATION RATE: 16 BRPM | BODY MASS INDEX: 31.8 KG/M2 | SYSTOLIC BLOOD PRESSURE: 104 MMHG | WEIGHT: 179.45 LBS | HEART RATE: 77 BPM

## 2022-07-02 PROCEDURE — G0378 HOSPITAL OBSERVATION PER HR: HCPCS

## 2022-07-02 RX ORDER — ONDANSETRON 4 MG/1
4 TABLET, FILM COATED ORAL EVERY 8 HOURS PRN
Qty: 21 TABLET | Refills: 0
Start: 2022-07-02 | End: 2022-11-01

## 2022-07-02 RX ADMIN — LEVOTHYROXINE SODIUM 50 MCG: 0.05 TABLET ORAL at 06:07

## 2022-07-02 RX ADMIN — METFORMIN HYDROCHLORIDE 1000 MG: 1000 TABLET, FILM COATED ORAL at 08:36

## 2022-07-02 RX ADMIN — PHENAZOPYRIDINE 200 MG: 200 TABLET ORAL at 08:36

## 2022-07-02 RX ADMIN — PRAVASTATIN SODIUM 20 MG: 20 TABLET ORAL at 08:37

## 2022-07-02 NOTE — DISCHARGE SUMMARY
Urology Discharge Note    Name:  Li Kimball  Age:  51 y.o.  Sex:  female  :  1970  MRN:  [id number]    Date of Admission: 2022   Date of Discharge:  2022    Admitting Diagnosis: Right ureteral stone  Discharge Diagnosis: Right ureteral stone s/p ureteroscopy/LL/SBE and stent     Presenting Problem  Active Hospital Problems    Diagnosis  POA   • Right ureteral stone [N20.1]  Yes      Resolved Hospital Problems   No resolved problems to display.         Hospital Course  Patient is a 51 y.o. female presented with a right ureteral stone. She underwent right urs/ll/sbe with stent.  Her pain is managed and she is tolerating po.  She has hydrocodone and bactrim already sent to her pharmacy.  I discussed sending zofan in for her as well. Discussed stent expectations.  She can restart her eliquis.     Procedures Performed    Procedure(s):  CYSTOSCOPY, RIGHT URETEROSCOPY RIGHT STENT PLACEMENT, STONE BASKET EXTRACTION, LASER LITHOTRIPSY  -------------------       Consults:   Consults     Date and Time Order Name Status Description    2022  6:38 PM Urology (on-call MD unless specified)            Pertinent Test Results:   Lab Results (last 24 hours)     Procedure Component Value Units Date/Time    STONE ANALYSIS - Calculus, Ureter, Right [[id number]] Collected: 22 183    Specimen: Calculus from Ureter, Right Updated: 22 184        Imaging Results (Last 72 Hours)     Procedure Component Value Units Date/Time    FL Retrograde Pyelogram In OR [[id number]] Collected: 22     Updated: 22    Narrative:      FL RETROGRADE PYELOGRAM IN OR-     INDICATIONS: Right ureteral stent placement     TECHNIQUE: FLUOROSCOPIC ASSISTANCE IN THE OPERATING ROOM.     FINDINGS:     3 intraoperative fluoroscopic spot views were obtained and recorded the  PACS for review, revealing right ureteral stent placement. Please see  operative report for full details.     Fluoroscopy time: 18 seconds        Impression:         As described.     This report was finalized on 7/1/2022 7:55 PM by Dr. Andrez Vance M.D.       XR Abdomen KUB [[id number]] Collected: 06/30/22 1043     Updated: 06/30/22 1117    Narrative:      KUB     HISTORY: Kidney stone     COMPARISON: CT abdomen and pelvis 06/29/2022     FINDINGS: Yesterday's CT demonstrated a 7 mm distal right ureteral  stone. On KUB, there are 2 dominant right lower pelvic calcifications of  which the more medial is suspicious for residual ureteral stone and the  more lateral is most likely a phlebolith. There are tiny additional  phleboliths within the lower pelvis. No clear stone overlies the renal  contours.     An IVC filter is present. There is mild gaseous distention of bowel  loops without evidence to suggest obstruction.       Impression:      2 dominant calcifications are present in the right lower  pelvis of which the more medial is suspicious for the ureteral stone  demonstrated on yesterday's CT. The other calcification is most likely a  phlebolith.     This report was finalized on 6/30/2022 11:14 AM by Dr. Jam Villalta M.D.       CT Abdomen Pelvis Without Contrast [[id number]] Collected: 06/29/22 1821     Updated: 06/29/22 1828    Narrative:      CT ABDOMEN PELVIS WO CONTRAST-     INDICATIONS: Flank pain, kidney stones suspected     TECHNIQUE: Radiation dose reduction techniques were utilized, including  automated exposure control and exposure modulation based on body size.  Unenhanced ABDOMEN AND PELVIS CT     COMPARISON: 05/03/2022     FINDINGS:     A 7 mm stone is seen at the distal right ureter near the vesicoureteral  junction, with obstructive effect, mild right hydronephrosis.  Nonobstructive 2 mm calcifications are seen in each kidney. No left  hydronephrosis.     Otherwise unremarkable unenhanced appearance of the liver, gallbladder,  spleen, adrenal glands, pancreas, kidneys, bladder.     No bowel obstruction or abnormal bowel thickening  "is identified. The  appendix does not appear inflamed. Colonic diverticula are seen that do  not appear inflamed. Small hiatal hernia is present.     No free intraperitoneal gas or free fluid. Small umbilical hernia of fat  is present.     Scattered small mesenteric and para-aortic lymph nodes are seen that are  not significant by size criteria.     Abdominal aorta is not aneurysmal. Aortic and other arterial  calcifications are present. IVC filter is noted.     The lung bases show minimal atelectasis.     Degenerative changes are seen in the spine. No acute fracture is  identified.             Impression:         7 mm stone at the distal right ureter with obstructive effect, mild  right hydronephrosis. Bilateral nonobstructive nephrolithiasis.     Colonic diverticulosis. No acute inflammatory process of bowel is  identified.     This report was finalized on 6/29/2022 6:25 PM by Dr. Andrez Vance M.D.             Condition on Discharge:  Stable    Vital Signs     Vitals:    07/01/22 2100 07/01/22 2326 07/02/22 0333 07/02/22 0635   BP: 137/82 123/79 98/60 104/64   BP Location: Right arm Right arm Right arm Right arm   Patient Position: Lying Lying Lying Lying   Pulse: 71 79 92 77   Resp: 16 16 16 16   Temp: 98 °F (36.7 °C) 97.5 °F (36.4 °C) 96.8 °F (36 °C) 98.4 °F (36.9 °C)   TempSrc: Oral Oral Oral Oral   SpO2: 94% 97% 98% 94%   Weight:       Height:           Physical Exam:   Vital signs: /64 (BP Location: Right arm, Patient Position: Lying)   Pulse 77   Temp 98.4 °F (36.9 °C) (Oral)   Resp 16   Ht 160 cm (63\")   Wt 81.4 kg (179 lb 7.3 oz)   SpO2 94%   BMI 31.79 kg/m²   94%     General: Well developed, well nourished, alert and oriented x 3    Appears stated age. No apparent distress.    HEENT: Moist mucous membranes of the oral mucosa & nasal mucosa.    Lips are not cyanotic.    Extraocular movements intact    Neck:  Trachea position is mid line.     Respiratory: Respiratory rhythm & " depth: Normal.    Respiratory effort:  Normal.      GI:  Normal bowel sounds.     Skin:  Inspection: No rash.    Palpation:  Warm, dry. No induration.     Extremities: No clubbing & no cyanosis.    No edema    :  Deferred         Discharge Disposition  Home or Self Care    Discharge Medications     Discharge Medications      New Medications      Instructions Start Date   ondansetron 4 MG tablet  Commonly known as: Zofran   4 mg, Oral, Every 8 Hours PRN      sulfamethoxazole-trimethoprim 800-160 MG per tablet  Commonly known as: Bactrim DS   1 tablet, Oral, 2 Times Daily         Changes to Medications      Instructions Start Date   HYDROcodone-acetaminophen 5-325 MG per tablet  Commonly known as: NORCO  What changed: Another medication with the same name was added. Make sure you understand how and when to take each.   1 tablet, Oral, Every 6 Hours PRN      HYDROcodone-acetaminophen 5-325 MG per tablet  Commonly known as: Ahmeek  What changed: You were already taking a medication with the same name, and this prescription was added. Make sure you understand how and when to take each.   1 tablet, Oral, Every 6 Hours PRN         Continue These Medications      Instructions Start Date   apixaban 2.5 MG tablet tablet  Commonly known as: Eliquis   2.5 mg, Oral, Every 12 Hours      levothyroxine 50 MCG tablet  Commonly known as: SYNTHROID, LEVOTHROID   50 mcg, Oral, Daily      metFORMIN  MG 24 hr tablet  Commonly known as: GLUCOPHAGE-XR   1,000 mg, Oral, Daily      ondansetron ODT 4 MG disintegrating tablet  Commonly known as: ZOFRAN-ODT   4 mg, Translingual, 4 Times Daily PRN      pravastatin 20 MG tablet  Commonly known as: PRAVACHOL   20 mg, Oral, Daily             Discharge Diet: as tolerated     Activity at Discharge: no heavy lifting    Follow-up Appointments  Future Appointments   Date Time Provider Department Center   7/8/2022  8:30 AM Christy Grey MD [facility]     Call [facility] [phone]  to schedule follow up with Dr. Qiu's in 1 week.        Yany Michael, DANIEL  07/02/22  10:11 EDT

## 2022-07-02 NOTE — NURSING NOTE
Discharge instructions provided. Patient can resume her Eliquis today per DANIEL Sanchez. Norco, Zofran, and Bactrim sent to the patient's pharmacy. Patient is to follow-up with Dr. Au in 1 week. All questions and concerns addressed.

## 2022-07-02 NOTE — OUTREACH NOTE
Prep Survey    Flowsheet Row Responses   [facility] facility patient discharged from? [place]   Is LACE score < 7 ? No   Emergency Room discharge w/ pulse ox? No   Eligibility [facility]   Date of Admission 06/29/22   Date of Discharge 07/02/22   Discharge Disposition Home or Self Care   Discharge diagnosis Right ureteroscopy with stent placement   Does the patient have one of the following disease processes/diagnoses(primary or secondary)? Other   Does the patient have Home health ordered? No   Is there a DME ordered? No   Prep survey completed? Yes          INDIRA VILLALBA - Registered Nurse

## 2022-07-02 NOTE — PLAN OF CARE
Goal Outcome Evaluation:  Plan of Care Reviewed With: patient           Outcome Evaluation: VSS, up with standby asst, voiding freely (pink tinted urine) some pain/ discomfort reported with urination, no nausea, SCDs on, reg diet being tolerated, no c/o pain, will continue to monitor.

## 2022-07-05 ENCOUNTER — TRANSITIONAL CARE MANAGEMENT TELEPHONE ENCOUNTER (OUTPATIENT)
Dept: CALL CENTER | Facility: HOSPITAL | Age: 52
End: 2022-07-05

## 2022-07-05 NOTE — PROGRESS NOTES
Case Management Discharge Note      Final Note: Discharged home. Linda Valverde, ALAN         Transportation Services  Private: Car    Final Discharge Disposition Code: 01 - home or self-care

## 2022-07-05 NOTE — OUTREACH NOTE
Call Center TCM Note    Flowsheet Row Responses   [facility] patient discharged from? [place]   Does the patient have one of the following disease processes/diagnoses(primary or secondary)? Other   TCM attempt successful? Yes   Call start time 0944   Call end time 0947   Discharge diagnosis Right ureteroscopy with stent placement   Meds reviewed with patient/caregiver? Yes   Is the patient having any side effects they believe may be caused by any medication additions or changes? No   Does the patient have all medications ordered at discharge? Yes   Is the patient taking all medications as directed (includes completed medication regime)? Yes   Does the patient have a primary care provider?  Yes   Does the patient have an appointment with their PCP within 7 days of discharge? Yes   Comments regarding PCP PCP appt on 7/8/22 , however not listed as HOSP DC FU and Pt refused another appt to be made.    Has the patient kept scheduled appointments due by today? N/A   Has home health visited the patient within 72 hours of discharge? N/A   Psychosocial issues? No   Did the patient receive a copy of their discharge instructions? Yes   Nursing interventions Reviewed instructions with patient   What is the patient's perception of their health status since discharge? Same   Is the patient/caregiver able to teach back signs and symptoms related to disease process for when to call PCP? Yes   Is the patient/caregiver able to teach back signs and symptoms related to disease process for when to call 911? Yes   Is the patient/caregiver able to teach back the hierarchy of who to call/visit for symptoms/problems? PCP, Specialist, Home health nurse, Urgent Care, ED, 911 Yes   TCM call completed? Yes   Wrap up additional comments Pt reports that she still just does not feel good and states that she has not for a long time. Pt declined sooner appt and only wants to keep appt she already had on 7/8/22.           Lilia Ferrell,  RN    7/5/2022, 09:49 EDT

## 2022-07-08 ENCOUNTER — OFFICE VISIT (OUTPATIENT)
Dept: INTERNAL MEDICINE | Facility: CLINIC | Age: 52
End: 2022-07-08

## 2022-07-08 VITALS
SYSTOLIC BLOOD PRESSURE: 118 MMHG | WEIGHT: 180.6 LBS | HEIGHT: 63 IN | OXYGEN SATURATION: 100 % | HEART RATE: 91 BPM | DIASTOLIC BLOOD PRESSURE: 70 MMHG | TEMPERATURE: 97.5 F | BODY MASS INDEX: 32 KG/M2

## 2022-07-08 DIAGNOSIS — N20.1 RIGHT URETERAL STONE: ICD-10-CM

## 2022-07-08 DIAGNOSIS — E03.9 HYPOTHYROIDISM, ADULT: Primary | ICD-10-CM

## 2022-07-08 DIAGNOSIS — E78.2 MIXED HYPERLIPIDEMIA: ICD-10-CM

## 2022-07-08 LAB
CALCIUM OXALATE DIHYDRATE MFR STONE IR: 30 %
COLOR STONE: NORMAL
COM MFR STONE: 70 %
COMPN STONE: NORMAL
LABORATORY COMMENT REPORT: NORMAL
Lab: NORMAL
Lab: NORMAL
PHOTO: NORMAL
SIZE STONE: NORMAL MM
SPEC SOURCE SUBJ: NORMAL
WT STONE: 11 MG

## 2022-07-08 PROCEDURE — 90750 HZV VACC RECOMBINANT IM: CPT | Performed by: FAMILY MEDICINE

## 2022-07-08 PROCEDURE — 99213 OFFICE O/P EST LOW 20 MIN: CPT | Performed by: FAMILY MEDICINE

## 2022-07-08 PROCEDURE — 90471 IMMUNIZATION ADMIN: CPT | Performed by: FAMILY MEDICINE

## 2022-07-08 RX ORDER — SEMAGLUTIDE 1.7 MG/.75ML
INJECTION, SOLUTION SUBCUTANEOUS
COMMUNITY
Start: 2022-05-06 | End: 2022-08-10 | Stop reason: SDUPTHER

## 2022-07-08 RX ORDER — SEMAGLUTIDE 2.4 MG/.75ML
INJECTION, SOLUTION SUBCUTANEOUS
COMMUNITY
Start: 2022-06-06 | End: 2022-08-10 | Stop reason: SDUPTHER

## 2022-07-08 RX ORDER — SEMAGLUTIDE 1 MG/.5ML
INJECTION, SOLUTION SUBCUTANEOUS
COMMUNITY
Start: 2022-04-11 | End: 2022-08-10 | Stop reason: SDUPTHER

## 2022-07-08 NOTE — PROGRESS NOTES
"    Chief Complaint  Hyperlipidemia (4 month follow up) and Immunizations (2nd shingles vaccine)    Subjective    History of Present Illness {CC  Problem List  Visit  Diagnosis   Encounters  Notes  Medications  Labs  Result Review Imaging  Media :23}     Li Kimball presents to [facility] for   History of Present Illness   52 yo female present for follow up visit. She was recently discharged from the hospital for cystoscopy, ureteroscopy R, stone extraction, and lithotripsy.  She was discharged with oral antibiotics and zofran as needed.  Taking Norco for pain, taking as needed. Not often because makes her feel sick. Still some pain in back.     Will have the stent removed.        Current outpatient and discharge medications have been reconciled for the patient.  Reviewed by: Christy Grey MD      Social History     Socioeconomic History   • Marital status:    Tobacco Use   • Smoking status: Never Smoker   • Smokeless tobacco: Never Used   Vaping Use   • Vaping Use: Never used   Substance and Sexual Activity   • Alcohol use: No   • Drug use: No   • Sexual activity: Defer      Objective     Vital Signs:   /70 (BP Location: Right arm, Patient Position: Sitting, Cuff Size: Adult)   Pulse 91   Temp 97.5 °F (36.4 °C) (Temporal)   Ht 160 cm (62.99\")   Wt 81.9 kg (180 lb 9.6 oz)   SpO2 100%   BMI 32.00 kg/m²   Physical Exam  HENT:      Head: Normocephalic.   Eyes:      Conjunctiva/sclera: Conjunctivae normal.   Cardiovascular:      Rate and Rhythm: Regular rhythm.      Heart sounds: Normal heart sounds.   Pulmonary:      Effort: No respiratory distress.      Breath sounds: Normal breath sounds. No wheezing.   Neurological:      Mental Status: She is alert.        Result Review  Data Reviewed:{ Labs  Result Review  Imaging  Med Tab  Media :23}   The following data was reviewed by: Christy Grey MD on 07/08/2022  Lab Results - Last 18 Months   Lab " Units 06/30/22  0602 06/29/22  1645 05/03/22  0759 05/03/22  0758 03/04/22  0907 06/14/21  1412   BUN mg/dL 16 15  --  20 15 16   CREATININE mg/dL 0.89 0.73  --  0.75 0.68 0.73   EGFR IF NONAFRICN AM mL/min/1.73  --   --   --   --   --  84   EGFR IF AFRICN AM mL/min/1.73  --   --   --   --   --  102   SODIUM mmol/L 139 142  --  140 143 142   POTASSIUM mmol/L 4.1 3.6  --  3.5 3.9 4.2   CHLORIDE mmol/L 105 103  --  105 107* 106   CALCIUM mg/dL 8.7 9.8  --  9.7 9.3 9.2   ALBUMIN g/dL  --  4.40  --  4.20 4.1 4.40   BILIRUBIN mg/dL  --  0.4  --  0.4 0.2 0.4   ALK PHOS U/L  --  100  --  73 72 102   AST (SGOT) U/L  --  9  --  12 14 15   ALT (SGPT) U/L  --  17  --  12 13 19   CHOLESTEROL mg/dL  --   --   --   --  139 166   TRIGLYCERIDES mg/dL  --   --   --   --  136 173*   HDL CHOL mg/dL  --   --   --   --  41 35*   VLDL CHOLESTEROL MALLIKA mg/dL  --   --   --   --  24 30   LDL CHOL mg/dL  --   --   --   --  74 101*   HEMOGLOBIN A1C %  --   --   --   --  5.9* 6.00*   MICROALB UR ug/mL  --   --   --   --  17.3 14.6   WBC 10*3/mm3 13.35* 12.65* 10.74  --  7.5  --    RBC 10*6/mm3 4.20 4.82 4.62  --  4.49  --    HEMATOCRIT % 36.6 42.1 41.8  --  39.7  --    MCV fL 87.1 87.3 90.5  --  88  --    MCH pg 29.3 29.0 29.2  --  29.2  --    TSH uIU/mL  --   --   --   --  0.852 1.770   FREE T4 ng/dL  --   --   --   --   --  0.88*              Current Outpatient Medications:   •  apixaban (Eliquis) 2.5 MG tablet tablet, Take 1 tablet by mouth Every 12 (Twelve) Hours., Disp: 60 tablet, Rfl: 5  •  levothyroxine (SYNTHROID, LEVOTHROID) 50 MCG tablet, TAKE 1 TABLET BY MOUTH DAILY, Disp: 90 tablet, Rfl: 1  •  metFORMIN ER (GLUCOPHAGE-XR) 500 MG 24 hr tablet, Take 2 tablets by mouth Daily., Disp: 180 tablet, Rfl: 1  •  pravastatin (PRAVACHOL) 20 MG tablet, Take 1 tablet by mouth Daily., Disp: 90 tablet, Rfl: 1  •  HYDROcodone-acetaminophen (NORCO) 5-325 MG per tablet, Take 1 tablet by mouth Every 6 (Six) Hours As Needed for Moderate Pain ., Disp: 12  tablet, Rfl: 0  •  HYDROcodone-acetaminophen (Norco) 5-325 MG per tablet, Take 1 tablet by mouth Every 6 (Six) Hours As Needed for Moderate Pain ., Disp: 20 tablet, Rfl: 0  •  ondansetron (Zofran) 4 MG tablet, Take 1 tablet by mouth Every 8 (Eight) Hours As Needed for Nausea or Vomiting., Disp: 21 tablet, Rfl: 0  •  ondansetron ODT (ZOFRAN-ODT) 4 MG disintegrating tablet, Place 1 tablet on the tongue 4 (Four) Times a Day As Needed for Nausea or Vomiting., Disp: 15 tablet, Rfl: 0  •  sulfamethoxazole-trimethoprim (Bactrim DS) 800-160 MG per tablet, Take 1 tablet by mouth 2 (Two) Times a Day., Disp: 10 tablet, Rfl: 0  •  Wegovy 1 MG/0.5ML solution auto-injector, , Disp: , Rfl:   •  Wegovy 1.7 MG/0.75ML solution auto-injector, , Disp: , Rfl:   •  Wegovy 2.4 MG/0.75ML solution auto-injector, , Disp: , Rfl:       Assessment and Plan {CC Problem List  Visit Diagnosis  ROS  Review (Popup)  Health Maintenance  Quality  BestPractice  Medications  SmartSets  SnapShot Encounters  Media :23}   Problem List Items Addressed This Visit        Cardiac and Vasculature    Mixed hyperlipidemia    Current Assessment & Plan     Lipid abnormalities are chronic.  continue current medication, continue monitor diet and exercise.    Monitor lipid panel, check today   Lipids will be reassessed in 6 months.           Relevant Orders    Lipid panel       Endocrine and Metabolic    Hypothyroidism, adult - Primary    Current Assessment & Plan     Chronic  Continue monitor tsh and t4   Continue current medication , adjust as needed based up on results today               Genitourinary and Reproductive     Right ureteral stone    Current Assessment & Plan     Recently discharge.   Still some pain advise to take tylenol as needed for pain                        Follow Up   Return in about 6 months (around 1/8/2023).  Patient was given instructions and counseling regarding her condition or for health maintenance advice. Please see  specific information pulled into the AVS if appropriate.    EpicAct:MR_WS_AMB_ORDERS,RunParams:STARTUPTYPE=FOLLOW    MR_WS_AMB_DISCHARGE

## 2022-07-08 NOTE — ASSESSMENT & PLAN NOTE
Chronic  Continue monitor tsh and t4   Continue current medication , adjust as needed based up on results today

## 2022-07-08 NOTE — ASSESSMENT & PLAN NOTE
Lipid abnormalities are chronic.  continue current medication, continue monitor diet and exercise.    Monitor lipid panel, check today   Lipids will be reassessed in 6 months.

## 2022-07-09 LAB
CHOLEST SERPL-MCNC: 133 MG/DL (ref 100–199)
HDLC SERPL-MCNC: 35 MG/DL
LDLC SERPL CALC-MCNC: 74 MG/DL (ref 0–99)
TRIGL SERPL-MCNC: 132 MG/DL (ref 0–149)
VLDLC SERPL CALC-MCNC: 24 MG/DL (ref 5–40)

## 2022-07-12 ENCOUNTER — READMISSION MANAGEMENT (OUTPATIENT)
Dept: CALL CENTER | Facility: HOSPITAL | Age: 52
End: 2022-07-12

## 2022-07-12 NOTE — OUTREACH NOTE
Medical Week 2 Survey    Flowsheet Row Responses   [facility] patient discharged from? [place]   Does the patient have one of the following disease processes/diagnoses(primary or secondary)? Other   Week 2 attempt successful? Yes   Call start time 1115   Revoke Decline to participate   Call end time 1115          KENNY BELL - Registered Nurse

## 2022-08-03 ENCOUNTER — TELEPHONE (OUTPATIENT)
Dept: INTERNAL MEDICINE | Facility: CLINIC | Age: 52
End: 2022-08-03

## 2022-08-03 NOTE — TELEPHONE ENCOUNTER
Caller: [place]    Relationship: Self    Best call back number:  [phone] - PATIENT IS REQUESTING TO CALL BACK BEFORE 10:30 IN THE MORNINGS, OR ANY TIME ON 8/5.  PATIENT STATES IT IS FINE TO LEAVE A DETAILED MESSAGE WITH THE NAMES AND PHONE NUMBERS.      What is the best time to reach you: ANY TIME     Who are you requesting to speak with (clinical staff, provider,  specific staff member): CLINICAL    What was the call regarding: PATIENT STATES SHE HAS SOME PLACES ON HER HEAD THAT NEED TO BE LOOKED AT BY A DERMATOLOGIST.  SHE WOULD LIKE A CALL BACK FROM THE OFFICE WITH NAMES OF DERMATOLOGISTS THAT DR. HIGHTOWER WOULD SUGGEST SHE SEE.   PATIENT STATES THAT SHE DOES NOT WANT TO GO BACK TO DR. ZHANG.  PLEASE CONTACT PATIENT TO ADVISE.     Do you require a callback: YES

## 2022-08-04 ENCOUNTER — TELEPHONE (OUTPATIENT)
Dept: INTERNAL MEDICINE | Facility: CLINIC | Age: 52
End: 2022-08-04

## 2022-08-04 NOTE — TELEPHONE ENCOUNTER
Caller: Li Kimball    Relationship: Self    Best call back number: [phone]    What is the best time to reach you: ANY TIME TOMORROW-- BEFORE 10:30AM ON ANY OTHER DAY    Who are you requesting to speak with (clinical staff, provider,  specific staff member): DR HIGHTOWER ONLY !!    What was the call regarding: PATIENT WANTS TO DISCUSS A MEDICATION    Do you require a callback: YES

## 2022-08-04 NOTE — TELEPHONE ENCOUNTER
Please help patient with this request.  I know some offices want a referral placed prior to seeing. If this is the case she can make an appt with me and I can place the order.  Ok for video visit if needed.

## 2022-08-05 ENCOUNTER — TELEPHONE (OUTPATIENT)
Dept: INTERNAL MEDICINE | Facility: CLINIC | Age: 52
End: 2022-08-05

## 2022-08-05 NOTE — TELEPHONE ENCOUNTER
S/W patient- she is wanting to speak to you regarding a mediation and did not give details. I advised you are out of the office and will not be available until next week. She was fine with that.

## 2022-08-05 NOTE — TELEPHONE ENCOUNTER
PATIENT CALLED AND WANTS TO SPEAK WITH  ONLY. PLEASE ONLY HAVE  CALL HER. PATIENT IS AVAILABLE AFTER 1:30PM

## 2022-08-05 NOTE — TELEPHONE ENCOUNTER
Please call patient as I am out of the office after 1:30.  She will need to give details regarding her concern so that I can address when I return to office on Monday.  Thank you

## 2022-08-09 ENCOUNTER — TELEPHONE (OUTPATIENT)
Dept: INTERNAL MEDICINE | Facility: CLINIC | Age: 52
End: 2022-08-09

## 2022-08-09 NOTE — TELEPHONE ENCOUNTER
Caller: Li Kimball    Relationship: Self    Best call back number: [phone]    What is the best time to reach you: ANYTIME    Who are you requesting to speak with (clinical staff, provider,  specific staff member): DR HIGHTOWER ONLY    What was the call regarding: PATIENT HAS QUESTIONS REGARDING Wegovy 1.7 MG/0.75ML solution auto-injector.  THIS IS BEING PRESCRIBED BY ANOTHER PROVIDER, SHE IS OUT OF THIS MEDICATION AND WOULD LIKE TO DISCUSS DR HIGHTOWER TAKING OVER PRESCRIBING THIS MEDICATION FOR HER. THE COMPANY THAT WAS GIVING HER THIS MEDICATION HAS GONE OUT OF BUSINESS AS OF 08/01/22. PATIENT DOES NOT WANT TO STOP TAKING MEDICATION AS IT IS WORKING FOR HER.     PATIENT IS OUT OF THIS MEDICATION.     PATIENT STATES THAT MEDICATION IS WORKING TO HELP WITH HER SUGAR AS WELL AS WEIGHT LOSS. SHE IS WORRIED THAT IF SHE STOPS TAKING Wegovy 1.7 MG/0.75ML solution auto-injector, SHE WILL BEGIN TO GAIN HER WEIGHT BACK AND HER SUGAR WILL START GOING BACK UP AS WELL.     PLEASE HAVE DR HIGHTOWER CALL PATIENT BACK AT HER CONVENIENCE. PATIENT HAS BEEN TRYING TO TALK WITH DR HIGHTOWER BUT SO FAR HAS HAD NO LUCK. PLEASE CALL BACK AT END OF DAY IF POSSIBLE.

## 2022-08-09 NOTE — TELEPHONE ENCOUNTER
Spoke with patient discussed concern with medication Wegovy she is taking from weight loss center which has closed down/ She hoping to continue taking medicaiton. It has helped with diabetes and weight loss.  Will make video visit for us to discuss further. Obtain records.

## 2022-08-10 ENCOUNTER — TELEMEDICINE (OUTPATIENT)
Dept: INTERNAL MEDICINE | Facility: CLINIC | Age: 52
End: 2022-08-10

## 2022-08-10 DIAGNOSIS — E66.9 OBESITY (BMI 30.0-34.9): ICD-10-CM

## 2022-08-10 DIAGNOSIS — E11.9 DIABETES MELLITUS TYPE 2, DIET-CONTROLLED: Primary | ICD-10-CM

## 2022-08-10 PROCEDURE — 99213 OFFICE O/P EST LOW 20 MIN: CPT | Performed by: FAMILY MEDICINE

## 2022-08-10 RX ORDER — SEMAGLUTIDE 1.7 MG/.75ML
1.7 INJECTION, SOLUTION SUBCUTANEOUS
Qty: 0.75 ML | Refills: 3 | Status: SHIPPED | OUTPATIENT
Start: 2022-08-10 | End: 2022-11-14

## 2022-08-12 PROBLEM — E11.9 DIABETES MELLITUS TYPE 2, DIET-CONTROLLED: Status: ACTIVE | Noted: 2022-08-12

## 2022-08-12 PROBLEM — E66.811 OBESITY (BMI 30.0-34.9): Status: ACTIVE | Noted: 2022-08-12

## 2022-08-12 PROBLEM — E66.9 OBESITY (BMI 30.0-34.9): Status: ACTIVE | Noted: 2022-08-12

## 2022-08-12 NOTE — TELEPHONE ENCOUNTER
Caller: Li Kimball    Relationship: Self    Best call back number: [phone]    Requested Prescriptions:   Requested Prescriptions     Pending Prescriptions Disp Refills   • apixaban (Eliquis) 2.5 MG tablet tablet 60 tablet 5     Sig: Take 1 tablet by mouth Every 12 (Twelve) Hours.        Pharmacy where request should be sent: [id number] [place], [place] - [address] [facility] [address] [facility] [phone] [facility] [phone]      Additional details provided by patient: PATIENT IS REQUESTING MORE SUPPLY ON THE MEDICATION SO SHE DOES NOT RUN OUT AS FAST AND HAVING TO CALL IN FOR REFILLS.     Does the patient have less than a 3 day supply:  [x] Yes  [] No    Iron Burch Rep   08/12/22 12:36 EDT

## 2022-08-13 NOTE — ASSESSMENT & PLAN NOTE
Continue wegovy   Continue to monitor diet  Follow up in a few weeks   Advise medication is not meant to take for long term  Continue to discuss weight loss goals

## 2022-08-13 NOTE — ASSESSMENT & PLAN NOTE
Diabetes is controlled with weight loss and diet.    Elevated A1c in years past, A1c has improved with weight loss using Wegovy.    Continue current treatment regimen.  refilled medication    Advised continue to monitor diet  30 min exercise daily as needed.   Diabetes will be reassessed in 3 months.

## 2022-08-28 RX ORDER — METFORMIN HYDROCHLORIDE 500 MG/1
1000 TABLET, EXTENDED RELEASE ORAL DAILY
Qty: 180 TABLET | Refills: 0 | Status: SHIPPED | OUTPATIENT
Start: 2022-08-28 | End: 2022-12-09

## 2022-09-06 ENCOUNTER — TELEPHONE (OUTPATIENT)
Dept: INTERNAL MEDICINE | Facility: CLINIC | Age: 52
End: 2022-09-06

## 2022-09-06 NOTE — TELEPHONE ENCOUNTER
Caller: Li Kimball    Relationship: Self    Best call back number:[phone]     What medications are you currently taking:   Current Outpatient Medications on File Prior to Visit   Medication Sig Dispense Refill   • apixaban (Eliquis) 2.5 MG tablet tablet Take 1 tablet by mouth Every 12 (Twelve) Hours. 60 tablet 5   • HYDROcodone-acetaminophen (NORCO) 5-325 MG per tablet Take 1 tablet by mouth Every 6 (Six) Hours As Needed for Moderate Pain . 12 tablet 0   • HYDROcodone-acetaminophen (Norco) 5-325 MG per tablet Take 1 tablet by mouth Every 6 (Six) Hours As Needed for Moderate Pain . 20 tablet 0   • levothyroxine (SYNTHROID, LEVOTHROID) 50 MCG tablet TAKE 1 TABLET BY MOUTH DAILY 90 tablet 1   • metFORMIN ER (GLUCOPHAGE-XR) 500 MG 24 hr tablet TAKE 2 TABLETS BY MOUTH DAILY 180 tablet 0   • ondansetron (Zofran) 4 MG tablet Take 1 tablet by mouth Every 8 (Eight) Hours As Needed for Nausea or Vomiting. 21 tablet 0   • ondansetron ODT (ZOFRAN-ODT) 4 MG disintegrating tablet Place 1 tablet on the tongue 4 (Four) Times a Day As Needed for Nausea or Vomiting. 15 tablet 0   • pravastatin (PRAVACHOL) 20 MG tablet Take 1 tablet by mouth Daily. 90 tablet 1   • sulfamethoxazole-trimethoprim (Bactrim DS) 800-160 MG per tablet Take 1 tablet by mouth 2 (Two) Times a Day. 10 tablet 0   • Wegovy 1.7 MG/0.75ML solution auto-injector Inject 0.75 mL under the skin into the appropriate area as directed Every 7 (Seven) Days. 0.75 mL 3     No current facility-administered medications on file prior to visit.          When did you start taking these medications: HAVEN'T TAKEN IT YET    Which medication are you concerned about:KETO GUMMIES THAT HAVE D9, B12 ,AND APPLE CIDER VINEGARD    Who prescribed you this medication: OTC    What are your concerns: PATIENT WANTING TO MAKE SURE THOSE ARE OK WITH HER TO TAKE WITH MEDICATION AND HER BLOOD THINNERS.REQUESTING CALLBACK FOR CLARIFICATION.  How long have you had these concerns:  N/A

## 2022-09-08 NOTE — TELEPHONE ENCOUNTER
Hub staff attempted to follow warm transfer process and was unsuccessful     Caller: Li Kimball    Relationship to patient: Self    Best call back number: [phone]    Patient is needing: THE PATIENT WOULD LIKE TO KNOW IF IT IS OKAY FOR HER TO TAKE HER GUMMY VITAMINS WHILE ON HER BLOOD THINNER. PLEASE ADVISE.

## 2022-09-12 NOTE — TELEPHONE ENCOUNTER
Hub staff attempted to follow warm transfer process and was unsuccessful     Caller: Li Kimball    Relationship to patient: Self    Best call back number: [phone]    Patient is needing: PATIENT CALLED BACK IN REFERENCE TO NOTE ON 9/6/22 AND FOLLOW UP NOTE ON 9/9/22. PATIENT WOULD LIKE A CALL BACK PLEASE.

## 2022-09-13 NOTE — TELEPHONE ENCOUNTER
Please call to see her concern.. I sent the medication to the pharmacy during that visit.  Please find out her questions or concern. Please tell her I cannot call her back at this time and you are calling for me so we can answer and address her issues in a timely fashion.

## 2022-11-01 ENCOUNTER — OFFICE VISIT (OUTPATIENT)
Dept: INTERNAL MEDICINE | Facility: CLINIC | Age: 52
End: 2022-11-01

## 2022-11-01 VITALS
DIASTOLIC BLOOD PRESSURE: 72 MMHG | OXYGEN SATURATION: 99 % | HEART RATE: 82 BPM | TEMPERATURE: 97.6 F | HEIGHT: 63 IN | SYSTOLIC BLOOD PRESSURE: 134 MMHG | WEIGHT: 181 LBS | BODY MASS INDEX: 32.07 KG/M2

## 2022-11-01 DIAGNOSIS — R59.0 ENLARGED LYMPH NODE IN NECK: Primary | ICD-10-CM

## 2022-11-01 PROCEDURE — 99213 OFFICE O/P EST LOW 20 MIN: CPT | Performed by: FAMILY MEDICINE

## 2022-11-01 NOTE — PROGRESS NOTES
"    Chief Complaint  Swollen Glands    Subjective    History of Present Illness {CC  Problem List  Visit  Diagnosis   Encounters  Notes  Medications  Labs  Result Review Imaging  Media :23}     Li Kimball presents to [facility] for   History of Present Illness   53 yo female present for evaluation of swollen gland. She states notice about 2-3 months ago. Left side of neck, not tender.  She is feeling well. Not sick, cough, congestion ,runny nose or ear pain.  She has no issues with her teeth, seeing dentist three times a year.       Social History     Socioeconomic History   • Marital status:    Tobacco Use   • Smoking status: Never   • Smokeless tobacco: Never   Vaping Use   • Vaping Use: Never used   Substance and Sexual Activity   • Alcohol use: No   • Drug use: No   • Sexual activity: Defer      Objective     Vital Signs:   /72   Pulse 82   Temp 97.6 °F (36.4 °C)   Ht 160 cm (63\")   Wt 82.1 kg (181 lb)   SpO2 99%   BMI 32.06 kg/m²   Physical Exam  Constitutional:       General: She is not in acute distress.     Appearance: She is not ill-appearing.   HENT:      Head: Normocephalic.      Right Ear: Tympanic membrane normal.      Left Ear: Tympanic membrane normal.   Neck:      Comments: Anterior cervical lymph node enlargement   Musculoskeletal:      Cervical back: No tenderness.   Lymphadenopathy:      Cervical: Cervical adenopathy present.   Neurological:      Mental Status: She is alert.        Result Review  Data Reviewed:{ Labs  Result Review  Imaging  Med Tab  Media :23}   The following data was reviewed by: Christy Grey MD on 11/01/2022  Lab Results - Last 18 Months   Lab Units 07/08/22  0903 06/30/22  0602 06/29/22  1645 05/03/22  0759 05/03/22  0758 03/04/22  0907 06/14/21  1412   BUN mg/dL  --  16 15  --  20 15 16   CREATININE mg/dL  --  0.89 0.73  --  0.75 0.68 0.73   EGFR IF NONAFRICN AM mL/min/1.73  --   --   --   --   --   --  " 84   EGFR IF AFRICN AM mL/min/1.73  --   --   --   --   --   --  102   SODIUM mmol/L  --  139 142  --  140 143 142   POTASSIUM mmol/L  --  4.1 3.6  --  3.5 3.9 4.2   CHLORIDE mmol/L  --  105 103  --  105 107* 106   CALCIUM mg/dL  --  8.7 9.8  --  9.7 9.3 9.2   ALBUMIN g/dL  --   --  4.40  --  4.20 4.1 4.40   BILIRUBIN mg/dL  --   --  0.4  --  0.4 0.2 0.4   ALK PHOS U/L  --   --  100  --  73 72 102   AST (SGOT) U/L  --   --  9  --  12 14 15   ALT (SGPT) U/L  --   --  17  --  12 13 19   CHOLESTEROL mg/dL 133  --   --   --   --  139 166   TRIGLYCERIDES mg/dL 132  --   --   --   --  136 173*   HDL CHOL mg/dL 35*  --   --   --   --  41 35*   VLDL CHOLESTEROL MALLIKA mg/dL 24  --   --   --   --  24 30   LDL CHOL mg/dL 74  --   --   --   --  74 101*   HEMOGLOBIN A1C %  --   --   --   --   --  5.9* 6.00*   MICROALB UR ug/mL  --   --   --   --   --  17.3 14.6   WBC 10*3/mm3  --  13.35* 12.65* 10.74  --  7.5  --    RBC 10*6/mm3  --  4.20 4.82 4.62  --  4.49  --    HEMATOCRIT %  --  36.6 42.1 41.8  --  39.7  --    MCV fL  --  87.1 87.3 90.5  --  88  --    MCH pg  --  29.3 29.0 29.2  --  29.2  --    TSH uIU/mL  --   --   --   --   --  0.852 1.770   FREE T4 ng/dL  --   --   --   --   --   --  0.88*              Current Outpatient Medications:   •  apixaban (Eliquis) 2.5 MG tablet tablet, Take 1 tablet by mouth Every 12 (Twelve) Hours., Disp: 60 tablet, Rfl: 5  •  levothyroxine (SYNTHROID, LEVOTHROID) 50 MCG tablet, TAKE 1 TABLET BY MOUTH DAILY, Disp: 90 tablet, Rfl: 1  •  metFORMIN ER (GLUCOPHAGE-XR) 500 MG 24 hr tablet, TAKE 2 TABLETS BY MOUTH DAILY, Disp: 180 tablet, Rfl: 0  •  pravastatin (PRAVACHOL) 20 MG tablet, Take 1 tablet by mouth Daily., Disp: 90 tablet, Rfl: 1  •  Wegovy 1.7 MG/0.75ML solution auto-injector, Inject 0.75 mL under the skin into the appropriate area as directed Every 7 (Seven) Days., Disp: 0.75 mL, Rfl: 3      Assessment and Plan {CC Problem List  Visit Diagnosis  ROS  Review (Popup)  Health Maintenance   Quality  BestPractice  Medications  SmartSets  SnapShot Encounters  Media :23}   Problem List Items Addressed This Visit    None  Visit Diagnoses     Enlarged lymph node in neck    -  Primary    Relevant Orders    CBC & Differential    US Head Neck Soft Tissue          Ultrasound of soft tissue of neck to evaluate nodule closely   CBC ordered to evaluate for infection.     She has new supplement she is going to try. Ketosis Plus gummies  To help with weight loss.  Advise monitor for any side effects it is not FDA approved unable to discuss side effect profile or interactions with medication.       Follow Up   Return if symptoms worsen or fail to improve.  Patient was given instructions and counseling regarding her condition or for health maintenance advice. Please see specific information pulled into the AVS if appropriate.    EpicAct:[email or web address]_AMB_ORDERS,RunParams:STARTUPTYPE=FOLLOW    [email or web address]_DISCHARGE

## 2022-11-06 DIAGNOSIS — E03.9 HYPOTHYROIDISM: ICD-10-CM

## 2022-11-07 RX ORDER — LEVOTHYROXINE SODIUM 0.05 MG/1
50 TABLET ORAL DAILY
Qty: 90 TABLET | Refills: 1 | Status: SHIPPED | OUTPATIENT
Start: 2022-11-07 | End: 2023-01-03

## 2022-11-11 ENCOUNTER — TELEPHONE (OUTPATIENT)
Dept: INTERNAL MEDICINE | Facility: CLINIC | Age: 52
End: 2022-11-11

## 2022-11-11 NOTE — TELEPHONE ENCOUNTER
Rx Refill Note  Requested Prescriptions     Pending Prescriptions Disp Refills   • Wegovy 1.7 MG/0.75ML solution auto-injector [Pharmacy Med Name: WEGOVY 1.7MG/0.75ML PF PEN INJ] 3 mL      Sig: ADMINISTER 1.7 MG(0.75 ML) UNDER THE SKIN EVERY 7 DAYS AS DIRECTED      Last office visit with prescribing clinician: 11/1/2022      Next office visit with prescribing clinician: 11/11/2022            Norma Singh  11/11/22, 13:51 EST

## 2022-11-11 NOTE — TELEPHONE ENCOUNTER
Caller: Li Kimball    Relationship: Self    Best call back number: [phone]    What test was performed: ULTRASOUD    When was the test performed 11/7/22:     Where was the test performed: [facility]    Additional notes: PATIENT REQUESTS A CALL BACK WITH TEST RESULTS WHEN AVAILABLE.

## 2022-11-14 RX ORDER — SEMAGLUTIDE 1.7 MG/.75ML
INJECTION, SOLUTION SUBCUTANEOUS
Qty: 3 ML | Refills: 1 | Status: SHIPPED | OUTPATIENT
Start: 2022-11-14 | End: 2023-01-03

## 2022-12-08 NOTE — TELEPHONE ENCOUNTER
Rx Refill Note  Requested Prescriptions     Pending Prescriptions Disp Refills   • metFORMIN ER (GLUCOPHAGE-XR) 500 MG 24 hr tablet [Pharmacy Med Name: METFORMIN ER 500MG 24HR TABS] 180 tablet 0     Sig: TAKE 2 TABLETS BY MOUTH DAILY      Last office visit with prescribing clinician: 11/1/2022   Last telemedicine visit with prescribing clinician: Visit date not found   Next office visit with prescribing clinician: 1/31/2023

## 2022-12-09 RX ORDER — METFORMIN HYDROCHLORIDE 500 MG/1
1000 TABLET, EXTENDED RELEASE ORAL DAILY
Qty: 180 TABLET | Refills: 0 | Status: SHIPPED | OUTPATIENT
Start: 2022-12-09 | End: 2023-03-27

## 2022-12-15 ENCOUNTER — TELEPHONE (OUTPATIENT)
Dept: FAMILY MEDICINE CLINIC | Facility: CLINIC | Age: 52
End: 2022-12-15

## 2022-12-15 NOTE — TELEPHONE ENCOUNTER
Caller: J Luis Kimball    Relationship to patient: Self    Best call back number:[phone]    Where are you experiencing symptoms: HAIR LOSS    How long have you been experiencing symptoms: 2-3 YEARS, PROGRESSIVELY GETTING WORSE    DERMATOLOGIST RECOMMENDED THAT SOME OF THE MEDICATIONS SHE TAKES MAY BE CAUSING HER HAIR LOSS. J LUIS WOULD LIKE A CALL TO DISCUSS    If a prescription is needed, what is your preferred pharmacy:   [id number] [address] [facility] [address] [facility] - [phone]  - [phone]   [address]  [facility] [address]  Phone: [phone] Fax: [phone]

## 2023-01-03 ENCOUNTER — OFFICE VISIT (OUTPATIENT)
Dept: FAMILY MEDICINE CLINIC | Facility: CLINIC | Age: 53
End: 2023-01-03
Payer: COMMERCIAL

## 2023-01-03 VITALS
SYSTOLIC BLOOD PRESSURE: 110 MMHG | HEIGHT: 63 IN | BODY MASS INDEX: 31.01 KG/M2 | TEMPERATURE: 97.7 F | HEART RATE: 84 BPM | DIASTOLIC BLOOD PRESSURE: 62 MMHG | OXYGEN SATURATION: 98 % | WEIGHT: 175 LBS | RESPIRATION RATE: 18 BRPM

## 2023-01-03 DIAGNOSIS — E03.9 HYPOTHYROIDISM, ADULT: ICD-10-CM

## 2023-01-03 DIAGNOSIS — E11.9 DIABETES MELLITUS TYPE 2, DIET-CONTROLLED: ICD-10-CM

## 2023-01-03 DIAGNOSIS — L65.9 HAIR LOSS: ICD-10-CM

## 2023-01-03 DIAGNOSIS — R22.1 NECK MASS: Primary | ICD-10-CM

## 2023-01-03 PROCEDURE — 99214 OFFICE O/P EST MOD 30 MIN: CPT | Performed by: FAMILY MEDICINE

## 2023-01-03 RX ORDER — LEVOTHYROXINE SODIUM 0.03 MG/1
25 TABLET ORAL DAILY
Qty: 30 TABLET | Refills: 3 | Status: SHIPPED | OUTPATIENT
Start: 2023-01-03

## 2023-01-03 NOTE — ASSESSMENT & PLAN NOTE
Seen new dermatologist, concern due to medication currently taking   Started on new medication, unsure of the name  Advise can try to cut back on thyroid medication to see if helps  Also continue hair skin and nail vitamin.   Stop wegovy

## 2023-01-03 NOTE — PROGRESS NOTES
Chief Complaint  Hypothyroidism    Subjective    History of Present Illness {CC  Problem List  Visit  Diagnosis   Encounters  Notes  Medications  Labs  Result Review Imaging  Media :23}     Li Kmiball presents to [facility] for   History of Present Illness   53 yo female present for follow up visit. She is taking thyroid medication daily.   States seen a new dermatologist who thought hair loss due to some medication. States on a new medication to help with hair loss. Unsure of the name, unable to see a difference at this time.   Dermatologist concern due to medications.    She has some increase stress.      Issues with hair loss for a long time, worsening.  She taking thyroid medication, thyroid function normal last evaluation.   States she has not taken Wegovy for the last two weeks.      Taking hair skin and nail vitamin daily.        States lump on neck not painful, sometimes a weird sensation in her neck but goes away in a matter of a few mins.      Social History     Socioeconomic History   • Marital status:    Tobacco Use   • Smoking status: Never   • Smokeless tobacco: Never   Vaping Use   • Vaping Use: Never used   Substance and Sexual Activity   • Alcohol use: No   • Drug use: No   • Sexual activity: Defer      Objective     Vital Signs:   /62 (BP Location: Left arm, Patient Position: Sitting, Cuff Size: Large Adult)   Pulse 84   Temp 97.7 °F (36.5 °C) (Infrared)   Resp 18   Ht 160 cm (63\")   Wt 79.4 kg (175 lb)   SpO2 98%   BMI 31.00 kg/m²   Physical Exam  Constitutional:       General: She is not in acute distress.     Appearance: She is not ill-appearing.   HENT:      Head: Normocephalic.   Neck:      Comments: L cervical lymph node enlargement  Cardiovascular:      Rate and Rhythm: Regular rhythm.      Heart sounds: Normal heart sounds.   Pulmonary:      Effort: No respiratory distress.      Breath sounds: Normal breath sounds. No  wheezing.   Abdominal:      General: There is no distension.      Palpations: Abdomen is soft.      Tenderness: There is no abdominal tenderness.   Lymphadenopathy:      Cervical: Cervical adenopathy present.   Neurological:      Mental Status: She is alert.        Result Review  Data Reviewed:{ Labs  Result Review  Imaging  Med Tab  Media :23}   The following data was reviewed by: Christy Grey MD on 01/03/2023  Lab Results - Last 18 Months   Lab Units 11/23/22  1215 07/08/22  0903 06/30/22  0602 06/29/22  1645 05/03/22  0759 05/03/22  0758 03/04/22  0907   BUN mg/dL  --   --  16 15  --  20 15   CREATININE mg/dL  --   --  0.89 0.73  --  0.75 0.68   SODIUM mmol/L  --   --  139 142  --  140 143   POTASSIUM mmol/L  --   --  4.1 3.6  --  3.5 3.9   CHLORIDE mmol/L  --   --  105 103  --  105 107*   CALCIUM mg/dL  --   --  8.7 9.8  --  9.7 9.3   ALBUMIN g/dL  --   --   --  4.40  --  4.20 4.1   BILIRUBIN mg/dL  --   --   --  0.4  --  0.4 0.2   ALK PHOS U/L  --   --   --  100  --  73 72   AST (SGOT) U/L  --   --   --  9  --  12 14   ALT (SGPT) U/L  --   --   --  17  --  12 13   CHOLESTEROL mg/dL  --  133  --   --   --   --  139   TRIGLYCERIDES mg/dL  --  132  --   --   --   --  136   HDL CHOL mg/dL  --  35*  --   --   --   --  41   VLDL CHOLESTEROL MALLIKA mg/dL  --  24  --   --   --   --  24   LDL CHOL mg/dL  --  74  --   --   --   --  74   HEMOGLOBIN A1C %  --   --   --   --   --   --  5.9*   MICROALB UR ug/mL  --   --   --   --   --   --  17.3   WBC 10*3/mm3  --   --  13.35* 12.65* 10.74  --  7.5   RBC 10*6/mm3  --   --  4.20 4.82 4.62  --  4.49   HEMATOCRIT %  --   --  36.6 42.1 41.8  --  39.7   MCV fL  --   --  87.1 87.3 90.5  --  88   MCH pg  --   --  29.3 29.0 29.2  --  29.2   TSH uIU/mL  --   --   --   --   --   --  0.852   FREE T4 ng/dL 0.94  --   --   --   --   --   --               Current Outpatient Medications:   •  apixaban (Eliquis) 2.5 MG tablet tablet, Take 1 tablet by mouth Every 12 (Twelve) Hours.,  Disp: 60 tablet, Rfl: 5  •  metFORMIN ER (GLUCOPHAGE-XR) 500 MG 24 hr tablet, TAKE 2 TABLETS BY MOUTH DAILY, Disp: 180 tablet, Rfl: 0  •  pravastatin (PRAVACHOL) 20 MG tablet, Take 1 tablet by mouth Daily., Disp: 90 tablet, Rfl: 1  •  levothyroxine (SYNTHROID, LEVOTHROID) 25 MCG tablet, Take 1 tablet by mouth Daily., Disp: 30 tablet, Rfl: 3      Assessment and Plan {CC Problem List  Visit Diagnosis  ROS  Review (Popup)  Health Maintenance  Quality  BestPractice  Medications  SmartSets  SnapShot Encounters  Media :23}   Problem List Items Addressed This Visit        Endocrine and Metabolic    Hypothyroidism, adult    Current Assessment & Plan     Adjustment with dosage to see if can help hair loss  Cut to 25 mcg, repeat in a few months  Advised if thyroid function does not remain normal with adjustment may need to go back to current dose  She is willing to try to see if improves hair loss.          Relevant Medications    levothyroxine (SYNTHROID, LEVOTHROID) 25 MCG tablet    Diabetes mellitus type 2, diet-controlled (HCC)    Current Assessment & Plan     Diabetes is improved, weight loss, taking metformin daily .   Continue current treatment regimen.  monitor A1c , adjust medication based upon result  Diabetes will be reassessed in 6 months.         Relevant Orders    Hemoglobin A1c       Skin    Hair loss    Current Assessment & Plan     Seen new dermatologist, concern due to medication currently taking   Started on new medication, unsure of the name  Advise can try to cut back on thyroid medication to see if helps  Also continue hair skin and nail vitamin.   Stop wegovy        Other Visit Diagnoses     Neck mass    -  Primary    Relevant Orders    CT soft tissue neck wo contrast      Pt stll has mass present reviewed US again   Ordered CT scan to evaluate further. Lymph adenopathy seen.         Follow Up   Return in about 3 months (around 4/3/2023) for recheck thyroid , Annual physical.  Patient was  given instructions and counseling regarding her condition or for health maintenance advice. Please see specific information pulled into the AVS if appropriate.    EpicAct:MR_WS_AMB_ORDERS,RunParams:STARTUPTYPE=FOLLOW    MR_WS_AMB_DISCHARGE

## 2023-01-03 NOTE — ASSESSMENT & PLAN NOTE
Adjustment with dosage to see if can help hair loss  Cut to 25 mcg, repeat in a few months  Advised if thyroid function does not remain normal with adjustment may need to go back to current dose  She is willing to try to see if improves hair loss.

## 2023-01-03 NOTE — ASSESSMENT & PLAN NOTE
Diabetes is improved, weight loss, taking metformin daily .   Continue current treatment regimen.  monitor A1c , adjust medication based upon result  Diabetes will be reassessed in 6 months.

## 2023-01-04 LAB — HBA1C MFR BLD: 5.7 % (ref 4.8–5.6)

## 2023-01-06 ENCOUNTER — TELEPHONE (OUTPATIENT)
Dept: FAMILY MEDICINE CLINIC | Facility: CLINIC | Age: 53
End: 2023-01-06
Payer: COMMERCIAL

## 2023-01-06 NOTE — TELEPHONE ENCOUNTER
Caller: Li Kimball    Relationship: Self    Best call back number: [phone]         What was the call regarding:     PATIENT WANTS YOU TO KNOW THAT THE [facility] THAT SHE IS USING IS THE ONE [facility] IN [place].      Do you require a callback:  NO

## 2023-01-16 RX ORDER — SEMAGLUTIDE 1.7 MG/.75ML
INJECTION, SOLUTION SUBCUTANEOUS
Qty: 3 ML | Refills: 1 | OUTPATIENT
Start: 2023-01-16

## 2023-01-30 ENCOUNTER — TELEPHONE (OUTPATIENT)
Dept: FAMILY MEDICINE CLINIC | Facility: CLINIC | Age: 53
End: 2023-01-30

## 2023-01-30 NOTE — TELEPHONE ENCOUNTER
Caller: JENELLE    Relationship to patient: Other    Best call back number: [phone]    Patient is needing: JENELLE FROM [facility] SAID THEY NEED TO VERIFY A CT SCAN FOR THIS PATIENT     THEY RECEIVED AN ORDER FOR A SCAN WITHOUT CONTRAST AND ARE WANTING TO MAKE SURE IF DR. HIGHTOWER WANTS TO LEAVE IT WITHOUT CONTRAST OR HAVE HER DO THE CONTRAST

## 2023-02-01 DIAGNOSIS — R22.1 NECK MASS: Primary | ICD-10-CM

## 2023-02-10 RX ORDER — APIXABAN 2.5 MG/1
TABLET, FILM COATED ORAL
Qty: 60 TABLET | Refills: 5 | Status: SHIPPED | OUTPATIENT
Start: 2023-02-10

## 2023-02-14 ENCOUNTER — TELEPHONE (OUTPATIENT)
Dept: FAMILY MEDICINE CLINIC | Facility: CLINIC | Age: 53
End: 2023-02-14

## 2023-02-14 NOTE — TELEPHONE ENCOUNTER
Caller: Li Kimball    Relationship: Self    Best call back number: [phone]    Who are you requesting to speak with (clinical staff, provider,  specific staff member): DR. HIGHTOWER OR CLINICAL STAFF    What was the call regarding: WANTING TO GO OVER CT SCAN RESULTS AND DERMOLOGIST WANTING HER TO TAKE MINOXIDIL PILL AND WANTING TO KNOW IF ITS OK FOR HER TO TAKE THE MEDICATION     Do you require a callback: YES

## 2023-02-16 NOTE — TELEPHONE ENCOUNTER
Duplicate encounter:  No issues with medication given from dermatology   She should have gotten the message regarding CT scan from [place] on the report.  If she has additional questions or concern she can make a video visit to discuss.

## 2023-02-21 NOTE — TELEPHONE ENCOUNTER
PATIENT STATES THAT SHE WAS TOLD BY THE PHARMACIST THAT THIS MEDICATION CAN LOWER HER BLOOD PRESSURE.     PATIENT WANTS TO DOUBLE CHECK THAT THIS MEDICATION IS SAFE TO TAKE.     PLEASE ADVISE [phone]

## 2023-03-21 DIAGNOSIS — E78.2 MIXED HYPERLIPIDEMIA: ICD-10-CM

## 2023-03-21 RX ORDER — PRAVASTATIN SODIUM 20 MG
20 TABLET ORAL DAILY
Qty: 90 TABLET | Refills: 1 | Status: SHIPPED | OUTPATIENT
Start: 2023-03-21

## 2023-03-21 RX ORDER — PRAVASTATIN SODIUM 20 MG
20 TABLET ORAL DAILY
Qty: 90 TABLET | Refills: 1 | OUTPATIENT
Start: 2023-03-21

## 2023-03-21 NOTE — TELEPHONE ENCOUNTER
Caller: Li Kibmall    Relationship to patient: Self    Best call back number: [phone]    Patient is needing: WAS SENT TO WRONG OFFICE, PLEASE REFILL

## 2023-03-28 RX ORDER — METFORMIN HYDROCHLORIDE 500 MG/1
1000 TABLET, EXTENDED RELEASE ORAL DAILY
Qty: 180 TABLET | Refills: 0 | Status: SHIPPED | OUTPATIENT
Start: 2023-03-28 | End: 2023-03-29 | Stop reason: SDUPTHER

## 2023-03-29 RX ORDER — METFORMIN HYDROCHLORIDE 500 MG/1
1000 TABLET, EXTENDED RELEASE ORAL DAILY
Qty: 180 TABLET | Refills: 0 | Status: SHIPPED | OUTPATIENT
Start: 2023-03-29

## 2023-03-30 ENCOUNTER — TELEPHONE (OUTPATIENT)
Dept: FAMILY MEDICINE CLINIC | Facility: CLINIC | Age: 53
End: 2023-03-30
Payer: COMMERCIAL

## 2023-03-30 NOTE — TELEPHONE ENCOUNTER
Caller: Li Kimball     Relationship: PATIENT    Best call back number: [phone]      What is your medical concern?     PATIENT IS SEEING A PHYSICIAN FOR WEIGHT LOSS.  THEY HAVE DOUBLED HER UP ON METFORMIN TO TWO IN MORNING AND TWO AT NIGHT.     PATIENT WANTS TO KNOW IF YOU THINK THIS IS SAFE?  SHE HAS NOT STARTED DOING IT YET.  SHE IS CONCERNED IF IT IS SAFE WITH HER OTHER MEDICATIONS ALSO.    PLEASE CALL AND ADVISE.

## 2023-04-04 NOTE — TELEPHONE ENCOUNTER
Please call advise I am unsure of there lab report, or what indication they have for increasing medication.  She should talk with them and regarding any concerns.  They should manage this medication now if they want her to take it this way.

## 2023-04-05 NOTE — TELEPHONE ENCOUNTER
Patient informed. Dr. Bohler is an endocrinologist whom started this regimen. Will follow up with Dr. Grey 4/18 to discuss further and to have fasting labs.

## 2023-04-10 RX ORDER — LEVOTHYROXINE SODIUM 0.03 MG/1
25 TABLET ORAL DAILY
Qty: 30 TABLET | Refills: 3 | Status: SHIPPED | OUTPATIENT
Start: 2023-04-10

## 2023-04-18 ENCOUNTER — OFFICE VISIT (OUTPATIENT)
Dept: FAMILY MEDICINE CLINIC | Facility: CLINIC | Age: 53
End: 2023-04-18
Payer: COMMERCIAL

## 2023-04-18 VITALS
BODY MASS INDEX: 33.49 KG/M2 | DIASTOLIC BLOOD PRESSURE: 70 MMHG | SYSTOLIC BLOOD PRESSURE: 130 MMHG | OXYGEN SATURATION: 99 % | HEART RATE: 88 BPM | TEMPERATURE: 98 F | WEIGHT: 189 LBS | HEIGHT: 63 IN | RESPIRATION RATE: 18 BRPM

## 2023-04-18 DIAGNOSIS — Z79.01 CHRONIC ANTICOAGULATION: ICD-10-CM

## 2023-04-18 DIAGNOSIS — Z00.00 ANNUAL PHYSICAL EXAM: Primary | ICD-10-CM

## 2023-04-18 DIAGNOSIS — E11.9 DIABETES MELLITUS TYPE 2, DIET-CONTROLLED: ICD-10-CM

## 2023-04-18 DIAGNOSIS — E78.2 MIXED HYPERLIPIDEMIA: ICD-10-CM

## 2023-04-18 DIAGNOSIS — E66.9 OBESITY (BMI 30.0-34.9): ICD-10-CM

## 2023-04-18 DIAGNOSIS — E03.9 HYPOTHYROIDISM, ADULT: ICD-10-CM

## 2023-04-18 LAB
ALBUMIN SERPL-MCNC: 4.4 G/DL (ref 3.5–5.2)
ALBUMIN/GLOB SERPL: 1.5 G/DL
ALP SERPL-CCNC: 74 U/L (ref 39–117)
ALT SERPL-CCNC: 10 U/L (ref 1–33)
AST SERPL-CCNC: 14 U/L (ref 1–32)
BASOPHILS # BLD AUTO: 0.04 10*3/MM3 (ref 0–0.2)
BASOPHILS NFR BLD AUTO: 0.7 % (ref 0–1.5)
BILIRUB SERPL-MCNC: 0.4 MG/DL (ref 0–1.2)
BUN SERPL-MCNC: 21 MG/DL (ref 6–20)
BUN/CREAT SERPL: 29.6 (ref 7–25)
CALCIUM SERPL-MCNC: 9.5 MG/DL (ref 8.6–10.5)
CHLORIDE SERPL-SCNC: 106 MMOL/L (ref 98–107)
CHOLEST SERPL-MCNC: 166 MG/DL (ref 0–200)
CO2 SERPL-SCNC: 25.7 MMOL/L (ref 22–29)
CREAT SERPL-MCNC: 0.71 MG/DL (ref 0.57–1)
EGFRCR SERPLBLD CKD-EPI 2021: 102.5 ML/MIN/1.73
EOSINOPHIL # BLD AUTO: 0.15 10*3/MM3 (ref 0–0.4)
EOSINOPHIL NFR BLD AUTO: 2.5 % (ref 0.3–6.2)
ERYTHROCYTE [DISTWIDTH] IN BLOOD BY AUTOMATED COUNT: 12.4 % (ref 12.3–15.4)
GLOBULIN SER CALC-MCNC: 2.9 GM/DL
GLUCOSE SERPL-MCNC: 96 MG/DL (ref 65–99)
HBA1C MFR BLD: 5.7 % (ref 4.8–5.6)
HCT VFR BLD AUTO: 39.3 % (ref 34–46.6)
HDLC SERPL-MCNC: 44 MG/DL (ref 40–60)
HGB BLD-MCNC: 13.1 G/DL (ref 12–15.9)
IMM GRANULOCYTES # BLD AUTO: 0.01 10*3/MM3 (ref 0–0.05)
IMM GRANULOCYTES NFR BLD AUTO: 0.2 % (ref 0–0.5)
LDLC SERPL CALC-MCNC: 98 MG/DL (ref 0–100)
LYMPHOCYTES # BLD AUTO: 1.75 10*3/MM3 (ref 0.7–3.1)
LYMPHOCYTES NFR BLD AUTO: 29.1 % (ref 19.6–45.3)
MCH RBC QN AUTO: 29.7 PG (ref 26.6–33)
MCHC RBC AUTO-ENTMCNC: 33.3 G/DL (ref 31.5–35.7)
MCV RBC AUTO: 89.1 FL (ref 79–97)
MONOCYTES # BLD AUTO: 0.59 10*3/MM3 (ref 0.1–0.9)
MONOCYTES NFR BLD AUTO: 9.8 % (ref 5–12)
NEUTROPHILS # BLD AUTO: 3.48 10*3/MM3 (ref 1.7–7)
NEUTROPHILS NFR BLD AUTO: 57.7 % (ref 42.7–76)
NRBC BLD AUTO-RTO: 0 /100 WBC (ref 0–0.2)
PLATELET # BLD AUTO: 319 10*3/MM3 (ref 140–450)
POTASSIUM SERPL-SCNC: 4.2 MMOL/L (ref 3.5–5.2)
PROT SERPL-MCNC: 7.3 G/DL (ref 6–8.5)
RBC # BLD AUTO: 4.41 10*6/MM3 (ref 3.77–5.28)
SODIUM SERPL-SCNC: 144 MMOL/L (ref 136–145)
TRIGL SERPL-MCNC: 134 MG/DL (ref 0–150)
TSH SERPL DL<=0.005 MIU/L-ACNC: 2.35 UIU/ML (ref 0.27–4.2)
VLDLC SERPL CALC-MCNC: 24 MG/DL (ref 5–40)
WBC # BLD AUTO: 6.02 10*3/MM3 (ref 3.4–10.8)

## 2023-04-18 RX ORDER — MINOXIDIL 2.5 MG/1
0.5 TABLET ORAL DAILY
COMMUNITY
Start: 2023-04-12

## 2023-04-18 RX ORDER — DIETHYLPROPION HYDROCHLORIDE 25 MG/1
TABLET ORAL
COMMUNITY
Start: 2023-04-14

## 2023-04-18 RX ORDER — PHENTERMINE HYDROCHLORIDE 37.5 MG/1
TABLET ORAL
COMMUNITY
Start: 2023-03-29

## 2023-04-18 RX ORDER — FINASTERIDE 5 MG/1
TABLET, FILM COATED ORAL
COMMUNITY
Start: 2023-02-20

## 2023-04-18 NOTE — PROGRESS NOTES
Chief Complaint  Annual Exam (Sees weight loss MD advised to increase Metformin to 4 per day she is still taking 2 daily)    Subjective            Li Kimball presents to [facility]  History of Present Illness    Patient reporting that health is doing well overall.  Patient is here today for annual physical.  Eating a balanced diet.  States she is seeing weight loss doctor seen previously.He is wanting her to take the metformin 4 times a day but has not increase.   Not doing well on exercise, needs to increase exercise.     Labs: Due for routine labs    Colorectal cancer screening: up to date, due Oct 2026  Breast cancer screening:up to date  Cervical cancer screening: up to date following with Dr. Chou with [place].          Immunization History   Administered Date(s) Administered   • COVID-19 (PFIZER) BIVALENT BOOSTER 12+YRS 09/21/2022   • COVID-19 (PFIZER) PURPLE CAP 04/11/2021, 05/02/2021, 11/18/2021   • Flu Vaccine Intradermal Quad 18-64YR 10/16/2018   • FluLaval/Fluzone >6mos 09/13/2021   • FluMist 2-49yrs 08/27/2017   • Hepatitis A 04/26/2018, 10/27/2018   • Influenza Quad Vaccine (Inpatient) 10/22/2019   • Influenza TIV (IM) 09/02/2016   • Influenza, Unspecified 09/12/2020   • Shingrix 03/04/2022, 07/08/2022   • Tdap 01/10/2020   • flucelvax quad pfs =>4 YRS 10/16/2018, 09/21/2022      Patient has seen dentist within last 6 months  yes  Patient has seen eye specialist within last 6 months need to schedule    PHQ-2 Depression Screening  Little interest or pleasure in doing things? 0-->not at all   Feeling down, depressed, or hopeless? 0-->not at all   PHQ-2 Total Score 0      Social History     Socioeconomic History   • Marital status:    Tobacco Use   • Smoking status: Never   • Smokeless tobacco: Never   Vaping Use   • Vaping Use: Never used   Substance and Sexual Activity   • Alcohol use: No   • Drug use: No   • Sexual activity: Defer          Review of Systems  "  Constitutional: Negative for chills and fever.   HENT: Negative for congestion, rhinorrhea and sneezing.    Eyes: Negative for visual disturbance.   Respiratory: Negative for cough and shortness of breath.    Cardiovascular: Negative for chest pain and leg swelling.   Gastrointestinal: Negative for abdominal pain, constipation, diarrhea, nausea and vomiting.   Genitourinary: Negative for difficulty urinating.   Neurological: Positive for headaches. Negative for dizziness.         Objective   Vital Signs:   /70 (BP Location: Left arm, Patient Position: Sitting, Cuff Size: Adult)   Pulse 88   Temp 98 °F (36.7 °C) (Infrared)   Resp 18   Ht 160 cm (63\")   Wt 85.7 kg (189 lb)   SpO2 99%   BMI 33.48 kg/m²     Physical Exam  Constitutional:       General: She is not in acute distress.     Appearance: She is obese. She is not ill-appearing.   HENT:      Head: Normocephalic.      Right Ear: Tympanic membrane normal.      Left Ear: Tympanic membrane normal.      Mouth/Throat:      Pharynx: Oropharynx is clear. No oropharyngeal exudate or posterior oropharyngeal erythema.   Eyes:      Conjunctiva/sclera: Conjunctivae normal.   Neck:      Comments: L submandibular gland  Cardiovascular:      Rate and Rhythm: Regular rhythm.      Heart sounds: Normal heart sounds.   Pulmonary:      Effort: No respiratory distress.      Breath sounds: Normal breath sounds. No wheezing.   Abdominal:      General: There is no distension.      Palpations: Abdomen is soft.      Tenderness: There is no abdominal tenderness.   Musculoskeletal:      Cervical back: Neck supple.      Right lower leg: No edema.      Left lower leg: No edema.   Neurological:      Mental Status: She is alert and oriented to person, place, and time.   Psychiatric:         Mood and Affect: Mood normal.        Result Review :   The following data was reviewed by: Christy Grey MD on 04/18/2023:  Common labs        6/30/2022    06:02 7/8/2022    09:03 1/3/2023 "    08:56   Common Labs   Glucose 97       BUN 16       Creatinine 0.89       Sodium 139       Potassium 4.1       Chloride 105       Calcium 8.7       WBC 13.35       Hemoglobin 12.3       Hematocrit 36.6       Platelets 386       Total Cholesterol  133      Triglycerides  132      HDL Cholesterol  35      LDL Cholesterol   74      Hemoglobin A1C   5.7                  Current Outpatient Medications:   •  Eliquis 2.5 MG tablet tablet, TAKE 1 TABLET BY MOUTH EVERY 12 HOURS, Disp: 60 tablet, Rfl: 5  •  finasteride (PROSCAR) 5 MG tablet, TAKE 1/4 TABLET BY MOUTH EVERY DAY, Disp: , Rfl:   •  levothyroxine (SYNTHROID, LEVOTHROID) 25 MCG tablet, TAKE 1 TABLET BY MOUTH DAILY, Disp: 30 tablet, Rfl: 3  •  metFORMIN ER (GLUCOPHAGE-XR) 500 MG 24 hr tablet, Take 2 tablets by mouth Daily., Disp: 180 tablet, Rfl: 0  •  minoxidil (LONITEN) 2.5 MG tablet, Take 0.5 tablets by mouth Daily., Disp: , Rfl:   •  phentermine (ADIPEX-P) 37.5 MG tablet, , Disp: , Rfl:   •  pravastatin (PRAVACHOL) 20 MG tablet, Take 1 tablet by mouth Daily., Disp: 90 tablet, Rfl: 1  •  Diethylpropion HCl 25 MG tablet, TAKE 1 TABLET BY MOUTH EVERY DAY AT MIDDAY (Patient not taking: Reported on 4/18/2023), Disp: , Rfl:           Assessment and Plan    Diagnoses and all orders for this visit:    1. Annual physical exam (Primary)    2. Mixed hyperlipidemia  -     Cancel: Lipid panel  -     Lipid panel    3. Hypothyroidism, adult  -     Cancel: TSH Rfx On Abnormal To Free T4  -     TSH Rfx On Abnormal To Free T4    4. Diabetes mellitus type 2, diet-controlled  -     Cancel: Comprehensive metabolic panel  -     Cancel: Hemoglobin A1c  -     Cancel: Microalbumin / Creatinine Urine Ratio - Urine, Clean Catch  -     Comprehensive metabolic panel  -     Hemoglobin A1c  -     Microalbumin / Creatinine Urine Ratio - Urine, Clean Catch    5. Obesity (BMI 30.0-34.9)  -     Cancel: Comprehensive metabolic panel  -     Comprehensive metabolic panel    6. Chronic  anticoagulation  -     CBC w AUTO Differential        Fit on- recommended for exercise and meditation.         The patient was counseled regarding nutrition, physical activity, healthy weight, injury prevention, misuse of tobacco, alcohol and illicit drugs, mental health, immunizations, and screenings.      Follow Up   Return in about 6 months (around 10/18/2023) for Recheck.  Patient was given instructions and counseling regarding her condition or for health maintenance advice. Please see specific information pulled into the AVS if appropriate.

## 2023-04-25 ENCOUNTER — TELEPHONE (OUTPATIENT)
Dept: FAMILY MEDICINE CLINIC | Facility: CLINIC | Age: 53
End: 2023-04-25
Payer: COMMERCIAL

## 2023-04-25 NOTE — TELEPHONE ENCOUNTER
Caller: Li Kimball    Relationship: Self    Best call back number: [phone]//CAN LEAVE VOICEMAIL    Caller requesting test results: SELF    What test was performed: LABS     When was the test performed: 4/18/23    Where was the test performed: IN OFFICE     Additional notes: PLEASE CALL WITH RESULTS   AND WOULD LIKE TO HAVE A COPY MAILED TO HER

## 2023-04-27 NOTE — TELEPHONE ENCOUNTER
This was already addressed in another encounter. Please call to make sure she received my message regarding lab helen.

## 2023-05-03 ENCOUNTER — TELEPHONE (OUTPATIENT)
Dept: FAMILY MEDICINE CLINIC | Facility: CLINIC | Age: 53
End: 2023-05-03
Payer: COMMERCIAL

## 2023-05-03 NOTE — TELEPHONE ENCOUNTER
Caller: Li Kimball    Relationship: Self    Best call back number: [phone] (LEAVE VOICEMAIL)     What is the best time to reach you: ANYTIME    Who are you requesting to speak with:MO/CLINICAL STAFF    What was the call regarding: PATIENT WOULD LIKE A CALL BACK TO GO OVER ADDITIONAL QUESTIONS REGARDING TEST RESULTS.     Do you require a callback:YES

## 2023-05-09 ENCOUNTER — TELEPHONE (OUTPATIENT)
Dept: FAMILY MEDICINE CLINIC | Facility: CLINIC | Age: 53
End: 2023-05-09
Payer: COMMERCIAL

## 2023-05-09 NOTE — TELEPHONE ENCOUNTER
I would advise her to have pharmacy review with all the medication she is taking currently.  If her weight doctor did not review prior to prescribing the medication.

## 2023-05-09 NOTE — TELEPHONE ENCOUNTER
Caller: Li Kimball    Relationship: Self    Best call back number: [phone]    What was the call regarding: PATIENT STATES HER WEIGHT DOCTOR IS WANTING TO PUT HER ON THE MEDICATION CONTRAVE. PATIENT WOULD LIKE TO MAKE SURE THAT IS SAFE TO TAKE WITH THE CURRENT MEDICATIONS SHE IS TAKING.    PLEASE CALL AND ADVISE

## 2023-06-18 DIAGNOSIS — E78.2 MIXED HYPERLIPIDEMIA: ICD-10-CM

## 2023-06-19 DIAGNOSIS — E78.2 MIXED HYPERLIPIDEMIA: ICD-10-CM

## 2023-06-19 RX ORDER — PRAVASTATIN SODIUM 20 MG
20 TABLET ORAL DAILY
Qty: 90 TABLET | Refills: 1 | OUTPATIENT
Start: 2023-06-19

## 2023-06-19 RX ORDER — PRAVASTATIN SODIUM 20 MG
20 TABLET ORAL DAILY
Qty: 90 TABLET | Refills: 1 | Status: SHIPPED | OUTPATIENT
Start: 2023-06-19

## 2023-07-27 ENCOUNTER — TELEPHONE (OUTPATIENT)
Dept: FAMILY MEDICINE CLINIC | Facility: CLINIC | Age: 53
End: 2023-07-27
Payer: COMMERCIAL

## 2023-07-31 RX ORDER — METFORMIN HYDROCHLORIDE 500 MG/1
1000 TABLET, EXTENDED RELEASE ORAL DAILY
Qty: 180 TABLET | Refills: 0 | Status: SHIPPED | OUTPATIENT
Start: 2023-07-31

## 2023-08-17 ENCOUNTER — TELEPHONE (OUTPATIENT)
Dept: FAMILY MEDICINE CLINIC | Facility: CLINIC | Age: 53
End: 2023-08-17
Payer: MEDICAID

## 2023-08-17 NOTE — TELEPHONE ENCOUNTER
Caller: [place]    Relationship: Self    Best call back number: [phone]     What is the best time to reach you: ANY AND APPROVED DETAILED VOICEMAIL    Who are you requesting to speak with (clinical staff, provider,  specific staff member): CLINICAL    What was the call regardin RELATED CONCERNS    THE PATIENT HAS BEEN ON SAXENDA FOR ABOUT A MONTH. IT HAS NOT SUREPRESSED HER APPETITE- SHE HAS ACTUALLY BEEN RAPIDLY GAINING WEIGHT.    THE SAXENDA WAS PRESCRIBED BY A TELE-VISIT DOCTOR NAMED DR. ORNELAS. THE PATIENT TRUSTS DR. HIGHTOWER'S CARE AND WOULD LIKE HER OPINION ON STOPPING SAXENDA.    THE PATIENT STILL NEEDS TO LOSE WEIGHT. SHE HAS BEEN TOLD BY ANOTHER PROVIDER THAT SHE WILL NEED TO HAVE SURGERY IF SHE CAN'T LOSE WEIGHT. THIS IS COMPLICATED FOR THE PATIENT DUE TO BEING ON BLOOD THINNERS.    THE PATIENT IS ALSO REQUESTING TO BE ADVISED ON STARTING MOUNJARO.    PATIENT PROVIDED PHARMACY IF NEEDED.    [id number] [place], [place] - [address] AT [facility] [address] [facility] [phone] [facility] [phone]

## 2023-08-21 ENCOUNTER — OFFICE VISIT (OUTPATIENT)
Dept: FAMILY MEDICINE CLINIC | Facility: CLINIC | Age: 53
End: 2023-08-21
Payer: MEDICAID

## 2023-08-21 VITALS
DIASTOLIC BLOOD PRESSURE: 78 MMHG | HEIGHT: 63 IN | WEIGHT: 209.8 LBS | TEMPERATURE: 98.2 F | SYSTOLIC BLOOD PRESSURE: 138 MMHG | HEART RATE: 100 BPM | BODY MASS INDEX: 37.17 KG/M2 | OXYGEN SATURATION: 97 %

## 2023-08-21 DIAGNOSIS — E78.2 MIXED HYPERLIPIDEMIA: Primary | ICD-10-CM

## 2023-08-21 DIAGNOSIS — E66.9 OBESITY (BMI 30.0-34.9): ICD-10-CM

## 2023-08-21 DIAGNOSIS — E03.9 HYPOTHYROIDISM, ADULT: ICD-10-CM

## 2023-08-21 DIAGNOSIS — E11.9 DIABETES MELLITUS TYPE 2, DIET-CONTROLLED: ICD-10-CM

## 2023-08-21 PROCEDURE — 1160F RVW MEDS BY RX/DR IN RCRD: CPT | Performed by: FAMILY MEDICINE

## 2023-08-21 PROCEDURE — 3044F HG A1C LEVEL LT 7.0%: CPT | Performed by: FAMILY MEDICINE

## 2023-08-21 PROCEDURE — 99214 OFFICE O/P EST MOD 30 MIN: CPT | Performed by: FAMILY MEDICINE

## 2023-08-21 PROCEDURE — 1159F MED LIST DOCD IN RCRD: CPT | Performed by: FAMILY MEDICINE

## 2023-08-21 NOTE — PROGRESS NOTES
"    Chief Complaint  Weight Gain    Subjective    History of Present Illness {CC  Problem List  Visit  Diagnosis   Encounters  Notes  Medications  Labs  Result Review Imaging  Media :23}     Li Kimball presents to [facility] for   History of Present Illness     53 yo female present for follow up visit. Pt states she has been taking saxenda from a telehealth doctor.  She was prescribed saxenda only the 0.6 because she became sick when tried to go up.   States has not taken the saxenda currently.    She states she was told about monjeruo   She has gained about 30 pounds since being off the wegovy.  Wegovy given from outside provider.   Concern Blood sugar is going to go back up as weight going back up       Social History     Socioeconomic History    Marital status:    Tobacco Use    Smoking status: Never    Smokeless tobacco: Never   Vaping Use    Vaping Use: Never used   Substance and Sexual Activity    Alcohol use: No    Drug use: No    Sexual activity: Defer      Objective     Vital Signs:   /78   Pulse 100   Temp 98.2 °F (36.8 °C)   Ht 160 cm (63\")   Wt 95.2 kg (209 lb 12.8 oz)   SpO2 97%   BMI 37.16 kg/m²   Physical Exam  Constitutional:       General: She is not in acute distress.     Appearance: She is obese. She is not ill-appearing.   HENT:      Head: Normocephalic.   Eyes:      Conjunctiva/sclera: Conjunctivae normal.   Cardiovascular:      Rate and Rhythm: Regular rhythm.      Heart sounds: Normal heart sounds.   Pulmonary:      Breath sounds: Normal breath sounds. No wheezing.   Musculoskeletal:      Right lower leg: No edema.      Left lower leg: No edema.   Neurological:      Mental Status: She is alert.      Result Review  Data Reviewed:{ Labs  Result Review  Imaging  Med Tab  Media :23}   The following data was reviewed by: Christy Grey MD on 08/21/2023  Lab Results - Last 18 Months   Lab Units 04/18/23  0914 01/03/23  0856 " 11/23/22  1215 07/08/22  0903 06/30/22  0602 06/29/22  1645 05/03/22  0759 05/03/22  0758 03/04/22  0907   0000   BUN mg/dL 21*  --   --   --  16 15  --  20 15   < >   CREATININE mg/dL 0.71  --   --   --  0.89 0.73  --  0.75 0.68   < >   SODIUM mmol/L 144  --   --   --  139 142  --  140 143   < >   POTASSIUM mmol/L 4.2  --   --   --  4.1 3.6  --  3.5 3.9   < >   CHLORIDE mmol/L 106  --   --   --  105 103  --  105 107*   < >   CALCIUM mg/dL 9.5  --   --   --  8.7 9.8  --  9.7 9.3   < >   ALBUMIN g/dL 4.4  --   --   --   --  4.40  --  4.20 4.1  --    BILIRUBIN mg/dL 0.4  --   --   --   --  0.4  --  0.4 0.2  --    ALK PHOS U/L 74  --   --   --   --  100  --  73 72  --    AST (SGOT) U/L 14  --   --   --   --  9  --  12 14  --    ALT (SGPT) U/L 10  --   --   --   --  17  --  12 13  --    CHOLESTEROL mg/dL 166  --   --  133  --   --   --   --  139  --    TRIGLYCERIDES mg/dL 134  --   --  132  --   --   --   --  136  --    HDL CHOL mg/dL 44  --   --  35*  --   --   --   --  41  --    VLDL CHOLESTEROL MALLIKA mg/dL 24  --   --  24  --   --   --   --  24  --    LDL CHOL mg/dL 98  --   --  74  --   --   --   --  74  --    HEMOGLOBIN A1C % 5.70* 5.7*  --   --   --   --   --   --  5.9*  --    MICROALB UR ug/mL  --   --   --   --   --   --   --   --  17.3  --    WBC 10*3/mm3 6.02  --   --   --  13.35* 12.65*   < >  --  7.5  --    RBC 10*6/mm3 4.41  --   --   --  4.20 4.82   < >  --  4.49  --    HEMATOCRIT % 39.3  --   --   --  36.6 42.1   < >  --  39.7  --    MCV fL 89.1  --   --   --  87.1 87.3   < >  --  88  --    MCH pg 29.7  --   --   --  29.3 29.0   < >  --  29.2  --    TSH uIU/mL 2.350  --   --   --   --   --   --   --  0.852  --    FREE T4 ng/dL  --   --  0.94  --   --   --   --   --   --   --     < > = values in this interval not displayed.              Current Outpatient Medications:     apixaban (Eliquis) 2.5 MG tablet tablet, Take 1 tablet by mouth Every 12 (Twelve) Hours., Disp: 60 tablet, Rfl: 5    finasteride (PROSCAR)  5 MG tablet, TAKE 1/4 TABLET BY MOUTH EVERY DAY, Disp: , Rfl:     levothyroxine (SYNTHROID, LEVOTHROID) 25 MCG tablet, TAKE 1 TABLET BY MOUTH DAILY, Disp: 30 tablet, Rfl: 3    metFORMIN ER (GLUCOPHAGE-XR) 500 MG 24 hr tablet, TAKE 2 TABLETS BY MOUTH DAILY, Disp: 180 tablet, Rfl: 0    minoxidil (LONITEN) 2.5 MG tablet, Take 0.5 tablets by mouth Daily., Disp: , Rfl:     pravastatin (PRAVACHOL) 20 MG tablet, Take 2 tablets by mouth Daily., Disp: 180 tablet, Rfl: 1    Tirzepatide (Mounjaro) 2.5 MG/0.5ML solution pen-injector, Inject 0.5 mL under the skin into the appropriate area as directed 1 (One) Time Per Week. Take for 4 weeks then increase to 0.5 mg dose, Disp: 2 mL, Rfl: 0      Assessment and Plan {CC Problem List  Visit Diagnosis  ROS  Review (Popup)  Health Maintenance  Quality  BestPractice  Medications  SmartSets  SnapShot Encounters  Media :23}   Problem List Items Addressed This Visit          Cardiac and Vasculature    Mixed hyperlipidemia - Primary    Relevant Orders    Lipid panel (Completed)       Endocrine and Metabolic    Hypothyroidism, adult    Relevant Orders    TSH Rfx On Abnormal To Free T4 (Completed)    Diabetes mellitus type 2, diet-controlled    Relevant Orders    Microalbumin / Creatinine Urine Ratio - Urine, Clean Catch (Completed)    Comprehensive metabolic panel (Completed)    Hemoglobin A1c (Completed)    Obesity (BMI 30.0-34.9)    Overview     Working with weight loss center until recently, closed         Relevant Orders    Comprehensive metabolic panel (Completed)     Check labs today   States told richard would be covered given she is diabetic  Monitor diet, low carb, drink plenty of water   Discussed risk benefit and benefits. She wanting to try something to help with controlling weight.           Follow Up   No follow-ups on file.  Patient was given instructions and counseling regarding her condition or for health maintenance advice. Please see specific information  pulled into the AVS if appropriate.    EpicAct:MR_WS_AMB_ORDERS,RunParams:STARTUPTYPE=FOLLOW    MR_WS_AMB_DISCHARGE

## 2023-08-22 DIAGNOSIS — E11.9 DIABETES MELLITUS TYPE 2, DIET-CONTROLLED: ICD-10-CM

## 2023-08-22 DIAGNOSIS — E03.9 HYPOTHYROIDISM, ADULT: ICD-10-CM

## 2023-08-22 DIAGNOSIS — E66.9 OBESITY (BMI 30.0-34.9): ICD-10-CM

## 2023-08-22 DIAGNOSIS — E78.2 MIXED HYPERLIPIDEMIA: ICD-10-CM

## 2023-08-22 LAB
ALBUMIN SERPL-MCNC: 4.4 G/DL (ref 3.5–5.2)
ALBUMIN/GLOB SERPL: 1.6 G/DL
ALP SERPL-CCNC: 69 U/L (ref 39–117)
ALT SERPL-CCNC: 13 U/L (ref 1–33)
AST SERPL-CCNC: 14 U/L (ref 1–32)
BILIRUB SERPL-MCNC: 0.3 MG/DL (ref 0–1.2)
BUN SERPL-MCNC: 15 MG/DL (ref 6–20)
BUN/CREAT SERPL: 20.3 (ref 7–25)
CALCIUM SERPL-MCNC: 9.5 MG/DL (ref 8.6–10.5)
CHLORIDE SERPL-SCNC: 106 MMOL/L (ref 98–107)
CHOLEST SERPL-MCNC: 191 MG/DL (ref 0–200)
CO2 SERPL-SCNC: 24.4 MMOL/L (ref 22–29)
CREAT SERPL-MCNC: 0.74 MG/DL (ref 0.57–1)
EGFRCR SERPLBLD CKD-EPI 2021: 97.5 ML/MIN/1.73
GLOBULIN SER CALC-MCNC: 2.7 GM/DL
GLUCOSE SERPL-MCNC: 111 MG/DL (ref 65–99)
HBA1C MFR BLD: 5.7 % (ref 4.8–5.6)
HDLC SERPL-MCNC: 41 MG/DL (ref 40–60)
LDLC SERPL CALC-MCNC: 110 MG/DL (ref 0–100)
POTASSIUM SERPL-SCNC: 4.2 MMOL/L (ref 3.5–5.2)
PROT SERPL-MCNC: 7.1 G/DL (ref 6–8.5)
SODIUM SERPL-SCNC: 141 MMOL/L (ref 136–145)
TRIGL SERPL-MCNC: 233 MG/DL (ref 0–150)
TSH SERPL DL<=0.005 MIU/L-ACNC: 2.5 UIU/ML (ref 0.27–4.2)
VLDLC SERPL CALC-MCNC: 40 MG/DL (ref 5–40)

## 2023-08-23 ENCOUNTER — TELEPHONE (OUTPATIENT)
Dept: FAMILY MEDICINE CLINIC | Facility: CLINIC | Age: 53
End: 2023-08-23
Payer: MEDICAID

## 2023-08-23 DIAGNOSIS — E78.2 MIXED HYPERLIPIDEMIA: ICD-10-CM

## 2023-08-23 RX ORDER — PRAVASTATIN SODIUM 20 MG
40 TABLET ORAL DAILY
Qty: 180 TABLET | Refills: 1 | Status: SHIPPED | OUTPATIENT
Start: 2023-08-23

## 2023-08-23 RX ORDER — TIRZEPATIDE 2.5 MG/.5ML
2.5 INJECTION, SOLUTION SUBCUTANEOUS WEEKLY
Qty: 2 ML | Refills: 0 | Status: SHIPPED | OUTPATIENT
Start: 2023-08-23

## 2023-08-23 NOTE — TELEPHONE ENCOUNTER
Patient wants new script sent in she only has 20 pills left and she will run out. To ora anderson .  She will double up on pills she has now til she gets new script.

## 2023-08-23 NOTE — TELEPHONE ENCOUNTER
Please advise it has been normal before on the same dose of pravastatin. Per our conversation she has not been eating the best last few weeks with increase craving. I recommend to monitor diet first get back on track before increasing or adding medication.  If she thinks she is doing well with diet and would like to increase medication she can take 2 tabs vs 1 tab.

## 2023-08-23 NOTE — TELEPHONE ENCOUNTER
Pa sent in through cover my meds with E11.9,E78.5,i82.409,dvt tried metformin /sugar diet not working.

## 2023-08-23 NOTE — TELEPHONE ENCOUNTER
Caller: Li Kimball    Relationship: Self    Best call back number: [phone]    What is the best time to reach you:ANYTIME    Who are you requesting to speak with (clinical staff, provider,  specific staff member):CLINICAL    What was the call regarding: PATIENT WANTED TO KNOW IF SHE NEEDED ANY MEDICATION CHANGED DUE TO HER BLOOD WORK. PATIENT ALSO NEEDS A PA FOR HER MONJURO, PATIENT STATED IT NEEDS TO STATE FOR DIABETES AND NOT WEIGHT LOSS.

## 2023-08-28 ENCOUNTER — TELEPHONE (OUTPATIENT)
Dept: FAMILY MEDICINE CLINIC | Facility: CLINIC | Age: 53
End: 2023-08-28
Payer: MEDICAID

## 2023-08-28 NOTE — TELEPHONE ENCOUNTER
Called [facility] Rita LAKHANI At [phone] she said she didn't know why needed to call but gave me Pharmacy # for [facility] [phone]  OURtax id# [id number]  NPI # [id number]  her ID# [id number] dx E11.9 type 2 diabetic

## 2023-08-28 NOTE — TELEPHONE ENCOUNTER
Caller: Li Kimball    Relationship to patient: Self    Best call back number: [phone]    Patient is needing: PATIENT IS REQUESTING THAT DR. HIGHTOWER CALL HER INSURANCE COMPANY ABOUT HER MONJARO. SHE STATES THAT THE PHARMACY TOLD HER THEY THEY WANT TO TALK TO THE DOCTOR. PLEASE CALL.       [facility]  [phone]

## 2023-08-30 ENCOUNTER — TELEPHONE (OUTPATIENT)
Dept: FAMILY MEDICINE CLINIC | Facility: CLINIC | Age: 53
End: 2023-08-30
Payer: MEDICAID

## 2023-08-30 NOTE — TELEPHONE ENCOUNTER
Approval for 1 year 8/2923-8/28/24   Telephone Encounter by Christina Webber at 06/05/18 01:38 PM     Author:  Christina Webber Service:  (none) Author Type:  Patient      Filed:  06/05/18 01:39 PM Encounter Date:  6/4/2018 Status:  Signed     :  Christina Webber (Patient )            [facility] calling back, pls return call at [phone].[CG1.1M]      Revision History        User Key Date/Time User Provider Type Action    > CG1.1 06/05/18 01:39 PM Christina Webber Patient  Sign    M - Manual

## 2023-09-05 ENCOUNTER — TELEPHONE (OUTPATIENT)
Dept: FAMILY MEDICINE CLINIC | Facility: CLINIC | Age: 53
End: 2023-09-05

## 2023-09-05 NOTE — TELEPHONE ENCOUNTER
Caller: Li Kimball    Relationship: Self    Best call back number: [phone]     Who are you requesting to speak with (clinical staff, provider,  specific staff member): DR HIGHTOWER OR MA    What was the call regarding: PATIENT REQUESTS A CALL BACK TO DISCUSS FUTURE REFILLS AND DOSAGE CHANGE TO Tirzepatide (Mounjaro) 2.5 MG/0.5ML solution pen-injector

## 2023-09-06 NOTE — TELEPHONE ENCOUNTER
Called patient what dose is she on ? She just received dosing should just be finishing first dose 2.5 ,is she ready for .5 4weeks? L/m to call back

## 2023-09-06 NOTE — TELEPHONE ENCOUNTER
Subhash---Patient has only taken one shot  2.5 is doing good so far but noticed she has no refills told her we needed to make sure she can tolerate and she would have to call us for new refill when time which wont be til mid sept. She said for me to call if you needed anything else. She has appt 11/6 at 745am for you/lab

## 2023-09-07 RX ORDER — TIRZEPATIDE 5 MG/.5ML
5 INJECTION, SOLUTION SUBCUTANEOUS WEEKLY
Qty: 2 ML | Refills: 2 | Status: SHIPPED | OUTPATIENT
Start: 2023-09-07

## 2023-09-13 ENCOUNTER — TELEPHONE (OUTPATIENT)
Dept: FAMILY MEDICINE CLINIC | Facility: CLINIC | Age: 53
End: 2023-09-13

## 2023-09-13 RX ORDER — LEVOTHYROXINE SODIUM 0.03 MG/1
25 TABLET ORAL DAILY
Qty: 90 TABLET | Refills: 0 | Status: SHIPPED | OUTPATIENT
Start: 2023-09-13 | End: 2023-09-14 | Stop reason: SDUPTHER

## 2023-09-13 NOTE — TELEPHONE ENCOUNTER
Caller: [place]    Relationship: Self    Best call back number: [phone]     What medications are you currently taking:   Current Outpatient Medications on File Prior to Visit   Medication Sig Dispense Refill    apixaban (Eliquis) 2.5 MG tablet tablet Take 1 tablet by mouth Every 12 (Twelve) Hours. 60 tablet 5    finasteride (PROSCAR) 5 MG tablet TAKE 1/4 TABLET BY MOUTH EVERY DAY      levothyroxine (SYNTHROID, LEVOTHROID) 25 MCG tablet TAKE 1 TABLET BY MOUTH DAILY 30 tablet 3    metFORMIN ER (GLUCOPHAGE-XR) 500 MG 24 hr tablet TAKE 2 TABLETS BY MOUTH DAILY 180 tablet 0    minoxidil (LONITEN) 2.5 MG tablet Take 0.5 tablets by mouth Daily.      pravastatin (PRAVACHOL) 20 MG tablet Take 2 tablets by mouth Daily. 180 tablet 1    Tirzepatide (Mounjaro) 5 MG/0.5ML solution pen-injector Inject 0.5 mL under the skin into the appropriate area as directed 1 (One) Time Per Week. 2 mL 2     No current facility-administered medications on file prior to visit.              Which medication are you concerned about: levothyroxine (SYNTHROID, LEVOTHROID) 25 MCG tablet     Who prescribed you this medication: DR HIGHTOWER    What are your concerns: PATIENT STATES [facility] TOLD HER THEY HAVE BEEN TRYING TO GET THE ABOVE MEDICATIONS FOR OVER A WEEK AND HAVE NOT RECEIVED A RESPONSE FROM DR HIGHTOWER.  PATIENT STATES SHE IS GETTING TIRED OF HAVING TO CONTINUE TO CALL BACK TO GET THE REFILLS SHE NEEDS.  SHE STATE THE PHARMACY TOLD HER THAT HE HAS BEEN SENDING A REQUEST SINCE SEPTEMBER 02, AND HAS NOT RECEIVED A RESPONSE FROM DR. HIGHTOWER.  SHE STATES SHE HAS BEEN DEALING WITH THIS ISSUE FOR A LONG TIME FOR ONE OR ANOTHER OF HER MEDICATIONS.  SHE STATES SHE DOES NOT KNOW WHERE THE ISSUE LIES, BUT SHE WANTS DR HIGHTOWER  TO KNOW THAT THERE IS AN ISSUE AND SHE WOULD LIKE TO GET THE ISSUE FIXED.    [id number] - [place], [place] - [address] AT [facility] [address] [facility] [phone] [facility] [phone] FX  [phone]     PLEASE ADVISE.

## 2023-09-13 NOTE — TELEPHONE ENCOUNTER
Sent script tried to call pharmacy had to l/m on v/m,informed patient never got request, and I'd let you know fyceasar

## 2023-09-14 RX ORDER — LEVOTHYROXINE SODIUM 0.03 MG/1
25 TABLET ORAL DAILY
Qty: 90 TABLET | Refills: 2 | Status: SHIPPED | OUTPATIENT
Start: 2023-09-14

## 2023-10-09 ENCOUNTER — TELEPHONE (OUTPATIENT)
Dept: FAMILY MEDICINE CLINIC | Facility: CLINIC | Age: 53
End: 2023-10-09

## 2023-10-09 NOTE — TELEPHONE ENCOUNTER
Caller: Li Kimball    Relationship: Self    Best call back number: [phone]    What medication are you requesting: MOUNJARO-NEXT DOSAGE AMOUNT    If a prescription is needed, what is your preferred pharmacy and phone number: [facility] #[id number] [place], [place] - [address] [facility] [address] [facility] [phone] [facility] [phone]      Additional notes: PATIENT HAS FOUR WEEKS OF MEDICATION RIGHT NOW AT HER CURRENT DOSAGE BUT WANTS TO MAKE SURE DR HIGHTOWER CAN SEND IN THE NEXT DOSAGE WHEN SHE NEEDS A REFILL.

## 2023-10-10 NOTE — PROGRESS NOTES
Andrae Bragg is requesting a refill of lisinopril (ZESTRIL) 10 MG tablet for a 30 day supply and would like sent to local pharmacy, [facility] on [address].     Ok to leave a detailed message on voicemail Yes   You have chosen to receive care through a telehealth visit.  Do you consent to use a video/audio connection for your medical care today? Yes    Chief Complaint  Diabetes and Weight Check    Subjective    History of Present Illness {CC  Problem List  Visit  Diagnosis   Encounters  Notes  Medications  Labs  Result Review Imaging  Media :23}     Li Kimball presents to [facility] for   History of Present Illness     52 yo female present for video visit to discuss her weight loss management and blood sugar.  She has been taking medication Wegovy for the last 6 months from another provider. The weight loss center previously prescribed has closed up.  She is currently down to 176 from 247.      She has significant decrease in A1c since starting medication which she was trying to control blood sugar with diet and exercise before.   Last A1c improved to 5.9 previously elevated 6.97.        She is monitoring diet, avoiding food which has triggered kidney stones.   Currently taking 1.7 mg  For the last one month.       Social History     Socioeconomic History   • Marital status:    Tobacco Use   • Smoking status: Never Smoker   • Smokeless tobacco: Never Used   Vaping Use   • Vaping Use: Never used   Substance and Sexual Activity   • Alcohol use: No   • Drug use: No   • Sexual activity: Defer      Objective     Vital Signs:     There were no vitals taken for this visit.      Physical Exam  Constitutional:       General: She is not in acute distress.     Appearance: She is not ill-appearing.   HENT:      Head: Normocephalic.   Neurological:      Mental Status: She is alert.        Result Review  Data Reviewed:{ Labs  Result Review  Imaging  Med Tab  Media :23}   The following data was reviewed by: Christy Grey MD on 08/10/2022  Lab Results - Last 18 Months   Lab Units 07/08/22  0903 06/30/22  0602 06/29/22  1645 05/03/22  0759 05/03/22  0758 03/04/22  0907  06/14/21  1412   BUN mg/dL  --  16 15  --  20 15 16   CREATININE mg/dL  --  0.89 0.73  --  0.75 0.68 0.73   EGFR IF NONAFRICN AM mL/min/1.73  --   --   --   --   --   --  84   EGFR IF AFRICN AM mL/min/1.73  --   --   --   --   --   --  102   SODIUM mmol/L  --  139 142  --  140 143 142   POTASSIUM mmol/L  --  4.1 3.6  --  3.5 3.9 4.2   CHLORIDE mmol/L  --  105 103  --  105 107* 106   CALCIUM mg/dL  --  8.7 9.8  --  9.7 9.3 9.2   ALBUMIN g/dL  --   --  4.40  --  4.20 4.1 4.40   BILIRUBIN mg/dL  --   --  0.4  --  0.4 0.2 0.4   ALK PHOS U/L  --   --  100  --  73 72 102   AST (SGOT) U/L  --   --  9  --  12 14 15   ALT (SGPT) U/L  --   --  17  --  12 13 19   CHOLESTEROL mg/dL 133  --   --   --   --  139 166   TRIGLYCERIDES mg/dL 132  --   --   --   --  136 173*   HDL CHOL mg/dL 35*  --   --   --   --  41 35*   VLDL CHOLESTEROL MALLIKA mg/dL 24  --   --   --   --  24 30   LDL CHOL mg/dL 74  --   --   --   --  74 101*   HEMOGLOBIN A1C %  --   --   --   --   --  5.9* 6.00*   MICROALB UR ug/mL  --   --   --   --   --  17.3 14.6   WBC 10*3/mm3  --  13.35* 12.65* 10.74  --  7.5  --    RBC 10*6/mm3  --  4.20 4.82 4.62  --  4.49  --    HEMATOCRIT %  --  36.6 42.1 41.8  --  39.7  --    MCV fL  --  87.1 87.3 90.5  --  88  --    MCH pg  --  29.3 29.0 29.2  --  29.2  --    TSH uIU/mL  --   --   --   --   --  0.852 1.770   FREE T4 ng/dL  --   --   --   --   --   --  0.88*              Current Outpatient Medications:   •  Wegovy 1.7 MG/0.75ML solution auto-injector, Inject 0.75 mL under the skin into the appropriate area as directed Every 7 (Seven) Days., Disp: 0.75 mL, Rfl: 3  •  apixaban (Eliquis) 2.5 MG tablet tablet, Take 1 tablet by mouth Every 12 (Twelve) Hours., Disp: 60 tablet, Rfl: 5  •  HYDROcodone-acetaminophen (NORCO) 5-325 MG per tablet, Take 1 tablet by mouth Every 6 (Six) Hours As Needed for Moderate Pain ., Disp: 12 tablet, Rfl: 0  •  HYDROcodone-acetaminophen (Norco) 5-325 MG per tablet, Take 1 tablet by mouth Every 6  (Six) Hours As Needed for Moderate Pain ., Disp: 20 tablet, Rfl: 0  •  levothyroxine (SYNTHROID, LEVOTHROID) 50 MCG tablet, TAKE 1 TABLET BY MOUTH DAILY, Disp: 90 tablet, Rfl: 1  •  metFORMIN ER (GLUCOPHAGE-XR) 500 MG 24 hr tablet, Take 2 tablets by mouth Daily., Disp: 180 tablet, Rfl: 1  •  ondansetron (Zofran) 4 MG tablet, Take 1 tablet by mouth Every 8 (Eight) Hours As Needed for Nausea or Vomiting., Disp: 21 tablet, Rfl: 0  •  ondansetron ODT (ZOFRAN-ODT) 4 MG disintegrating tablet, Place 1 tablet on the tongue 4 (Four) Times a Day As Needed for Nausea or Vomiting., Disp: 15 tablet, Rfl: 0  •  pravastatin (PRAVACHOL) 20 MG tablet, Take 1 tablet by mouth Daily., Disp: 90 tablet, Rfl: 1  •  sulfamethoxazole-trimethoprim (Bactrim DS) 800-160 MG per tablet, Take 1 tablet by mouth 2 (Two) Times a Day., Disp: 10 tablet, Rfl: 0      Assessment and Plan {CC Problem List  Visit Diagnosis  ROS  Review (Popup)  Health Maintenance  Quality  BestPractice  Medications  SmartSets  SnapShot Encounters  Media :23}   Problem List Items Addressed This Visit        Endocrine and Metabolic    Diabetes mellitus type 2, diet-controlled (HCC) - Primary    Current Assessment & Plan     Diabetes is controlled with weight loss and diet.    Elevated A1c in years past, A1c has improved with weight loss using Wegovy.    Continue current treatment regimen.  refilled medication    Advised continue to monitor diet  30 min exercise daily as needed.   Diabetes will be reassessed in 3 months.         Obesity (BMI 30.0-34.9)    Overview     Working with weight loss center until recently, closed         Current Assessment & Plan     Continue wegovy   Continue to monitor diet  Follow up in a few weeks   Advise medication is not meant to take for long term  Continue to discuss weight loss goals                  Follow Up   Return in about 3 months (around 11/10/2022).  Patient was given instructions and counseling regarding her condition  or for health maintenance advice. Please see specific information pulled into the AVS if appropriate.    EpicAct:[email or web address]_ORDERS,RunParams:STARTUPTYPE=FOLLOW    [email or web address]_DISCHARGE

## 2023-10-17 ENCOUNTER — OFFICE VISIT (OUTPATIENT)
Dept: FAMILY MEDICINE CLINIC | Facility: CLINIC | Age: 53
End: 2023-10-17
Payer: MEDICAID

## 2023-10-17 VITALS
BODY MASS INDEX: 35.65 KG/M2 | SYSTOLIC BLOOD PRESSURE: 120 MMHG | TEMPERATURE: 97.7 F | DIASTOLIC BLOOD PRESSURE: 72 MMHG | WEIGHT: 201.2 LBS | HEIGHT: 63 IN | OXYGEN SATURATION: 92 % | HEART RATE: 88 BPM

## 2023-10-17 DIAGNOSIS — R11.0 NAUSEA: ICD-10-CM

## 2023-10-17 DIAGNOSIS — J45.909 MILD ASTHMA, UNSPECIFIED WHETHER COMPLICATED, UNSPECIFIED WHETHER PERSISTENT: Primary | ICD-10-CM

## 2023-10-17 PROCEDURE — 1159F MED LIST DOCD IN RCRD: CPT | Performed by: FAMILY MEDICINE

## 2023-10-17 PROCEDURE — 1160F RVW MEDS BY RX/DR IN RCRD: CPT | Performed by: FAMILY MEDICINE

## 2023-10-17 PROCEDURE — 3044F HG A1C LEVEL LT 7.0%: CPT | Performed by: FAMILY MEDICINE

## 2023-10-17 PROCEDURE — 99213 OFFICE O/P EST LOW 20 MIN: CPT | Performed by: FAMILY MEDICINE

## 2023-10-17 RX ORDER — OMEPRAZOLE 20 MG/1
20 CAPSULE, DELAYED RELEASE ORAL DAILY
Qty: 30 CAPSULE | Refills: 3 | Status: SHIPPED | OUTPATIENT
Start: 2023-10-17

## 2023-10-17 RX ORDER — ALBUTEROL SULFATE 90 UG/1
2 AEROSOL, METERED RESPIRATORY (INHALATION) EVERY 6 HOURS PRN
Qty: 8 G | Refills: 3 | Status: SHIPPED | OUTPATIENT
Start: 2023-10-17

## 2023-10-17 RX ORDER — TIRZEPATIDE 7.5 MG/.5ML
7.5 INJECTION, SOLUTION SUBCUTANEOUS WEEKLY
Qty: 2 ML | Refills: 0 | Status: SHIPPED | OUTPATIENT
Start: 2023-10-17

## 2023-10-17 RX ORDER — ONDANSETRON 4 MG/1
4 TABLET, FILM COATED ORAL EVERY 12 HOURS PRN
Qty: 30 TABLET | Refills: 0 | Status: SHIPPED | OUTPATIENT
Start: 2023-10-17

## 2023-10-17 RX ORDER — PRAVASTATIN SODIUM 40 MG
40 TABLET ORAL DAILY
COMMUNITY
Start: 2023-08-24

## 2023-10-17 NOTE — PROGRESS NOTES
"    Chief Complaint  dry cough over a month, eat at [employer] diarrhea-sick in stomach, and dose? on mourjorno    Subjective    History of Present Illness {CC  Problem List  Visit  Diagnosis   Encounters  Notes  Medications  Labs  Result Review Imaging  Media :23}     Li Kimball presents to [facility] for   History of Present Illness     54 yo female present for follow up visit.  She states she had a cough for the last month. Dry cough.     Four days ago, states she ate [employer] and begin to feel bad. Smell of food make sick to stomach. Diarrhea, which has slowed down since the weekend.  No vomiting just nausea.   States still runny some still.    States she doing mounjaro on Saturday, would like to increase to next dose. Discussed medication may be contributing to current GI symptoms as it is side effect.  Advise may need to say at current dose for a moment to see is symptoms improve before going up.     Advise to use albuterol inhaler needing a refill. If no improvement in cough may need to follow up with GI specialist.       Social History     Socioeconomic History    Marital status:    Tobacco Use    Smoking status: Never    Smokeless tobacco: Never   Vaping Use    Vaping Use: Never used   Substance and Sexual Activity    Alcohol use: No    Drug use: No    Sexual activity: Defer      Objective     Vital Signs:   /72   Pulse 88   Temp 97.7 °F (36.5 °C)   Ht 160 cm (63\")   Wt 91.3 kg (201 lb 3.2 oz)   SpO2 92%   BMI 35.64 kg/m²   Physical Exam  HENT:      Head: Normocephalic.   Cardiovascular:      Rate and Rhythm: Regular rhythm.      Heart sounds: Normal heart sounds.   Pulmonary:      Breath sounds: Normal breath sounds.   Abdominal:      Palpations: Abdomen is soft.      Tenderness: There is no abdominal tenderness. There is no guarding or rebound.   Neurological:      Mental Status: She is alert.        Result Review  Data Reviewed:{ Labs  Result " Review  Imaging  Med Tab  Media :23}   The following data was reviewed by: Christy Grey MD on 10/17/2023  Lab Results - Last 18 Months   Lab Units 08/22/23  1150 08/22/23  0944 04/18/23  0914 01/03/23  0856 11/23/22  1215 07/08/22  0903 06/30/22  0602 06/29/22  1645   BUN mg/dL  --  15 21*  --   --   --  16 15   CREATININE mg/dL  --  0.74 0.71  --   --   --  0.89 0.73   SODIUM mmol/L  --  141 144  --   --   --  139 142   POTASSIUM mmol/L  --  4.2 4.2  --   --   --  4.1 3.6   CHLORIDE mmol/L  --  106 106  --   --   --  105 103   CALCIUM mg/dL  --  9.5 9.5  --   --   --  8.7 9.8   ALBUMIN g/dL  --  4.4 4.4  --   --   --   --  4.40   BILIRUBIN mg/dL  --  0.3 0.4  --   --   --   --  0.4   ALK PHOS U/L  --  69 74  --   --   --   --  100   AST (SGOT) U/L  --  14 14  --   --   --   --  9   ALT (SGPT) U/L  --  13 10  --   --   --   --  17   CHOLESTEROL mg/dL  --  191 166  --   --  133  --   --    TRIGLYCERIDES mg/dL  --  233* 134  --   --  132  --   --    HDL CHOL mg/dL  --  41 44  --   --  35*  --   --    VLDL CHOLESTEROL MALLIKA mg/dL  --  40 24  --   --  24  --   --    LDL CHOL mg/dL  --  110* 98  --   --  74  --   --    HEMOGLOBIN A1C %  --  5.70* 5.70* 5.7*  --   --   --   --    MICROALB UR ug/mL 13.8  --   --   --   --   --   --   --    WBC 10*3/mm3  --   --  6.02  --   --   --  13.35* 12.65*   RBC 10*6/mm3  --   --  4.41  --   --   --  4.20 4.82   HEMATOCRIT %  --   --  39.3  --   --   --  36.6 42.1   MCV fL  --   --  89.1  --   --   --  87.1 87.3   MCH pg  --   --  29.7  --   --   --  29.3 29.0   TSH uIU/mL  --  2.500 2.350  --   --   --   --   --    FREE T4 ng/dL  --   --   --   --  0.94  --   --   --               Current Outpatient Medications:     apixaban (Eliquis) 2.5 MG tablet tablet, Take 1 tablet by mouth Every 12 (Twelve) Hours., Disp: 60 tablet, Rfl: 5    finasteride (PROSCAR) 5 MG tablet, TAKE 1/4 TABLET BY MOUTH EVERY DAY, Disp: , Rfl:     levothyroxine (SYNTHROID, LEVOTHROID) 25 MCG tablet, Take 1  tablet by mouth Daily., Disp: 90 tablet, Rfl: 2    metFORMIN ER (GLUCOPHAGE-XR) 500 MG 24 hr tablet, TAKE 2 TABLETS BY MOUTH DAILY, Disp: 180 tablet, Rfl: 0    minoxidil (LONITEN) 2.5 MG tablet, Take 0.5 tablets by mouth Daily., Disp: , Rfl:     pravastatin (PRAVACHOL) 40 MG tablet, Take 1 tablet by mouth Daily., Disp: , Rfl:     Tirzepatide (Mounjaro) 5 MG/0.5ML solution pen-injector, Inject 0.5 mL under the skin into the appropriate area as directed 1 (One) Time Per Week., Disp: 2 mL, Rfl: 2      Assessment and Plan {CC Problem List  Visit Diagnosis  ROS  Review (Popup)  Health Maintenance  Quality  BestPractice  Medications  SmartSets  SnapShot Encounters  Media :23}   Problem List Items Addressed This Visit       Asthma - Primary    Relevant Medications    albuterol sulfate  (90 Base) MCG/ACT inhaler     Other Visit Diagnoses       Nausea                Follow Up   Return in about 3 months (around 1/17/2024) for with new provider.  Patient was given instructions and counseling regarding her condition or for health maintenance advice. Please see specific information pulled into the AVS if appropriate.    EpicAct:[email or web address]_ORDERS,RunParams:STARTUPTYPE=FOLLOW    [email or web address]_DISCHARGE

## 2023-10-24 ENCOUNTER — TELEPHONE (OUTPATIENT)
Dept: FAMILY MEDICINE CLINIC | Facility: CLINIC | Age: 53
End: 2023-10-24
Payer: MEDICAID

## 2023-10-26 NOTE — TELEPHONE ENCOUNTER
Caller: Li Kimball    Relationship: Self    Best call back number: [phone] APPROVED DETAILED VOICEMAIL.    What was the call regarding: THE PATIENT REPORTS SHE HAS SYMPTOMS OF DIARRHEA AND NAUSEA.  SHE BELIEVES IT MAY BE A SIDE EFFECT TO  Tirzepatide (Mounjaro) 7.5 MG/0.5ML solution pen-injector   SO SHE IS SKIPPING THIS WEEK'S DOSE. SHE WOULD LIKE CONFIRMATION THAT ITS OK TO SKIP THIS MEDICATION THIS WEEK.  
Patient is still on lowest dose. Skipped last week but will try again this week. Will follow up with Laryn 11/6.   
Please advise she may need to go back to lower dose if not tolerating this higher dose.   
- - -

## 2023-11-06 ENCOUNTER — OFFICE VISIT (OUTPATIENT)
Dept: FAMILY MEDICINE CLINIC | Facility: CLINIC | Age: 53
End: 2023-11-06
Payer: MEDICAID

## 2023-11-06 VITALS
HEART RATE: 89 BPM | SYSTOLIC BLOOD PRESSURE: 122 MMHG | TEMPERATURE: 97.5 F | OXYGEN SATURATION: 99 % | DIASTOLIC BLOOD PRESSURE: 86 MMHG | HEIGHT: 63 IN | BODY MASS INDEX: 36.93 KG/M2 | WEIGHT: 208.4 LBS

## 2023-11-06 DIAGNOSIS — E11.9 DIABETES MELLITUS TYPE 2, DIET-CONTROLLED: ICD-10-CM

## 2023-11-06 DIAGNOSIS — R11.2 NAUSEA AND VOMITING, UNSPECIFIED VOMITING TYPE: ICD-10-CM

## 2023-11-06 DIAGNOSIS — E78.2 MIXED HYPERLIPIDEMIA: Primary | ICD-10-CM

## 2023-11-06 DIAGNOSIS — R05.1 ACUTE COUGH: ICD-10-CM

## 2023-11-06 DIAGNOSIS — E03.9 HYPOTHYROIDISM, ADULT: ICD-10-CM

## 2023-11-06 LAB
CHOLEST SERPL-MCNC: 174 MG/DL (ref 0–200)
HBA1C MFR BLD: 5.8 % (ref 4.8–5.6)
HDLC SERPL-MCNC: 35 MG/DL (ref 40–60)
LDLC SERPL CALC-MCNC: 77 MG/DL (ref 0–100)
TRIGL SERPL-MCNC: 384 MG/DL (ref 0–150)
TSH SERPL DL<=0.005 MIU/L-ACNC: 3.2 UIU/ML (ref 0.27–4.2)
VLDLC SERPL CALC-MCNC: 62 MG/DL (ref 5–40)

## 2023-11-06 RX ORDER — ONDANSETRON 4 MG/1
4 TABLET, FILM COATED ORAL EVERY 8 HOURS PRN
Qty: 30 TABLET | Refills: 1 | Status: SHIPPED | OUTPATIENT
Start: 2023-11-06

## 2023-11-06 RX ORDER — BENZONATATE 100 MG/1
100 CAPSULE ORAL 3 TIMES DAILY PRN
Qty: 30 CAPSULE | Refills: 1 | Status: SHIPPED | OUTPATIENT
Start: 2023-11-06

## 2023-11-06 NOTE — PROGRESS NOTES
"Chief Complaint  Med Refill (Discuss mounjaro dosing/Ill after injection-one week/) and Hyperlipidemia (Fasting-due labs/Chol med changed)    Subjective        Li Kimball presents to [facility]    History of Present Illness  53 year old female presents to discuss Mounjaro.  Patient states she had episode where she was unsure if she had side effects of Mounjaro or had food poisoning from [employer].  Had episode of nausea, vomiting, and diarrhea.  She had skipped a week of medication but gave herself injection this past Saturday with no side effects.  She is due to go up on dose.  Zofran sent to pharmacy to use if side effects occur.  Hemoglobin A1c ordered.  Lipid panel ordered for hyperlipidemia.  TSH ordered for hypothyroidism.  Patient states she has had a dry cough.  No wheezing found on exam.  Not currently using her inhaler but taking cough drops.  Tessalon sent to pharmacy.  Encouraged daily allergy pill.      Objective   Vital Signs:  /86 (BP Location: Left arm, Patient Position: Sitting, Cuff Size: Large Adult)   Pulse 89   Temp 97.5 °F (36.4 °C)   Ht 160 cm (62.99\")   Wt 94.5 kg (208 lb 6.4 oz)   SpO2 99%   BMI 36.93 kg/m²   Estimated body mass index is 36.93 kg/m² as calculated from the following:    Height as of this encounter: 160 cm (62.99\").    Weight as of this encounter: 94.5 kg (208 lb 6.4 oz).       Class 2 Severe Obesity (BMI >=35 and <=39.9). Obesity-related health conditions include the following: diabetes mellitus and dyslipidemias. Obesity is unchanged. BMI is is above average; BMI management plan is completed. We discussed portion control and increasing exercise.      Physical Exam  Constitutional:       Appearance: Normal appearance.   HENT:      Head: Normocephalic.      Nose: Nose normal. No congestion.      Right Sinus: No maxillary sinus tenderness or frontal sinus tenderness.      Left Sinus: No maxillary sinus tenderness or frontal sinus " tenderness.   Eyes:      Conjunctiva/sclera: Conjunctivae normal.      Pupils: Pupils are equal, round, and reactive to light.   Cardiovascular:      Rate and Rhythm: Normal rate and regular rhythm.      Pulses: Normal pulses.      Heart sounds: Normal heart sounds.   Pulmonary:      Effort: Pulmonary effort is normal.      Breath sounds: Normal breath sounds. No wheezing.   Skin:     General: Skin is warm.   Neurological:      General: No focal deficit present.      Mental Status: She is alert and oriented to person, place, and time.   Psychiatric:         Mood and Affect: Mood normal.         Behavior: Behavior normal.            Result Review :  The following data was reviewed by: DANIEL Wen on 11/06/2023:  Common labs          1/3/2023    08:56 4/18/2023    09:14 8/22/2023    09:44 8/22/2023    11:50   Common Labs   Glucose  96  111     BUN  21  15     Creatinine  0.71  0.74     Sodium  144  141     Potassium  4.2  4.2     Chloride  106  106     Calcium  9.5  9.5     Total Protein  7.3  7.1     Albumin  4.4  4.4     Total Bilirubin  0.4  0.3     Alkaline Phosphatase  74  69     AST (SGOT)  14  14     ALT (SGPT)  10  13     WBC  6.02      Hemoglobin  13.1      Hematocrit  39.3      Platelets  319      Total Cholesterol  166  191     Triglycerides  134  233     HDL Cholesterol  44  41     LDL Cholesterol   98  110     Hemoglobin A1C 5.7  5.70  5.70     Microalbumin, Urine    13.8      Data reviewed : labs             Assessment and Plan   Diagnoses and all orders for this visit:    1. Mixed hyperlipidemia (Primary)  -     Lipid panel    2. Hypothyroidism, adult  -     TSH Rfx On Abnormal To Free T4    3. Diabetes mellitus type 2, diet-controlled  -     Hemoglobin A1c    4. Nausea and vomiting, unspecified vomiting type  -     ondansetron (Zofran) 4 MG tablet; Take 1 tablet by mouth Every 8 (Eight) Hours As Needed for Nausea or Vomiting.  Dispense: 30 tablet; Refill: 1    5. Acute cough  -     benzonatate  (Kannan Arana) 100 MG capsule; Take 1 capsule by mouth 3 (Three) Times a Day As Needed for Cough.  Dispense: 30 capsule; Refill: 1             Follow Up   No follow-ups on file.  Patient was given instructions and counseling regarding her condition or for health maintenance advice. Please see specific information pulled into the AVS if appropriate.

## 2023-11-10 ENCOUNTER — TELEPHONE (OUTPATIENT)
Dept: FAMILY MEDICINE CLINIC | Facility: CLINIC | Age: 53
End: 2023-11-10
Payer: MEDICAID

## 2023-11-10 DIAGNOSIS — E78.2 MIXED HYPERLIPIDEMIA: Primary | ICD-10-CM

## 2023-11-10 RX ORDER — FENOFIBRATE 48 MG/1
48 TABLET, COATED ORAL DAILY
Qty: 90 TABLET | Refills: 0 | Status: SHIPPED | OUTPATIENT
Start: 2023-11-10

## 2023-11-10 NOTE — TELEPHONE ENCOUNTER
Caller: Li Kimball    Relationship: Self    Best call back number: [phone]    Caller requesting test results: SELF    What test was performed: LABS     When was the test performed: 11/6/23    Where was the test performed: IN OFFICE     Additional notes: PLEASE CALL WITH RESULTS

## 2023-11-21 ENCOUNTER — TELEPHONE (OUTPATIENT)
Dept: FAMILY MEDICINE CLINIC | Facility: CLINIC | Age: 53
End: 2023-11-21
Payer: MEDICAID

## 2023-11-21 NOTE — TELEPHONE ENCOUNTER
Caller: Li Kimball    Relationship: Self    Best call back number: [phone]     Which medication are you concerned about: Tirzepatide (Mounjaro) 7.5 MG/0.5ML solution pen-injector       What are your concerns:     PATIENT WOULD LIKE TO DISCUSS SIDE EFFECTS OF THIS MEDICATION WITH YOU.    SINCE  GOING UP ON THE DOSAGE SHE HAS BEEN SICK TO HER STOMACH AND HAS HAD SEVERE DIARRHEA REALLY BAD AND WANTS TO KNOW WHAT SHE SHOULD DO?    SHE WILL NEED A REFILL IN 2 WEEKS, HOWEVER, SHE DOES NOT WANT TO GO UP IN STRENGTH DUE TO THE DIARRHEA.      IF NO ANSWER, LEAVE A MESSAGE AND SHE WILL TRY TO CALL YOU BACK.  IN THE MESSAGE LET HER KNOW IS DIARRHEA A SIDE A EFFECT AND WHAT TO DO.  THIS IS HER BIGGEST CONCERN.    PLEASE CALL AND ADVISE.

## 2023-11-22 NOTE — TELEPHONE ENCOUNTER
Spoke with pt confirmed that she is at the urgent care, pt notified to make an appt for medication changes, pt states she will not be able to miss any days of work and requesting the mounjaro dose be lowered.

## 2023-11-22 NOTE — TELEPHONE ENCOUNTER
PATIENT STATED SHE HAS HAD DIARRHEA ALL NIGHT LONG ALONG WITH NAUSEA, AND WOULD LIKE TO KNOW WHAT SUGGESTIONS JAQUAN MEDINA HAS FOR THIS.    SHE STATED SHE WANTS TO GO DOWN A STRENGTH ON THE MEDICATION AS WELL.    PLEASE CALL, SHE STATED TO PLEASE LEAVE A VOICEMAIL IF NO ANSWER.

## 2023-11-22 NOTE — TELEPHONE ENCOUNTER
Please have her follow up with venancio in office to discuss medication changes  If she is still nauseated with severe diarrhea I recommend going to urgent care.  Please advise to increase fluids.

## 2023-11-27 NOTE — TELEPHONE ENCOUNTER
PATIENT IS ASKING TO SWITCH TO RYBELSUS INSTEAD OF THE MOUNJARO DUE TO THE PROBLEMS SHE IS HAVING WITH BEING SICK FOR A WHOLE WEEK STRAIGHT. PATIENT STATES HER COUSIN WAS ON THIS OTHER MEDICATION AND HAD NO PROBLEMS AND SHE UNDERSTANDS THAT MEDICATIONS AFFECT PEOPLE DIFFERENTLY.    PATIENT IS ASKING FOR JAQUAN MEDINA TO CALL HER SPECIFICALLY IF AT ALL POSSIBLE ASAP AND PATIENT STATES SHE IS OFF WORK ON 11/28/2023 AND WOULD BE AVAILABLE TO SPEAK WITH HER.    CONTACT: [phone] (mobile)

## 2023-11-28 NOTE — TELEPHONE ENCOUNTER
Patient says she switched to you as her primary,cause Que leaving on last visit. She said Mounjaro is making her sick all the time and she is missing work . Wants to stop and try Rybselus . She said does she still need to come in when just saw you, I told her yes but she wanted me to ask you.

## 2023-12-04 ENCOUNTER — TELEPHONE (OUTPATIENT)
Dept: FAMILY MEDICINE CLINIC | Facility: CLINIC | Age: 53
End: 2023-12-04
Payer: MEDICAID

## 2023-12-12 ENCOUNTER — OFFICE VISIT (OUTPATIENT)
Dept: FAMILY MEDICINE CLINIC | Facility: CLINIC | Age: 53
End: 2023-12-12
Payer: MEDICAID

## 2023-12-12 VITALS
HEART RATE: 100 BPM | TEMPERATURE: 98 F | OXYGEN SATURATION: 99 % | WEIGHT: 209 LBS | SYSTOLIC BLOOD PRESSURE: 130 MMHG | DIASTOLIC BLOOD PRESSURE: 70 MMHG | BODY MASS INDEX: 37.03 KG/M2 | HEIGHT: 63 IN | RESPIRATION RATE: 18 BRPM

## 2023-12-12 DIAGNOSIS — E66.01 CLASS 2 SEVERE OBESITY WITH SERIOUS COMORBIDITY AND BODY MASS INDEX (BMI) OF 37.0 TO 37.9 IN ADULT, UNSPECIFIED OBESITY TYPE: ICD-10-CM

## 2023-12-12 DIAGNOSIS — E78.1 HYPERTRIGLYCERIDEMIA: ICD-10-CM

## 2023-12-12 DIAGNOSIS — E11.9 DIABETES MELLITUS TYPE 2, DIET-CONTROLLED: Primary | ICD-10-CM

## 2023-12-12 DIAGNOSIS — E78.2 MIXED HYPERLIPIDEMIA: ICD-10-CM

## 2023-12-12 PROBLEM — E66.812 CLASS 2 SEVERE OBESITY WITH SERIOUS COMORBIDITY AND BODY MASS INDEX (BMI) OF 37.0 TO 37.9 IN ADULT: Status: ACTIVE | Noted: 2023-12-12

## 2023-12-12 RX ORDER — ORAL SEMAGLUTIDE 3 MG/1
1 TABLET ORAL DAILY
Qty: 30 TABLET | Refills: 1 | Status: SHIPPED | OUTPATIENT
Start: 2023-12-12

## 2023-12-12 RX ORDER — PRAVASTATIN SODIUM 10 MG
10 TABLET ORAL DAILY
Qty: 90 TABLET | Refills: 0 | Status: SHIPPED | OUTPATIENT
Start: 2023-12-12

## 2023-12-12 RX ORDER — FENOFIBRATE 145 MG/1
145 TABLET, COATED ORAL DAILY
Qty: 30 TABLET | Refills: 5 | Status: SHIPPED | OUTPATIENT
Start: 2023-12-12

## 2023-12-12 NOTE — PROGRESS NOTES
Chief Complaint  Weight Loss (Was taking Monjauro made her sick when went up to 7.5 tolerated the 5 mg and couldn't work for a week wanted to try something else like Rybelsus)    Subjective        Li Kimball presents to [facility]  History of Present Illness  53-year-old white female who used to follow Dr. Grey for primary care is here confused as to who her primary care provider is as Dr. Grey is still listed as the PCP.  After discussion patient states she will switch her PCP to myself.  Patient states the reason for her visit is medication refill.  Patient states on Mounjaro 7.5 mg she had GI symptoms she did fine on Mounjaro 5 mg.  However patient states her friend or family member takes Rybelsus and she wants to give it a try for weight loss.  Patient does have a diagnosis of diabetes on follow-up.  Discussed starting dose of Rybelsus 3 mg p.o. daily.  Inform patient she does need to return to clinic within 2 months for increasing dose of Rybelsus after reevaluation.  Patient was a bit argumentative regarding follow-up visits.    Counseled patient to limit her food intake by avoiding excessive eating large meals while on GLP-1 agonist, patient voiced understanding. informedpatient large meals will precipitate GI symptoms while on GLP-1 agonist.    Also discussed recent labs and hypertriglyceridemia with patient.  Informed patient need to stop pravastatin at this time and increase the dose of fenofibrate however patient refused stating she had been on statin for very long time and does not want to stop.  Inform patient will increase dose of fenofibrate and reviewed the dose of pravastatin to the lowest dose as patient insist to be on both.    Discussed the importance of healthy diet, nutrition, and lifestyle. Recommend low salt, fat/cholesterol diet and avoid concentrated sweets. Encouraged DASH diet along with fresh fruits & vegetables and low fat dairy products. Counseled  "patient to exercise/walk as tolerated. Avoid tobacco and alcohol use.  Also asked patient to avoid fried food.    Patient was argumentative for length of time during the clinic visit and after lengthy explanations including patient all of the options after explaining the different drugs patient was somewhat agreeable to Rybelsus which she herself wanted to be started.      Objective   Vital Signs:  /70 (BP Location: Left arm, Patient Position: Sitting, Cuff Size: Large Adult)   Pulse 100   Temp 98 °F (36.7 °C) (Infrared)   Resp 18   Ht 160 cm (62.99\")   Wt 94.8 kg (209 lb)   SpO2 99%   BMI 37.03 kg/m²   Estimated body mass index is 37.03 kg/m² as calculated from the following:    Height as of this encounter: 160 cm (62.99\").    Weight as of this encounter: 94.8 kg (209 lb).               Physical Exam  Constitutional:       Appearance: Normal appearance.   HENT:      Head: Normocephalic and atraumatic.   Eyes:      Conjunctiva/sclera: Conjunctivae normal.   Cardiovascular:      Rate and Rhythm: Normal rate and regular rhythm.      Heart sounds: Normal heart sounds.   Pulmonary:      Effort: Pulmonary effort is normal.      Breath sounds: Normal breath sounds.   Abdominal:      General: Bowel sounds are normal.      Palpations: Abdomen is soft.      Comments: Non-tender   Skin:     General: Skin is warm.   Neurological:      General: No focal deficit present.      Mental Status: She is alert and oriented to person, place, and time.   Psychiatric:         Mood and Affect: Mood normal.         Behavior: Behavior normal.        Result Review :  The following data was reviewed by: DANIEL Martínez on 12/12/2023:  Common labs          4/18/2023    09:14 8/22/2023    09:44 8/22/2023    11:50 11/6/2023    08:57   Common Labs   Glucose 96  111      BUN 21  15      Creatinine 0.71  0.74      Sodium 144  141      Potassium 4.2  4.2      Chloride 106  106      Calcium 9.5  9.5      Total Protein 7.3  " 7.1      Albumin 4.4  4.4      Total Bilirubin 0.4  0.3      Alkaline Phosphatase 74  69      AST (SGOT) 14  14      ALT (SGPT) 10  13      WBC 6.02       Hemoglobin 13.1       Hematocrit 39.3       Platelets 319       Total Cholesterol 166  191   174    Triglycerides 134  233   384    HDL Cholesterol 44  41   35    LDL Cholesterol  98  110   77    Hemoglobin A1C 5.70  5.70   5.80    Microalbumin, Urine   13.8       TSH          4/18/2023    09:14 8/22/2023    09:44 11/6/2023    08:57   TSH   TSH 2.350  2.500  3.200      A1C Last 3 Results          4/18/2023    09:14 8/22/2023    09:44 11/6/2023    08:57   HGBA1C Last 3 Results   Hemoglobin A1C 5.70  5.70  5.80      Microalbumin          8/22/2023    11:50   Microalbumin   Microalbumin, Urine 13.8                   Assessment and Plan   Diagnoses and all orders for this visit:    1. Diabetes mellitus type 2, diet-controlled (Primary)  -     Semaglutide (Rybelsus) 3 MG tablet; Take 1 tablet by mouth Daily.  Dispense: 30 tablet; Refill: 1    2. Mixed hyperlipidemia  Assessment & Plan:  Lipid abnormalities are worsening.  Nutritional counseling was provided. and Pharmacotherapy as ordered.  Lipids will be reassessed in 3 months.  Had  Patient multiple times she needed higher dose of fenofibrate due to hypertriglyceridemia and will lower pravastatin as patient does not have to be on both.  Patient was very reluctant to stop the pravastatin.  Discussed the importance of healthy diet, nutrition, and lifestyle. Recommend low salt, fat/cholesterol diet and avoid concentrated sweets. Encouraged DASH diet along with fresh fruits & vegetables and low fat dairy products. Counseled patient to exercise/walk as tolerated. Avoid tobacco and alcohol use.        Orders:  -     pravastatin (PRAVACHOL) 10 MG tablet; Take 1 tablet by mouth Daily.  Dispense: 90 tablet; Refill: 0    3. Hypertriglyceridemia  Assessment & Plan:  Lipid abnormalities are newly identified.  Nutritional  counseling was provided. and Pharmacotherapy as ordered.  Lipids will be reassessed in 3 months.  Started fenofibrate    Orders:  -     fenofibrate (TRICOR) 145 MG tablet; Take 1 tablet by mouth Daily.  Dispense: 30 tablet; Refill: 5    4. Class 2 severe obesity with serious comorbidity and body mass index (BMI) of 37.0 to 37.9 in adult, unspecified obesity type  -     Semaglutide (Rybelsus) 3 MG tablet; Take 1 tablet by mouth Daily.  Dispense: 30 tablet; Refill: 1               Follow Up   Return in about 8 weeks (around 2/6/2024) for Recheck.  Patient was given instructions and counseling regarding her condition or for health maintenance advice. Please see specific information pulled into the AVS if appropriate.

## 2023-12-13 ENCOUNTER — TELEPHONE (OUTPATIENT)
Dept: FAMILY MEDICINE CLINIC | Facility: CLINIC | Age: 53
End: 2023-12-13

## 2023-12-13 NOTE — TELEPHONE ENCOUNTER
Caller: Li Kimball    Relationship: Self    Best call back number:     [phone]         What was the call regarding:       PATIENT STATES THAT THE INSURANCE NEEDS TO SPEAK WITH THE PROVIDER ABOUT HER MEDICATIONS.    SHE IS ALSO CONCERNED ABOUT THE MEDICATION THAT WAS PRESCRIBED TO HER YESTERDAY BECAUSE SHE IS ON BLOOD THINNERS FOR THE REMAINDER OF HER LIFE.      PLEASE CALL PATIENT AND ADVISE.

## 2023-12-13 NOTE — ASSESSMENT & PLAN NOTE
Lipid abnormalities are worsening.  Nutritional counseling was provided. and Pharmacotherapy as ordered.  Lipids will be reassessed in 3 months.  Had  Patient multiple times she needed higher dose of fenofibrate due to hypertriglyceridemia and will lower pravastatin as patient does not have to be on both.  Patient was very reluctant to stop the pravastatin.  Discussed the importance of healthy diet, nutrition, and lifestyle. Recommend low salt, fat/cholesterol diet and avoid concentrated sweets. Encouraged DASH diet along with fresh fruits & vegetables and low fat dairy products. Counseled patient to exercise/walk as tolerated. Avoid tobacco and alcohol use.

## 2023-12-13 NOTE — TELEPHONE ENCOUNTER
Caller: Li Kimball    Relationship: Self    Best call back number: [phone]     What was the call regarding: PATIENT STATES THE MEDICATION RYBELSUS REQUIRES A PRIOR AUTHORIZATION    PLEASE CALL AND ADVISE

## 2023-12-13 NOTE — ASSESSMENT & PLAN NOTE
Lipid abnormalities are newly identified.  Nutritional counseling was provided. and Pharmacotherapy as ordered.  Lipids will be reassessed in 3 months.  Started fenofibrate

## 2023-12-14 DIAGNOSIS — E78.2 MIXED HYPERLIPIDEMIA: ICD-10-CM

## 2023-12-15 NOTE — TELEPHONE ENCOUNTER
PATIENT NEEDS THIS PRIOR AUTHORIZATION Semaglutide (Rybelsus) 3 MG tablet.    PATIENT IS READY TO START THIS MEDICATION, BUT CAN'T UNTIL THE PRIOR AUTHORIZATION IS FILLED OUT. PATIENT SAID THE INSURANCE IS REJECTING IT.    PLEASE CALL PHARMACY OR INSURANCE COMPANY WITH ANY QUESTIONS REGARDING THE PRIOR AUTHORIZATION.    THANK YOU.     PATIENT STATED SHE WILL BE AT WORK AND UNABLE TO ANSWER HE PHONE BUT PLEASE LEAVE A VOICEMAIL, AND LET HER KNOW WHAT IS GOING ON.

## 2023-12-26 RX ORDER — PRAVASTATIN SODIUM 10 MG
10 TABLET ORAL DAILY
Qty: 90 TABLET | Refills: 0 | Status: SHIPPED | OUTPATIENT
Start: 2023-12-26

## 2023-12-26 NOTE — TELEPHONE ENCOUNTER
Caller: Li Kimball    Relationship: Self    Best call back number: [phone] PLEASE LEAVE VM IF NO ANSWER.     What was the call regarding: PLEASE SEND IN MEDICATION WITH REFILLS, PHARMACY NEVER RECEIVED THIS WHEN SENT ON 12/12.    PLEASE ADVISE PATIENT WHEN SENT.

## 2024-01-02 ENCOUNTER — TELEPHONE (OUTPATIENT)
Dept: FAMILY MEDICINE CLINIC | Facility: CLINIC | Age: 54
End: 2024-01-02
Payer: MEDICAID

## 2024-01-02 NOTE — TELEPHONE ENCOUNTER
Caller: Li Kimball    Relationship: Self    Best call back number: [phone]     What medication are you requesting: RYBELSUS 7 MG    If a prescription is needed, what is your preferred pharmacy and phone number:  [id number] [place], [place] - [address] [facility] [address] [facility] [phone] [facility] [phone] FX     Additional notes: PATIENT STATES THE RYBELESUS 3 MG IS NOT SUPPRESSING HER APPETITE AND WOULD LIKE AN INCREASE TO THE 7 MG

## 2024-01-04 NOTE — TELEPHONE ENCOUNTER
Caller: Li Kimball    Relationship: Self    Best call back number:     [phone]      What is the best time to reach you: ANYTIME    Who are you requesting to speak with (clinical staff, provider,  specific staff member): CLINICAL    What was the call regarding:PATIENT RETURNING CALL ABOUT THE MEDICATION SHE STATED SHE ISN'T TAKING THE MEDICATION FOR DIABETES SHE IS ONLY TAKING IT FOR WEIGHT LOSS     Is it okay if the provider responds through MyChart: NO

## 2024-01-04 NOTE — TELEPHONE ENCOUNTER
Patient just wanted to let you know her higher dose will be due in 2 weeks!  And she is not interested in diabetic educator.

## 2024-01-05 DIAGNOSIS — E11.9 TYPE 2 DIABETES MELLITUS WITHOUT COMPLICATION, WITHOUT LONG-TERM CURRENT USE OF INSULIN: Primary | ICD-10-CM

## 2024-01-05 RX ORDER — ORAL SEMAGLUTIDE 7 MG/1
7 TABLET ORAL DAILY
Qty: 30 TABLET | Refills: 3 | Status: SHIPPED | OUTPATIENT
Start: 2024-01-05

## 2024-01-11 RX ORDER — METFORMIN HYDROCHLORIDE 500 MG/1
1000 TABLET, EXTENDED RELEASE ORAL DAILY
Qty: 180 TABLET | Refills: 0 | Status: SHIPPED | OUTPATIENT
Start: 2024-01-11

## 2024-01-11 NOTE — TELEPHONE ENCOUNTER
Caller: Li Kimball    Relationship: Self    Best call back number: [phone]    Requested Prescriptions:   Requested Prescriptions     Pending Prescriptions Disp Refills    metFORMIN ER (GLUCOPHAGE-XR) 500 MG 24 hr tablet 180 tablet 0     Sig: Take 2 tablets by mouth Daily.        Pharmacy where request should be sent: [id number] [place], [place] - [address] [facility] [address] [facility] [phone] [facility] [phone]      Last office visit with prescribing clinician: 12/12/2023   Last telemedicine visit with prescribing clinician: Visit date not found   Next office visit with prescribing clinician: 3/6/2024     Additional details provided by patient:     Does the patient have less than a 3 day supply:  [] Yes  [x] No        Iron Walton Rep   01/11/24 08:40 EST

## 2024-02-06 ENCOUNTER — TELEPHONE (OUTPATIENT)
Dept: FAMILY MEDICINE CLINIC | Facility: CLINIC | Age: 54
End: 2024-02-06
Payer: MEDICAID

## 2024-02-07 RX ORDER — APIXABAN 2.5 MG/1
2.5 TABLET, FILM COATED ORAL EVERY 12 HOURS
Qty: 60 TABLET | Refills: 5 | OUTPATIENT
Start: 2024-02-07

## 2024-02-07 NOTE — TELEPHONE ENCOUNTER
I would have to discuss her medication regimen readjustment on follow-up visit on March 6 prior to changing the dose.       Sent in Neighbor[employer]lyMi Carmona to call back re Rybelsus

## 2024-02-08 NOTE — TELEPHONE ENCOUNTER
Patient informed need to discuss at follow up. Wanted you aware medication is not suppressing her appetite. Advised patient to work on diet which she stated she is already doing.

## 2024-02-13 ENCOUNTER — APPOINTMENT (OUTPATIENT)
Dept: ULTRASOUND IMAGING | Facility: HOSPITAL | Age: 54
DRG: 398 | End: 2024-02-13
Payer: MEDICAID

## 2024-02-13 ENCOUNTER — HOSPITAL ENCOUNTER (INPATIENT)
Facility: HOSPITAL | Age: 54
LOS: 6 days | Discharge: HOME | DRG: 398 | End: 2024-02-19
Attending: EMERGENCY MEDICINE | Admitting: INTERNAL MEDICINE
Payer: MEDICAID

## 2024-02-13 ENCOUNTER — APPOINTMENT (OUTPATIENT)
Dept: CT IMAGING | Facility: HOSPITAL | Age: 54
DRG: 398 | End: 2024-02-13
Payer: MEDICAID

## 2024-02-13 DIAGNOSIS — K35.30 ACUTE APPENDICITIS WITH LOCALIZED PERITONITIS, WITHOUT PERFORATION, ABSCESS, OR GANGRENE: Primary | ICD-10-CM

## 2024-02-13 DIAGNOSIS — K57.92 ACUTE DIVERTICULITIS: ICD-10-CM

## 2024-02-13 DIAGNOSIS — G89.18 ACUTE POSTOPERATIVE PAIN: ICD-10-CM

## 2024-02-13 PROBLEM — K52.9 COLITIS: Status: ACTIVE | Noted: 2024-02-13

## 2024-02-13 PROBLEM — K35.80 ACUTE APPENDICITIS: Status: ACTIVE | Noted: 2024-02-13

## 2024-02-13 LAB
ALBUMIN SERPL-MCNC: 4 G/DL (ref 3.5–5.2)
ALBUMIN SERPL-MCNC: 4 G/DL (ref 3.5–5.2)
ALBUMIN/GLOB SERPL: 1.2 G/DL
ALBUMIN/GLOB SERPL: 1.3 G/DL
ALP SERPL-CCNC: 46 U/L (ref 39–117)
ALP SERPL-CCNC: 48 U/L (ref 39–117)
ALT SERPL W P-5'-P-CCNC: 13 U/L (ref 1–33)
ALT SERPL W P-5'-P-CCNC: 15 U/L (ref 1–33)
ANION GAP SERPL CALCULATED.3IONS-SCNC: 10 MMOL/L (ref 5–15)
ANION GAP SERPL CALCULATED.3IONS-SCNC: 9 MMOL/L (ref 5–15)
AST SERPL-CCNC: 13 U/L (ref 1–32)
AST SERPL-CCNC: 14 U/L (ref 1–32)
BASOPHILS # BLD AUTO: 0.03 10*3/MM3 (ref 0–0.2)
BASOPHILS # BLD AUTO: 0.04 10*3/MM3 (ref 0–0.2)
BASOPHILS NFR BLD AUTO: 0.2 % (ref 0–1.5)
BASOPHILS NFR BLD AUTO: 0.3 % (ref 0–1.5)
BILIRUB SERPL-MCNC: 0.2 MG/DL (ref 0–1.2)
BILIRUB SERPL-MCNC: 0.4 MG/DL (ref 0–1.2)
BILIRUB UR QL STRIP: NEGATIVE
BUN SERPL-MCNC: 18 MG/DL (ref 6–20)
BUN SERPL-MCNC: 22 MG/DL (ref 6–20)
BUN/CREAT SERPL: 20 (ref 7–25)
BUN/CREAT SERPL: 22.2 (ref 7–25)
CALCIUM SPEC-SCNC: 9.5 MG/DL (ref 8.6–10.5)
CALCIUM SPEC-SCNC: 9.9 MG/DL (ref 8.6–10.5)
CHLORIDE SERPL-SCNC: 104 MMOL/L (ref 98–107)
CHLORIDE SERPL-SCNC: 107 MMOL/L (ref 98–107)
CLARITY UR: ABNORMAL
CO2 SERPL-SCNC: 21 MMOL/L (ref 22–29)
CO2 SERPL-SCNC: 24 MMOL/L (ref 22–29)
COLOR UR: YELLOW
CREAT SERPL-MCNC: 0.9 MG/DL (ref 0.57–1)
CREAT SERPL-MCNC: 0.99 MG/DL (ref 0.57–1)
D-LACTATE SERPL-SCNC: 1.9 MMOL/L (ref 0.5–2)
DEPRECATED RDW RBC AUTO: 38.6 FL (ref 37–54)
DEPRECATED RDW RBC AUTO: 40.3 FL (ref 37–54)
EGFRCR SERPLBLD CKD-EPI 2021: 68.3 ML/MIN/1.73
EGFRCR SERPLBLD CKD-EPI 2021: 76.6 ML/MIN/1.73
EOSINOPHIL # BLD AUTO: 0.04 10*3/MM3 (ref 0–0.4)
EOSINOPHIL # BLD AUTO: 0.12 10*3/MM3 (ref 0–0.4)
EOSINOPHIL NFR BLD AUTO: 0.3 % (ref 0.3–6.2)
EOSINOPHIL NFR BLD AUTO: 0.9 % (ref 0.3–6.2)
ERYTHROCYTE [DISTWIDTH] IN BLOOD BY AUTOMATED COUNT: 12.9 % (ref 12.3–15.4)
ERYTHROCYTE [DISTWIDTH] IN BLOOD BY AUTOMATED COUNT: 13 % (ref 12.3–15.4)
GLOBULIN UR ELPH-MCNC: 3.1 GM/DL
GLOBULIN UR ELPH-MCNC: 3.3 GM/DL
GLUCOSE BLDC GLUCOMTR-MCNC: 117 MG/DL (ref 70–130)
GLUCOSE BLDC GLUCOMTR-MCNC: 124 MG/DL (ref 70–130)
GLUCOSE BLDC GLUCOMTR-MCNC: 99 MG/DL (ref 70–130)
GLUCOSE SERPL-MCNC: 118 MG/DL (ref 65–99)
GLUCOSE SERPL-MCNC: 129 MG/DL (ref 65–99)
GLUCOSE UR STRIP-MCNC: NEGATIVE MG/DL
HCT VFR BLD AUTO: 34.4 % (ref 34–46.6)
HCT VFR BLD AUTO: 36.2 % (ref 34–46.6)
HGB BLD-MCNC: 11.2 G/DL (ref 12–15.9)
HGB BLD-MCNC: 11.7 G/DL (ref 12–15.9)
HGB UR QL STRIP.AUTO: NEGATIVE
IMM GRANULOCYTES # BLD AUTO: 0.05 10*3/MM3 (ref 0–0.05)
IMM GRANULOCYTES # BLD AUTO: 0.05 10*3/MM3 (ref 0–0.05)
IMM GRANULOCYTES NFR BLD AUTO: 0.3 % (ref 0–0.5)
IMM GRANULOCYTES NFR BLD AUTO: 0.4 % (ref 0–0.5)
INR PPP: 1.03 (ref 0.9–1.1)
KETONES UR QL STRIP: NEGATIVE
LEUKOCYTE ESTERASE UR QL STRIP.AUTO: NEGATIVE
LYMPHOCYTES # BLD AUTO: 1.61 10*3/MM3 (ref 0.7–3.1)
LYMPHOCYTES # BLD AUTO: 2.52 10*3/MM3 (ref 0.7–3.1)
LYMPHOCYTES NFR BLD AUTO: 10.9 % (ref 19.6–45.3)
LYMPHOCYTES NFR BLD AUTO: 19.6 % (ref 19.6–45.3)
MAGNESIUM SERPL-MCNC: 1.8 MG/DL (ref 1.6–2.6)
MCH RBC QN AUTO: 27.3 PG (ref 26.6–33)
MCH RBC QN AUTO: 27.9 PG (ref 26.6–33)
MCHC RBC AUTO-ENTMCNC: 32.3 G/DL (ref 31.5–35.7)
MCHC RBC AUTO-ENTMCNC: 32.6 G/DL (ref 31.5–35.7)
MCV RBC AUTO: 84.6 FL (ref 79–97)
MCV RBC AUTO: 85.8 FL (ref 79–97)
MONOCYTES # BLD AUTO: 1.02 10*3/MM3 (ref 0.1–0.9)
MONOCYTES # BLD AUTO: 1.47 10*3/MM3 (ref 0.1–0.9)
MONOCYTES NFR BLD AUTO: 10 % (ref 5–12)
MONOCYTES NFR BLD AUTO: 7.9 % (ref 5–12)
NEUTROPHILS NFR BLD AUTO: 11.56 10*3/MM3 (ref 1.7–7)
NEUTROPHILS NFR BLD AUTO: 70.9 % (ref 42.7–76)
NEUTROPHILS NFR BLD AUTO: 78.3 % (ref 42.7–76)
NEUTROPHILS NFR BLD AUTO: 9.12 10*3/MM3 (ref 1.7–7)
NITRITE UR QL STRIP: NEGATIVE
NRBC BLD AUTO-RTO: 0 /100 WBC (ref 0–0.2)
NRBC BLD AUTO-RTO: 0.1 /100 WBC (ref 0–0.2)
PH UR STRIP.AUTO: 5.5 [PH] (ref 5–8)
PLATELET # BLD AUTO: 388 10*3/MM3 (ref 140–450)
PLATELET # BLD AUTO: 423 10*3/MM3 (ref 140–450)
PMV BLD AUTO: 9.3 FL (ref 6–12)
PMV BLD AUTO: 9.4 FL (ref 6–12)
POTASSIUM SERPL-SCNC: 3.7 MMOL/L (ref 3.5–5.2)
POTASSIUM SERPL-SCNC: 4 MMOL/L (ref 3.5–5.2)
PROT SERPL-MCNC: 7.1 G/DL (ref 6–8.5)
PROT SERPL-MCNC: 7.3 G/DL (ref 6–8.5)
PROT UR QL STRIP: NEGATIVE
PROTHROMBIN TIME: 13.6 SECONDS (ref 11.7–14.2)
RBC # BLD AUTO: 4.01 10*6/MM3 (ref 3.77–5.28)
RBC # BLD AUTO: 4.28 10*6/MM3 (ref 3.77–5.28)
SODIUM SERPL-SCNC: 137 MMOL/L (ref 136–145)
SODIUM SERPL-SCNC: 138 MMOL/L (ref 136–145)
SP GR UR STRIP: 1.03 (ref 1–1.03)
UROBILINOGEN UR QL STRIP: ABNORMAL
WBC NRBC COR # BLD AUTO: 12.87 10*3/MM3 (ref 3.4–10.8)
WBC NRBC COR # BLD AUTO: 14.76 10*3/MM3 (ref 3.4–10.8)

## 2024-02-13 PROCEDURE — 76830 TRANSVAGINAL US NON-OB: CPT

## 2024-02-13 PROCEDURE — 25010000002 ONDANSETRON PER 1 MG

## 2024-02-13 PROCEDURE — G0378 HOSPITAL OBSERVATION PER HR: HCPCS

## 2024-02-13 PROCEDURE — 83735 ASSAY OF MAGNESIUM: CPT | Performed by: NURSE PRACTITIONER

## 2024-02-13 PROCEDURE — 74177 CT ABD & PELVIS W/CONTRAST: CPT

## 2024-02-13 PROCEDURE — 25810000003 LACTATED RINGERS SOLUTION: Performed by: PHYSICIAN ASSISTANT

## 2024-02-13 PROCEDURE — 25010000002 MORPHINE PER 10 MG: Performed by: EMERGENCY MEDICINE

## 2024-02-13 PROCEDURE — 76856 US EXAM PELVIC COMPLETE: CPT

## 2024-02-13 PROCEDURE — 85025 COMPLETE CBC W/AUTO DIFF WBC: CPT | Performed by: EMERGENCY MEDICINE

## 2024-02-13 PROCEDURE — 85610 PROTHROMBIN TIME: CPT | Performed by: NURSE PRACTITIONER

## 2024-02-13 PROCEDURE — 25010000002 PIPERACILLIN SOD-TAZOBACTAM PER 1 G: Performed by: PHYSICIAN ASSISTANT

## 2024-02-13 PROCEDURE — 25510000001 IOPAMIDOL 61 % SOLUTION: Performed by: INTERNAL MEDICINE

## 2024-02-13 PROCEDURE — 25010000002 PIPERACILLIN SOD-TAZOBACTAM PER 1 G: Performed by: STUDENT IN AN ORGANIZED HEALTH CARE EDUCATION/TRAINING PROGRAM

## 2024-02-13 PROCEDURE — 25010000002 ONDANSETRON PER 1 MG: Performed by: STUDENT IN AN ORGANIZED HEALTH CARE EDUCATION/TRAINING PROGRAM

## 2024-02-13 PROCEDURE — 99222 1ST HOSP IP/OBS MODERATE 55: CPT | Performed by: STUDENT IN AN ORGANIZED HEALTH CARE EDUCATION/TRAINING PROGRAM

## 2024-02-13 PROCEDURE — 25810000003 SODIUM CHLORIDE 0.9 % SOLUTION: Performed by: STUDENT IN AN ORGANIZED HEALTH CARE EDUCATION/TRAINING PROGRAM

## 2024-02-13 PROCEDURE — 25810000003 SODIUM CHLORIDE 0.9 % SOLUTION: Performed by: NURSE PRACTITIONER

## 2024-02-13 PROCEDURE — 93976 VASCULAR STUDY: CPT

## 2024-02-13 PROCEDURE — 82948 REAGENT STRIP/BLOOD GLUCOSE: CPT

## 2024-02-13 PROCEDURE — 80053 COMPREHEN METABOLIC PANEL: CPT | Performed by: EMERGENCY MEDICINE

## 2024-02-13 PROCEDURE — 25010000002 ENOXAPARIN PER 10 MG: Performed by: INTERNAL MEDICINE

## 2024-02-13 PROCEDURE — 25010000002 HYDROMORPHONE 1 MG/ML SOLUTION: Performed by: INTERNAL MEDICINE

## 2024-02-13 PROCEDURE — 36415 COLL VENOUS BLD VENIPUNCTURE: CPT | Performed by: NURSE PRACTITIONER

## 2024-02-13 PROCEDURE — 74176 CT ABD & PELVIS W/O CONTRAST: CPT

## 2024-02-13 PROCEDURE — 25010000002 PIPERACILLIN SOD-TAZOBACTAM PER 1 G: Performed by: NURSE PRACTITIONER

## 2024-02-13 PROCEDURE — 83605 ASSAY OF LACTIC ACID: CPT | Performed by: PHYSICIAN ASSISTANT

## 2024-02-13 PROCEDURE — 85025 COMPLETE CBC W/AUTO DIFF WBC: CPT | Performed by: NURSE PRACTITIONER

## 2024-02-13 PROCEDURE — 81003 URINALYSIS AUTO W/O SCOPE: CPT | Performed by: EMERGENCY MEDICINE

## 2024-02-13 PROCEDURE — 25010000002 HYDROMORPHONE PER 4 MG: Performed by: EMERGENCY MEDICINE

## 2024-02-13 PROCEDURE — 25010000002 ONDANSETRON PER 1 MG: Performed by: INTERNAL MEDICINE

## 2024-02-13 PROCEDURE — 25010000002 ONDANSETRON PER 1 MG: Performed by: PHYSICIAN ASSISTANT

## 2024-02-13 PROCEDURE — 80053 COMPREHEN METABOLIC PANEL: CPT | Performed by: NURSE PRACTITIONER

## 2024-02-13 PROCEDURE — 25010000002 HYDROMORPHONE 1 MG/ML SOLUTION: Performed by: STUDENT IN AN ORGANIZED HEALTH CARE EDUCATION/TRAINING PROGRAM

## 2024-02-13 PROCEDURE — 99285 EMERGENCY DEPT VISIT HI MDM: CPT

## 2024-02-13 RX ORDER — ONDANSETRON 2 MG/ML
4 INJECTION INTRAMUSCULAR; INTRAVENOUS EVERY 6 HOURS PRN
Status: DISCONTINUED | OUTPATIENT
Start: 2024-02-13 | End: 2024-02-13 | Stop reason: SDUPTHER

## 2024-02-13 RX ORDER — SODIUM CHLORIDE 9 MG/ML
100 INJECTION, SOLUTION INTRAVENOUS CONTINUOUS
Status: DISCONTINUED | OUTPATIENT
Start: 2024-02-13 | End: 2024-02-18

## 2024-02-13 RX ORDER — ACETAMINOPHEN 160 MG/5ML
650 SOLUTION ORAL EVERY 4 HOURS PRN
Status: DISCONTINUED | OUTPATIENT
Start: 2024-02-13 | End: 2024-02-17

## 2024-02-13 RX ORDER — IBUPROFEN 600 MG/1
1 TABLET ORAL
Status: DISCONTINUED | OUTPATIENT
Start: 2024-02-13 | End: 2024-02-19 | Stop reason: HOSPADM

## 2024-02-13 RX ORDER — SODIUM CHLORIDE 0.9 % (FLUSH) 0.9 %
10 SYRINGE (ML) INJECTION EVERY 12 HOURS SCHEDULED
Status: DISCONTINUED | OUTPATIENT
Start: 2024-02-13 | End: 2024-02-19 | Stop reason: HOSPADM

## 2024-02-13 RX ORDER — MORPHINE SULFATE 2 MG/ML
4 INJECTION, SOLUTION INTRAMUSCULAR; INTRAVENOUS ONCE
Status: COMPLETED | OUTPATIENT
Start: 2024-02-13 | End: 2024-02-13

## 2024-02-13 RX ORDER — ONDANSETRON 2 MG/ML
4 INJECTION INTRAMUSCULAR; INTRAVENOUS ONCE
Status: COMPLETED | OUTPATIENT
Start: 2024-02-13 | End: 2024-02-13

## 2024-02-13 RX ORDER — DEXTROSE MONOHYDRATE 25 G/50ML
25 INJECTION, SOLUTION INTRAVENOUS
Status: DISCONTINUED | OUTPATIENT
Start: 2024-02-13 | End: 2024-02-19 | Stop reason: HOSPADM

## 2024-02-13 RX ORDER — NITROGLYCERIN 0.4 MG/1
0.4 TABLET SUBLINGUAL
Status: DISCONTINUED | OUTPATIENT
Start: 2024-02-13 | End: 2024-02-19 | Stop reason: HOSPADM

## 2024-02-13 RX ORDER — ONDANSETRON 2 MG/ML
4 INJECTION INTRAMUSCULAR; INTRAVENOUS EVERY 6 HOURS PRN
Status: DISCONTINUED | OUTPATIENT
Start: 2024-02-13 | End: 2024-02-19 | Stop reason: HOSPADM

## 2024-02-13 RX ORDER — ACETAMINOPHEN 650 MG/1
650 SUPPOSITORY RECTAL EVERY 4 HOURS PRN
Status: DISCONTINUED | OUTPATIENT
Start: 2024-02-13 | End: 2024-02-17

## 2024-02-13 RX ORDER — AMOXICILLIN 250 MG
2 CAPSULE ORAL 2 TIMES DAILY PRN
Status: DISCONTINUED | OUTPATIENT
Start: 2024-02-13 | End: 2024-02-16

## 2024-02-13 RX ORDER — HYDROMORPHONE HYDROCHLORIDE 1 MG/ML
0.5 INJECTION, SOLUTION INTRAMUSCULAR; INTRAVENOUS; SUBCUTANEOUS ONCE
Status: COMPLETED | OUTPATIENT
Start: 2024-02-13 | End: 2024-02-13

## 2024-02-13 RX ORDER — ACETAMINOPHEN 325 MG/1
650 TABLET ORAL EVERY 4 HOURS PRN
Status: DISCONTINUED | OUTPATIENT
Start: 2024-02-13 | End: 2024-02-17

## 2024-02-13 RX ORDER — NICOTINE POLACRILEX 4 MG
15 LOZENGE BUCCAL
Status: DISCONTINUED | OUTPATIENT
Start: 2024-02-13 | End: 2024-02-19 | Stop reason: HOSPADM

## 2024-02-13 RX ORDER — ONDANSETRON 2 MG/ML
INJECTION INTRAMUSCULAR; INTRAVENOUS
Status: COMPLETED
Start: 2024-02-13 | End: 2024-02-13

## 2024-02-13 RX ORDER — BISACODYL 10 MG
10 SUPPOSITORY, RECTAL RECTAL DAILY PRN
Status: DISCONTINUED | OUTPATIENT
Start: 2024-02-13 | End: 2024-02-16

## 2024-02-13 RX ORDER — SODIUM CHLORIDE 0.9 % (FLUSH) 0.9 %
10 SYRINGE (ML) INJECTION AS NEEDED
Status: DISCONTINUED | OUTPATIENT
Start: 2024-02-13 | End: 2024-02-19 | Stop reason: HOSPADM

## 2024-02-13 RX ORDER — POLYETHYLENE GLYCOL 3350 17 G/17G
17 POWDER, FOR SOLUTION ORAL DAILY PRN
Status: DISCONTINUED | OUTPATIENT
Start: 2024-02-13 | End: 2024-02-16

## 2024-02-13 RX ORDER — ENOXAPARIN SODIUM 100 MG/ML
40 INJECTION SUBCUTANEOUS NIGHTLY
Status: DISCONTINUED | OUTPATIENT
Start: 2024-02-13 | End: 2024-02-13

## 2024-02-13 RX ORDER — SODIUM CHLORIDE 9 MG/ML
40 INJECTION, SOLUTION INTRAVENOUS AS NEEDED
Status: DISCONTINUED | OUTPATIENT
Start: 2024-02-13 | End: 2024-02-19 | Stop reason: HOSPADM

## 2024-02-13 RX ORDER — BISACODYL 5 MG/1
5 TABLET, DELAYED RELEASE ORAL DAILY PRN
Status: DISCONTINUED | OUTPATIENT
Start: 2024-02-13 | End: 2024-02-16

## 2024-02-13 RX ADMIN — HYDROMORPHONE HYDROCHLORIDE 1 MG: 1 INJECTION, SOLUTION INTRAMUSCULAR; INTRAVENOUS; SUBCUTANEOUS at 22:33

## 2024-02-13 RX ADMIN — PIPERACILLIN SODIUM AND TAZOBACTAM SODIUM 3.38 G: 3; .375 INJECTION, SOLUTION INTRAVENOUS at 20:21

## 2024-02-13 RX ADMIN — HYDROMORPHONE HYDROCHLORIDE 1 MG: 1 INJECTION, SOLUTION INTRAMUSCULAR; INTRAVENOUS; SUBCUTANEOUS at 10:09

## 2024-02-13 RX ADMIN — ONDANSETRON 4 MG: 2 INJECTION INTRAMUSCULAR; INTRAVENOUS at 04:13

## 2024-02-13 RX ADMIN — SODIUM CHLORIDE, POTASSIUM CHLORIDE, SODIUM LACTATE AND CALCIUM CHLORIDE 1000 ML: 600; 310; 30; 20 INJECTION, SOLUTION INTRAVENOUS at 01:45

## 2024-02-13 RX ADMIN — MORPHINE SULFATE 4 MG: 2 INJECTION, SOLUTION INTRAMUSCULAR; INTRAVENOUS at 01:46

## 2024-02-13 RX ADMIN — SODIUM CHLORIDE 100 ML/HR: 9 INJECTION, SOLUTION INTRAVENOUS at 08:00

## 2024-02-13 RX ADMIN — ONDANSETRON 4 MG: 2 INJECTION INTRAMUSCULAR; INTRAVENOUS at 10:22

## 2024-02-13 RX ADMIN — ONDANSETRON 4 MG: 2 INJECTION INTRAMUSCULAR; INTRAVENOUS at 16:22

## 2024-02-13 RX ADMIN — ENOXAPARIN SODIUM 40 MG: 100 INJECTION SUBCUTANEOUS at 20:46

## 2024-02-13 RX ADMIN — ACETAMINOPHEN 325MG 650 MG: 325 TABLET ORAL at 08:57

## 2024-02-13 RX ADMIN — ONDANSETRON 4 MG: 2 INJECTION INTRAMUSCULAR; INTRAVENOUS at 22:33

## 2024-02-13 RX ADMIN — PIPERACILLIN SODIUM AND TAZOBACTAM SODIUM 3.38 G: 3; .375 INJECTION, SOLUTION INTRAVENOUS at 11:16

## 2024-02-13 RX ADMIN — ACETAMINOPHEN 325MG 650 MG: 325 TABLET ORAL at 20:43

## 2024-02-13 RX ADMIN — Medication 10 ML: at 10:11

## 2024-02-13 RX ADMIN — ONDANSETRON 4 MG: 2 INJECTION INTRAMUSCULAR; INTRAVENOUS at 01:47

## 2024-02-13 RX ADMIN — HYDROMORPHONE HYDROCHLORIDE 1 MG: 1 INJECTION, SOLUTION INTRAMUSCULAR; INTRAVENOUS; SUBCUTANEOUS at 16:22

## 2024-02-13 RX ADMIN — IOPAMIDOL 85 ML: 612 INJECTION, SOLUTION INTRAVENOUS at 09:10

## 2024-02-13 RX ADMIN — HYDROMORPHONE HYDROCHLORIDE 0.5 MG: 1 INJECTION, SOLUTION INTRAMUSCULAR; INTRAVENOUS; SUBCUTANEOUS at 04:15

## 2024-02-13 RX ADMIN — PIPERACILLIN SODIUM AND TAZOBACTAM SODIUM 3.38 G: 3; .375 INJECTION, SOLUTION INTRAVENOUS at 04:16

## 2024-02-13 RX ADMIN — Medication 10 ML: at 20:21

## 2024-02-13 NOTE — ED PROVIDER NOTES
EMERGENCY DEPARTMENT ENCOUNTER  Room Number:  02/02  PCP: Tank Shea APRN  Independent Historians: Patient      HPI:  Chief Complaint: had concerns including Flank Pain.     A complete HPI/ROS/PMH/PSH/SH/FH are unobtainable due to: None    Chronic or social conditions impacting patient care (Social Determinants of Health): None      Context: Li Kimball is a 53 y.o. female with a medical history of DVT anticoagulated on Eliquis, kidney stones, type 2 diabetes, hypothyroidism, hyperlipidemia, and skin cancer presents emergency department complaining of right lower quadrant abdominal pain that worsened today.  Patient says she has had issues over the last week and was seen at the [facility] care center was told she had a urinary tract infection.  She was treated with Macrobid and then her antibiotic was changed to Keflex due to resistance and she has finished that antibiotic but says she still having right lower quadrant pain.  Patient says she thinks she may have a kidney stone as this feels similar to her prior episode of kidney stones.  She reports nausea but denies vomiting or diarrhea.  She denies fever or chills.  She denies chest pain or shortness of breath.      Review of prior external notes (non-ED) -and- Review of prior external test results outside of this encounter:   Patient was seen at the [facility] on 2/2/2024 for dysuria.  She was diagnosed with a UTI and prescribed Macrobid.  It appears based on the urine culture below the Macrobid is resistant.    (ABNORMAL) Culture,Urine (02/02/2024 6:47 PM EST)  Lab Results - (ABNORMAL) Culture,Urine (02/02/2024 6:47 PM EST)  Component Value        Culture Proteus mirabilis >100,000 CFU/mL (A)        Culture Escherichia coli 10,000 to 50,000 CFU/mL (A)        Culture Colonization of the urinary tract without infection is common. Treatment is discouraged unless the patient is symptomatic, pregnant, or undergoing an invasive urologic procedure.             Lab Results - (ABNORMAL) Culture,Urine (02/02/2024 6:47 PM EST)  Organism Antibiotic Method Susceptibility   Proteus mirabilis Ampicillin EMILIA <=8: Susceptible    Proteus mirabilis Cefazolin EMILIA <=2: Susceptible    Proteus mirabilis Ciprofloxacin EMILIA <=0.25: Susceptible    Proteus mirabilis Gentamicin EMILIA <=4: Susceptible    Proteus mirabilis Nitrofurantoin EMILIA >64: Resistant    Proteus mirabilis Trimethoprim/Sulfamethoxazole EMILIA <=2/38: Susceptible    Escherichia coli Ampicillin EMILIA <=8: Susceptible    Escherichia coli Cefazolin EMILIA <=2: Susceptible    Escherichia coli Ciprofloxacin EMILIA <=0.25: Susceptible    Escherichia coli Gentamicin EMILIA <=4: Susceptible    Escherichia coli Nitrofurantoin EMILIA <=32: Susceptible    Escherichia coli Trimethoprim/Sulfamethoxazole EMILIA <=2/38: Susceptible        Date of Admission: 6/29/2022   Date of Discharge:  7/2/2022   Hospital Course  Patient is a 51 y.o. female presented with a right ureteral stone. She underwent right urs/ll/sbe with stent.  Her pain is managed and she is tolerating po.  She has hydrocodone and bactrim already sent to her pharmacy.  I discussed sending zofan in for her as well. Discussed stent expectations.  She can restart her eliquis.       PAST MEDICAL HISTORY  Active Ambulatory Problems     Diagnosis Date Noted    Hypothyroidism, adult     Mixed hyperlipidemia     BPPV (benign paroxysmal positional vertigo)     Asthma     Migraines     DVT (deep venous thrombosis)     Colonic stricture 09/03/2020    Screen for colon cancer 08/11/2021    Chronic anticoagulation 10/03/2019    Recurrent deep vein thrombosis (DVT) 10/03/2019    Hair loss 12/14/2021    History of skin cancer 12/15/2021    Right ureteral stone 06/29/2022    Diabetes mellitus type 2, diet-controlled 08/12/2022    Obesity (BMI 30.0-34.9) 08/12/2022    Hypertriglyceridemia 12/12/2023    Class 2 severe obesity with serious comorbidity and body mass index (BMI) of 37.0 to 37.9 in adult 12/12/2023     Type 2 diabetes mellitus without complication, without long-term current use of insulin 01/05/2024     Resolved Ambulatory Problems     Diagnosis Date Noted    No Resolved Ambulatory Problems     Past Medical History:   Diagnosis Date    Diverticulitis     Hypothyroid     Kidney stones     Slow to wake up after anesthesia     Weight loss          PAST SURGICAL HISTORY  Past Surgical History:   Procedure Laterality Date    ANKLE SURGERY Right 2015    Removal of hardware    COLONOSCOPY N/A 08/2020    COLONOSCOPY N/A 9/17/2020    Procedure: COLONOSCOPY to cecum with 20mm colonic balloon dilation;  Surgeon: Walter Verdin MD;  Location: [facility] ENDOSCOPY;  Service: General;  Laterality: N/A;  pre - diverticulitis induced stricture   post - colonic stricture    COLONOSCOPY N/A 10/28/2021    Procedure: COLONOSCOPY into cecum with bx;  Surgeon: Walter Verdin MD;  Location: [facility] ENDOSCOPY;  Service: General;  Laterality: N/A;  pre: hx of colonic stricture   post: diverticulosis     CYSTOSCOPY W/ URETERAL STENT PLACEMENT Right 7/1/2022    Procedure: CYSTOSCOPY, RIGHT URETEROSCOPY RIGHT STENT PLACEMENT, STONE BASKET EXTRACTION, LASER LITHOTRIPSY;  Surgeon: Cyrus Au MD;  Location: [facility] MAIN OR;  Service: Urology;  Laterality: Right;    ENDOMETRIAL ABLATION      FOOT SURGERY Left 2006    Fracture , postop DVT and PE    JOINT REPLACEMENT      ankle, s/p two surgery    LAPAROSCOPIC TUBAL LIGATION      ORIF ANKLE FRACTURE Right 2014    DVT and PE perioperatively    TUBAL ABDOMINAL LIGATION      VENA CAVA FILTER INSERTION  2014         FAMILY HISTORY  Family History   Problem Relation Age of Onset    Other Mother         Vertigo    Hyperlipidemia Mother     Cancer Father         hodgkins disease    Heart disease Brother     Malig Hyperthermia Neg Hx          SOCIAL HISTORY  Social History     Socioeconomic History    Marital status:    Tobacco Use    Smoking status: Never     Passive exposure:  Never    Smokeless tobacco: Never   Vaping Use    Vaping Use: Never used   Substance and Sexual Activity    Alcohol use: No    Drug use: No    Sexual activity: Defer         ALLERGIES  Codeine      REVIEW OF SYSTEMS  Included in HPI  All systems reviewed and negative except for those discussed in HPI.      PHYSICAL EXAM    I have reviewed the triage vital signs and nursing notes.    ED Triage Vitals   Temp Heart Rate Resp BP SpO2   02/13/24 0027 02/13/24 0027 02/13/24 0027 02/13/24 0048 02/13/24 0027   97.6 °F (36.4 °C) 111 18 146/100 100 %      Temp src Heart Rate Source Patient Position BP Location FiO2 (%)   02/13/24 0027 02/13/24 0027 02/13/24 0048 02/13/24 0048 --   Tympanic Monitor Sitting Right arm        Physical Exam  Constitutional:       General: She is not in acute distress.     Appearance: She is obese. She is ill-appearing.   HENT:      Head: Normocephalic and atraumatic.      Nose: Nose normal.      Mouth/Throat:      Mouth: Mucous membranes are moist.   Eyes:      Extraocular Movements: Extraocular movements intact.      Pupils: Pupils are equal, round, and reactive to light.   Cardiovascular:      Rate and Rhythm: Normal rate and regular rhythm.      Pulses: Normal pulses.      Heart sounds: Normal heart sounds.   Pulmonary:      Effort: Pulmonary effort is normal. No respiratory distress.      Breath sounds: Normal breath sounds.   Abdominal:      General: Abdomen is flat. There is no distension.      Palpations: Abdomen is soft.      Tenderness: There is abdominal tenderness (Tenderness palpation the right lower quadrant).   Musculoskeletal:         General: Normal range of motion.      Cervical back: Normal range of motion and neck supple.   Skin:     General: Skin is warm and dry.      Capillary Refill: Capillary refill takes less than 2 seconds.   Neurological:      General: No focal deficit present.      Mental Status: She is alert and oriented to person, place, and time.   Psychiatric:          Mood and Affect: Mood normal.         Behavior: Behavior normal.             LAB RESULTS  Recent Results (from the past 24 hour(s))   Comprehensive Metabolic Panel    Collection Time: 02/13/24  1:34 AM    Specimen: Blood   Result Value Ref Range    Glucose 129 (H) 65 - 99 mg/dL    BUN 22 (H) 6 - 20 mg/dL    Creatinine 0.99 0.57 - 1.00 mg/dL    Sodium 137 136 - 145 mmol/L    Potassium 4.0 3.5 - 5.2 mmol/L    Chloride 107 98 - 107 mmol/L    CO2 21.0 (L) 22.0 - 29.0 mmol/L    Calcium 9.9 8.6 - 10.5 mg/dL    Total Protein 7.3 6.0 - 8.5 g/dL    Albumin 4.0 3.5 - 5.2 g/dL    ALT (SGPT) 15 1 - 33 U/L    AST (SGOT) 14 1 - 32 U/L    Alkaline Phosphatase 46 39 - 117 U/L    Total Bilirubin 0.2 0.0 - 1.2 mg/dL    Globulin 3.3 gm/dL    A/G Ratio 1.2 g/dL    BUN/Creatinine Ratio 22.2 7.0 - 25.0    Anion Gap 9.0 5.0 - 15.0 mmol/L    eGFR 68.3 >60.0 mL/min/1.73   CBC Auto Differential    Collection Time: 02/13/24  1:34 AM    Specimen: Blood   Result Value Ref Range    WBC 12.87 (H) 3.40 - 10.80 10*3/mm3    RBC 4.28 3.77 - 5.28 10*6/mm3    Hemoglobin 11.7 (L) 12.0 - 15.9 g/dL    Hematocrit 36.2 34.0 - 46.6 %    MCV 84.6 79.0 - 97.0 fL    MCH 27.3 26.6 - 33.0 pg    MCHC 32.3 31.5 - 35.7 g/dL    RDW 12.9 12.3 - 15.4 %    RDW-SD 38.6 37.0 - 54.0 fl    MPV 9.3 6.0 - 12.0 fL    Platelets 423 140 - 450 10*3/mm3    Neutrophil % 70.9 42.7 - 76.0 %    Lymphocyte % 19.6 19.6 - 45.3 %    Monocyte % 7.9 5.0 - 12.0 %    Eosinophil % 0.9 0.3 - 6.2 %    Basophil % 0.3 0.0 - 1.5 %    Immature Grans % 0.4 0.0 - 0.5 %    Neutrophils, Absolute 9.12 (H) 1.70 - 7.00 10*3/mm3    Lymphocytes, Absolute 2.52 0.70 - 3.10 10*3/mm3    Monocytes, Absolute 1.02 (H) 0.10 - 0.90 10*3/mm3    Eosinophils, Absolute 0.12 0.00 - 0.40 10*3/mm3    Basophils, Absolute 0.04 0.00 - 0.20 10*3/mm3    Immature Grans, Absolute 0.05 0.00 - 0.05 10*3/mm3    nRBC 0.0 0.0 - 0.2 /100 WBC   Urinalysis With Culture If Indicated - Urine, Clean Catch    Collection Time: 02/13/24  1:35  AM    Specimen: Urine, Clean Catch   Result Value Ref Range    Color, UA Yellow Yellow, Straw    Appearance, UA Cloudy (A) Clear    pH, UA 5.5 5.0 - 8.0    Specific Gravity, UA 1.029 1.005 - 1.030    Glucose, UA Negative Negative    Ketones, UA Negative Negative    Bilirubin, UA Negative Negative    Blood, UA Negative Negative    Protein, UA Negative Negative    Leuk Esterase, UA Negative Negative    Nitrite, UA Negative Negative    Urobilinogen, UA 0.2 E.U./dL 0.2 - 1.0 E.U./dL   Lactic Acid, Plasma    Collection Time: 02/13/24  4:08 AM    Specimen: Blood   Result Value Ref Range    Lactate 1.9 0.5 - 2.0 mmol/L         RADIOLOGY  CT Abdomen Pelvis Without Contrast    Addendum Date: 2/13/2024    ADDENDUM: 02 13 24 03:39 Call Doctor Regarding Above results, called  Dr. Mendoza on 02 13 03:38 (-05:00) Electronically signed by Kamilal Valencia DO American Board of     Result Date: 2/13/2024  CR Patient: J LUIS HOPPER  Time Out: 03:37 Exam(s): CT ABDOMEN + PELVIS Without Contrast EXAM:   CT Abdomen and Pelvis Without Intravenous Contrast CLINICAL HISTORY:    Reason for exam: Right flank pain, dysuria, known UTI. TECHNIQUE:   Axial computed tomography images of the abdomen and pelvis without intravenous contrast.  CTDI is 19.4 mGy and DLP is 1075 mGy-cm.  This CT exam was performed according to the principle of ALARA (As Low As Reasonably Achievable) by using one or more of the following dose reduction techniques: automated exposure control, adjustment of the mA and or kV according to patient size, and or use of iterative reconstruction technique. COMPARISON:   6 29 22 FINDINGS:   Lung bases:  Unremarkable.  No mass.  No consolidation.  ABDOMEN:   Liver:  Unremarkable.   Gallbladder and bile ducts:  Unremarkable.  No calcified stones.  No ductal dilation.   Pancreas:  Unremarkable.  No ductal dilation.   Spleen:  Unremarkable.  No splenomegaly.   Adrenals:  Unremarkable.  No mass.   Kidneys and ureters:   Tiny bilateral nonobstructing intrarenal calculi.  Negative for obstructive uropathy.  Negative for perirenal or pararenal fluid collections.   Stomach and bowel:  Negative for GI tract obstruction or pneumatosis.  Mild wall thickening proximal to mid sigmoid colon.  There are mild inflammatory changes adjacent to the sigmoid colon, most prominent as it crosses the left adnexa.  There are irregular collections of gas adjacent to the sigmoid colon, the most proximal region is at the level of the sigmoid colon across in the left adnexa where the gas collection is lateral to the left lateral wall.  A second region of irregular gas along the right lateral wall of the sigmoid colon, near the level of the lower uterine segment and cervix.  PELVIS:   Appendix:  Pathologic enlargement of the appendix measuring 12 mm in short axis.  Multiple tiny appendicoliths.  Moderate periappendiceal inflammation.   Bladder:  Unremarkable.  No stones.   Reproductive:  Soft tissue prominence in the left adnexa measuring approximately 3.0 x 4.7 cm.  There are mild inflammatory changes immediately adjacent to the left adnexal soft tissue.  ABDOMEN and PELVIS:   Intraperitoneal space:  Unremarkable.  No free air.  No significant fluid collection.   Bones joints:  No acute fracture.  No dislocation.   Soft tissues:  Unremarkable.   Vasculature:  Unremarkable.  No abdominal aortic aneurysm.  No change in position of IVC filter with the apex below the level of both renal veins.   Lymph nodes:  Unremarkable.  No enlarged lymph nodes. IMPRESSION:     1.  Acute appendicitis without imaging evidence of perforation. 2.  Mild fat stranding adjacent to the sigmoid colon and prominent left adnexal soft tissue . mild wall thickening of the sigmoid colon, likely reflecting colitis diverticulitis.  Irregular gas collections adjacent to the sigmoid colon are indeterminate for irregular prominent diverticuli or areas of locally contained perforation.   Etiology of the soft tissue prominence in the left adnexa is unknown.  Recommend follow-up pelvic ultrasound and contrast-enhanced CT. diagnostic sensitivity is reduced by the absence of IV contrast.     Communications:  Call Doctor Above results Electronically signed by Walter Zhang DO, Diplomate American Board of Radiology on 02-13-24 at 0337       MEDICATIONS GIVEN IN ER  Medications   sodium chloride 0.9 % flush 10 mL (has no administration in time range)   sodium chloride 0.9 % flush 10 mL (has no administration in time range)   sodium chloride 0.9 % infusion 40 mL (has no administration in time range)   dextrose (GLUTOSE) oral gel 15 g (has no administration in time range)   dextrose (D50W) (25 g/50 mL) IV injection 25 g (has no administration in time range)   glucagon (GLUCAGEN) injection 1 mg (has no administration in time range)   insulin regular (humuLIN R,novoLIN R) injection 2-7 Units (has no administration in time range)   nitroglycerin (NITROSTAT) SL tablet 0.4 mg (has no administration in time range)   sodium chloride 0.9 % infusion (has no administration in time range)   acetaminophen (TYLENOL) tablet 650 mg (has no administration in time range)     Or   acetaminophen (TYLENOL) 160 MG/5ML oral solution 650 mg (has no administration in time range)     Or   acetaminophen (TYLENOL) suppository 650 mg (has no administration in time range)   sennosides-docusate (PERICOLACE) 8.6-50 MG per tablet 2 tablet (has no administration in time range)     And   polyethylene glycol (MIRALAX) packet 17 g (has no administration in time range)     And   bisacodyl (DULCOLAX) EC tablet 5 mg (has no administration in time range)     And   bisacodyl (DULCOLAX) suppository 10 mg (has no administration in time range)   ondansetron (ZOFRAN) injection 4 mg (has no administration in time range)   Pharmacy to Dose Zosyn (has no administration in time range)   piperacillin-tazobactam (ZOSYN) 3.375 g in iso-osmotic dextrose 50  ml (premix) (has no administration in time range)   lactated ringers bolus 1,000 mL (0 mL Intravenous Stopped 2/13/24 0354)   ondansetron (ZOFRAN) injection 4 mg (4 mg Intravenous Given 2/13/24 0147)   morphine injection 4 mg (4 mg Intravenous Given 2/13/24 0146)   piperacillin-tazobactam (ZOSYN) 3.375 g in iso-osmotic dextrose 50 ml (premix) (3.375 g Intravenous New Bag 2/13/24 0416)   HYDROmorphone (DILAUDID) injection 0.5 mg (0.5 mg Intravenous Given 2/13/24 0415)   ondansetron (ZOFRAN) injection 4 mg (4 mg Intravenous Given 2/13/24 0413)           OUTPATIENT MEDICATION MANAGEMENT:  Current Facility-Administered Medications Ordered in Epic   Medication Dose Route Frequency Provider Last Rate Last Admin    acetaminophen (TYLENOL) tablet 650 mg  650 mg Oral Q4H PRN Yany Mallory APRN        Or    acetaminophen (TYLENOL) 160 MG/5ML oral solution 650 mg  650 mg Oral Q4H PRN Yany Mallory APRN        Or    acetaminophen (TYLENOL) suppository 650 mg  650 mg Rectal Q4H PRN Yany Mallory APRN        sennosides-docusate (PERICOLACE) 8.6-50 MG per tablet 2 tablet  2 tablet Oral BID PRN Ynay Mallory APRN        And    polyethylene glycol (MIRALAX) packet 17 g  17 g Oral Daily PRN Yany Mallory APRN        And    bisacodyl (DULCOLAX) EC tablet 5 mg  5 mg Oral Daily PRN Yany Mallory APRN        And    bisacodyl (DULCOLAX) suppository 10 mg  10 mg Rectal Daily PRN Yany Mallory APRN        dextrose (D50W) (25 g/50 mL) IV injection 25 g  25 g Intravenous Q15 Min PRN Yany Mallory APRN        dextrose (GLUTOSE) oral gel 15 g  15 g Oral Q15 Min PRN Yany Mallory APRN        glucagon (GLUCAGEN) injection 1 mg  1 mg Intramuscular Q15 Min PRN Mohamud, Yany M, APRN        insulin regular (humuLIN R,novoLIN R) injection 2-7 Units  2-7 Units Subcutaneous Q6H Yany Mallory APRN        nitroglycerin (NITROSTAT) SL tablet 0.4 mg  0.4 mg Sublingual Q5 Min PRN  Yany Mallory APRN        ondansetron (ZOFRAN) injection 4 mg  4 mg Intravenous Q6H PRN Yany Mallory APRN        Pharmacy to Dose Zosyn   Does not apply Continuous PRN aYny Mallory APRN        piperacillin-tazobactam (ZOSYN) 3.375 g in iso-osmotic dextrose 50 ml (premix)  3.375 g Intravenous Q8H Yany Mallory APRN        sodium chloride 0.9 % flush 10 mL  10 mL Intravenous Q12H Yany Mallory APRN        sodium chloride 0.9 % flush 10 mL  10 mL Intravenous PRN Yany Mallory APRN        sodium chloride 0.9 % infusion 40 mL  40 mL Intravenous PRN Yany Mallory APRN        sodium chloride 0.9 % infusion  100 mL/hr Intravenous Continuous Yany Mallory APRN         Current Outpatient Medications Ordered in Epic   Medication Sig Dispense Refill    albuterol sulfate  (90 Base) MCG/ACT inhaler Inhale 2 puffs Every 6 (Six) Hours As Needed for Shortness of Air (shortness of breath). 8 g 3    apixaban (Eliquis) 2.5 MG tablet tablet Take 1 tablet by mouth Every 12 (Twelve) Hours. 60 tablet 5    fenofibrate (TRICOR) 145 MG tablet Take 1 tablet by mouth Daily. 30 tablet 5    finasteride (PROSCAR) 5 MG tablet TAKE 1/4 TABLET BY MOUTH EVERY DAY      levothyroxine (SYNTHROID, LEVOTHROID) 25 MCG tablet Take 1 tablet by mouth Daily. 90 tablet 2    metFORMIN ER (GLUCOPHAGE-XR) 500 MG 24 hr tablet Take 2 tablets by mouth Daily. 180 tablet 0    minoxidil (LONITEN) 2.5 MG tablet Take 0.5 tablets by mouth Daily.      omeprazole (priLOSEC) 20 MG capsule Take 1 capsule by mouth Daily. 30 capsule 3    ondansetron (Zofran) 4 MG tablet Take 1 tablet by mouth Every 12 (Twelve) Hours As Needed for Nausea or Vomiting. 30 tablet 0    ondansetron (Zofran) 4 MG tablet Take 1 tablet by mouth Every 8 (Eight) Hours As Needed for Nausea or Vomiting. 30 tablet 1    pravastatin (PRAVACHOL) 10 MG tablet TAKE 1 TABLET BY MOUTH DAILY 90 tablet 0    Semaglutide (Rybelsus) 7 MG tablet Take 7 mg by  mouth Daily. Please take only Rybelsus 3 mg or Rybelsus 7 mg once daily.  Take only 1 dose at a time that is no more than 1 pill/day. 30 tablet 3         PROGRESS, DATA ANALYSIS, CONSULTS, AND MEDICAL DECISION MAKING  ORDERS PLACED DURING THIS VISIT:  Orders Placed This Encounter   Procedures    CT Abdomen Pelvis Without Contrast    US Pelvis Complete    CT Abdomen Pelvis With Contrast    Comprehensive Metabolic Panel    Urinalysis With Culture If Indicated - Urine, Clean Catch    CBC Auto Differential    Lactic Acid, Plasma    CBC Auto Differential    Comprehensive Metabolic Panel    Magnesium    Protime-INR    NPO Diet NPO Type: Strict NPO    Vital Signs    Intake & Output    Weigh Patient    Oral Care    Place Sequential Compression Device    Maintain Sequential Compression Device    Telemetry - Maintain IV Access    Telemetry - Place Orders & Notify Provider of Results When Patient Experiences Acute Chest Pain, Dysrhythmia or Respiratory Distress    May Be Off Telemetry for Tests    Up With Assistance    Code Status and Medical Interventions:    Inpatient General Surgery Consult    [facility] (on-call MD unless specified) Details    Inpatient General Surgery Consult    POC Glucose 4x Daily Before Meals & at Bedtime    Insert Peripheral IV    Initiate Observation Status    CBC & Differential       All labs have been independently interpreted by me.  All radiology studies have been reviewed by me. All EKG's have been independently viewed and interpreted by me.  Discussion below represents my analysis of pertinent findings related to patient's condition, differential diagnosis, treatment plan and final disposition.    Differential diagnosis includes but is not limited to:   My differential diagnosis for abdominal pain includes but is not limited to:  Gastritis, gastroenteritis, peptic ulcer disease, GERD, esophageal perforation, acute appendicitis, mesenteric adenitis, Meckel’s diverticulum, epiploic appendagitis,  diverticulitis, colon cancer, ulcerative colitis, Crohn’s disease, intussusception, small bowel obstruction, adhesions, ischemic bowel, perforated viscus, ileus, obstipation, biliary colic, cholecystitis, cholelithiasis, Mike-Juan Keaton, hepatitis, pancreatitis, common bile duct obstruction, cholangitis, bile leak, splenic trauma, splenic rupture, splenic infarction, splenic abscess, abdominal abscess, ascites, spontaneous bacterial peritonitis, hernia, UTI, cystitis, prostatitis, ureterolithiasis, urinary obstruction, ovarian cyst, torsion, pregnancy, ectopic pregnancy, PID, pelvic abscess, mittelschmerz, endometriosis, AAA, myocardial infarction, pneumonia, cancer, porphyria, DKA, medications, sickle cell, viral syndrome, zoster      ED Course:  ED Course as of 02/13/24 0503   Tue Feb 13, 2024   0220 WBC(!): 12.87  Urinalysis negative for acute infection [CC]   0222 CT Abdomen Pelvis Without Contrast  My independent interpretation of the CT of the abdomen pelvis is inflammatory changes of the right lower quadrant [CC]   0340 Spoke with radiologist who reports patient has appendicitis and is concerned she also may have acute diverticulitis with possible contained perforation.  Patient has no left lower quadrant abdominal pain on my exam.  Updated her on results and surgery was paged. [CC]   0352 Spoke with with general surgery on call.  He agrees with plan to admission to the hospitalist as he will not operate this morning as the patient is anticoagulated. [CC]   0405 Spoke with MARLIN Slade with A.  Reviewed history, exam, results, treatments.  She agrees admit the patient to Dr. Hernandez.     [CC]      ED Course User Index  [CC] Victorina Mendoza PA-C           AS OF 05:03 EST VITALS:    BP - 154/89  HR - 92  TEMP - 97.6 °F (36.4 °C) (Tympanic)  O2 SATS - 98%    MDM:  Patient is a 53-year-old female who presents emergency department today with right lower quadrant abdominal pain.  On arrival here in the  emergency department, vitals reassuring, she is afebrile.  On my exam the patient is nurse to palpation the right lower quadrant of the abdomen with localized peritonitis.  Patient was evaluated with labs which revealed a leukocytosis.  Labs otherwise reassuring.  She was subsequently evaluated with a CT of the abdomen pelvis which reveals acute appendicitis and also findings concerning for acute diverticulitis with possible contained perforation.  Patient has no left lower quadrant tenderness.  General surgery was consulted and agreed with plan for IV Zosyn.  They do not have plans to operate this morning as the patient is anticoagulated on Eliquis.  Last p.o. intake was at 4:00 yesterday.  Patient will require admission to the hospitalist service for management of her chronic illnesses and  general surgery will see in consultation.  She is stable at time of admission.          DIAGNOSIS  Final diagnoses:   Acute appendicitis with localized peritonitis, without perforation, abscess, or gangrene   Acute diverticulitis         DISPOSITION  ED Disposition       ED Disposition   Decision to Admit    Condition   --    Comment   Level of Care: Telemetry [5]   Diagnosis: Acute appendicitis [[id number]]   Admitting Physician: BRADLEY GOMEZ [[id number]]   Attending Physician: BRADLEY GOMEZ [[id number]]                      Please note that portions of this document were completed with a voice recognition program.    Note Disclaimer: At [facility], we believe that sharing information builds trust and better relationships. You are receiving this note because you recently visited [facility]. It is possible you will see health information before a provider has talked with you about it. This kind of information can be easy to misunderstand. To help you fully understand what it means for your health, we urge you to discuss this note with your provider.     Victorina Mendoza PA-C  02/13/24 1786

## 2024-02-13 NOTE — PLAN OF CARE
Problem: Adult Inpatient Plan of Care  Goal: Plan of Care Review  2/13/2024 1440 by Asha Mancia RN  Flowsheets (Taken 2/13/2024 1440)  Outcome Evaluation: VSS, IV fluids. NPO. General surgery consulted, IV ABX, IV pain and Nausea given. AXO4. Assit X1 if needed. Room air  2/13/2024 1437 by Asha Mancia RN  Outcome: Ongoing, Progressing  Goal: Patient-Specific Goal (Individualized)  Outcome: Ongoing, Progressing  Goal: Absence of Hospital-Acquired Illness or Injury  Outcome: Ongoing, Progressing  Intervention: Identify and Manage Fall Risk  Recent Flowsheet Documentation  Taken 2/13/2024 1401 by Asha Mancia RN  Safety Promotion/Fall Prevention: patient off unit  Taken 2/13/2024 1200 by Asha Mancia RN  Safety Promotion/Fall Prevention:   safety round/check completed   nonskid shoes/slippers when out of bed  Taken 2/13/2024 1000 by Asha Mancia RN  Safety Promotion/Fall Prevention: safety round/check completed  Intervention: Prevent Skin Injury  Recent Flowsheet Documentation  Taken 2/13/2024 1200 by Asha Mancia RN  Body Position: position changed independently  Taken 2/13/2024 0800 by Asha Mancia RN  Skin Protection:   adhesive use limited   tubing/devices free from skin contact  Intervention: Prevent and Manage VTE (Venous Thromboembolism) Risk  Recent Flowsheet Documentation  Taken 2/13/2024 1200 by Asha Mancia RN  Activity Management: activity encouraged  Taken 2/13/2024 1000 by Asha Mancia RN  Activity Management: activity encouraged  Taken 2/13/2024 0800 by Asha Mancia RN  VTE Prevention/Management: (Eliquis) other (see comments)  Intervention: Prevent Infection  Recent Flowsheet Documentation  Taken 2/13/2024 1200 by Asha Mancia RN  Infection Prevention:   single patient room provided   hand hygiene promoted  Taken 2/13/2024 1000 by Asha Mancia RN  Infection Prevention: hand hygiene promoted  Goal: Optimal Comfort and Wellbeing  Outcome:  Ongoing, Progressing  Intervention: Provide Person-Centered Care  Recent Flowsheet Documentation  Taken 2/13/2024 0800 by Asha Mancia RN  Trust Relationship/Rapport:   care explained   choices provided  Goal: Readiness for Transition of Care  Outcome: Ongoing, Progressing  Intervention: Mutually Develop Transition Plan  Recent Flowsheet Documentation  Taken 2/13/2024 0846 by Asha Mancia RN  Transportation Anticipated: car, drives self  Patient/Family Anticipated Services at Transition: none  Patient/Family Anticipates Transition to: home  Goal: Plan of Care Review  Recent Flowsheet Documentation  Taken 2/13/2024 1440 by Asha Mancia RN  Outcome Evaluation: VSS, IV fluids. NPO. General surgery consulted, IV ABX, IV pain and Nausea given. AXO4. Assit X1 if needed. Room air  2/13/2024 1437 by Asha Mancia RN  Outcome: Ongoing, Progressing  Goal: Patient-Specific Goal (Individualized)  Outcome: Ongoing, Progressing  Goal: Absence of Hospital-Acquired Illness or Injury  Outcome: Ongoing, Progressing  Intervention: Identify and Manage Fall Risk  Recent Flowsheet Documentation  Taken 2/13/2024 1401 by Asha Mancia RN  Safety Promotion/Fall Prevention: patient off unit  Taken 2/13/2024 1200 by Asha Mancia RN  Safety Promotion/Fall Prevention:   safety round/check completed   nonskid shoes/slippers when out of bed  Taken 2/13/2024 1000 by Asha Mancia RN  Safety Promotion/Fall Prevention: safety round/check completed  Intervention: Prevent Skin Injury  Recent Flowsheet Documentation  Taken 2/13/2024 1200 by Asha Mancia RN  Body Position: position changed independently  Taken 2/13/2024 0800 by Ahsa Mancia RN  Skin Protection:   adhesive use limited   tubing/devices free from skin contact  Intervention: Prevent and Manage VTE (Venous Thromboembolism) Risk  Recent Flowsheet Documentation  Taken 2/13/2024 1200 by Asha Mancia RN  Activity Management: activity  encouraged  Taken 2/13/2024 1000 by Asha Mancia RN  Activity Management: activity encouraged  Taken 2/13/2024 0800 by Asha Mancia RN  VTE Prevention/Management: (Eliquis) other (see comments)  Intervention: Prevent Infection  Recent Flowsheet Documentation  Taken 2/13/2024 1200 by Asha Mancia RN  Infection Prevention:   single patient room provided   hand hygiene promoted  Taken 2/13/2024 1000 by Asha Mancia RN  Infection Prevention: hand hygiene promoted  Goal: Optimal Comfort and Wellbeing  Outcome: Ongoing, Progressing  Intervention: Provide Person-Centered Care  Recent Flowsheet Documentation  Taken 2/13/2024 0800 by Asha Mancia RN  Trust Relationship/Rapport:   care explained   choices provided  Goal: Readiness for Transition of Care  Outcome: Ongoing, Progressing  Intervention: Mutually Develop Transition Plan  Recent Flowsheet Documentation  Taken 2/13/2024 0846 by Asha Mancia RN  Transportation Anticipated: car, drives self  Patient/Family Anticipated Services at Transition: none  Patient/Family Anticipates Transition to: home   Goal Outcome Evaluation:              Outcome Evaluation: VSS, IV fluids. NPO. General surgery consulted, IV ABX, IV pain and Nausea given. AXO4. Assit X1 if needed. Room air

## 2024-02-13 NOTE — PROGRESS NOTES
[facility] Clinical Pharmacy Services: Piperacillin-Tazobactam Consult    Pt Name: Li Kimball   : 1970    Indication: Intra-Abdominal Infection    Relevant clinical data and objective history reviewed:    Past Medical History:   Diagnosis Date    Asthma     BPPV (benign paroxysmal positional vertigo)     Diverticulitis     DVT (deep venous thrombosis)     Right    Hypothyroid     Kidney stones     Migraines     Mixed hyperlipidemia     Slow to wake up after anesthesia     Weight loss      Creatinine   Date Value Ref Range Status   2024 0.99 0.57 - 1.00 mg/dL Final   2023 0.74 0.57 - 1.00 mg/dL Final   2023 0.71 0.57 - 1.00 mg/dL Final   2022 0.89 0.57 - 1.00 mg/dL Final   2022 0.73 0.57 - 1.00 mg/dL Final   10/03/2019 0.6 (L) 0.7 - 1.5 mg/dL Final     BUN   Date Value Ref Range Status   2024 22 (H) 6 - 20 mg/dL Final   10/03/2019 13 7 - 20 mg/dL Final     Estimated Creatinine Clearance: 72.3 mL/min (by C-G formula based on SCr of 0.99 mg/dL).    Lab Results   Component Value Date    WBC 12.87 (H) 2024     Temp Readings from Last 3 Encounters:   24 97.6 °F (36.4 °C) (Tympanic)   23 98 °F (36.7 °C) (Infrared)   23 97.5 °F (36.4 °C)      Assessment/Plan  Estimated CrCl >20 mL/min at this time; BMI 37.38 kg/m2  Will start piperacillin-tazobactam 3.375 g IV every 8 hours     Pharmacy will continue to follow daily while on piperacillin-tazobactam and adjust as needed. Thank you for this consult.    Walter Bai [facility]  Clinical Pharmacist

## 2024-02-13 NOTE — ED TRIAGE NOTES
Pt to ED via pv w/ c/o right flank pain that radiates to abdomen x 2 days. Pt was seen at [facility] a week ago, dx UTI, given abx. Pt states right flank pain is new. Denies hematuria.

## 2024-02-13 NOTE — ED NOTES
Nursing report ED to floor  Li Kimball  53 y.o.  female    HPI :   Chief Complaint   Patient presents with    Flank Pain     Li Kimball is a 53 y.o. female who presents to the ED c/o acute right lower abdominal and right flank pain.  Patient recently was on Macrobid for UTI which appears to be resistant she was then placed on Keflex.  States she has had continued discomfort.  No fevers or chills no hematuria.  No nausea vomiting.     Admitting doctor:   Alayna Hernandez MD    Admitting diagnosis:   The primary encounter diagnosis was Acute appendicitis with localized peritonitis, without perforation, abscess, or gangrene. A diagnosis of Acute diverticulitis was also pertinent to this visit.    Code status:   Current Code Status       Date Active Code Status Order ID Comments User Context       2/13/2024 0411 CPR (Attempt to Resuscitate) [id number]  Yany Mallory, [facility]        Question Answer    Code Status (Patient has no pulse and is not breathing) CPR (Attempt to Resuscitate)    Medical Interventions (Patient has pulse or is breathing) Full Support                    Allergies:   Codeine    Isolation:   No active isolations    Intake and Output    Intake/Output Summary (Last 24 hours) at 2/13/2024 0428  Last data filed at 2/13/2024 0354  Gross per 24 hour   Intake 1000 ml   Output --   Net 1000 ml       Weight:       02/13/24  0027   Weight: 95.7 kg (211 lb)       Most recent vitals:   Vitals:    02/13/24 0333 02/13/24 0334 02/13/24 0335 02/13/24 0409   BP:    154/89   BP Location:       Patient Position:       Pulse: 80 79 86 101   Resp:       Temp:       TempSrc:       SpO2: 96% 97% 97% 100%   Weight:       Height:           Active LDAs/IV Access:   Lines, Drains & Airways       Active LDAs       Name Placement date Placement time Site Days    Peripheral IV 02/13/24 0138 Right Antecubital 02/13/24 0138  Antecubital  less than 1    Ureteral Drain/Stent Right ureter 07/01/22  1829  Right ureter   STRING OFF  591                    Labs (abnormal labs have a star):   Labs Reviewed   COMPREHENSIVE METABOLIC PANEL - Abnormal; Notable for the following components:       Result Value    Glucose 129 (*)     BUN 22 (*)     CO2 21.0 (*)     All other components within normal limits    Narrative:     GFR Normal >60  Chronic Kidney Disease <60  Kidney Failure <15     URINALYSIS W/ CULTURE IF INDICATED - Abnormal; Notable for the following components:    Appearance, UA Cloudy (*)     All other components within normal limits    Narrative:     In absence of clinical symptoms, the presence of pyuria, bacteria, and/or nitrites on the urinalysis result does not correlate with infection.  Urine microscopic not indicated.   CBC WITH AUTO DIFFERENTIAL - Abnormal; Notable for the following components:    WBC 12.87 (*)     Hemoglobin 11.7 (*)     Neutrophils, Absolute 9.12 (*)     Monocytes, Absolute 1.02 (*)     All other components within normal limits   LACTIC ACID, PLASMA   CBC WITH AUTO DIFFERENTIAL   COMPREHENSIVE METABOLIC PANEL   MAGNESIUM   PROTIME-INR   POCT GLUCOSE FINGERSTICK   POCT GLUCOSE FINGERSTICK   POCT GLUCOSE FINGERSTICK   POCT GLUCOSE FINGERSTICK   CBC AND DIFFERENTIAL    Narrative:     The following orders were created for panel order CBC & Differential.  Procedure                               Abnormality         Status                     ---------                               -----------         ------                     CBC Auto Differential[[id number]]        Abnormal            Final result                 Please view results for these tests on the individual orders.       EKG:   No orders to display       Meds given in ED:   Medications   piperacillin-tazobactam (ZOSYN) 3.375 g in iso-osmotic dextrose 50 ml (premix) (3.375 g Intravenous New Bag 2/13/24 0416)   sodium chloride 0.9 % flush 10 mL (has no administration in time range)   sodium chloride 0.9 % flush 10 mL (has no administration in time  range)   sodium chloride 0.9 % infusion 40 mL (has no administration in time range)   dextrose (GLUTOSE) oral gel 15 g (has no administration in time range)   dextrose (D50W) (25 g/50 mL) IV injection 25 g (has no administration in time range)   glucagon (GLUCAGEN) injection 1 mg (has no administration in time range)   insulin regular (humuLIN R,novoLIN R) injection 2-7 Units (has no administration in time range)   nitroglycerin (NITROSTAT) SL tablet 0.4 mg (has no administration in time range)   sodium chloride 0.9 % infusion (has no administration in time range)   acetaminophen (TYLENOL) tablet 650 mg (has no administration in time range)     Or   acetaminophen (TYLENOL) 160 MG/5ML oral solution 650 mg (has no administration in time range)     Or   acetaminophen (TYLENOL) suppository 650 mg (has no administration in time range)   sennosides-docusate (PERICOLACE) 8.6-50 MG per tablet 2 tablet (has no administration in time range)     And   polyethylene glycol (MIRALAX) packet 17 g (has no administration in time range)     And   bisacodyl (DULCOLAX) EC tablet 5 mg (has no administration in time range)     And   bisacodyl (DULCOLAX) suppository 10 mg (has no administration in time range)   ondansetron (ZOFRAN) injection 4 mg (has no administration in time range)   Pharmacy to Dose Zosyn (has no administration in time range)   piperacillin-tazobactam (ZOSYN) 3.375 g in iso-osmotic dextrose 50 ml (premix) (has no administration in time range)   lactated ringers bolus 1,000 mL (0 mL Intravenous Stopped 2/13/24 1712)   ondansetron (ZOFRAN) injection 4 mg (4 mg Intravenous Given 2/13/24 0147)   morphine injection 4 mg (4 mg Intravenous Given 2/13/24 0146)   HYDROmorphone (DILAUDID) injection 0.5 mg (0.5 mg Intravenous Given 2/13/24 7095)   ondansetron (ZOFRAN) injection 4 mg (4 mg Intravenous Given 2/13/24 6203)       Imaging results:  CT Abdomen Pelvis Without Contrast    Addendum Date: 2/13/2024    ADDENDUM: 02 13 24  03:39 Call Doctor Regarding Above results, called  Dr. Mendoza on 02 13 03:38 (-05:00) Electronically signed by Kamilla Valencia DO [employer] of     Result Date: 2/13/2024  Communications:  Call Doctor Above results Electronically signed by Kamilla Valencia DO [employer] on 02-13-24 at 0337     Ambulatory status:   - standby    Social issues:   Social History     Socioeconomic History    Marital status:    Tobacco Use    Smoking status: Never     Passive exposure: Never    Smokeless tobacco: Never   Vaping Use    Vaping Use: Never used   Substance and Sexual Activity    Alcohol use: No    Drug use: No    Sexual activity: Brock Villagomez RN  02/13/24 04:28 EST

## 2024-02-13 NOTE — H&P
Patient Name:  Li Kimball  YOB: 1970  MRN:  [id number]  Admit Date:  2/13/2024  Patient Care Team:  Tank Shea APRN as PCP - General (Nurse Practitioner)  Sepideh Chou MD as Obstetrician (Obstetrics and Gynecology)  Jameson Darby MD (Gastroenterology)  Walter Verdin MD as Surgeon (General Surgery)  David Duong MD as Consulting Physician (Dermatology)      Subjective   History Present Illness     Chief Complaint   Patient presents with    Flank Pain     Patient is a 53-year-old female with known history of multiple blood clots, DVT/PE, obesity, hyperlipidemia, hypothyroidism presented to the emergency room with complaints of 2-day history of abdominal pain mainly localized in the right lower quadrant, sharp in nature, 9/10 intensity and constant.  She did admit to nausea but no vomiting and had subjective fevers at home.  In the ER underwent CT of the abdomen pelvis which is suggestive of acute appendicitis.  Small amount of asymmetric stranding/edema in the left adnexa was felt to be a nonspecific finding and was likely felt to be a corpus luteal cyst in the left ovary.  Of note patient does not have any left-sided abdominal pain.  Routine blood work was done and WBC was noted to be 14.76.  Given the above she is being hospitalized for further evaluation and treatment.        Review of Systems   A 12 system review has been performed and they are negative other than mentioned in the H&P      Personal History     Past Medical History:   Diagnosis Date    Asthma     BPPV (benign paroxysmal positional vertigo)     Diverticulitis     DVT (deep venous thrombosis)     Right    Hypothyroid     Kidney stones     Migraines     Mixed hyperlipidemia     Slow to wake up after anesthesia     Weight loss      Past Surgical History:   Procedure Laterality Date    ANKLE SURGERY Right 2015    Removal of hardware    COLONOSCOPY N/A 08/2020    COLONOSCOPY N/A 9/17/2020     Procedure: COLONOSCOPY to cecum with 20mm colonic balloon dilation;  Surgeon: Walter Verdin MD;  Location:  [facility];  Service: General;  Laterality: N/A;  pre - diverticulitis induced stricture   post - colonic stricture    COLONOSCOPY N/A 10/28/2021    Procedure: COLONOSCOPY into cecum with bx;  Surgeon: Walter Verdin MD;  Location:  [facility];  Service: General;  Laterality: N/A;  pre: hx of colonic stricture   post: diverticulosis     CYSTOSCOPY W/ URETERAL STENT PLACEMENT Right 7/1/2022    Procedure: CYSTOSCOPY, RIGHT URETEROSCOPY RIGHT STENT PLACEMENT, STONE BASKET EXTRACTION, LASER LITHOTRIPSY;  Surgeon: Cyrus Au MD;  Location:  [facility];  Service: Urology;  Laterality: Right;    ENDOMETRIAL ABLATION      FOOT SURGERY Left 2006    Fracture , postop DVT and PE    JOINT REPLACEMENT      ankle, s/p two surgery    LAPAROSCOPIC TUBAL LIGATION      ORIF ANKLE FRACTURE Right 2014    DVT and PE perioperatively    TUBAL ABDOMINAL LIGATION      VENA CAVA FILTER INSERTION  2014     Family History   Problem Relation Age of Onset    Other Mother         Vertigo    Hyperlipidemia Mother     Cancer Father         hodgkins disease    Heart disease Brother     Malig Hyperthermia Neg Hx      Social History     Tobacco Use    Smoking status: Never     Passive exposure: Never    Smokeless tobacco: Never   Vaping Use    Vaping Use: Never used   Substance Use Topics    Alcohol use: No    Drug use: No     No current facility-administered medications on file prior to encounter.     Current Outpatient Medications on File Prior to Encounter   Medication Sig Dispense Refill    apixaban (Eliquis) 2.5 MG tablet tablet Take 1 tablet by mouth Every 12 (Twelve) Hours. 60 tablet 5    fenofibrate (TRICOR) 145 MG tablet Take 1 tablet by mouth Daily. 30 tablet 5    finasteride (PROSCAR) 5 MG tablet TAKE 1/4 TABLET BY MOUTH EVERY DAY      levothyroxine (SYNTHROID, LEVOTHROID) 25 MCG tablet Take 1 tablet  by mouth Daily. 90 tablet 2    metFORMIN ER (GLUCOPHAGE-XR) 500 MG 24 hr tablet Take 2 tablets by mouth Daily. 180 tablet 0    minoxidil (LONITEN) 2.5 MG tablet Take 0.5 tablets by mouth Daily.      ondansetron (Zofran) 4 MG tablet Take 1 tablet by mouth Every 8 (Eight) Hours As Needed for Nausea or Vomiting. 30 tablet 1    Semaglutide (Rybelsus) 7 MG tablet Take 7 mg by mouth Daily. Please take only Rybelsus 3 mg or Rybelsus 7 mg once daily.  Take only 1 dose at a time that is no more than 1 pill/day. 30 tablet 3    albuterol sulfate  (90 Base) MCG/ACT inhaler Inhale 2 puffs Every 6 (Six) Hours As Needed for Shortness of Air (shortness of breath). 8 g 3    omeprazole (priLOSEC) 20 MG capsule Take 1 capsule by mouth Daily. 30 capsule 3    ondansetron (Zofran) 4 MG tablet Take 1 tablet by mouth Every 12 (Twelve) Hours As Needed for Nausea or Vomiting. 30 tablet 0    pravastatin (PRAVACHOL) 10 MG tablet TAKE 1 TABLET BY MOUTH DAILY 90 tablet 0     Allergies   Allergen Reactions    Codeine Nausea And Vomiting       Objective    Objective     Vital Signs  Temp:  [97.6 °F (36.4 °C)-100.8 °F (38.2 °C)] 100.8 °F (38.2 °C)  Heart Rate:  [] 63  Resp:  [18] 18  BP: (121-154)/() 123/77  SpO2:  [96 %-100 %] 96 %  on   ;   Device (Oxygen Therapy): room air  Body mass index is 37.38 kg/m².    Physical Exam  HEENT: PERRLA, extraocular movements intact, Scleras no icterus  Neck: Supple, no JVD  Cardiovascular: Regular rate and rhythm with normal S1 and S2  Respiratory: Fairly clear to auscultation bilaterally with no wheezes  GI: Soft, tender in the right lower quadrant, bowel sounds present  Extremities: No edema, palpable pedal pulses  Neurologic: Grossly nonfocal, no facial asymmetry    Results Review:  I reviewed the patient's new clinical results.  I reviewed the patient's new imaging results and agree with the interpretation.  I reviewed the patient's other test results and agree with the interpretation  I  personally viewed and interpreted the patient's EKG/Telemetry data  Discussed with ED provider.    Lab Results (last 24 hours)       Procedure Component Value Units Date/Time    CBC & Differential [[id number]]  (Abnormal) Collected: 02/13/24 0134    Specimen: Blood Updated: 02/13/24 0150    Narrative:      The following orders were created for panel order CBC & Differential.  Procedure                               Abnormality         Status                     ---------                               -----------         ------                     CBC Auto Differential[[id number]]        Abnormal            Final result                 Please view results for these tests on the individual orders.    Comprehensive Metabolic Panel [[id number]]  (Abnormal) Collected: 02/13/24 0134    Specimen: Blood Updated: 02/13/24 0204     Glucose 129 mg/dL      BUN 22 mg/dL      Creatinine 0.99 mg/dL      Sodium 137 mmol/L      Potassium 4.0 mmol/L      Chloride 107 mmol/L      CO2 21.0 mmol/L      Calcium 9.9 mg/dL      Total Protein 7.3 g/dL      Albumin 4.0 g/dL      ALT (SGPT) 15 U/L      AST (SGOT) 14 U/L      Alkaline Phosphatase 46 U/L      Total Bilirubin 0.2 mg/dL      Globulin 3.3 gm/dL      A/G Ratio 1.2 g/dL      BUN/Creatinine Ratio 22.2     Anion Gap 9.0 mmol/L      eGFR 68.3 mL/min/1.73     Narrative:      GFR Normal >60  Chronic Kidney Disease <60  Kidney Failure <15      CBC Auto Differential [[id number]]  (Abnormal) Collected: 02/13/24 0134    Specimen: Blood Updated: 02/13/24 0150     WBC 12.87 10*3/mm3      RBC 4.28 10*6/mm3      Hemoglobin 11.7 g/dL      Hematocrit 36.2 %      MCV 84.6 fL      MCH 27.3 pg      MCHC 32.3 g/dL      RDW 12.9 %      RDW-SD 38.6 fl      MPV 9.3 fL      Platelets 423 10*3/mm3      Neutrophil % 70.9 %      Lymphocyte % 19.6 %      Monocyte % 7.9 %      Eosinophil % 0.9 %      Basophil % 0.3 %      Immature Grans % 0.4 %      Neutrophils, Absolute 9.12 10*3/mm3      Lymphocytes, Absolute  2.52 10*3/mm3      Monocytes, Absolute 1.02 10*3/mm3      Eosinophils, Absolute 0.12 10*3/mm3      Basophils, Absolute 0.04 10*3/mm3      Immature Grans, Absolute 0.05 10*3/mm3      nRBC 0.0 /100 WBC     Urinalysis With Culture If Indicated - Urine, Clean Catch [[id number]]  (Abnormal) Collected: 02/13/24 0135    Specimen: Urine, Clean Catch Updated: 02/13/24 0144     Color, UA Yellow     Appearance, UA Cloudy     pH, UA 5.5     Specific Gravity, UA 1.029     Glucose, UA Negative     Ketones, UA Negative     Bilirubin, UA Negative     Blood, UA Negative     Protein, UA Negative     Leuk Esterase, UA Negative     Nitrite, UA Negative     Urobilinogen, UA 0.2 E.U./dL    Narrative:      In absence of clinical symptoms, the presence of pyuria, bacteria, and/or nitrites on the urinalysis result does not correlate with infection.  Urine microscopic not indicated.    Lactic Acid, Plasma [[id number]]  (Normal) Collected: 02/13/24 0408    Specimen: Blood Updated: 02/13/24 0432     Lactate 1.9 mmol/L     CBC Auto Differential [[id number]]  (Abnormal) Collected: 02/13/24 0737    Specimen: Blood Updated: 02/13/24 0828     WBC 14.76 10*3/mm3      RBC 4.01 10*6/mm3      Hemoglobin 11.2 g/dL      Hematocrit 34.4 %      MCV 85.8 fL      MCH 27.9 pg      MCHC 32.6 g/dL      RDW 13.0 %      RDW-SD 40.3 fl      MPV 9.4 fL      Platelets 388 10*3/mm3      Neutrophil % 78.3 %      Lymphocyte % 10.9 %      Monocyte % 10.0 %      Eosinophil % 0.3 %      Basophil % 0.2 %      Immature Grans % 0.3 %      Neutrophils, Absolute 11.56 10*3/mm3      Lymphocytes, Absolute 1.61 10*3/mm3      Monocytes, Absolute 1.47 10*3/mm3      Eosinophils, Absolute 0.04 10*3/mm3      Basophils, Absolute 0.03 10*3/mm3      Immature Grans, Absolute 0.05 10*3/mm3      nRBC 0.1 /100 WBC     Comprehensive Metabolic Panel [[id number]]  (Abnormal) Collected: 02/13/24 0737    Specimen: Blood Updated: 02/13/24 0843     Glucose 118 mg/dL      BUN 18 mg/dL      Creatinine  0.90 mg/dL      Sodium 138 mmol/L      Potassium 3.7 mmol/L      Chloride 104 mmol/L      CO2 24.0 mmol/L      Calcium 9.5 mg/dL      Total Protein 7.1 g/dL      Albumin 4.0 g/dL      ALT (SGPT) 13 U/L      AST (SGOT) 13 U/L      Alkaline Phosphatase 48 U/L      Total Bilirubin 0.4 mg/dL      Globulin 3.1 gm/dL      A/G Ratio 1.3 g/dL      BUN/Creatinine Ratio 20.0     Anion Gap 10.0 mmol/L      eGFR 76.6 mL/min/1.73     Narrative:      GFR Normal >60  Chronic Kidney Disease <60  Kidney Failure <15      Magnesium [[id number]]  (Normal) Collected: 02/13/24 0737    Specimen: Blood Updated: 02/13/24 0843     Magnesium 1.8 mg/dL     Protime-INR [[id number]]  (Normal) Collected: 02/13/24 0738    Specimen: Blood Updated: 02/13/24 0836     Protime 13.6 Seconds      INR 1.03    POC Glucose Once [[id number]]  (Normal) Collected: 02/13/24 1120    Specimen: Blood Updated: 02/13/24 1123     Glucose 117 mg/dL             Imaging Results (Last 24 Hours)       Procedure Component Value Units Date/Time    CT Abdomen Pelvis With Contrast [[id number]] Collected: 02/13/24 0925     Updated: 02/13/24 0957    Narrative:      CT ABDOMEN PELVIS W CONTRAST-     INDICATION: Right flank pain     COMPARISON: CT abdomen and pelvis February 13, 2014 at 2:16 a.m.     TECHNIQUE:  Routine CT abdomen and pelvis with IV contrast. Coronal and sagittal  reformats. Radiation dose reduction techniques were utilized, including  automated exposure control and exposure modulation based on body size.     FINDINGS:      Lung bases: Subsegmental atelectasis at the bases.     ABDOMEN: Normal liver, gallbladder, spleen, pancreas and adrenal glands.  Incidental splenule. Suspect tiny cyst in the superior pole right  kidney. Nonobstructing nephrolithiasis in the inferior pole left kidney,  series 2, axial image 63, measures 2 mm. Small parapelvic left renal  cysts.     Pelvis: Underdistended bladder. No bladder calculus. Anteverted uterus.  Questionable small mass or  leiomyoma in the left uterine fundus. Suspect  tubal ligation. Small corpus luteal cyst suspected in the left ovary.  Small amount of asymmetric fat stranding in the left adnexa.     Bowel: Small amount of free fluid in the cul-de-sac. Colonic  diverticulosis. No obstruction. Appendix is enlarged, series 2, axial  image 93, measuring 12 mm, with periappendiceal edema and trace  ill-defined fluid, consistent with appendicitis. Possible tiny  appendicolith at the appendicular origin, series 3, coronal image 61. No  extraluminal gas. No walled off periappendiceal fluid collection/abscess  seen.     Abdominal wall: Small fat-containing umbilical hernia. Periumbilical  scarring. Small fat-containing left inguinal hernia.     Retroperitoneum: No lymphadenopathy.     Vasculature: Patent. IVC filter seen with the legs extending through the  caval wall.     Osseous structures: No destructive osseous lesions.       Impression:         1. Acute nonperforated appendicitis.  2. Small amount of free fluid in the cul-de-sac.  3. Small amount of asymmetric stranding/edema in the left adnexa is  nonspecific. Could be related to a corpus luteal cyst in the left ovary,  diverticulitis thought less likely.  4. Nonobstructing left nephrolithiasis.           This report was finalized on 2/13/2024 9:54 AM by Dr. Jeffy Santana M.D on Workstation: [id number]       CT Abdomen Pelvis Without Contrast [[id number]] Collected: 02/13/24 0337     Updated: 02/13/24 0340    Addenda:        ADDENDUM:  02 13 24 03:39 Call Doctor Regarding Above results, called  Dr. Mendoza   on 02 13 03:38 (-05:00)    Electronically signed by Walter Zhang DO, Diplomate [employer] Board of     Signed: 02/13/24 0337 by Walter Zhang DO    Narrative:      CR  Patient: J LUIS HOPPER  Time Out: 03:37  Exam(s): CT ABDOMEN + PELVIS Without Contrast     EXAM:    CT Abdomen and Pelvis Without Intravenous Contrast    CLINICAL HISTORY:     Reason for exam: Right  flank pain, dysuria, known UTI.    TECHNIQUE:    Axial computed tomography images of the abdomen and pelvis without   intravenous contrast.  CTDI is 19.4 mGy and DLP is 1075 mGy-cm.  This CT   exam was performed according to the principle of ALARA (As Low As   Reasonably Achievable) by using one or more of the following dose   reduction techniques: automated exposure control, adjustment of the mA   and or kV according to patient size, and or use of iterative   reconstruction technique.    COMPARISON:    6 29 22    FINDINGS:    Lung bases:  Unremarkable.  No mass.  No consolidation.     ABDOMEN:    Liver:  Unremarkable.    Gallbladder and bile ducts:  Unremarkable.  No calcified stones.  No   ductal dilation.    Pancreas:  Unremarkable.  No ductal dilation.    Spleen:  Unremarkable.  No splenomegaly.    Adrenals:  Unremarkable.  No mass.    Kidneys and ureters:  Tiny bilateral nonobstructing intrarenal calculi.    Negative for obstructive uropathy.  Negative for perirenal or pararenal   fluid collections.    Stomach and bowel:  Negative for GI tract obstruction or pneumatosis.    Mild wall thickening proximal to mid sigmoid colon.  There are mild   inflammatory changes adjacent to the sigmoid colon, most prominent as it   crosses the left adnexa.  There are irregular collections of gas adjacent   to the sigmoid colon, the most proximal region is at the level of the   sigmoid colon across in the left adnexa where the gas collection is   lateral to the left lateral wall.  A second region of irregular gas along   the right lateral wall of the sigmoid colon, near the level of the lower   uterine segment and cervix.     PELVIS:    Appendix:  Pathologic enlargement of the appendix measuring 12 mm in   short axis.  Multiple tiny appendicoliths.  Moderate periappendiceal   inflammation.    Bladder:  Unremarkable.  No stones.    Reproductive:  Soft tissue prominence in the left adnexa measuring   approximately 3.0 x 4.7 cm.   There are mild inflammatory changes   immediately adjacent to the left adnexal soft tissue.     ABDOMEN and PELVIS:    Intraperitoneal space:  Unremarkable.  No free air.  No significant   fluid collection.    Bones joints:  No acute fracture.  No dislocation.    Soft tissues:  Unremarkable.    Vasculature:  Unremarkable.  No abdominal aortic aneurysm.  No change   in position of IVC filter with the apex below the level of both renal   veins.    Lymph nodes:  Unremarkable.  No enlarged lymph nodes.    IMPRESSION:       1.  Acute appendicitis without imaging evidence of perforation.  2.  Mild fat stranding adjacent to the sigmoid colon and prominent left   adnexal soft tissue . mild wall thickening of the sigmoid colon, likely   reflecting colitis diverticulitis.  Irregular gas collections adjacent to   the sigmoid colon are indeterminate for irregular prominent diverticuli   or areas of locally contained perforation.  Etiology of the soft tissue   prominence in the left adnexa is unknown.  Recommend follow-up pelvic   ultrasound and contrast-enhanced CT. diagnostic sensitivity is reduced by   the absence of IV contrast.      Impression:            Communications:     Call Doctor Above results    Electronically signed by Walter Zhang DO, Diplomate American Board of   Radiology on 02-13-24 at 0337                SCANNED - TELEMETRY     Final Result           Assessment/Plan     Active Hospital Problems    Diagnosis  POA    **Acute appendicitis [K35.80]  Yes    Colitis [K52.9]  Yes    Diabetes mellitus type 2, diet-controlled [E11.9]  Yes    Chronic anticoagulation [Z79.01]  Not Applicable    Recurrent deep vein thrombosis (DVT) [I82.409]  Yes    Hypothyroidism, adult [E03.9]  Yes      Resolved Hospital Problems   No resolved problems to display.       1.Acute appendicitis, continue with n.p.o. status, IV fluids and Zosyn.  Surgical consultation has been obtained.  2.  History of multiple blood clots/DVT/PE, does  have an IV filter in place and Eliquis is on hold for the moment.  Will be on Lovenox for DVT prophylaxis.  3.  Morbid obesity, counseled to lose weight.  4.  Hypothyroidism, Synthroid will be resumed once able to tolerate p.o.  5.  CODE STATUS is full code.       Donovan Sosa MD  [facility]  02/13/24  13:43 EST

## 2024-02-13 NOTE — PROGRESS NOTES
[facility]: Medication Reconciliation     Li Kimball is a 53 y.o. female presenting to [facility] for Acute appendicitis [K35.80]  Acute diverticulitis [K57.92]  Acute appendicitis with localized peritonitis, without perforation, abscess, or gangrene [K35.30]       She  has a past medical history of Asthma, BPPV (benign paroxysmal positional vertigo), Diverticulitis, DVT (deep venous thrombosis), Hypothyroid, Kidney stones, Migraines, Mixed hyperlipidemia, Slow to wake up after anesthesia, and Weight loss.       Allergies as of 02/13/2024 - Reviewed 02/13/2024   Allergen Reaction Noted    Codeine Nausea And Vomiting 07/06/2016          Medication information was obtained from: Patient   Pharmacy and Phone Number:   Preferred Pharmacy:  [id number] - [place], [place] - [address] AT [facility] [phone] [facility] [phone]   [address] [address]  Phone: [phone] Fax: [phone]    [facility] [place]  [address] [facility] [address]  Phone: [phone] Fax: [phone]       Prior to Admission Medications       Prescriptions Last Dose Informant Patient Reported? Taking?    apixaban (Eliquis) 2.5 MG tablet tablet 2/12/2024 Self No Yes    Take 1 tablet by mouth Every 12 (Twelve) Hours.    fenofibrate (TRICOR) 145 MG tablet 2/12/2024 Self No Yes    Take 1 tablet by mouth Daily.    finasteride (PROSCAR) 5 MG tablet 2/12/2024 Self Yes Yes    TAKE 1/4 TABLET BY MOUTH EVERY DAY    levothyroxine (SYNTHROID, LEVOTHROID) 25 MCG tablet 2/12/2024 Self No Yes    Take 1 tablet by mouth Daily.    metFORMIN ER (GLUCOPHAGE-XR) 500 MG 24 hr tablet 2/12/2024 Self No Yes    Take 2 tablets by mouth Daily.    Patient taking differently:  Take 1 tablet by mouth Daily.    minoxidil (LONITEN) 2.5 MG tablet 2/12/2024 Self Yes Yes    Take 0.5 tablets by mouth Daily.    omeprazole (priLOSEC)  20 MG capsule Past Month Self No Yes    Take 1 capsule by mouth Daily.    Patient taking differently:  Take 1 capsule by mouth Daily As Needed.    ondansetron (Zofran) 4 MG tablet Past Week Self No Yes    Take 1 tablet by mouth Every 8 (Eight) Hours As Needed for Nausea or Vomiting.    Semaglutide (Rybelsus) 7 MG tablet 2/12/2024 Self No Yes    Take 7 mg by mouth Daily. Please take only Rybelsus 3 mg or Rybelsus 7 mg once daily.  Take only 1 dose at a time that is no more than 1 pill/day.    albuterol sulfate  (90 Base) MCG/ACT inhaler  Self No No    Inhale 2 puffs Every 6 (Six) Hours As Needed for Shortness of Air (shortness of breath).    ondansetron (Zofran) 4 MG tablet   No No    Take 1 tablet by mouth Every 12 (Twelve) Hours As Needed for Nausea or Vomiting.    pravastatin (PRAVACHOL) 10 MG tablet  Self No No    TAKE 1 TABLET BY MOUTH DAILY           Medication notes:      This medication list is complete to the best of my knowledge as of 2/13/2024       Please call if questions.     Dara Beltran, PharmD  Clinical Pharmacist

## 2024-02-13 NOTE — ED PROVIDER NOTES
MD ATTESTATION NOTE    SHARED VISIT: This visit was performed by BOTH a physician and an APC. The substantive portion of the medical decision making was performed by this attesting physician who made or approved the management plan and takes responsibility for patient management. All studies documented in the APC note (if performed) were independently interpreted by me.    The JOHN and I have discussed this patient's history, physical exam, MDM, and treatment plan.  I have reviewed the documentation and personally had a face to face interaction with the patient. The attached note describes my personal findings.      Li Kimball is a 53 y.o. female who presents to the ED c/o acute right lower abdominal and right flank pain.  Patient recently was on Macrobid for UTI which appears to be resistant she was then placed on Keflex.  States she has had continued discomfort.  No fevers or chills no hematuria.  No nausea vomiting.    On exam:  GENERAL: not distressed  HENT: nares patent  EYES: no scleral icterus  CV: regular rhythm, regular rate  RESPIRATORY: normal effort  ABDOMEN: soft, mild right lower quadrant tenderness mild right flank tenderness  MUSCULOSKELETAL: no deformity  NEURO: alert, moves all extremities, follows commands  SKIN: warm, dry    Labs  Recent Results (from the past 24 hour(s))   Comprehensive Metabolic Panel    Collection Time: 02/13/24  1:34 AM    Specimen: Blood   Result Value Ref Range    Glucose 129 (H) 65 - 99 mg/dL    BUN 22 (H) 6 - 20 mg/dL    Creatinine 0.99 0.57 - 1.00 mg/dL    Sodium 137 136 - 145 mmol/L    Potassium 4.0 3.5 - 5.2 mmol/L    Chloride 107 98 - 107 mmol/L    CO2 21.0 (L) 22.0 - 29.0 mmol/L    Calcium 9.9 8.6 - 10.5 mg/dL    Total Protein 7.3 6.0 - 8.5 g/dL    Albumin 4.0 3.5 - 5.2 g/dL    ALT (SGPT) 15 1 - 33 U/L    AST (SGOT) 14 1 - 32 U/L    Alkaline Phosphatase 46 39 - 117 U/L    Total Bilirubin 0.2 0.0 - 1.2 mg/dL    Globulin 3.3 gm/dL    A/G Ratio 1.2 g/dL     BUN/Creatinine Ratio 22.2 7.0 - 25.0    Anion Gap 9.0 5.0 - 15.0 mmol/L    eGFR 68.3 >60.0 mL/min/1.73   CBC Auto Differential    Collection Time: 02/13/24  1:34 AM    Specimen: Blood   Result Value Ref Range    WBC 12.87 (H) 3.40 - 10.80 10*3/mm3    RBC 4.28 3.77 - 5.28 10*6/mm3    Hemoglobin 11.7 (L) 12.0 - 15.9 g/dL    Hematocrit 36.2 34.0 - 46.6 %    MCV 84.6 79.0 - 97.0 fL    MCH 27.3 26.6 - 33.0 pg    MCHC 32.3 31.5 - 35.7 g/dL    RDW 12.9 12.3 - 15.4 %    RDW-SD 38.6 37.0 - 54.0 fl    MPV 9.3 6.0 - 12.0 fL    Platelets 423 140 - 450 10*3/mm3    Neutrophil % 70.9 42.7 - 76.0 %    Lymphocyte % 19.6 19.6 - 45.3 %    Monocyte % 7.9 5.0 - 12.0 %    Eosinophil % 0.9 0.3 - 6.2 %    Basophil % 0.3 0.0 - 1.5 %    Immature Grans % 0.4 0.0 - 0.5 %    Neutrophils, Absolute 9.12 (H) 1.70 - 7.00 10*3/mm3    Lymphocytes, Absolute 2.52 0.70 - 3.10 10*3/mm3    Monocytes, Absolute 1.02 (H) 0.10 - 0.90 10*3/mm3    Eosinophils, Absolute 0.12 0.00 - 0.40 10*3/mm3    Basophils, Absolute 0.04 0.00 - 0.20 10*3/mm3    Immature Grans, Absolute 0.05 0.00 - 0.05 10*3/mm3    nRBC 0.0 0.0 - 0.2 /100 WBC   Urinalysis With Culture If Indicated - Urine, Clean Catch    Collection Time: 02/13/24  1:35 AM    Specimen: Urine, Clean Catch   Result Value Ref Range    Color, UA Yellow Yellow, Straw    Appearance, UA Cloudy (A) Clear    pH, UA 5.5 5.0 - 8.0    Specific Gravity, UA 1.029 1.005 - 1.030    Glucose, UA Negative Negative    Ketones, UA Negative Negative    Bilirubin, UA Negative Negative    Blood, UA Negative Negative    Protein, UA Negative Negative    Leuk Esterase, UA Negative Negative    Nitrite, UA Negative Negative    Urobilinogen, UA 0.2 E.U./dL 0.2 - 1.0 E.U./dL   Lactic Acid, Plasma    Collection Time: 02/13/24  4:08 AM    Specimen: Blood   Result Value Ref Range    Lactate 1.9 0.5 - 2.0 mmol/L       Radiology  CT Abdomen Pelvis Without Contrast    Addendum Date: 2/13/2024    ADDENDUM: 02 13 24 03:39 Call Doctor Regarding Above  results, called  Dr. Mendoza on 02 13 03:38 (-05:00) Electronically signed by Kamilla Valencia DO American Board of     Result Date: 2/13/2024  CR Patient: J LUIS HOPPER  Time Out: 03:37 Exam(s): CT ABDOMEN + PELVIS Without Contrast EXAM:   CT Abdomen and Pelvis Without Intravenous Contrast CLINICAL HISTORY:    Reason for exam: Right flank pain, dysuria, known UTI. TECHNIQUE:   Axial computed tomography images of the abdomen and pelvis without intravenous contrast.  CTDI is 19.4 mGy and DLP is 1075 mGy-cm.  This CT exam was performed according to the principle of ALARA (As Low As Reasonably Achievable) by using one or more of the following dose reduction techniques: automated exposure control, adjustment of the mA and or kV according to patient size, and or use of iterative reconstruction technique. COMPARISON:   6 29 22 FINDINGS:   Lung bases:  Unremarkable.  No mass.  No consolidation.  ABDOMEN:   Liver:  Unremarkable.   Gallbladder and bile ducts:  Unremarkable.  No calcified stones.  No ductal dilation.   Pancreas:  Unremarkable.  No ductal dilation.   Spleen:  Unremarkable.  No splenomegaly.   Adrenals:  Unremarkable.  No mass.   Kidneys and ureters:  Tiny bilateral nonobstructing intrarenal calculi.  Negative for obstructive uropathy.  Negative for perirenal or pararenal fluid collections.   Stomach and bowel:  Negative for GI tract obstruction or pneumatosis.  Mild wall thickening proximal to mid sigmoid colon.  There are mild inflammatory changes adjacent to the sigmoid colon, most prominent as it crosses the left adnexa.  There are irregular collections of gas adjacent to the sigmoid colon, the most proximal region is at the level of the sigmoid colon across in the left adnexa where the gas collection is lateral to the left lateral wall.  A second region of irregular gas along the right lateral wall of the sigmoid colon, near the level of the lower uterine segment and cervix.  PELVIS:    Appendix:  Pathologic enlargement of the appendix measuring 12 mm in short axis.  Multiple tiny appendicoliths.  Moderate periappendiceal inflammation.   Bladder:  Unremarkable.  No stones.   Reproductive:  Soft tissue prominence in the left adnexa measuring approximately 3.0 x 4.7 cm.  There are mild inflammatory changes immediately adjacent to the left adnexal soft tissue.  ABDOMEN and PELVIS:   Intraperitoneal space:  Unremarkable.  No free air.  No significant fluid collection.   Bones joints:  No acute fracture.  No dislocation.   Soft tissues:  Unremarkable.   Vasculature:  Unremarkable.  No abdominal aortic aneurysm.  No change in position of IVC filter with the apex below the level of both renal veins.   Lymph nodes:  Unremarkable.  No enlarged lymph nodes. IMPRESSION:     1.  Acute appendicitis without imaging evidence of perforation. 2.  Mild fat stranding adjacent to the sigmoid colon and prominent left adnexal soft tissue . mild wall thickening of the sigmoid colon, likely reflecting colitis diverticulitis.  Irregular gas collections adjacent to the sigmoid colon are indeterminate for irregular prominent diverticuli or areas of locally contained perforation.  Etiology of the soft tissue prominence in the left adnexa is unknown.  Recommend follow-up pelvic ultrasound and contrast-enhanced CT. diagnostic sensitivity is reduced by the absence of IV contrast.     Communications:  Call Doctor Above results Electronically signed by Walter Zhang DO, Diplomate American Board of Radiology on 02-13-24 at 0337     Medications given in the ED:  Medications   sodium chloride 0.9 % flush 10 mL (has no administration in time range)   sodium chloride 0.9 % flush 10 mL (has no administration in time range)   sodium chloride 0.9 % infusion 40 mL (has no administration in time range)   dextrose (GLUTOSE) oral gel 15 g (has no administration in time range)   dextrose (D50W) (25 g/50 mL) IV injection 25 g (has no  administration in time range)   glucagon (GLUCAGEN) injection 1 mg (has no administration in time range)   insulin regular (humuLIN R,novoLIN R) injection 2-7 Units (has no administration in time range)   nitroglycerin (NITROSTAT) SL tablet 0.4 mg (has no administration in time range)   sodium chloride 0.9 % infusion (has no administration in time range)   acetaminophen (TYLENOL) tablet 650 mg (has no administration in time range)     Or   acetaminophen (TYLENOL) 160 MG/5ML oral solution 650 mg (has no administration in time range)     Or   acetaminophen (TYLENOL) suppository 650 mg (has no administration in time range)   sennosides-docusate (PERICOLACE) 8.6-50 MG per tablet 2 tablet (has no administration in time range)     And   polyethylene glycol (MIRALAX) packet 17 g (has no administration in time range)     And   bisacodyl (DULCOLAX) EC tablet 5 mg (has no administration in time range)     And   bisacodyl (DULCOLAX) suppository 10 mg (has no administration in time range)   ondansetron (ZOFRAN) injection 4 mg (has no administration in time range)   Pharmacy to Dose Zosyn (has no administration in time range)   piperacillin-tazobactam (ZOSYN) 3.375 g in iso-osmotic dextrose 50 ml (premix) (has no administration in time range)   lactated ringers bolus 1,000 mL (0 mL Intravenous Stopped 2/13/24 0354)   ondansetron (ZOFRAN) injection 4 mg (4 mg Intravenous Given 2/13/24 0147)   morphine injection 4 mg (4 mg Intravenous Given 2/13/24 0146)   piperacillin-tazobactam (ZOSYN) 3.375 g in iso-osmotic dextrose 50 ml (premix) (3.375 g Intravenous New Bag 2/13/24 0416)   HYDROmorphone (DILAUDID) injection 0.5 mg (0.5 mg Intravenous Given 2/13/24 0415)   ondansetron (ZOFRAN) injection 4 mg (4 mg Intravenous Given 2/13/24 0413)       Orders placed during this visit:  Orders Placed This Encounter   Procedures    CT Abdomen Pelvis Without Contrast    US Pelvis Complete    CT Abdomen Pelvis With Contrast    Comprehensive  Metabolic Panel    Urinalysis With Culture If Indicated - Urine, Clean Catch    CBC Auto Differential    Lactic Acid, Plasma    CBC Auto Differential    Comprehensive Metabolic Panel    Magnesium    Protime-INR    NPO Diet NPO Type: Strict NPO    Vital Signs    Intake & Output    Weigh Patient    Oral Care    Place Sequential Compression Device    Maintain Sequential Compression Device    Telemetry - Maintain IV Access    Telemetry - Place Orders & Notify Provider of Results When Patient Experiences Acute Chest Pain, Dysrhythmia or Respiratory Distress    May Be Off Telemetry for Tests    Up With Assistance    Code Status and Medical Interventions:    Inpatient General Surgery Consult    [facility] (on-call MD unless specified) Details    Inpatient General Surgery Consult    POC Glucose 4x Daily Before Meals & at Bedtime    Insert Peripheral IV    Initiate Observation Status    CBC & Differential       Medical Decision Making:  ED Course as of 02/13/24 0701   Tue Feb 13, 2024   0220 WBC(!): 12.87  Urinalysis negative for acute infection [CC]   0222 CT Abdomen Pelvis Without Contrast  My independent interpretation of the CT of the abdomen pelvis is inflammatory changes of the right lower quadrant [CC]   0340 Spoke with radiologist who reports patient has appendicitis and is concerned she also may have acute diverticulitis with possible contained perforation.  Patient has no left lower quadrant abdominal pain on my exam.  Updated her on results and surgery was paged. [CC]   0352 Spoke with with general surgery on call.  He agrees with plan to admission to the hospitalist as he will not operate this morning as the patient is anticoagulated. [CC]   0405 Spoke with MARLIN Slade with [facility].  Reviewed history, exam, results, treatments.  She agrees admit the patient to Dr. Hernandez.     [CC]      ED Course User Index  [CC] Victorina Mendoza, TREVOR       Differential diagnosis:  UTI, pyelonephritis, ureteral stone    Diagnosis  Final  diagnoses:   Acute appendicitis with localized peritonitis, without perforation, abscess, or gangrene   Acute diverticulitis          Jose Foster MD  02/13/24 0701

## 2024-02-14 ENCOUNTER — ANESTHESIA EVENT (OUTPATIENT)
Dept: PERIOP | Facility: HOSPITAL | Age: 54
End: 2024-02-14
Payer: MEDICAID

## 2024-02-14 ENCOUNTER — ANESTHESIA (OUTPATIENT)
Dept: PERIOP | Facility: HOSPITAL | Age: 54
End: 2024-02-14
Payer: MEDICAID

## 2024-02-14 LAB
ANION GAP SERPL CALCULATED.3IONS-SCNC: 12.8 MMOL/L (ref 5–15)
B-HCG UR QL: NEGATIVE
B-HCG UR QL: NEGATIVE
BASOPHILS # BLD AUTO: 0.03 10*3/MM3 (ref 0–0.2)
BASOPHILS NFR BLD AUTO: 0.2 % (ref 0–1.5)
BUN SERPL-MCNC: 14 MG/DL (ref 6–20)
BUN/CREAT SERPL: 17.5 (ref 7–25)
CALCIUM SPEC-SCNC: 8.6 MG/DL (ref 8.6–10.5)
CHLORIDE SERPL-SCNC: 103 MMOL/L (ref 98–107)
CO2 SERPL-SCNC: 20.2 MMOL/L (ref 22–29)
CREAT SERPL-MCNC: 0.8 MG/DL (ref 0.57–1)
DEPRECATED RDW RBC AUTO: 41.5 FL (ref 37–54)
EGFRCR SERPLBLD CKD-EPI 2021: 88.2 ML/MIN/1.73
EOSINOPHIL # BLD AUTO: 0.02 10*3/MM3 (ref 0–0.4)
EOSINOPHIL NFR BLD AUTO: 0.1 % (ref 0.3–6.2)
ERYTHROCYTE [DISTWIDTH] IN BLOOD BY AUTOMATED COUNT: 13 % (ref 12.3–15.4)
EXPIRATION DATE: NORMAL
GLUCOSE BLDC GLUCOMTR-MCNC: 138 MG/DL (ref 70–130)
GLUCOSE BLDC GLUCOMTR-MCNC: 140 MG/DL (ref 70–130)
GLUCOSE BLDC GLUCOMTR-MCNC: 149 MG/DL (ref 70–130)
GLUCOSE BLDC GLUCOMTR-MCNC: 150 MG/DL (ref 70–130)
GLUCOSE BLDC GLUCOMTR-MCNC: 157 MG/DL (ref 70–130)
GLUCOSE SERPL-MCNC: 137 MG/DL (ref 65–99)
HCT VFR BLD AUTO: 35.2 % (ref 34–46.6)
HGB BLD-MCNC: 11.5 G/DL (ref 12–15.9)
IMM GRANULOCYTES # BLD AUTO: 0.17 10*3/MM3 (ref 0–0.05)
IMM GRANULOCYTES NFR BLD AUTO: 0.9 % (ref 0–0.5)
INTERNAL NEGATIVE CONTROL: NEGATIVE
INTERNAL POSITIVE CONTROL: POSITIVE
LYMPHOCYTES # BLD AUTO: 1.49 10*3/MM3 (ref 0.7–3.1)
LYMPHOCYTES NFR BLD AUTO: 8.3 % (ref 19.6–45.3)
Lab: NORMAL
MCH RBC QN AUTO: 28.5 PG (ref 26.6–33)
MCHC RBC AUTO-ENTMCNC: 32.7 G/DL (ref 31.5–35.7)
MCV RBC AUTO: 87.3 FL (ref 79–97)
MONOCYTES # BLD AUTO: 1.02 10*3/MM3 (ref 0.1–0.9)
MONOCYTES NFR BLD AUTO: 5.7 % (ref 5–12)
NEUTROPHILS NFR BLD AUTO: 15.22 10*3/MM3 (ref 1.7–7)
NEUTROPHILS NFR BLD AUTO: 84.8 % (ref 42.7–76)
NRBC BLD AUTO-RTO: 0 /100 WBC (ref 0–0.2)
PLATELET # BLD AUTO: 393 10*3/MM3 (ref 140–450)
PMV BLD AUTO: 9.3 FL (ref 6–12)
POTASSIUM SERPL-SCNC: 3.3 MMOL/L (ref 3.5–5.2)
RBC # BLD AUTO: 4.03 10*6/MM3 (ref 3.77–5.28)
SODIUM SERPL-SCNC: 136 MMOL/L (ref 136–145)
WBC NRBC COR # BLD AUTO: 17.95 10*3/MM3 (ref 3.4–10.8)

## 2024-02-14 PROCEDURE — 82948 REAGENT STRIP/BLOOD GLUCOSE: CPT

## 2024-02-14 PROCEDURE — 25810000003 SODIUM CHLORIDE PER 500 ML: Performed by: STUDENT IN AN ORGANIZED HEALTH CARE EDUCATION/TRAINING PROGRAM

## 2024-02-14 PROCEDURE — 25010000002 FENTANYL CITRATE (PF) 50 MCG/ML SOLUTION: Performed by: NURSE ANESTHETIST, CERTIFIED REGISTERED

## 2024-02-14 PROCEDURE — 44970 LAPAROSCOPY APPENDECTOMY: CPT | Performed by: STUDENT IN AN ORGANIZED HEALTH CARE EDUCATION/TRAINING PROGRAM

## 2024-02-14 PROCEDURE — 25010000002 HYDROMORPHONE 1 MG/ML SOLUTION: Performed by: INTERNAL MEDICINE

## 2024-02-14 PROCEDURE — 25010000002 PIPERACILLIN SOD-TAZOBACTAM PER 1 G: Performed by: NURSE PRACTITIONER

## 2024-02-14 PROCEDURE — 25010000002 PIPERACILLIN SOD-TAZOBACTAM PER 1 G: Performed by: STUDENT IN AN ORGANIZED HEALTH CARE EDUCATION/TRAINING PROGRAM

## 2024-02-14 PROCEDURE — 25810000003 SODIUM CHLORIDE 0.9 % SOLUTION: Performed by: STUDENT IN AN ORGANIZED HEALTH CARE EDUCATION/TRAINING PROGRAM

## 2024-02-14 PROCEDURE — 25810000003 SODIUM CHLORIDE 0.9 % SOLUTION: Performed by: NURSE PRACTITIONER

## 2024-02-14 PROCEDURE — 85025 COMPLETE CBC W/AUTO DIFF WBC: CPT | Performed by: INTERNAL MEDICINE

## 2024-02-14 PROCEDURE — 81025 URINE PREGNANCY TEST: CPT | Performed by: STUDENT IN AN ORGANIZED HEALTH CARE EDUCATION/TRAINING PROGRAM

## 2024-02-14 PROCEDURE — 25010000002 HYDROMORPHONE PER 4 MG: Performed by: NURSE ANESTHETIST, CERTIFIED REGISTERED

## 2024-02-14 PROCEDURE — 0DTJ4ZZ RESECTION OF APPENDIX, PERCUTANEOUS ENDOSCOPIC APPROACH: ICD-10-PCS | Performed by: STUDENT IN AN ORGANIZED HEALTH CARE EDUCATION/TRAINING PROGRAM

## 2024-02-14 PROCEDURE — 25810000003 LACTATED RINGERS PER 1000 ML: Performed by: ANESTHESIOLOGY

## 2024-02-14 PROCEDURE — 25010000002 ONDANSETRON PER 1 MG: Performed by: INTERNAL MEDICINE

## 2024-02-14 PROCEDURE — 88304 TISSUE EXAM BY PATHOLOGIST: CPT | Performed by: STUDENT IN AN ORGANIZED HEALTH CARE EDUCATION/TRAINING PROGRAM

## 2024-02-14 PROCEDURE — 25010000002 KETOROLAC TROMETHAMINE PER 15 MG: Performed by: NURSE ANESTHETIST, CERTIFIED REGISTERED

## 2024-02-14 PROCEDURE — 25010000002 FENTANYL CITRATE (PF) 50 MCG/ML SOLUTION: Performed by: ANESTHESIOLOGY

## 2024-02-14 PROCEDURE — 25010000002 DEXAMETHASONE SODIUM PHOSPHATE 20 MG/5ML SOLUTION: Performed by: NURSE ANESTHETIST, CERTIFIED REGISTERED

## 2024-02-14 PROCEDURE — 25010000002 HYDROMORPHONE 1 MG/ML SOLUTION: Performed by: STUDENT IN AN ORGANIZED HEALTH CARE EDUCATION/TRAINING PROGRAM

## 2024-02-14 PROCEDURE — 25010000002 SUGAMMADEX 200 MG/2ML SOLUTION: Performed by: NURSE ANESTHETIST, CERTIFIED REGISTERED

## 2024-02-14 PROCEDURE — 80048 BASIC METABOLIC PNL TOTAL CA: CPT | Performed by: INTERNAL MEDICINE

## 2024-02-14 PROCEDURE — 25010000002 ONDANSETRON PER 1 MG: Performed by: STUDENT IN AN ORGANIZED HEALTH CARE EDUCATION/TRAINING PROGRAM

## 2024-02-14 PROCEDURE — 25010000002 PROPOFOL 200 MG/20ML EMULSION: Performed by: NURSE ANESTHETIST, CERTIFIED REGISTERED

## 2024-02-14 DEVICE — THE ECHELON, ECHELON ENDOPATH™ AND ECHELON FLEX™ FAMILIES OF ENDOSCOPIC LINEAR CUTTERS AND RELOADS ARE STERILE, SINGLE PATIENT USE INSTRUMENTS THAT SIMULTANEOUSLY CUT AND STAPLE TISSUE. THERE ARE SIX STAGGERED ROWS OF STAPLES, THREE ON EITHER SIDE OF THE CUT LINE. THE 45 MM INSTRUMENTS HAVE A STAPLE LINE THATIS APPROXIMATELY 45 MM LONG AND A CUT LINE THAT IS APPROXIMATELY 42 MM LONG. THE SHAFT CAN ROTATE FREELY IN BOTH DIRECTIONS AND AN ARTICULATION MECHANISM ON ARTICULATING INSTRUMENTS ENABLES BENDING THE DISTAL PORTIONOF THE SHAFT TO FACILITATE LATERAL ACCESS OF THE OPERATIVE SITE.THE INSTRUMENTS ARE SHIPPED WITHOUT A RELOAD AND MUST BE LOADED PRIOR TO USE. A STAPLE RETAINING CAP ON THE RELOAD PROTECTS THE STAPLE LEG POINTS DURING SHIPPING AND TRANSPORTATION. THE INSTRUMENTS’ LOCK-OUT FEATURE IS DESIGNED TO PREVENT A USED RELOAD FROM BEING REFIRED.
Type: IMPLANTABLE DEVICE | Site: ABDOMEN | Status: FUNCTIONAL
Brand: ECHELON ENDOPATH

## 2024-02-14 RX ORDER — KETOROLAC TROMETHAMINE 30 MG/ML
INJECTION, SOLUTION INTRAMUSCULAR; INTRAVENOUS AS NEEDED
Status: DISCONTINUED | OUTPATIENT
Start: 2024-02-14 | End: 2024-02-14 | Stop reason: SURG

## 2024-02-14 RX ORDER — FENTANYL CITRATE 50 UG/ML
50 INJECTION, SOLUTION INTRAMUSCULAR; INTRAVENOUS
Status: DISCONTINUED | OUTPATIENT
Start: 2024-02-14 | End: 2024-02-14 | Stop reason: HOSPADM

## 2024-02-14 RX ORDER — LABETALOL HYDROCHLORIDE 5 MG/ML
5 INJECTION, SOLUTION INTRAVENOUS
Status: DISCONTINUED | OUTPATIENT
Start: 2024-02-14 | End: 2024-02-14 | Stop reason: HOSPADM

## 2024-02-14 RX ORDER — OXYCODONE HYDROCHLORIDE 5 MG/1
10 TABLET ORAL EVERY 4 HOURS PRN
Status: DISPENSED | OUTPATIENT
Start: 2024-02-14 | End: 2024-02-19

## 2024-02-14 RX ORDER — OXYCODONE HYDROCHLORIDE 5 MG/1
5 TABLET ORAL EVERY 4 HOURS PRN
Status: DISPENSED | OUTPATIENT
Start: 2024-02-14 | End: 2024-02-19

## 2024-02-14 RX ORDER — PROMETHAZINE HYDROCHLORIDE 25 MG/1
25 TABLET ORAL ONCE AS NEEDED
Status: DISCONTINUED | OUTPATIENT
Start: 2024-02-14 | End: 2024-02-14 | Stop reason: HOSPADM

## 2024-02-14 RX ORDER — DEXAMETHASONE SODIUM PHOSPHATE 4 MG/ML
INJECTION, SOLUTION INTRA-ARTICULAR; INTRALESIONAL; INTRAMUSCULAR; INTRAVENOUS; SOFT TISSUE AS NEEDED
Status: DISCONTINUED | OUTPATIENT
Start: 2024-02-14 | End: 2024-02-14 | Stop reason: SURG

## 2024-02-14 RX ORDER — FLUMAZENIL 0.1 MG/ML
0.2 INJECTION INTRAVENOUS AS NEEDED
Status: DISCONTINUED | OUTPATIENT
Start: 2024-02-14 | End: 2024-02-14 | Stop reason: HOSPADM

## 2024-02-14 RX ORDER — SODIUM CHLORIDE 0.9 % (FLUSH) 0.9 %
3-10 SYRINGE (ML) INJECTION AS NEEDED
Status: DISCONTINUED | OUTPATIENT
Start: 2024-02-14 | End: 2024-02-14 | Stop reason: HOSPADM

## 2024-02-14 RX ORDER — NALOXONE HCL 0.4 MG/ML
0.2 VIAL (ML) INJECTION AS NEEDED
Status: DISCONTINUED | OUTPATIENT
Start: 2024-02-14 | End: 2024-02-14 | Stop reason: HOSPADM

## 2024-02-14 RX ORDER — DROPERIDOL 2.5 MG/ML
0.62 INJECTION, SOLUTION INTRAMUSCULAR; INTRAVENOUS
Status: DISCONTINUED | OUTPATIENT
Start: 2024-02-14 | End: 2024-02-14 | Stop reason: HOSPADM

## 2024-02-14 RX ORDER — HYDROMORPHONE HYDROCHLORIDE 1 MG/ML
0.5 INJECTION, SOLUTION INTRAMUSCULAR; INTRAVENOUS; SUBCUTANEOUS
Status: DISCONTINUED | OUTPATIENT
Start: 2024-02-14 | End: 2024-02-14 | Stop reason: HOSPADM

## 2024-02-14 RX ORDER — MIDAZOLAM HYDROCHLORIDE 1 MG/ML
1 INJECTION INTRAMUSCULAR; INTRAVENOUS
Status: DISCONTINUED | OUTPATIENT
Start: 2024-02-14 | End: 2024-02-14 | Stop reason: HOSPADM

## 2024-02-14 RX ORDER — EPHEDRINE SULFATE 50 MG/ML
5 INJECTION, SOLUTION INTRAVENOUS ONCE AS NEEDED
Status: DISCONTINUED | OUTPATIENT
Start: 2024-02-14 | End: 2024-02-14 | Stop reason: HOSPADM

## 2024-02-14 RX ORDER — HYDROCODONE BITARTRATE AND ACETAMINOPHEN 5; 325 MG/1; MG/1
1 TABLET ORAL ONCE AS NEEDED
Status: DISCONTINUED | OUTPATIENT
Start: 2024-02-14 | End: 2024-02-14 | Stop reason: HOSPADM

## 2024-02-14 RX ORDER — MAGNESIUM HYDROXIDE 1200 MG/15ML
LIQUID ORAL AS NEEDED
Status: DISCONTINUED | OUTPATIENT
Start: 2024-02-14 | End: 2024-02-14 | Stop reason: HOSPADM

## 2024-02-14 RX ORDER — LIDOCAINE HYDROCHLORIDE 10 MG/ML
0.5 INJECTION, SOLUTION INFILTRATION; PERINEURAL ONCE AS NEEDED
Status: DISCONTINUED | OUTPATIENT
Start: 2024-02-14 | End: 2024-02-14 | Stop reason: HOSPADM

## 2024-02-14 RX ORDER — IPRATROPIUM BROMIDE AND ALBUTEROL SULFATE 2.5; .5 MG/3ML; MG/3ML
3 SOLUTION RESPIRATORY (INHALATION) ONCE AS NEEDED
Status: DISCONTINUED | OUTPATIENT
Start: 2024-02-14 | End: 2024-02-14 | Stop reason: HOSPADM

## 2024-02-14 RX ORDER — PROMETHAZINE HYDROCHLORIDE 25 MG/1
25 SUPPOSITORY RECTAL ONCE AS NEEDED
Status: DISCONTINUED | OUTPATIENT
Start: 2024-02-14 | End: 2024-02-14 | Stop reason: HOSPADM

## 2024-02-14 RX ORDER — FAMOTIDINE 10 MG/ML
20 INJECTION, SOLUTION INTRAVENOUS ONCE
Status: COMPLETED | OUTPATIENT
Start: 2024-02-14 | End: 2024-02-14

## 2024-02-14 RX ORDER — ROCURONIUM BROMIDE 10 MG/ML
INJECTION, SOLUTION INTRAVENOUS AS NEEDED
Status: DISCONTINUED | OUTPATIENT
Start: 2024-02-14 | End: 2024-02-14 | Stop reason: SURG

## 2024-02-14 RX ORDER — LEVOTHYROXINE SODIUM 0.03 MG/1
25 TABLET ORAL
Status: DISCONTINUED | OUTPATIENT
Start: 2024-02-14 | End: 2024-02-19 | Stop reason: HOSPADM

## 2024-02-14 RX ORDER — DIPHENHYDRAMINE HYDROCHLORIDE 50 MG/ML
12.5 INJECTION INTRAMUSCULAR; INTRAVENOUS
Status: DISCONTINUED | OUTPATIENT
Start: 2024-02-14 | End: 2024-02-14 | Stop reason: HOSPADM

## 2024-02-14 RX ORDER — SODIUM CHLORIDE, SODIUM LACTATE, POTASSIUM CHLORIDE, CALCIUM CHLORIDE 600; 310; 30; 20 MG/100ML; MG/100ML; MG/100ML; MG/100ML
9 INJECTION, SOLUTION INTRAVENOUS CONTINUOUS
Status: DISCONTINUED | OUTPATIENT
Start: 2024-02-14 | End: 2024-02-14

## 2024-02-14 RX ORDER — PROPOFOL 10 MG/ML
INJECTION, EMULSION INTRAVENOUS AS NEEDED
Status: DISCONTINUED | OUTPATIENT
Start: 2024-02-14 | End: 2024-02-14 | Stop reason: SURG

## 2024-02-14 RX ORDER — HYDRALAZINE HYDROCHLORIDE 20 MG/ML
5 INJECTION INTRAMUSCULAR; INTRAVENOUS
Status: DISCONTINUED | OUTPATIENT
Start: 2024-02-14 | End: 2024-02-14 | Stop reason: HOSPADM

## 2024-02-14 RX ORDER — ONDANSETRON 2 MG/ML
4 INJECTION INTRAMUSCULAR; INTRAVENOUS ONCE AS NEEDED
Status: DISCONTINUED | OUTPATIENT
Start: 2024-02-14 | End: 2024-02-14 | Stop reason: HOSPADM

## 2024-02-14 RX ORDER — SODIUM CHLORIDE 0.9 % (FLUSH) 0.9 %
3 SYRINGE (ML) INJECTION EVERY 12 HOURS SCHEDULED
Status: DISCONTINUED | OUTPATIENT
Start: 2024-02-14 | End: 2024-02-14 | Stop reason: HOSPADM

## 2024-02-14 RX ORDER — LIDOCAINE HYDROCHLORIDE 20 MG/ML
INJECTION, SOLUTION INFILTRATION; PERINEURAL AS NEEDED
Status: DISCONTINUED | OUTPATIENT
Start: 2024-02-14 | End: 2024-02-14 | Stop reason: SURG

## 2024-02-14 RX ORDER — FENTANYL CITRATE 50 UG/ML
50 INJECTION, SOLUTION INTRAMUSCULAR; INTRAVENOUS ONCE AS NEEDED
Status: COMPLETED | OUTPATIENT
Start: 2024-02-14 | End: 2024-02-14

## 2024-02-14 RX ORDER — OXYCODONE AND ACETAMINOPHEN 7.5; 325 MG/1; MG/1
1 TABLET ORAL EVERY 4 HOURS PRN
Status: DISCONTINUED | OUTPATIENT
Start: 2024-02-14 | End: 2024-02-14 | Stop reason: HOSPADM

## 2024-02-14 RX ORDER — FENTANYL CITRATE 50 UG/ML
INJECTION, SOLUTION INTRAMUSCULAR; INTRAVENOUS AS NEEDED
Status: DISCONTINUED | OUTPATIENT
Start: 2024-02-14 | End: 2024-02-14 | Stop reason: SURG

## 2024-02-14 RX ORDER — SODIUM CHLORIDE 9 MG/ML
INJECTION, SOLUTION INTRAVENOUS AS NEEDED
Status: DISCONTINUED | OUTPATIENT
Start: 2024-02-14 | End: 2024-02-14 | Stop reason: HOSPADM

## 2024-02-14 RX ORDER — MINOXIDIL 2.5 MG/1
1.25 TABLET ORAL DAILY
Status: DISCONTINUED | OUTPATIENT
Start: 2024-02-14 | End: 2024-02-19 | Stop reason: HOSPADM

## 2024-02-14 RX ORDER — BUPIVACAINE HYDROCHLORIDE AND EPINEPHRINE 2.5; 5 MG/ML; UG/ML
INJECTION, SOLUTION EPIDURAL; INFILTRATION; INTRACAUDAL; PERINEURAL AS NEEDED
Status: DISCONTINUED | OUTPATIENT
Start: 2024-02-14 | End: 2024-02-14 | Stop reason: HOSPADM

## 2024-02-14 RX ADMIN — PROPOFOL 120 MG: 10 INJECTION, EMULSION INTRAVENOUS at 09:25

## 2024-02-14 RX ADMIN — PIPERACILLIN SODIUM AND TAZOBACTAM SODIUM 3.38 G: 3; .375 INJECTION, SOLUTION INTRAVENOUS at 12:26

## 2024-02-14 RX ADMIN — ONDANSETRON 4 MG: 2 INJECTION INTRAMUSCULAR; INTRAVENOUS at 08:36

## 2024-02-14 RX ADMIN — HYDROMORPHONE HYDROCHLORIDE 0.5 MG: 1 INJECTION, SOLUTION INTRAMUSCULAR; INTRAVENOUS; SUBCUTANEOUS at 11:11

## 2024-02-14 RX ADMIN — FENTANYL CITRATE 50 MCG: 50 INJECTION, SOLUTION INTRAMUSCULAR; INTRAVENOUS at 08:26

## 2024-02-14 RX ADMIN — SUGAMMADEX 200 MG: 100 INJECTION, SOLUTION INTRAVENOUS at 10:32

## 2024-02-14 RX ADMIN — SODIUM CHLORIDE 100 ML/HR: 9 INJECTION, SOLUTION INTRAVENOUS at 04:04

## 2024-02-14 RX ADMIN — FAMOTIDINE 20 MG: 10 INJECTION, SOLUTION INTRAVENOUS at 08:26

## 2024-02-14 RX ADMIN — PIPERACILLIN SODIUM AND TAZOBACTAM SODIUM 3.38 G: 3; .375 INJECTION, SOLUTION INTRAVENOUS at 20:21

## 2024-02-14 RX ADMIN — LIDOCAINE HYDROCHLORIDE 100 MG: 20 INJECTION, SOLUTION INFILTRATION; PERINEURAL at 09:25

## 2024-02-14 RX ADMIN — ONDANSETRON 4 MG: 2 INJECTION INTRAMUSCULAR; INTRAVENOUS at 04:37

## 2024-02-14 RX ADMIN — PIPERACILLIN SODIUM AND TAZOBACTAM SODIUM 3.38 G: 3; .375 INJECTION, SOLUTION INTRAVENOUS at 04:36

## 2024-02-14 RX ADMIN — KETOROLAC TROMETHAMINE 30 MG: 30 INJECTION, SOLUTION INTRAMUSCULAR; INTRAVENOUS at 09:52

## 2024-02-14 RX ADMIN — Medication 10 ML: at 20:21

## 2024-02-14 RX ADMIN — FENTANYL CITRATE 50 MCG: 50 INJECTION, SOLUTION INTRAMUSCULAR; INTRAVENOUS at 09:21

## 2024-02-14 RX ADMIN — HYDROMORPHONE HYDROCHLORIDE 0.5 MG: 1 INJECTION, SOLUTION INTRAMUSCULAR; INTRAVENOUS; SUBCUTANEOUS at 11:26

## 2024-02-14 RX ADMIN — SODIUM CHLORIDE, POTASSIUM CHLORIDE, SODIUM LACTATE AND CALCIUM CHLORIDE 9 ML/HR: 600; 310; 30; 20 INJECTION, SOLUTION INTRAVENOUS at 08:26

## 2024-02-14 RX ADMIN — ACETAMINOPHEN 325MG 650 MG: 325 TABLET ORAL at 16:37

## 2024-02-14 RX ADMIN — DEXAMETHASONE SODIUM PHOSPHATE 4 MG: 4 INJECTION, SOLUTION INTRAMUSCULAR; INTRAVENOUS at 09:56

## 2024-02-14 RX ADMIN — FENTANYL CITRATE 50 MCG: 50 INJECTION, SOLUTION INTRAMUSCULAR; INTRAVENOUS at 09:54

## 2024-02-14 RX ADMIN — HYDROMORPHONE HYDROCHLORIDE 1 MG: 1 INJECTION, SOLUTION INTRAMUSCULAR; INTRAVENOUS; SUBCUTANEOUS at 04:37

## 2024-02-14 RX ADMIN — ROCURONIUM BROMIDE 50 MG: 10 INJECTION, SOLUTION INTRAVENOUS at 09:25

## 2024-02-14 NOTE — PROGRESS NOTES
Postop lap appy. Recommend continued IV antibiotics for appendicitis with purulent peritonitis.  Ok to restart Eliquis 24h postop. May trial clear liquids. Monitor abdominal exam.    Jamee Panda M.D.  General, Robotic, and Endoscopic Surgery  [facility]    [address], [address]  [place], [place], [address]  P: [phone]  F: [phone]      normal... Well appearing, well nourished, awake, alert, oriented to person, place, time/situation and in no apparent distress. Well appearing, well nourished. A&Ox3 as pt does not remember what happened.

## 2024-02-14 NOTE — CONSULTS
General Surgery Consultation    Consulting Physician: Jamee Panda MD  Referring:  Yany Mallory APRN    Reason for consultation:   Acute appendicitis, colitis possible perforation    CC:   Abdominal pain    HPI:   The patient is a 53 y.o. female who presented to the hospital with right lower quadrant abdominal pain. She states her pain started a couple of days ago. She has had nausea but no vomiting. She has had decreased appetite. She reports feeling hot/cold chills. She underwent evaluation in the ER and was noted to have CT evidence of acute appendicitis as well as sigmoid diverticulitis. She states she has had diverticulitis before. She denies any changes to her bowel function, stating she had a normal bowel movement before arriving to the hospital. Denies any blood in her stool.  She has had multiple colonoscopies in the past, believes her last one was 3 years ago and was told to return for follow up colonscopy in 5 years. She has a possible history of colon cancer in her grandmother. 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 She is unsure of prior hypercoag workup.  She states her blood clots were provoked by ankle surgery in the past. She states her last dose of Eliquis was yesterday evening at 9pm.     Past Medical History:  Morbid obesity BMI 37 - on rybelsus and metformin for weight loss, denies history of T2DM   Past Medical History:   Diagnosis Date    Asthma     BPPV (benign paroxysmal positional vertigo)     Diverticulitis     DVT (deep venous thrombosis)     Right    Hypothyroid     Kidney stones     Migraines     Mixed hyperlipidemia     Slow to wake up after anesthesia     Weight loss      Past Surgical History:  Tubal ligation  Denies other abdominal surgeries  Past Surgical History:   Procedure Laterality Date    ANKLE SURGERY Right 2015    Removal of hardware    COLONOSCOPY N/A 08/2020    COLONOSCOPY N/A 9/17/2020    Procedure:  COLONOSCOPY to cecum with 20mm colonic balloon dilation;  Surgeon: Walter Verdin MD;  Location:  [facility];  Service: General;  Laterality: N/A;  pre - diverticulitis induced stricture   post - colonic stricture    COLONOSCOPY N/A 10/28/2021    Procedure: COLONOSCOPY into cecum with bx;  Surgeon: Walter Verdin MD;  Location:  [facility];  Service: General;  Laterality: N/A;  pre: hx of colonic stricture   post: diverticulosis     CYSTOSCOPY W/ URETERAL STENT PLACEMENT Right 7/1/2022    Procedure: CYSTOSCOPY, RIGHT URETEROSCOPY RIGHT STENT PLACEMENT, STONE BASKET EXTRACTION, LASER LITHOTRIPSY;  Surgeon: Cyrus Au MD;  Location: [facility];  Service: Urology;  Laterality: Right;    ENDOMETRIAL ABLATION      FOOT SURGERY Left 2006    Fracture , postop DVT and PE    JOINT REPLACEMENT      ankle, s/p two surgery    LAPAROSCOPIC TUBAL LIGATION      ORIF ANKLE FRACTURE Right 2014    DVT and PE perioperatively    TUBAL ABDOMINAL LIGATION      VENA CAVA FILTER INSERTION  2014       Medications:  Medications Prior to Admission   Medication Sig Dispense Refill Last Dose    apixaban (Eliquis) 2.5 MG tablet tablet Take 1 tablet by mouth Every 12 (Twelve) Hours. 60 tablet 5 2/12/2024    fenofibrate (TRICOR) 145 MG tablet Take 1 tablet by mouth Daily. 30 tablet 5 2/12/2024    finasteride (PROSCAR) 5 MG tablet TAKE 1/4 TABLET BY MOUTH EVERY DAY   2/12/2024    levothyroxine (SYNTHROID, LEVOTHROID) 25 MCG tablet Take 1 tablet by mouth Daily. 90 tablet 2 2/12/2024    metFORMIN ER (GLUCOPHAGE-XR) 500 MG 24 hr tablet Take 2 tablets by mouth Daily. (Patient taking differently: Take 1 tablet by mouth Daily.) 180 tablet 0 2/12/2024    minoxidil (LONITEN) 2.5 MG tablet Take 0.5 tablets by mouth Daily.   2/12/2024    omeprazole (priLOSEC) 20 MG capsule Take 1 capsule by mouth Daily. (Patient taking differently: Take 1 capsule by mouth Daily As Needed.) 30 capsule 3 Past Month    ondansetron (Zofran) 4 MG  tablet Take 1 tablet by mouth Every 8 (Eight) Hours As Needed for Nausea or Vomiting. 30 tablet 1 Past Week    Semaglutide (Rybelsus) 7 MG tablet Take 7 mg by mouth Daily. Please take only Rybelsus 3 mg or Rybelsus 7 mg once daily.  Take only 1 dose at a time that is no more than 1 pill/day. 30 tablet 3 2/12/2024    albuterol sulfate  (90 Base) MCG/ACT inhaler Inhale 2 puffs Every 6 (Six) Hours As Needed for Shortness of Air (shortness of breath). 8 g 3     ondansetron (Zofran) 4 MG tablet Take 1 tablet by mouth Every 12 (Twelve) Hours As Needed for Nausea or Vomiting. 30 tablet 0     pravastatin (PRAVACHOL) 10 MG tablet TAKE 1 TABLET BY MOUTH DAILY 90 tablet 0        Current Facility-Administered Medications:     acetaminophen (TYLENOL) tablet 650 mg, 650 mg, Oral, Q4H PRN, 650 mg at 02/13/24 2043 **OR** acetaminophen (TYLENOL) 160 MG/5ML oral solution 650 mg, 650 mg, Oral, Q4H PRN **OR** acetaminophen (TYLENOL) suppository 650 mg, 650 mg, Rectal, Q4H PRN, Yany Mallory APRN    sennosides-docusate (PERICOLACE) 8.6-50 MG per tablet 2 tablet, 2 tablet, Oral, BID PRN **AND** polyethylene glycol (MIRALAX) packet 17 g, 17 g, Oral, Daily PRN **AND** bisacodyl (DULCOLAX) EC tablet 5 mg, 5 mg, Oral, Daily PRN **AND** bisacodyl (DULCOLAX) suppository 10 mg, 10 mg, Rectal, Daily PRN, Yany Mallory APRN    dextrose (D50W) (25 g/50 mL) IV injection 25 g, 25 g, Intravenous, Q15 Min PRN, Yany Mallory APRN    dextrose (GLUTOSE) oral gel 15 g, 15 g, Oral, Q15 Min PRN, Yany Mallory APRN    Enoxaparin Sodium (LOVENOX) syringe 40 mg, 40 mg, Subcutaneous, Nightly, Donovan Sosa MD, 40 mg at 02/13/24 2046    glucagon (GLUCAGEN) injection 1 mg, 1 mg, Intramuscular, Q15 Min PRN, Yany Mallory APRN    HYDROmorphone (DILAUDID) injection 1 mg, 1 mg, Intravenous, Q4H PRN, Donovan Sosa MD, 1 mg at 02/13/24 1622    insulin regular (humuLIN R,novoLIN R) injection 2-7 Units, 2-7 Units,  Subcutaneous, Q6H, Yany Mallory, APRN    nitroglycerin (NITROSTAT) SL tablet 0.4 mg, 0.4 mg, Sublingual, Q5 Min PRN, Yany Mallory, APRN    ondansetron (ZOFRAN) injection 4 mg, 4 mg, Intravenous, Q6H PRN, Donovan Sosa MD, 4 mg at 02/13/24 1622    piperacillin-tazobactam (ZOSYN) 3.375 g in iso-osmotic dextrose 50 ml (premix), 3.375 g, Intravenous, Q8H, Yany Mallory, APRN, Last Rate: 0 mL/hr at 02/13/24 1547, 3.375 g at 02/13/24 2021    sodium chloride 0.9 % flush 10 mL, 10 mL, Intravenous, Q12H, Yany Mallory, APRN, 10 mL at 02/13/24 2021    sodium chloride 0.9 % flush 10 mL, 10 mL, Intravenous, PRN, Yany Mallory M, APRN    sodium chloride 0.9 % infusion 40 mL, 40 mL, Intravenous, PRN, Mohamud, Yany M, APRN    sodium chloride 0.9 % infusion, 100 mL/hr, Intravenous, Continuous, Yany Mallory, APRN, Last Rate: 100 mL/hr at 02/13/24 2046, 100 mL/hr at 02/13/24 2046    Allergies:   Allergies   Allergen Reactions    Codeine Nausea And Vomiting       Social History:   Denies tobacco, alcohol, or recreational drug use  Social History     Socioeconomic History    Marital status:    Tobacco Use    Smoking status: Never     Passive exposure: Never    Smokeless tobacco: Never   Vaping Use    Vaping Use: Never used   Substance and Sexual Activity    Alcohol use: No    Drug use: No    Sexual activity: Defer       Family History:   Grandmother may have had colon cancer  Family History   Problem Relation Age of Onset    Other Mother         Vertigo    Hyperlipidemia Mother     Cancer Father         hodgkins disease    Heart disease Brother     Malig Hyperthermia Neg Hx        Review of Systems:  Constitutional: denies any weight changes, fatigue, or weakness  Cardiovascular: denies chest pain or palpitations   Respiratory: denies cough or shortness of breath  Gastrointestinal:  + abdominal pain, +nausea, denies vomiting, diarrhea, constipation, melena,  hematochezia  Genitourinary: denies dysuria or hematuria      Physical Exam:   Vitals:    02/13/24 1950   BP: 156/94   Pulse: 98   Resp: 18   Temp: 98.8 °F (37.1 °C)   SpO2: 97%     GENERAL: alert, interactive, cooperative  HEENT: no scleral icterus; moist mucous membranes  NECK: Supple  RESPIRATORY: normal work of breathing on room air  CARDIOVASCULAR: regular rate  GASTROINTESTINAL: abdomen obese with panniculus, abd soft, tender in the RLQ with focal guarding, no rebound, there is no tenderness in the left abdomen or suprapubic region  MUSCULOSKELETAL: no cyanosis or edema   NEUROLOGIC: alert and oriented, normal speech, no gross focal deficits   SKIN: warm, no rash, no jaundice      Diagnostic workup:     Pertinent labs:   Results from last 7 days   Lab Units 02/13/24  0737 02/13/24  0134   WBC 10*3/mm3 14.76* 12.87*   HEMOGLOBIN g/dL 11.2* 11.7*   HEMATOCRIT % 34.4 36.2   PLATELETS 10*3/mm3 388 423     Results from last 7 days   Lab Units 02/13/24  0737 02/13/24  0134   SODIUM mmol/L 138 137   POTASSIUM mmol/L 3.7 4.0   CHLORIDE mmol/L 104 107   CO2 mmol/L 24.0 21.0*   BUN mg/dL 18 22*   CREATININE mg/dL 0.90 0.99   CALCIUM mg/dL 9.5 9.9   BILIRUBIN mg/dL 0.4 0.2   ALK PHOS U/L 48 46   ALT (SGPT) U/L 13 15   AST (SGOT) U/L 13 14   GLUCOSE mg/dL 118* 129*       Imaging:  CT abd/pelvis images and report independently reviewed; there is evidence of appendicitis as well as sigmoid diverticulitis    Assessment and plan:   The patient is a 53 y.o. female with morbid obesity (BMI 37), history of DVT/PE on Eliquis with IVCF in place, presenting with acute appendicitis and sigmoid diverticulitis    I discussed with the patient the risks and benefits of nonoperative management with IV antibiotics and drainage for development of abscess and interval appendectomy vs. immediate laparoscopic appendectomy. Risks, benefits, alternatives, and postoperative expectations were discussed. Risks include and are not limited to  bleeding; ongoing infection requiring antibiotics and drainage procedures; injury to surrounding structures including colon, small bowel, ureter, other intraabdominal organs; risk of need for ileocectomy or ostomy. I explained that postoperatively, most patients undergoing laparoscopic appendectomy are able to be discharged unless there is requirement for open appendectomy, concern for need for ongoing IV antibiotic therapy, or in her case, for ongoing monitoring to ensure resolution of her sigmoid diverticulitis noted on CT (notably, she is nontender on the left/over the pelvis and I think her symptoms are all related to her appendix). All questions were answered.     At this time patient wishes to proceed with laparoscopic appendectomy.  Will plan for surgery in the morning.  Continue zosyn.  Continue to hold Eliquis.      Jamee Panda M.D.  General, Robotic, and Endoscopic Surgery  [facility]    [address], [address]  [place], [place], [address]  P: [phone]  F: [phone]

## 2024-02-14 NOTE — PLAN OF CARE
Goal Outcome Evaluation:  Plan of Care Reviewed With: patient        Progress: no change     Pt AO X4. VSS. Pain and nausea treated with PRN meds as needed. IV fluids infusing.Iv abx given as scheduled. NPO from midnight.Scheduled for appendectomy this AM, consent signed. All needs met. WCTM for the rest of the shift.

## 2024-02-14 NOTE — PROGRESS NOTES
Name: Li Kimball ADMIT: 2024   : 1970  PCP: Tank Shea APRN    MRN: [id number] LOS: 0 days   AGE/SEX: 53 y.o. female  ROOM: [facility]     Subjective   Subjective   Patient is lying on the bed and is complaining of abdominal discomfort/pain and did undergo surgical intervention earlier today.  Denies nausea, vomiting, chest pain, shortness of breath.       Objective   Objective   Vital Signs  Temp:  [97.5 °F (36.4 °C)-100.9 °F (38.3 °C)] 97.7 °F (36.5 °C)  Heart Rate:  [] 88  Resp:  [16-18] 18  BP: (109-157)/(73-94) 109/74  SpO2:  [90 %-100 %] 90 %  on  Flow (L/min):  [3] 3;   Device (Oxygen Therapy): room air  Body mass index is 37.38 kg/m².  Physical Exam  HEENT: PERRLA, extraocular movements intact, Scleras no icterus  Neck: Supple, no JVD  Cardiovascular: Regular rate and rhythm with normal S1 and S2  Respiratory: Fairly clear to auscultation bilaterally with no wheezes  GI: Soft, appropriately tender postoperatively and incision sites noted, bowel sounds are present.  Extremities: No edema, palpable pedal pulses  Neurologic: Grossly nonfocal, no facial asymmetry      Results Review     I reviewed the patient's new clinical results.  Results from last 7 days   Lab Units 24  0547 24  0737 24  0134   WBC 10*3/mm3 17.95* 14.76* 12.87*   HEMOGLOBIN g/dL 11.5* 11.2* 11.7*   PLATELETS 10*3/mm3 393 388 423     Results from last 7 days   Lab Units 24  0547 24  0737 24  0134   SODIUM mmol/L 136 138 137   POTASSIUM mmol/L 3.3* 3.7 4.0   CHLORIDE mmol/L 103 104 107   CO2 mmol/L 20.2* 24.0 21.0*   BUN mg/dL 14 18 22*   CREATININE mg/dL 0.80 0.90 0.99   GLUCOSE mg/dL 137* 118* 129*   EGFR mL/min/1.73 88.2 76.6 68.3     Results from last 7 days   Lab Units 24  0737 24  0134   ALBUMIN g/dL 4.0 4.0   BILIRUBIN mg/dL 0.4 0.2   ALK PHOS U/L 48 46   AST (SGOT) U/L 13 14   ALT (SGPT) U/L 13 15     Results from last 7 days   Lab Units  02/14/24  0547 02/13/24  0737 02/13/24  0134   CALCIUM mg/dL 8.6 9.5 9.9   ALBUMIN g/dL  --  4.0 4.0   MAGNESIUM mg/dL  --  1.8  --      Results from last 7 days   Lab Units 02/13/24  0408   LACTATE mmol/L 1.9     Glucose   Date/Time Value Ref Range Status   02/14/2024 1213 140 (H) 70 - 130 mg/dL Final   02/14/2024 1056 138 (H) 70 - 130 mg/dL Final   02/14/2024 0607 149 (H) 70 - 130 mg/dL Final   02/13/2024 2042 99 70 - 130 mg/dL Final   02/13/2024 1621 124 70 - 130 mg/dL Final   02/13/2024 1120 117 70 - 130 mg/dL Final       CT Abdomen Pelvis With Contrast    Result Date: 2/13/2024   1. Acute nonperforated appendicitis. 2. Small amount of free fluid in the cul-de-sac. 3. Small amount of asymmetric stranding/edema in the left adnexa is nonspecific. Could be related to a corpus luteal cyst in the left ovary, diverticulitis thought less likely. 4. Nonobstructing left nephrolithiasis.    This report was finalized on 2/13/2024 9:54 AM by Dr. Jeffy Santana M.D on Workstation: [id number]      CT Abdomen Pelvis Without Contrast    Addendum Date: 2/13/2024    ADDENDUM: 02 13 24 03:39 Call Doctor Regarding Above results, called  Dr. Mendoza on 02 13 03:38 (-05:00) Electronically signed by Kamilla Valencia DO [employer] of     Result Date: 2/13/2024  Communications:  Call Doctor Above results Electronically signed by Iftikhar Valencia DOomatwill [employer] on 02-13-24 at 0337     I have personally reviewed all medications:  Scheduled Medications  insulin regular, 2-7 Units, Subcutaneous, Q6H  piperacillin-tazobactam, 3.375 g, Intravenous, Q8H  sodium chloride, 10 mL, Intravenous, Q12H    Infusions  sodium chloride, 100 mL/hr, Last Rate: 100 mL/hr (02/14/24 0404)    Diet  Diet: Liquid Diets; Clear Liquid; Fluid Consistency: Thin (IDDSI 0)    I have personally reviewed:  [x]  Laboratory   [x]  Microbiology   [x]  Radiology   [x]  EKG/Telemetry  [x]  Cardiology/Vascular   []  Pathology     []  Records       Assessment/Plan     Active Hospital Problems    Diagnosis  POA    **Acute appendicitis [K35.80]  Yes    Colitis [K52.9]  Yes    Diabetes mellitus type 2, diet-controlled [E11.9]  Yes    Chronic anticoagulation [Z79.01]  Not Applicable    Recurrent deep vein thrombosis (DVT) [I82.409]  Yes    Hypothyroidism, adult [E03.9]  Yes      Resolved Hospital Problems   No resolved problems to display.       53 y.o. female admitted with Acute appendicitis.    Acute appendicitis with purulent peritonitis, continue with fluids and status post appendectomy earlier today.  Continue with IV Zosyn and appreciate surgical input.  Resume Eliquis 24 hours postop per surgical team.  2.  Morbid obesity, counseled to lose weight.  3.  Hypothyroidism, on Synthroid.  4.  Hyperglycemia, blood sugars are mildly elevated and will check hemoglobin A1c level.  5.  History of multiple blood clots including DVT/PE and does have an IVC filter in place. Resume Eliquis tomorrow as per surgical team recommendation.  6.  CODE STATUS is full code.     Donovan Sosa MD  [facility]  02/14/24  14:51 EST

## 2024-02-14 NOTE — PAYOR COMM NOTE
"J Luis Hopper (53 y.o. Female)        PLEASE SEE ATTACHED FOR INPT NOTIFICATION.     REF#[id number]    PLEASE CALL   OR  [phone]    THANK YOU    FELICIA PORRAS LPN CCP   Date of Birth   1970    Social Security Number       Address   [address] [place] [address]  [facility] [address]    Home Phone   [phone]    MRN   [id number]       [place]   Mandaeism    Marital Status                               Admission Date   2/13/24  OBS   2/14/24  INPT  Admission Type   Emergency    Admitting Provider   Donovan Sosa MD    Attending Provider   Donovan Sosa MD    Department, Room/Bed   [address], S607/1       Discharge Date       Discharge Disposition       Discharge Destination                                 Attending Provider: Donovan Sosa MD    Allergies: Codeine    Isolation: None   Infection: MRSA/History Only (09/17/20)   Code Status: CPR    Ht: 160 cm (63\")   Wt: 95.7 kg (211 lb)    Admission Cmt: None   Principal Problem: Acute appendicitis [K35.80]                   Active Insurance as of 2/13/2024       Primary Coverage       Payor Plan Insurance Group Employer/Plan Group    [facility] COMMUNITY PLAN [facility] COMMUNITY PLAN [facility]       Payor Plan Address Payor Plan Phone Number Payor Plan Fax Number Effective Dates    [address]   7/1/2023 - None Entered    [facility] [id number]         Subscriber Name Subscriber Birth Date Member ID       J LUIS HOPPER 1970 [id number]                     Emergency Contacts        (Rel.) Home Phone Work Phone Mobile Phone    Jose Saenz (Brother) [phone] -- --    Elizabeth Pride (Mother) [phone] -- --              [place]: [facility] [id number]  Tax ID [id number]     History & Physical        Donovan Sosa MD at 02/13/24 1343              Patient Name:  J Luis Hopper  YOB: 1970  MRN:  [id number]  Admit Date:  2/13/2024  Patient Care " Team:  Tank Shea APRN as PCP - General (Nurse Practitioner)  Sepideh Chou MD as Obstetrician (Obstetrics and Gynecology)  Jameson Darby MD (Gastroenterology)  Walter Verdin MD as Surgeon (General Surgery)  David Duong MD as Consulting Physician (Dermatology)      Subjective  History Present Illness     Chief Complaint   Patient presents with    Flank Pain     Patient is a 53-year-old female with known history of multiple blood clots, DVT/PE, obesity, hyperlipidemia, hypothyroidism presented to the emergency room with complaints of 2-day history of abdominal pain mainly localized in the right lower quadrant, sharp in nature, 9/10 intensity and constant.  She did admit to nausea but no vomiting and had subjective fevers at home.  In the ER underwent CT of the abdomen pelvis which is suggestive of acute appendicitis.  Small amount of asymmetric stranding/edema in the left adnexa was felt to be a nonspecific finding and was likely felt to be a corpus luteal cyst in the left ovary.  Of note patient does not have any left-sided abdominal pain.  Routine blood work was done and WBC was noted to be 14.76.  Given the above she is being hospitalized for further evaluation and treatment.        Review of Systems   A 12 system review has been performed and they are negative other than mentioned in the H&P      Personal History     Past Medical History:   Diagnosis Date    Asthma     BPPV (benign paroxysmal positional vertigo)     Diverticulitis     DVT (deep venous thrombosis)     Right    Hypothyroid     Kidney stones     Migraines     Mixed hyperlipidemia     Slow to wake up after anesthesia     Weight loss      Past Surgical History:   Procedure Laterality Date    ANKLE SURGERY Right 2015    Removal of hardware    COLONOSCOPY N/A 08/2020    COLONOSCOPY N/A 9/17/2020    Procedure: COLONOSCOPY to cecum with 20mm colonic balloon dilation;  Surgeon: Walter Verdin MD;  Location: [facility]  ENDOSCOPY;  Service: General;  Laterality: N/A;  pre - diverticulitis induced stricture   post - colonic stricture    COLONOSCOPY N/A 10/28/2021    Procedure: COLONOSCOPY into cecum with bx;  Surgeon: Walter Verdin MD;  Location: [facility] ENDOSCOPY;  Service: General;  Laterality: N/A;  pre: hx of colonic stricture   post: diverticulosis     CYSTOSCOPY W/ URETERAL STENT PLACEMENT Right 7/1/2022    Procedure: CYSTOSCOPY, RIGHT URETEROSCOPY RIGHT STENT PLACEMENT, STONE BASKET EXTRACTION, LASER LITHOTRIPSY;  Surgeon: Cyrus Au MD;  Location: [facility] MAIN OR;  Service: Urology;  Laterality: Right;    ENDOMETRIAL ABLATION      FOOT SURGERY Left 2006    Fracture , postop DVT and PE    JOINT REPLACEMENT      ankle, s/p two surgery    LAPAROSCOPIC TUBAL LIGATION      ORIF ANKLE FRACTURE Right 2014    DVT and PE perioperatively    TUBAL ABDOMINAL LIGATION      VENA CAVA FILTER INSERTION  2014     Family History   Problem Relation Age of Onset    Other Mother         Vertigo    Hyperlipidemia Mother     Cancer Father         hodgkins disease    Heart disease Brother     Malig Hyperthermia Neg Hx      Social History     Tobacco Use    Smoking status: Never     Passive exposure: Never    Smokeless tobacco: Never   Vaping Use    Vaping Use: Never used   Substance Use Topics    Alcohol use: No    Drug use: No     No current facility-administered medications on file prior to encounter.     Current Outpatient Medications on File Prior to Encounter   Medication Sig Dispense Refill    apixaban (Eliquis) 2.5 MG tablet tablet Take 1 tablet by mouth Every 12 (Twelve) Hours. 60 tablet 5    fenofibrate (TRICOR) 145 MG tablet Take 1 tablet by mouth Daily. 30 tablet 5    finasteride (PROSCAR) 5 MG tablet TAKE 1/4 TABLET BY MOUTH EVERY DAY      levothyroxine (SYNTHROID, LEVOTHROID) 25 MCG tablet Take 1 tablet by mouth Daily. 90 tablet 2    metFORMIN ER (GLUCOPHAGE-XR) 500 MG 24 hr tablet Take 2 tablets by mouth Daily. 180  tablet 0    minoxidil (LONITEN) 2.5 MG tablet Take 0.5 tablets by mouth Daily.      ondansetron (Zofran) 4 MG tablet Take 1 tablet by mouth Every 8 (Eight) Hours As Needed for Nausea or Vomiting. 30 tablet 1    Semaglutide (Rybelsus) 7 MG tablet Take 7 mg by mouth Daily. Please take only Rybelsus 3 mg or Rybelsus 7 mg once daily.  Take only 1 dose at a time that is no more than 1 pill/day. 30 tablet 3    albuterol sulfate  (90 Base) MCG/ACT inhaler Inhale 2 puffs Every 6 (Six) Hours As Needed for Shortness of Air (shortness of breath). 8 g 3    omeprazole (priLOSEC) 20 MG capsule Take 1 capsule by mouth Daily. 30 capsule 3    ondansetron (Zofran) 4 MG tablet Take 1 tablet by mouth Every 12 (Twelve) Hours As Needed for Nausea or Vomiting. 30 tablet 0    pravastatin (PRAVACHOL) 10 MG tablet TAKE 1 TABLET BY MOUTH DAILY 90 tablet 0     Allergies   Allergen Reactions    Codeine Nausea And Vomiting       Objective   Objective     Vital Signs  Temp:  [97.6 °F (36.4 °C)-100.8 °F (38.2 °C)] 100.8 °F (38.2 °C)  Heart Rate:  [] 63  Resp:  [18] 18  BP: (121-154)/() 123/77  SpO2:  [96 %-100 %] 96 %  on   ;   Device (Oxygen Therapy): room air  Body mass index is 37.38 kg/m².    Physical Exam  HEENT: PERRLA, extraocular movements intact, Scleras no icterus  Neck: Supple, no JVD  Cardiovascular: Regular rate and rhythm with normal S1 and S2  Respiratory: Fairly clear to auscultation bilaterally with no wheezes  GI: Soft, tender in the right lower quadrant, bowel sounds present  Extremities: No edema, palpable pedal pulses  Neurologic: Grossly nonfocal, no facial asymmetry    Results Review:  I reviewed the patient's new clinical results.  I reviewed the patient's new imaging results and agree with the interpretation.  I reviewed the patient's other test results and agree with the interpretation  I personally viewed and interpreted the patient's EKG/Telemetry data  Discussed with ED provider.    Lab Results (last  24 hours)       Procedure Component Value Units Date/Time    CBC & Differential [[id number]]  (Abnormal) Collected: 02/13/24 0134    Specimen: Blood Updated: 02/13/24 0150    Narrative:      The following orders were created for panel order CBC & Differential.  Procedure                               Abnormality         Status                     ---------                               -----------         ------                     CBC Auto Differential[[id number]]        Abnormal            Final result                 Please view results for these tests on the individual orders.    Comprehensive Metabolic Panel [[id number]]  (Abnormal) Collected: 02/13/24 0134    Specimen: Blood Updated: 02/13/24 0204     Glucose 129 mg/dL      BUN 22 mg/dL      Creatinine 0.99 mg/dL      Sodium 137 mmol/L      Potassium 4.0 mmol/L      Chloride 107 mmol/L      CO2 21.0 mmol/L      Calcium 9.9 mg/dL      Total Protein 7.3 g/dL      Albumin 4.0 g/dL      ALT (SGPT) 15 U/L      AST (SGOT) 14 U/L      Alkaline Phosphatase 46 U/L      Total Bilirubin 0.2 mg/dL      Globulin 3.3 gm/dL      A/G Ratio 1.2 g/dL      BUN/Creatinine Ratio 22.2     Anion Gap 9.0 mmol/L      eGFR 68.3 mL/min/1.73     Narrative:      GFR Normal >60  Chronic Kidney Disease <60  Kidney Failure <15      CBC Auto Differential [[id number]]  (Abnormal) Collected: 02/13/24 0134    Specimen: Blood Updated: 02/13/24 0150     WBC 12.87 10*3/mm3      RBC 4.28 10*6/mm3      Hemoglobin 11.7 g/dL      Hematocrit 36.2 %      MCV 84.6 fL      MCH 27.3 pg      MCHC 32.3 g/dL      RDW 12.9 %      RDW-SD 38.6 fl      MPV 9.3 fL      Platelets 423 10*3/mm3      Neutrophil % 70.9 %      Lymphocyte % 19.6 %      Monocyte % 7.9 %      Eosinophil % 0.9 %      Basophil % 0.3 %      Immature Grans % 0.4 %      Neutrophils, Absolute 9.12 10*3/mm3      Lymphocytes, Absolute 2.52 10*3/mm3      Monocytes, Absolute 1.02 10*3/mm3      Eosinophils, Absolute 0.12 10*3/mm3      Basophils, Absolute  0.04 10*3/mm3      Immature Grans, Absolute 0.05 10*3/mm3      nRBC 0.0 /100 WBC     Urinalysis With Culture If Indicated - Urine, Clean Catch [[id number]]  (Abnormal) Collected: 02/13/24 0135    Specimen: Urine, Clean Catch Updated: 02/13/24 0144     Color, UA Yellow     Appearance, UA Cloudy     pH, UA 5.5     Specific Gravity, UA 1.029     Glucose, UA Negative     Ketones, UA Negative     Bilirubin, UA Negative     Blood, UA Negative     Protein, UA Negative     Leuk Esterase, UA Negative     Nitrite, UA Negative     Urobilinogen, UA 0.2 E.U./dL    Narrative:      In absence of clinical symptoms, the presence of pyuria, bacteria, and/or nitrites on the urinalysis result does not correlate with infection.  Urine microscopic not indicated.    Lactic Acid, Plasma [[id number]]  (Normal) Collected: 02/13/24 0408    Specimen: Blood Updated: 02/13/24 0432     Lactate 1.9 mmol/L     CBC Auto Differential [[id number]]  (Abnormal) Collected: 02/13/24 0737    Specimen: Blood Updated: 02/13/24 0828     WBC 14.76 10*3/mm3      RBC 4.01 10*6/mm3      Hemoglobin 11.2 g/dL      Hematocrit 34.4 %      MCV 85.8 fL      MCH 27.9 pg      MCHC 32.6 g/dL      RDW 13.0 %      RDW-SD 40.3 fl      MPV 9.4 fL      Platelets 388 10*3/mm3      Neutrophil % 78.3 %      Lymphocyte % 10.9 %      Monocyte % 10.0 %      Eosinophil % 0.3 %      Basophil % 0.2 %      Immature Grans % 0.3 %      Neutrophils, Absolute 11.56 10*3/mm3      Lymphocytes, Absolute 1.61 10*3/mm3      Monocytes, Absolute 1.47 10*3/mm3      Eosinophils, Absolute 0.04 10*3/mm3      Basophils, Absolute 0.03 10*3/mm3      Immature Grans, Absolute 0.05 10*3/mm3      nRBC 0.1 /100 WBC     Comprehensive Metabolic Panel [[id number]]  (Abnormal) Collected: 02/13/24 0737    Specimen: Blood Updated: 02/13/24 0843     Glucose 118 mg/dL      BUN 18 mg/dL      Creatinine 0.90 mg/dL      Sodium 138 mmol/L      Potassium 3.7 mmol/L      Chloride 104 mmol/L      CO2 24.0 mmol/L      Calcium  9.5 mg/dL      Total Protein 7.1 g/dL      Albumin 4.0 g/dL      ALT (SGPT) 13 U/L      AST (SGOT) 13 U/L      Alkaline Phosphatase 48 U/L      Total Bilirubin 0.4 mg/dL      Globulin 3.1 gm/dL      A/G Ratio 1.3 g/dL      BUN/Creatinine Ratio 20.0     Anion Gap 10.0 mmol/L      eGFR 76.6 mL/min/1.73     Narrative:      GFR Normal >60  Chronic Kidney Disease <60  Kidney Failure <15      Magnesium [[id number]]  (Normal) Collected: 02/13/24 0737    Specimen: Blood Updated: 02/13/24 0843     Magnesium 1.8 mg/dL     Protime-INR [[id number]]  (Normal) Collected: 02/13/24 0738    Specimen: Blood Updated: 02/13/24 0836     Protime 13.6 Seconds      INR 1.03    POC Glucose Once [[id number]]  (Normal) Collected: 02/13/24 1120    Specimen: Blood Updated: 02/13/24 1123     Glucose 117 mg/dL             Imaging Results (Last 24 Hours)       Procedure Component Value Units Date/Time    CT Abdomen Pelvis With Contrast [[id number]] Collected: 02/13/24 0925     Updated: 02/13/24 0957    Narrative:      CT ABDOMEN PELVIS W CONTRAST-     INDICATION: Right flank pain     COMPARISON: CT abdomen and pelvis February 13, 2014 at 2:16 a.m.     TECHNIQUE:  Routine CT abdomen and pelvis with IV contrast. Coronal and sagittal  reformats. Radiation dose reduction techniques were utilized, including  automated exposure control and exposure modulation based on body size.     FINDINGS:      Lung bases: Subsegmental atelectasis at the bases.     ABDOMEN: Normal liver, gallbladder, spleen, pancreas and adrenal glands.  Incidental splenule. Suspect tiny cyst in the superior pole right  kidney. Nonobstructing nephrolithiasis in the inferior pole left kidney,  series 2, axial image 63, measures 2 mm. Small parapelvic left renal  cysts.     Pelvis: Underdistended bladder. No bladder calculus. Anteverted uterus.  Questionable small mass or leiomyoma in the left uterine fundus. Suspect  tubal ligation. Small corpus luteal cyst suspected in the left  ovary.  Small amount of asymmetric fat stranding in the left adnexa.     Bowel: Small amount of free fluid in the cul-de-sac. Colonic  diverticulosis. No obstruction. Appendix is enlarged, series 2, axial  image 93, measuring 12 mm, with periappendiceal edema and trace  ill-defined fluid, consistent with appendicitis. Possible tiny  appendicolith at the appendicular origin, series 3, coronal image 61. No  extraluminal gas. No walled off periappendiceal fluid collection/abscess  seen.     Abdominal wall: Small fat-containing umbilical hernia. Periumbilical  scarring. Small fat-containing left inguinal hernia.     Retroperitoneum: No lymphadenopathy.     Vasculature: Patent. IVC filter seen with the legs extending through the  caval wall.     Osseous structures: No destructive osseous lesions.       Impression:         1. Acute nonperforated appendicitis.  2. Small amount of free fluid in the cul-de-sac.  3. Small amount of asymmetric stranding/edema in the left adnexa is  nonspecific. Could be related to a corpus luteal cyst in the left ovary,  diverticulitis thought less likely.  4. Nonobstructing left nephrolithiasis.           This report was finalized on 2/13/2024 9:54 AM by Dr. Jeffy Santana M.D on Workstation: [id number]       CT Abdomen Pelvis Without Contrast [[id number]] Collected: 02/13/24 0337     Updated: 02/13/24 0340    Addenda:        ADDENDUM:  02 13 24 03:39 Call Doctor Regarding Above results, called  Dr. Mendoza   on 02 13 03:38 (-05:00)    Electronically signed by Kamilla Valencia DO American Board of     Signed: 02/13/24 0337 by Walter Zhang DO    Narrative:      CR  Patient: J LUIS HOPPER  Time Out: 03:37  Exam(s): CT ABDOMEN + PELVIS Without Contrast     EXAM:    CT Abdomen and Pelvis Without Intravenous Contrast    CLINICAL HISTORY:     Reason for exam: Right flank pain, dysuria, known UTI.    TECHNIQUE:    Axial computed tomography images of the abdomen and pelvis  without   intravenous contrast.  CTDI is 19.4 mGy and DLP is 1075 mGy-cm.  This CT   exam was performed according to the principle of ALARA (As Low As   Reasonably Achievable) by using one or more of the following dose   reduction techniques: automated exposure control, adjustment of the mA   and or kV according to patient size, and or use of iterative   reconstruction technique.    COMPARISON:    6 29 22    FINDINGS:    Lung bases:  Unremarkable.  No mass.  No consolidation.     ABDOMEN:    Liver:  Unremarkable.    Gallbladder and bile ducts:  Unremarkable.  No calcified stones.  No   ductal dilation.    Pancreas:  Unremarkable.  No ductal dilation.    Spleen:  Unremarkable.  No splenomegaly.    Adrenals:  Unremarkable.  No mass.    Kidneys and ureters:  Tiny bilateral nonobstructing intrarenal calculi.    Negative for obstructive uropathy.  Negative for perirenal or pararenal   fluid collections.    Stomach and bowel:  Negative for GI tract obstruction or pneumatosis.    Mild wall thickening proximal to mid sigmoid colon.  There are mild   inflammatory changes adjacent to the sigmoid colon, most prominent as it   crosses the left adnexa.  There are irregular collections of gas adjacent   to the sigmoid colon, the most proximal region is at the level of the   sigmoid colon across in the left adnexa where the gas collection is   lateral to the left lateral wall.  A second region of irregular gas along   the right lateral wall of the sigmoid colon, near the level of the lower   uterine segment and cervix.     PELVIS:    Appendix:  Pathologic enlargement of the appendix measuring 12 mm in   short axis.  Multiple tiny appendicoliths.  Moderate periappendiceal   inflammation.    Bladder:  Unremarkable.  No stones.    Reproductive:  Soft tissue prominence in the left adnexa measuring   approximately 3.0 x 4.7 cm.  There are mild inflammatory changes   immediately adjacent to the left adnexal soft tissue.     ABDOMEN and  PELVIS:    Intraperitoneal space:  Unremarkable.  No free air.  No significant   fluid collection.    Bones joints:  No acute fracture.  No dislocation.    Soft tissues:  Unremarkable.    Vasculature:  Unremarkable.  No abdominal aortic aneurysm.  No change   in position of IVC filter with the apex below the level of both renal   veins.    Lymph nodes:  Unremarkable.  No enlarged lymph nodes.    IMPRESSION:       1.  Acute appendicitis without imaging evidence of perforation.  2.  Mild fat stranding adjacent to the sigmoid colon and prominent left   adnexal soft tissue . mild wall thickening of the sigmoid colon, likely   reflecting colitis diverticulitis.  Irregular gas collections adjacent to   the sigmoid colon are indeterminate for irregular prominent diverticuli   or areas of locally contained perforation.  Etiology of the soft tissue   prominence in the left adnexa is unknown.  Recommend follow-up pelvic   ultrasound and contrast-enhanced CT. diagnostic sensitivity is reduced by   the absence of IV contrast.      Impression:            Communications:     Call Doctor Above results    Electronically signed by Walter Zhang DO, Diplomate American Board of   Radiology on 02-13-24 at 0337                SCANNED - TELEMETRY     Final Result           Assessment/Plan     Active Hospital Problems    Diagnosis  POA    **Acute appendicitis [K35.80]  Yes    Colitis [K52.9]  Yes    Diabetes mellitus type 2, diet-controlled [E11.9]  Yes    Chronic anticoagulation [Z79.01]  Not Applicable    Recurrent deep vein thrombosis (DVT) [I82.409]  Yes    Hypothyroidism, adult [E03.9]  Yes      Resolved Hospital Problems   No resolved problems to display.       1.Acute appendicitis, continue with n.p.o. status, IV fluids and Zosyn.  Surgical consultation has been obtained.  2.  History of multiple blood clots/DVT/PE, does have an IV filter in place and Eliquis is on hold for the moment.  Will be on Lovenox for DVT  prophylaxis.  3.  Morbid obesity, counseled to lose weight.  4.  Hypothyroidism, Synthroid will be resumed once able to tolerate p.o.  5.  CODE STATUS is full code.       Donovan Sosa MD  [facility]  02/13/24  13:43 EST      Electronically signed by Donovan Sosa MD at 02/13/24 1347          Emergency Department Notes        Arline Villagomez, RN at 02/13/24 0428          Nursing report ED to floor  Li Kimball  53 y.o.  female    HPI :   Chief Complaint   Patient presents with    Flank Pain     Li Kimball is a 53 y.o. female who presents to the ED c/o acute right lower abdominal and right flank pain.  Patient recently was on Macrobid for UTI which appears to be resistant she was then placed on Keflex.  States she has had continued discomfort.  No fevers or chills no hematuria.  No nausea vomiting.     Admitting doctor:   Alayna Hernandez MD    Admitting diagnosis:   The primary encounter diagnosis was Acute appendicitis with localized peritonitis, without perforation, abscess, or gangrene. A diagnosis of Acute diverticulitis was also pertinent to this visit.    Code status:   Current Code Status       Date Active Code Status Order ID Comments User Context       2/13/2024 0411 CPR (Attempt to Resuscitate) [id number]  Yany Mallory, APRN ED        Question Answer    Code Status (Patient has no pulse and is not breathing) CPR (Attempt to Resuscitate)    Medical Interventions (Patient has pulse or is breathing) Full Support                    Allergies:   Codeine    Isolation:   No active isolations    Intake and Output    Intake/Output Summary (Last 24 hours) at 2/13/2024 0428  Last data filed at 2/13/2024 0354  Gross per 24 hour   Intake 1000 ml   Output --   Net 1000 ml       Weight:       02/13/24  0027   Weight: 95.7 kg (211 lb)       Most recent vitals:   Vitals:    02/13/24 0333 02/13/24 0334 02/13/24 0335 02/13/24 0409   BP:    154/89   BP Location:        Patient Position:       Pulse: 80 79 86 101   Resp:       Temp:       TempSrc:       SpO2: 96% 97% 97% 100%   Weight:       Height:           Active LDAs/IV Access:   Lines, Drains & Airways       Active LDAs       Name Placement date Placement time Site Days    Peripheral IV 02/13/24 0138 Right Antecubital 02/13/24 0138  Antecubital  less than 1    Ureteral Drain/Stent Right ureter 07/01/22  1829  Right ureter  STRING OFF  591                    Labs (abnormal labs have a star):   Labs Reviewed   COMPREHENSIVE METABOLIC PANEL - Abnormal; Notable for the following components:       Result Value    Glucose 129 (*)     BUN 22 (*)     CO2 21.0 (*)     All other components within normal limits    Narrative:     GFR Normal >60  Chronic Kidney Disease <60  Kidney Failure <15     URINALYSIS W/ CULTURE IF INDICATED - Abnormal; Notable for the following components:    Appearance, UA Cloudy (*)     All other components within normal limits    Narrative:     In absence of clinical symptoms, the presence of pyuria, bacteria, and/or nitrites on the urinalysis result does not correlate with infection.  Urine microscopic not indicated.   CBC WITH AUTO DIFFERENTIAL - Abnormal; Notable for the following components:    WBC 12.87 (*)     Hemoglobin 11.7 (*)     Neutrophils, Absolute 9.12 (*)     Monocytes, Absolute 1.02 (*)     All other components within normal limits   LACTIC ACID, PLASMA   CBC WITH AUTO DIFFERENTIAL   COMPREHENSIVE METABOLIC PANEL   MAGNESIUM   PROTIME-INR   POCT GLUCOSE FINGERSTICK   POCT GLUCOSE FINGERSTICK   POCT GLUCOSE FINGERSTICK   POCT GLUCOSE FINGERSTICK   CBC AND DIFFERENTIAL    Narrative:     The following orders were created for panel order CBC & Differential.  Procedure                               Abnormality         Status                     ---------                               -----------         ------                     CBC Auto Differential[[id number]]        Abnormal            Final  result                 Please view results for these tests on the individual orders.       EKG:   No orders to display       Meds given in ED:   Medications   piperacillin-tazobactam (ZOSYN) 3.375 g in iso-osmotic dextrose 50 ml (premix) (3.375 g Intravenous New Bag 2/13/24 0416)   sodium chloride 0.9 % flush 10 mL (has no administration in time range)   sodium chloride 0.9 % flush 10 mL (has no administration in time range)   sodium chloride 0.9 % infusion 40 mL (has no administration in time range)   dextrose (GLUTOSE) oral gel 15 g (has no administration in time range)   dextrose (D50W) (25 g/50 mL) IV injection 25 g (has no administration in time range)   glucagon (GLUCAGEN) injection 1 mg (has no administration in time range)   insulin regular (humuLIN R,novoLIN R) injection 2-7 Units (has no administration in time range)   nitroglycerin (NITROSTAT) SL tablet 0.4 mg (has no administration in time range)   sodium chloride 0.9 % infusion (has no administration in time range)   acetaminophen (TYLENOL) tablet 650 mg (has no administration in time range)     Or   acetaminophen (TYLENOL) 160 MG/5ML oral solution 650 mg (has no administration in time range)     Or   acetaminophen (TYLENOL) suppository 650 mg (has no administration in time range)   sennosides-docusate (PERICOLACE) 8.6-50 MG per tablet 2 tablet (has no administration in time range)     And   polyethylene glycol (MIRALAX) packet 17 g (has no administration in time range)     And   bisacodyl (DULCOLAX) EC tablet 5 mg (has no administration in time range)     And   bisacodyl (DULCOLAX) suppository 10 mg (has no administration in time range)   ondansetron (ZOFRAN) injection 4 mg (has no administration in time range)   Pharmacy to Dose Zosyn (has no administration in time range)   piperacillin-tazobactam (ZOSYN) 3.375 g in iso-osmotic dextrose 50 ml (premix) (has no administration in time range)   lactated ringers bolus 1,000 mL (0 mL Intravenous Stopped  2/13/24 0354)   ondansetron (ZOFRAN) injection 4 mg (4 mg Intravenous Given 2/13/24 0147)   morphine injection 4 mg (4 mg Intravenous Given 2/13/24 0146)   HYDROmorphone (DILAUDID) injection 0.5 mg (0.5 mg Intravenous Given 2/13/24 0415)   ondansetron (ZOFRAN) injection 4 mg (4 mg Intravenous Given 2/13/24 0413)       Imaging results:  CT Abdomen Pelvis Without Contrast    Addendum Date: 2/13/2024    ADDENDUM: 02 13 24 03:39 Call Doctor Regarding Above results, called  Dr. Mendoza on 02 13 03:38 (-05:00) Electronically signed by Kamilla Valencia DO American Board of     Result Date: 2/13/2024  Communications:  Call Doctor Above results Electronically signed by Iftikhar Valencia DOomatwill [employer] Board of Radiology on 02-13-24 at 0337     Ambulatory status:   - standby    Social issues:   Social History     Socioeconomic History    Marital status:    Tobacco Use    Smoking status: Never     Passive exposure: Never    Smokeless tobacco: Never   Vaping Use    Vaping Use: Never used   Substance and Sexual Activity    Alcohol use: No    Drug use: No    Sexual activity: Defer         Arline Villagomez RN  02/13/24 04:28 EST          Electronically signed by Arline Villagomez RN at 02/13/24 0428       Jose Foster MD at 02/13/24 0223          MD ATTESTATION NOTE    SHARED VISIT: This visit was performed by BOTH a physician and an APC. The substantive portion of the medical decision making was performed by this attesting physician who made or approved the management plan and takes responsibility for patient management. All studies documented in the APC note (if performed) were independently interpreted by me.    The JOHN and I have discussed this patient's history, physical exam, MDM, and treatment plan.  I have reviewed the documentation and personally had a face to face interaction with the patient. The attached note describes my personal findings.      Li Kimball is a 53 y.o. female  who presents to the ED c/o acute right lower abdominal and right flank pain.  Patient recently was on Macrobid for UTI which appears to be resistant she was then placed on Keflex.  States she has had continued discomfort.  No fevers or chills no hematuria.  No nausea vomiting.    On exam:  GENERAL: not distressed  HENT: nares patent  EYES: no scleral icterus  CV: regular rhythm, regular rate  RESPIRATORY: normal effort  ABDOMEN: soft, mild right lower quadrant tenderness mild right flank tenderness  MUSCULOSKELETAL: no deformity  NEURO: alert, moves all extremities, follows commands  SKIN: warm, dry    Labs  Recent Results (from the past 24 hour(s))   Comprehensive Metabolic Panel    Collection Time: 02/13/24  1:34 AM    Specimen: Blood   Result Value Ref Range    Glucose 129 (H) 65 - 99 mg/dL    BUN 22 (H) 6 - 20 mg/dL    Creatinine 0.99 0.57 - 1.00 mg/dL    Sodium 137 136 - 145 mmol/L    Potassium 4.0 3.5 - 5.2 mmol/L    Chloride 107 98 - 107 mmol/L    CO2 21.0 (L) 22.0 - 29.0 mmol/L    Calcium 9.9 8.6 - 10.5 mg/dL    Total Protein 7.3 6.0 - 8.5 g/dL    Albumin 4.0 3.5 - 5.2 g/dL    ALT (SGPT) 15 1 - 33 U/L    AST (SGOT) 14 1 - 32 U/L    Alkaline Phosphatase 46 39 - 117 U/L    Total Bilirubin 0.2 0.0 - 1.2 mg/dL    Globulin 3.3 gm/dL    A/G Ratio 1.2 g/dL    BUN/Creatinine Ratio 22.2 7.0 - 25.0    Anion Gap 9.0 5.0 - 15.0 mmol/L    eGFR 68.3 >60.0 mL/min/1.73   CBC Auto Differential    Collection Time: 02/13/24  1:34 AM    Specimen: Blood   Result Value Ref Range    WBC 12.87 (H) 3.40 - 10.80 10*3/mm3    RBC 4.28 3.77 - 5.28 10*6/mm3    Hemoglobin 11.7 (L) 12.0 - 15.9 g/dL    Hematocrit 36.2 34.0 - 46.6 %    MCV 84.6 79.0 - 97.0 fL    MCH 27.3 26.6 - 33.0 pg    MCHC 32.3 31.5 - 35.7 g/dL    RDW 12.9 12.3 - 15.4 %    RDW-SD 38.6 37.0 - 54.0 fl    MPV 9.3 6.0 - 12.0 fL    Platelets 423 140 - 450 10*3/mm3    Neutrophil % 70.9 42.7 - 76.0 %    Lymphocyte % 19.6 19.6 - 45.3 %    Monocyte % 7.9 5.0 - 12.0 %     Eosinophil % 0.9 0.3 - 6.2 %    Basophil % 0.3 0.0 - 1.5 %    Immature Grans % 0.4 0.0 - 0.5 %    Neutrophils, Absolute 9.12 (H) 1.70 - 7.00 10*3/mm3    Lymphocytes, Absolute 2.52 0.70 - 3.10 10*3/mm3    Monocytes, Absolute 1.02 (H) 0.10 - 0.90 10*3/mm3    Eosinophils, Absolute 0.12 0.00 - 0.40 10*3/mm3    Basophils, Absolute 0.04 0.00 - 0.20 10*3/mm3    Immature Grans, Absolute 0.05 0.00 - 0.05 10*3/mm3    nRBC 0.0 0.0 - 0.2 /100 WBC   Urinalysis With Culture If Indicated - Urine, Clean Catch    Collection Time: 02/13/24  1:35 AM    Specimen: Urine, Clean Catch   Result Value Ref Range    Color, UA Yellow Yellow, Straw    Appearance, UA Cloudy (A) Clear    pH, UA 5.5 5.0 - 8.0    Specific Gravity, UA 1.029 1.005 - 1.030    Glucose, UA Negative Negative    Ketones, UA Negative Negative    Bilirubin, UA Negative Negative    Blood, UA Negative Negative    Protein, UA Negative Negative    Leuk Esterase, UA Negative Negative    Nitrite, UA Negative Negative    Urobilinogen, UA 0.2 E.U./dL 0.2 - 1.0 E.U./dL   Lactic Acid, Plasma    Collection Time: 02/13/24  4:08 AM    Specimen: Blood   Result Value Ref Range    Lactate 1.9 0.5 - 2.0 mmol/L       Radiology  CT Abdomen Pelvis Without Contrast    Addendum Date: 2/13/2024    ADDENDUM: 02 13 24 03:39 Call Doctor Regarding Above results, called  Dr. Mendoza on 02 13 03:38 (-05:00) Electronically signed by Walter Zhang DO, Diplomate American Board of     Result Date: 2/13/2024  CR Patient: J LUIS HOPPER  Time Out: 03:37 Exam(s): CT ABDOMEN + PELVIS Without Contrast EXAM:   CT Abdomen and Pelvis Without Intravenous Contrast CLINICAL HISTORY:    Reason for exam: Right flank pain, dysuria, known UTI. TECHNIQUE:   Axial computed tomography images of the abdomen and pelvis without intravenous contrast.  CTDI is 19.4 mGy and DLP is 1075 mGy-cm.  This CT exam was performed according to the principle of ALARA (As Low As Reasonably Achievable) by using one or more of the  following dose reduction techniques: automated exposure control, adjustment of the mA and or kV according to patient size, and or use of iterative reconstruction technique. COMPARISON:   6 29 22 FINDINGS:   Lung bases:  Unremarkable.  No mass.  No consolidation.  ABDOMEN:   Liver:  Unremarkable.   Gallbladder and bile ducts:  Unremarkable.  No calcified stones.  No ductal dilation.   Pancreas:  Unremarkable.  No ductal dilation.   Spleen:  Unremarkable.  No splenomegaly.   Adrenals:  Unremarkable.  No mass.   Kidneys and ureters:  Tiny bilateral nonobstructing intrarenal calculi.  Negative for obstructive uropathy.  Negative for perirenal or pararenal fluid collections.   Stomach and bowel:  Negative for GI tract obstruction or pneumatosis.  Mild wall thickening proximal to mid sigmoid colon.  There are mild inflammatory changes adjacent to the sigmoid colon, most prominent as it crosses the left adnexa.  There are irregular collections of gas adjacent to the sigmoid colon, the most proximal region is at the level of the sigmoid colon across in the left adnexa where the gas collection is lateral to the left lateral wall.  A second region of irregular gas along the right lateral wall of the sigmoid colon, near the level of the lower uterine segment and cervix.  PELVIS:   Appendix:  Pathologic enlargement of the appendix measuring 12 mm in short axis.  Multiple tiny appendicoliths.  Moderate periappendiceal inflammation.   Bladder:  Unremarkable.  No stones.   Reproductive:  Soft tissue prominence in the left adnexa measuring approximately 3.0 x 4.7 cm.  There are mild inflammatory changes immediately adjacent to the left adnexal soft tissue.  ABDOMEN and PELVIS:   Intraperitoneal space:  Unremarkable.  No free air.  No significant fluid collection.   Bones joints:  No acute fracture.  No dislocation.   Soft tissues:  Unremarkable.   Vasculature:  Unremarkable.  No abdominal aortic aneurysm.  No change in position of  IVC filter with the apex below the level of both renal veins.   Lymph nodes:  Unremarkable.  No enlarged lymph nodes. IMPRESSION:     1.  Acute appendicitis without imaging evidence of perforation. 2.  Mild fat stranding adjacent to the sigmoid colon and prominent left adnexal soft tissue . mild wall thickening of the sigmoid colon, likely reflecting colitis diverticulitis.  Irregular gas collections adjacent to the sigmoid colon are indeterminate for irregular prominent diverticuli or areas of locally contained perforation.  Etiology of the soft tissue prominence in the left adnexa is unknown.  Recommend follow-up pelvic ultrasound and contrast-enhanced CT. diagnostic sensitivity is reduced by the absence of IV contrast.     Communications:  Call Doctor Above results Electronically signed by Walter Zhang DO, Diplomate American Board of Radiology on 02-13-24 at 0337     Medications given in the ED:  Medications   sodium chloride 0.9 % flush 10 mL (has no administration in time range)   sodium chloride 0.9 % flush 10 mL (has no administration in time range)   sodium chloride 0.9 % infusion 40 mL (has no administration in time range)   dextrose (GLUTOSE) oral gel 15 g (has no administration in time range)   dextrose (D50W) (25 g/50 mL) IV injection 25 g (has no administration in time range)   glucagon (GLUCAGEN) injection 1 mg (has no administration in time range)   insulin regular (humuLIN R,novoLIN R) injection 2-7 Units (has no administration in time range)   nitroglycerin (NITROSTAT) SL tablet 0.4 mg (has no administration in time range)   sodium chloride 0.9 % infusion (has no administration in time range)   acetaminophen (TYLENOL) tablet 650 mg (has no administration in time range)     Or   acetaminophen (TYLENOL) 160 MG/5ML oral solution 650 mg (has no administration in time range)     Or   acetaminophen (TYLENOL) suppository 650 mg (has no administration in time range)   sennosides-docusate (PERICOLACE)  8.6-50 MG per tablet 2 tablet (has no administration in time range)     And   polyethylene glycol (MIRALAX) packet 17 g (has no administration in time range)     And   bisacodyl (DULCOLAX) EC tablet 5 mg (has no administration in time range)     And   bisacodyl (DULCOLAX) suppository 10 mg (has no administration in time range)   ondansetron (ZOFRAN) injection 4 mg (has no administration in time range)   Pharmacy to Dose Zosyn (has no administration in time range)   piperacillin-tazobactam (ZOSYN) 3.375 g in iso-osmotic dextrose 50 ml (premix) (has no administration in time range)   lactated ringers bolus 1,000 mL (0 mL Intravenous Stopped 2/13/24 0354)   ondansetron (ZOFRAN) injection 4 mg (4 mg Intravenous Given 2/13/24 0147)   morphine injection 4 mg (4 mg Intravenous Given 2/13/24 0146)   piperacillin-tazobactam (ZOSYN) 3.375 g in iso-osmotic dextrose 50 ml (premix) (3.375 g Intravenous New Bag 2/13/24 0416)   HYDROmorphone (DILAUDID) injection 0.5 mg (0.5 mg Intravenous Given 2/13/24 0415)   ondansetron (ZOFRAN) injection 4 mg (4 mg Intravenous Given 2/13/24 0413)       Orders placed during this visit:  Orders Placed This Encounter   Procedures    CT Abdomen Pelvis Without Contrast    US Pelvis Complete    CT Abdomen Pelvis With Contrast    Comprehensive Metabolic Panel    Urinalysis With Culture If Indicated - Urine, Clean Catch    CBC Auto Differential    Lactic Acid, Plasma    CBC Auto Differential    Comprehensive Metabolic Panel    Magnesium    Protime-INR    NPO Diet NPO Type: Strict NPO    Vital Signs    Intake & Output    Weigh Patient    Oral Care    Place Sequential Compression Device    Maintain Sequential Compression Device    Telemetry - Maintain IV Access    Telemetry - Place Orders & Notify Provider of Results When Patient Experiences Acute Chest Pain, Dysrhythmia or Respiratory Distress    May Be Off Telemetry for Tests    Up With Assistance    Code Status and Medical Interventions:    Inpatient  General Surgery Consult    [facility] (on-call MD unless specified) Details    Inpatient General Surgery Consult    POC Glucose 4x Daily Before Meals & at Bedtime    Insert Peripheral IV    Initiate Observation Status    CBC & Differential       Medical Decision Making:  ED Course as of 02/13/24 0701 Tue Feb 13, 2024   0220 WBC(!): 12.87  Urinalysis negative for acute infection [CC]   0222 CT Abdomen Pelvis Without Contrast  My independent interpretation of the CT of the abdomen pelvis is inflammatory changes of the right lower quadrant [CC]   0340 Spoke with radiologist who reports patient has appendicitis and is concerned she also may have acute diverticulitis with possible contained perforation.  Patient has no left lower quadrant abdominal pain on my exam.  Updated her on results and surgery was paged. [CC]   0352 Spoke with with general surgery on call.  He agrees with plan to admission to the hospitalist as he will not operate this morning as the patient is anticoagulated. [CC]   0405 Spoke with MARLIN Slade with [facility].  Reviewed history, exam, results, treatments.  She agrees admit the patient to Dr. Hernandez.     [CC]      ED Course User Index  [CC] Victorina Mendoza PA-C       Differential diagnosis:  UTI, pyelonephritis, ureteral stone    Diagnosis  Final diagnoses:   Acute appendicitis with localized peritonitis, without perforation, abscess, or gangrene   Acute diverticulitis          Jose Foster MD  02/13/24 0701      Electronically signed by Jose Foster MD at 02/13/24 0701       Victorina Mendoza PA-C at 02/13/24 0110       Attestation signed by Jose Foster MD at 02/13/24 0707    SHARED VISIT: This visit was performed by BOTH a physician and an APC. The substantive portion of the medical decision making was performed by this attesting physician who made or approved the management plan and takes responsibility for patient management. All studies in the APC note (if performed)  were independently interpreted by me.   Jose CRAIN MD 2/13/2024 07:06 EST                          EMERGENCY DEPARTMENT ENCOUNTER  Room Number:  02/02  PCP: Tank Shea APRN  Independent Historians: Patient      HPI:  Chief Complaint: had concerns including Flank Pain.     A complete HPI/ROS/PMH/PSH/SH/FH are unobtainable due to: None    Chronic or social conditions impacting patient care (Social Determinants of Health): None      Context: Li Kimball is a 53 y.o. female with a medical history of DVT anticoagulated on Eliquis, kidney stones, type 2 diabetes, hypothyroidism, hyperlipidemia, and skin cancer presents emergency department complaining of right lower quadrant abdominal pain that worsened today.  Patient says she has had issues over the last week and was seen at the immediate care center was told she had a urinary tract infection.  She was treated with Macrobid and then her antibiotic was changed to Keflex due to resistance and she has finished that antibiotic but says she still having right lower quadrant pain.  Patient says she thinks she may have a kidney stone as this feels similar to her prior episode of kidney stones.  She reports nausea but denies vomiting or diarrhea.  She denies fever or chills.  She denies chest pain or shortness of breath.      Review of prior external notes (non-ED) -and- Review of prior external test results outside of this encounter:   Patient was seen at the [facility] on 2/2/2024 for dysuria.  She was diagnosed with a UTI and prescribed Macrobid.  It appears based on the urine culture below the Macrobid is resistant.    (ABNORMAL) Culture,Urine (02/02/2024 6:47 PM EST)  Lab Results - (ABNORMAL) Culture,Urine (02/02/2024 6:47 PM EST)  Component Value        Culture Proteus mirabilis >100,000 CFU/mL (A)        Culture Escherichia coli 10,000 to 50,000 CFU/mL (A)        Culture Colonization of the urinary tract without infection is common. Treatment is  discouraged unless the patient is symptomatic, pregnant, or undergoing an invasive urologic procedure.            Lab Results - (ABNORMAL) Culture,Urine (02/02/2024 6:47 PM EST)  Organism Antibiotic Method Susceptibility   Proteus mirabilis Ampicillin EMILIA <=8: Susceptible    Proteus mirabilis Cefazolin EMILIA <=2: Susceptible    Proteus mirabilis Ciprofloxacin EMILIA <=0.25: Susceptible    Proteus mirabilis Gentamicin EMILIA <=4: Susceptible    Proteus mirabilis Nitrofurantoin EMILIA >64: Resistant    Proteus mirabilis Trimethoprim/Sulfamethoxazole EMILIA <=2/38: Susceptible    Escherichia coli Ampicillin EMILIA <=8: Susceptible    Escherichia coli Cefazolin EMILIA <=2: Susceptible    Escherichia coli Ciprofloxacin EMILIA <=0.25: Susceptible    Escherichia coli Gentamicin EMILIA <=4: Susceptible    Escherichia coli Nitrofurantoin EMILIA <=32: Susceptible    Escherichia coli Trimethoprim/Sulfamethoxazole EMILIA <=2/38: Susceptible        Date of Admission: 6/29/2022   Date of Discharge:  7/2/2022   Hospital Course  Patient is a 51 y.o. female presented with a right ureteral stone. She underwent right urs/ll/sbe with stent.  Her pain is managed and she is tolerating po.  She has hydrocodone and bactrim already sent to her pharmacy.  I discussed sending zofan in for her as well. Discussed stent expectations.  She can restart her eliquis.       PAST MEDICAL HISTORY  Active Ambulatory Problems     Diagnosis Date Noted    Hypothyroidism, adult     Mixed hyperlipidemia     BPPV (benign paroxysmal positional vertigo)     Asthma     Migraines     DVT (deep venous thrombosis)     Colonic stricture 09/03/2020    Screen for colon cancer 08/11/2021    Chronic anticoagulation 10/03/2019    Recurrent deep vein thrombosis (DVT) 10/03/2019    Hair loss 12/14/2021    History of skin cancer 12/15/2021    Right ureteral stone 06/29/2022    Diabetes mellitus type 2, diet-controlled 08/12/2022    Obesity (BMI 30.0-34.9) 08/12/2022    Hypertriglyceridemia 12/12/2023     Class 2 severe obesity with serious comorbidity and body mass index (BMI) of 37.0 to 37.9 in adult 12/12/2023    Type 2 diabetes mellitus without complication, without long-term current use of insulin 01/05/2024     Resolved Ambulatory Problems     Diagnosis Date Noted    No Resolved Ambulatory Problems     Past Medical History:   Diagnosis Date    Diverticulitis     Hypothyroid     Kidney stones     Slow to wake up after anesthesia     Weight loss          PAST SURGICAL HISTORY  Past Surgical History:   Procedure Laterality Date    ANKLE SURGERY Right 2015    Removal of hardware    COLONOSCOPY N/A 08/2020    COLONOSCOPY N/A 9/17/2020    Procedure: COLONOSCOPY to cecum with 20mm colonic balloon dilation;  Surgeon: Wlater Verdin MD;  Location: [facility] ENDOSCOPY;  Service: General;  Laterality: N/A;  pre - diverticulitis induced stricture   post - colonic stricture    COLONOSCOPY N/A 10/28/2021    Procedure: COLONOSCOPY into cecum with bx;  Surgeon: Walter Verdin MD;  Location: [facility] ENDOSCOPY;  Service: General;  Laterality: N/A;  pre: hx of colonic stricture   post: diverticulosis     CYSTOSCOPY W/ URETERAL STENT PLACEMENT Right 7/1/2022    Procedure: CYSTOSCOPY, RIGHT URETEROSCOPY RIGHT STENT PLACEMENT, STONE BASKET EXTRACTION, LASER LITHOTRIPSY;  Surgeon: Cyrus Au MD;  Location: [facility] MAIN OR;  Service: Urology;  Laterality: Right;    ENDOMETRIAL ABLATION      FOOT SURGERY Left 2006    Fracture , postop DVT and PE    JOINT REPLACEMENT      ankle, s/p two surgery    LAPAROSCOPIC TUBAL LIGATION      ORIF ANKLE FRACTURE Right 2014    DVT and PE perioperatively    TUBAL ABDOMINAL LIGATION      VENA CAVA FILTER INSERTION  2014         FAMILY HISTORY  Family History   Problem Relation Age of Onset    Other Mother         Vertigo    Hyperlipidemia Mother     Cancer Father         hodgkins disease    Heart disease Brother     Malig Hyperthermia Neg Hx          SOCIAL HISTORY  Social History      Socioeconomic History    Marital status:    Tobacco Use    Smoking status: Never     Passive exposure: Never    Smokeless tobacco: Never   Vaping Use    Vaping Use: Never used   Substance and Sexual Activity    Alcohol use: No    Drug use: No    Sexual activity: Defer         ALLERGIES  Codeine      REVIEW OF SYSTEMS  Included in HPI  All systems reviewed and negative except for those discussed in HPI.      PHYSICAL EXAM    I have reviewed the triage vital signs and nursing notes.    ED Triage Vitals   Temp Heart Rate Resp BP SpO2   02/13/24 0027 02/13/24 0027 02/13/24 0027 02/13/24 0048 02/13/24 0027   97.6 °F (36.4 °C) 111 18 146/100 100 %      Temp src Heart Rate Source Patient Position BP Location FiO2 (%)   02/13/24 0027 02/13/24 0027 02/13/24 0048 02/13/24 0048 --   Tympanic Monitor Sitting Right arm        Physical Exam  Constitutional:       General: She is not in acute distress.     Appearance: She is obese. She is ill-appearing.   HENT:      Head: Normocephalic and atraumatic.      Nose: Nose normal.      Mouth/Throat:      Mouth: Mucous membranes are moist.   Eyes:      Extraocular Movements: Extraocular movements intact.      Pupils: Pupils are equal, round, and reactive to light.   Cardiovascular:      Rate and Rhythm: Normal rate and regular rhythm.      Pulses: Normal pulses.      Heart sounds: Normal heart sounds.   Pulmonary:      Effort: Pulmonary effort is normal. No respiratory distress.      Breath sounds: Normal breath sounds.   Abdominal:      General: Abdomen is flat. There is no distension.      Palpations: Abdomen is soft.      Tenderness: There is abdominal tenderness (Tenderness palpation the right lower quadrant).   Musculoskeletal:         General: Normal range of motion.      Cervical back: Normal range of motion and neck supple.   Skin:     General: Skin is warm and dry.      Capillary Refill: Capillary refill takes less than 2 seconds.   Neurological:      General: No  focal deficit present.      Mental Status: She is alert and oriented to person, place, and time.   Psychiatric:         Mood and Affect: Mood normal.         Behavior: Behavior normal.             LAB RESULTS  Recent Results (from the past 24 hour(s))   Comprehensive Metabolic Panel    Collection Time: 02/13/24  1:34 AM    Specimen: Blood   Result Value Ref Range    Glucose 129 (H) 65 - 99 mg/dL    BUN 22 (H) 6 - 20 mg/dL    Creatinine 0.99 0.57 - 1.00 mg/dL    Sodium 137 136 - 145 mmol/L    Potassium 4.0 3.5 - 5.2 mmol/L    Chloride 107 98 - 107 mmol/L    CO2 21.0 (L) 22.0 - 29.0 mmol/L    Calcium 9.9 8.6 - 10.5 mg/dL    Total Protein 7.3 6.0 - 8.5 g/dL    Albumin 4.0 3.5 - 5.2 g/dL    ALT (SGPT) 15 1 - 33 U/L    AST (SGOT) 14 1 - 32 U/L    Alkaline Phosphatase 46 39 - 117 U/L    Total Bilirubin 0.2 0.0 - 1.2 mg/dL    Globulin 3.3 gm/dL    A/G Ratio 1.2 g/dL    BUN/Creatinine Ratio 22.2 7.0 - 25.0    Anion Gap 9.0 5.0 - 15.0 mmol/L    eGFR 68.3 >60.0 mL/min/1.73   CBC Auto Differential    Collection Time: 02/13/24  1:34 AM    Specimen: Blood   Result Value Ref Range    WBC 12.87 (H) 3.40 - 10.80 10*3/mm3    RBC 4.28 3.77 - 5.28 10*6/mm3    Hemoglobin 11.7 (L) 12.0 - 15.9 g/dL    Hematocrit 36.2 34.0 - 46.6 %    MCV 84.6 79.0 - 97.0 fL    MCH 27.3 26.6 - 33.0 pg    MCHC 32.3 31.5 - 35.7 g/dL    RDW 12.9 12.3 - 15.4 %    RDW-SD 38.6 37.0 - 54.0 fl    MPV 9.3 6.0 - 12.0 fL    Platelets 423 140 - 450 10*3/mm3    Neutrophil % 70.9 42.7 - 76.0 %    Lymphocyte % 19.6 19.6 - 45.3 %    Monocyte % 7.9 5.0 - 12.0 %    Eosinophil % 0.9 0.3 - 6.2 %    Basophil % 0.3 0.0 - 1.5 %    Immature Grans % 0.4 0.0 - 0.5 %    Neutrophils, Absolute 9.12 (H) 1.70 - 7.00 10*3/mm3    Lymphocytes, Absolute 2.52 0.70 - 3.10 10*3/mm3    Monocytes, Absolute 1.02 (H) 0.10 - 0.90 10*3/mm3    Eosinophils, Absolute 0.12 0.00 - 0.40 10*3/mm3    Basophils, Absolute 0.04 0.00 - 0.20 10*3/mm3    Immature Grans, Absolute 0.05 0.00 - 0.05 10*3/mm3    nRBC  0.0 0.0 - 0.2 /100 WBC   Urinalysis With Culture If Indicated - Urine, Clean Catch    Collection Time: 02/13/24  1:35 AM    Specimen: Urine, Clean Catch   Result Value Ref Range    Color, UA Yellow Yellow, Straw    Appearance, UA Cloudy (A) Clear    pH, UA 5.5 5.0 - 8.0    Specific Gravity, UA 1.029 1.005 - 1.030    Glucose, UA Negative Negative    Ketones, UA Negative Negative    Bilirubin, UA Negative Negative    Blood, UA Negative Negative    Protein, UA Negative Negative    Leuk Esterase, UA Negative Negative    Nitrite, UA Negative Negative    Urobilinogen, UA 0.2 E.U./dL 0.2 - 1.0 E.U./dL   Lactic Acid, Plasma    Collection Time: 02/13/24  4:08 AM    Specimen: Blood   Result Value Ref Range    Lactate 1.9 0.5 - 2.0 mmol/L         RADIOLOGY  CT Abdomen Pelvis Without Contrast    Addendum Date: 2/13/2024    ADDENDUM: 02 13 24 03:39 Call Doctor Regarding Above results, called  Dr. Mendoza on 02 13 03:38 (-05:00) Electronically signed by Iftikhar Valencia DOomatwill American Board of     Result Date: 2/13/2024  CR Patient: J LIUS HOPPER  Time Out: 03:37 Exam(s): CT ABDOMEN + PELVIS Without Contrast EXAM:   CT Abdomen and Pelvis Without Intravenous Contrast CLINICAL HISTORY:    Reason for exam: Right flank pain, dysuria, known UTI. TECHNIQUE:   Axial computed tomography images of the abdomen and pelvis without intravenous contrast.  CTDI is 19.4 mGy and DLP is 1075 mGy-cm.  This CT exam was performed according to the principle of ALARA (As Low As Reasonably Achievable) by using one or more of the following dose reduction techniques: automated exposure control, adjustment of the mA and or kV according to patient size, and or use of iterative reconstruction technique. COMPARISON:   6 29 22 FINDINGS:   Lung bases:  Unremarkable.  No mass.  No consolidation.  ABDOMEN:   Liver:  Unremarkable.   Gallbladder and bile ducts:  Unremarkable.  No calcified stones.  No ductal dilation.   Pancreas:  Unremarkable.  No  ductal dilation.   Spleen:  Unremarkable.  No splenomegaly.   Adrenals:  Unremarkable.  No mass.   Kidneys and ureters:  Tiny bilateral nonobstructing intrarenal calculi.  Negative for obstructive uropathy.  Negative for perirenal or pararenal fluid collections.   Stomach and bowel:  Negative for GI tract obstruction or pneumatosis.  Mild wall thickening proximal to mid sigmoid colon.  There are mild inflammatory changes adjacent to the sigmoid colon, most prominent as it crosses the left adnexa.  There are irregular collections of gas adjacent to the sigmoid colon, the most proximal region is at the level of the sigmoid colon across in the left adnexa where the gas collection is lateral to the left lateral wall.  A second region of irregular gas along the right lateral wall of the sigmoid colon, near the level of the lower uterine segment and cervix.  PELVIS:   Appendix:  Pathologic enlargement of the appendix measuring 12 mm in short axis.  Multiple tiny appendicoliths.  Moderate periappendiceal inflammation.   Bladder:  Unremarkable.  No stones.   Reproductive:  Soft tissue prominence in the left adnexa measuring approximately 3.0 x 4.7 cm.  There are mild inflammatory changes immediately adjacent to the left adnexal soft tissue.  ABDOMEN and PELVIS:   Intraperitoneal space:  Unremarkable.  No free air.  No significant fluid collection.   Bones joints:  No acute fracture.  No dislocation.   Soft tissues:  Unremarkable.   Vasculature:  Unremarkable.  No abdominal aortic aneurysm.  No change in position of IVC filter with the apex below the level of both renal veins.   Lymph nodes:  Unremarkable.  No enlarged lymph nodes. IMPRESSION:     1.  Acute appendicitis without imaging evidence of perforation. 2.  Mild fat stranding adjacent to the sigmoid colon and prominent left adnexal soft tissue . mild wall thickening of the sigmoid colon, likely reflecting colitis diverticulitis.  Irregular gas collections adjacent to  the sigmoid colon are indeterminate for irregular prominent diverticuli or areas of locally contained perforation.  Etiology of the soft tissue prominence in the left adnexa is unknown.  Recommend follow-up pelvic ultrasound and contrast-enhanced CT. diagnostic sensitivity is reduced by the absence of IV contrast.     Communications:  Call Doctor Above results Electronically signed by Walter Zhang DO, Diplomate American Board of Radiology on 02-13-24 at 0337       MEDICATIONS GIVEN IN ER  Medications   sodium chloride 0.9 % flush 10 mL (has no administration in time range)   sodium chloride 0.9 % flush 10 mL (has no administration in time range)   sodium chloride 0.9 % infusion 40 mL (has no administration in time range)   dextrose (GLUTOSE) oral gel 15 g (has no administration in time range)   dextrose (D50W) (25 g/50 mL) IV injection 25 g (has no administration in time range)   glucagon (GLUCAGEN) injection 1 mg (has no administration in time range)   insulin regular (humuLIN R,novoLIN R) injection 2-7 Units (has no administration in time range)   nitroglycerin (NITROSTAT) SL tablet 0.4 mg (has no administration in time range)   sodium chloride 0.9 % infusion (has no administration in time range)   acetaminophen (TYLENOL) tablet 650 mg (has no administration in time range)     Or   acetaminophen (TYLENOL) 160 MG/5ML oral solution 650 mg (has no administration in time range)     Or   acetaminophen (TYLENOL) suppository 650 mg (has no administration in time range)   sennosides-docusate (PERICOLACE) 8.6-50 MG per tablet 2 tablet (has no administration in time range)     And   polyethylene glycol (MIRALAX) packet 17 g (has no administration in time range)     And   bisacodyl (DULCOLAX) EC tablet 5 mg (has no administration in time range)     And   bisacodyl (DULCOLAX) suppository 10 mg (has no administration in time range)   ondansetron (ZOFRAN) injection 4 mg (has no administration in time range)   Pharmacy to  Dose Zosyn (has no administration in time range)   piperacillin-tazobactam (ZOSYN) 3.375 g in iso-osmotic dextrose 50 ml (premix) (has no administration in time range)   lactated ringers bolus 1,000 mL (0 mL Intravenous Stopped 2/13/24 0354)   ondansetron (ZOFRAN) injection 4 mg (4 mg Intravenous Given 2/13/24 0147)   morphine injection 4 mg (4 mg Intravenous Given 2/13/24 0146)   piperacillin-tazobactam (ZOSYN) 3.375 g in iso-osmotic dextrose 50 ml (premix) (3.375 g Intravenous New Bag 2/13/24 0416)   HYDROmorphone (DILAUDID) injection 0.5 mg (0.5 mg Intravenous Given 2/13/24 0415)   ondansetron (ZOFRAN) injection 4 mg (4 mg Intravenous Given 2/13/24 0413)           OUTPATIENT MEDICATION MANAGEMENT:  Current Facility-Administered Medications Ordered in Epic   Medication Dose Route Frequency Provider Last Rate Last Admin    acetaminophen (TYLENOL) tablet 650 mg  650 mg Oral Q4H PRN Yany Mallory APRN        Or    acetaminophen (TYLENOL) 160 MG/5ML oral solution 650 mg  650 mg Oral Q4H PRN Yany Mallory APRN        Or    acetaminophen (TYLENOL) suppository 650 mg  650 mg Rectal Q4H PRN Yany Mallory APRN        sennosides-docusate (PERICOLACE) 8.6-50 MG per tablet 2 tablet  2 tablet Oral BID PRN Yany Mallory APRN        And    polyethylene glycol (MIRALAX) packet 17 g  17 g Oral Daily PRN Yany Mallory APRN        And    bisacodyl (DULCOLAX) EC tablet 5 mg  5 mg Oral Daily PRN Yany Mallory APRN        And    bisacodyl (DULCOLAX) suppository 10 mg  10 mg Rectal Daily PRN Yany Mallory APRN        dextrose (D50W) (25 g/50 mL) IV injection 25 g  25 g Intravenous Q15 Min PRN Ynay Mallory APRN        dextrose (GLUTOSE) oral gel 15 g  15 g Oral Q15 Min PRN Yany Mallory APRN        glucagon (GLUCAGEN) injection 1 mg  1 mg Intramuscular Q15 Min PRN Yany Mallory, DANIEL        insulin regular (humuLIN R,novoLIN R) injection 2-7 Units  2-7 Units  Subcutaneous Q6H Yany Mallory APRN        nitroglycerin (NITROSTAT) SL tablet 0.4 mg  0.4 mg Sublingual Q5 Min PRN Yany Mallory APRN        ondansetron (ZOFRAN) injection 4 mg  4 mg Intravenous Q6H PRN Yany Mallory APRN        Pharmacy to Dose Zosyn   Does not apply Continuous PRN Yany Mallory APRN        piperacillin-tazobactam (ZOSYN) 3.375 g in iso-osmotic dextrose 50 ml (premix)  3.375 g Intravenous Q8H Yany Mallory APRN        sodium chloride 0.9 % flush 10 mL  10 mL Intravenous Q12H Yany Mallory APRN        sodium chloride 0.9 % flush 10 mL  10 mL Intravenous PRN Yany Mallory APRN        sodium chloride 0.9 % infusion 40 mL  40 mL Intravenous PRN Yany Mallory APRN        sodium chloride 0.9 % infusion  100 mL/hr Intravenous Continuous Yany Mallory APRN         Current Outpatient Medications Ordered in Epic   Medication Sig Dispense Refill    albuterol sulfate  (90 Base) MCG/ACT inhaler Inhale 2 puffs Every 6 (Six) Hours As Needed for Shortness of Air (shortness of breath). 8 g 3    apixaban (Eliquis) 2.5 MG tablet tablet Take 1 tablet by mouth Every 12 (Twelve) Hours. 60 tablet 5    fenofibrate (TRICOR) 145 MG tablet Take 1 tablet by mouth Daily. 30 tablet 5    finasteride (PROSCAR) 5 MG tablet TAKE 1/4 TABLET BY MOUTH EVERY DAY      levothyroxine (SYNTHROID, LEVOTHROID) 25 MCG tablet Take 1 tablet by mouth Daily. 90 tablet 2    metFORMIN ER (GLUCOPHAGE-XR) 500 MG 24 hr tablet Take 2 tablets by mouth Daily. 180 tablet 0    minoxidil (LONITEN) 2.5 MG tablet Take 0.5 tablets by mouth Daily.      omeprazole (priLOSEC) 20 MG capsule Take 1 capsule by mouth Daily. 30 capsule 3    ondansetron (Zofran) 4 MG tablet Take 1 tablet by mouth Every 12 (Twelve) Hours As Needed for Nausea or Vomiting. 30 tablet 0    ondansetron (Zofran) 4 MG tablet Take 1 tablet by mouth Every 8 (Eight) Hours As Needed for Nausea or Vomiting. 30 tablet 1     pravastatin (PRAVACHOL) 10 MG tablet TAKE 1 TABLET BY MOUTH DAILY 90 tablet 0    Semaglutide (Rybelsus) 7 MG tablet Take 7 mg by mouth Daily. Please take only Rybelsus 3 mg or Rybelsus 7 mg once daily.  Take only 1 dose at a time that is no more than 1 pill/day. 30 tablet 3         PROGRESS, DATA ANALYSIS, CONSULTS, AND MEDICAL DECISION MAKING  ORDERS PLACED DURING THIS VISIT:  Orders Placed This Encounter   Procedures    CT Abdomen Pelvis Without Contrast    US Pelvis Complete    CT Abdomen Pelvis With Contrast    Comprehensive Metabolic Panel    Urinalysis With Culture If Indicated - Urine, Clean Catch    CBC Auto Differential    Lactic Acid, Plasma    CBC Auto Differential    Comprehensive Metabolic Panel    Magnesium    Protime-INR    NPO Diet NPO Type: Strict NPO    Vital Signs    Intake & Output    Weigh Patient    Oral Care    Place Sequential Compression Device    Maintain Sequential Compression Device    Telemetry - Maintain IV Access    Telemetry - Place Orders & Notify Provider of Results When Patient Experiences Acute Chest Pain, Dysrhythmia or Respiratory Distress    May Be Off Telemetry for Tests    Up With Assistance    Code Status and Medical Interventions:    Inpatient General Surgery Consult    [facility] (on-call MD unless specified) Details    Inpatient General Surgery Consult    POC Glucose 4x Daily Before Meals & at Bedtime    Insert Peripheral IV    Initiate Observation Status    CBC & Differential       All labs have been independently interpreted by me.  All radiology studies have been reviewed by me. All EKG's have been independently viewed and interpreted by me.  Discussion below represents my analysis of pertinent findings related to patient's condition, differential diagnosis, treatment plan and final disposition.    Differential diagnosis includes but is not limited to:   My differential diagnosis for abdominal pain includes but is not limited to:  Gastritis, gastroenteritis, peptic ulcer  disease, GERD, esophageal perforation, acute appendicitis, mesenteric adenitis, Meckel’s diverticulum, epiploic appendagitis, diverticulitis, colon cancer, ulcerative colitis, Crohn’s disease, intussusception, small bowel obstruction, adhesions, ischemic bowel, perforated viscus, ileus, obstipation, biliary colic, cholecystitis, cholelithiasis, Mike-Juan Keaton, hepatitis, pancreatitis, common bile duct obstruction, cholangitis, bile leak, splenic trauma, splenic rupture, splenic infarction, splenic abscess, abdominal abscess, ascites, spontaneous bacterial peritonitis, hernia, UTI, cystitis, prostatitis, ureterolithiasis, urinary obstruction, ovarian cyst, torsion, pregnancy, ectopic pregnancy, PID, pelvic abscess, mittelschmerz, endometriosis, AAA, myocardial infarction, pneumonia, cancer, porphyria, DKA, medications, sickle cell, viral syndrome, zoster      ED Course:  ED Course as of 02/13/24 0503   Tue Feb 13, 2024   0220 WBC(!): 12.87  Urinalysis negative for acute infection [CC]   0222 CT Abdomen Pelvis Without Contrast  My independent interpretation of the CT of the abdomen pelvis is inflammatory changes of the right lower quadrant [CC]   0340 Spoke with radiologist who reports patient has appendicitis and is concerned she also may have acute diverticulitis with possible contained perforation.  Patient has no left lower quadrant abdominal pain on my exam.  Updated her on results and surgery was paged. [CC]   0352 Spoke with with general surgery on call.  He agrees with plan to admission to the hospitalist as he will not operate this morning as the patient is anticoagulated. [CC]   0405 Spoke with MARLIN Slade with [facility].  Reviewed history, exam, results, treatments.  She agrees admit the patient to Dr. Hernandez.     [CC]      ED Course User Index  [CC] Victorina Mendoza PA-C           AS OF 05:03 EST VITALS:    BP - 154/89  HR - 92  TEMP - 97.6 °F (36.4 °C) (Tympanic)  O2 SATS - 98%    MDM:  Patient is a  53-year-old female who presents emergency department today with right lower quadrant abdominal pain.  On arrival here in the emergency department, vitals reassuring, she is afebrile.  On my exam the patient is nurse to palpation the right lower quadrant of the abdomen with localized peritonitis.  Patient was evaluated with labs which revealed a leukocytosis.  Labs otherwise reassuring.  She was subsequently evaluated with a CT of the abdomen pelvis which reveals acute appendicitis and also findings concerning for acute diverticulitis with possible contained perforation.  Patient has no left lower quadrant tenderness.  General surgery was consulted and agreed with plan for IV Zosyn.  They do not have plans to operate this morning as the patient is anticoagulated on Eliquis.  Last p.o. intake was at 4:00 yesterday.  Patient will require admission to the hospitalist service for management of her chronic illnesses and  general surgery will see in consultation.  She is stable at time of admission.          DIAGNOSIS  Final diagnoses:   Acute appendicitis with localized peritonitis, without perforation, abscess, or gangrene   Acute diverticulitis         DISPOSITION  ED Disposition       ED Disposition   Decision to Admit    Condition   --    Comment   Level of Care: Telemetry [5]   Diagnosis: Acute appendicitis [374630]   Admitting Physician: BRADLEY GOMEZ [[id number]]   Attending Physician: BRADLEY GOMEZ [[id number]]                      Please note that portions of this document were completed with a voice recognition program.    Note Disclaimer: At [facility], we believe that sharing information builds trust and better relationships. You are receiving this note because you recently visited [facility]. It is possible you will see health information before a provider has talked with you about it. This kind of information can be easy to misunderstand. To help you fully understand what it means for your health, we urge  you to discuss this note with your provider.     Victorina Mendoza PA-C  02/13/24 0505      Electronically signed by Jose Foster MD at 02/13/24 0707       Adriana Mello, RN at 02/13/24 0049          Pt to ED via pv w/ c/o right flank pain that radiates to abdomen x 2 days. Pt was seen at [facility] a week ago, dx UTI, given abx. Pt states right flank pain is new. Denies hematuria.     Electronically signed by Adriana Mello, RN at 02/13/24 0051          Operative/Procedure Notes (all)        Jamee Panda MD at 02/14/24 0945  Version 1 of 1         PREOPERATIVE DIAGNOSIS:  Acute Appendicitis  POSTOPERATIVE DIAGNOSIS:  Acute suppurative appendicitis   PROCEDURE:  Laparoscopic Appendectomy  SURGEON/STAFF: Jamee Panda MD  ASSISTANT:  Assistant: Raeann Boyle RNFA was responsible for performing the following activities: Retraction, Suturing, Closing, Placing Dressing, and Held/Positioned Camera and their skilled assistance was necessary for the success of this case.  ANESTHESIA:  General  BLOOD LOSS: Minimal  COUNTS:  Needle and sponge count correct.  SPECIMENS:  Appendix    Findings: acute appendicitis with appendix along the right paracolic gutter with surrounding pus and adherence to terminal ileum and cecum, with pus in the pelvis  Incidental endometriosis of the anterior abdominal wall and pelvis  Inflamed appearing left ovary and fallopian tube in proximity to appendix  No obvious inflammation in the LLQ     INDICATIONS FOR OPERATION:  Li Kimball is a 53 y.o. year old female who presented to to the ED for evaluation of abdominal pain.  The patient was found to have acute appendicitis on imaging.  The patient was examined and was found to have findings consistent with acute appendicitis.  I discussed with the patient about treatment options that would include conservative management with antibiotics versus laparoscopic appendectomy.  Risk and benefits of both approaches were  discussed in detail with the patient including the possibility failure of antibiotic only treatment versus the risk of bleeding, infection, abscess formation, intra-abdominal injuries, stump leak and the risk of anesthesia. After considering risk and benefits the patient has decided to proceed with laparoscopic appendectomy.    OPERATION:  The patient was brought to the operating room in stable condition.   Preoperative antibiotics were given and sequential compression devices were applied.  At this time, the patient was laid supine on the operating room table.  General anesthesia was induced by the Anesthesia service without difficulty. A meza catheter was placed by the nursing staff.  The patient's abdomen was prepped and draped in the usual sterile fashion.      The patient's abdomen was accessed at the level of the left upper quadrant via Optiview technique. Local anesthetic was injected and a 5mm incision made through which the 5mm optiview trocar was advance through all layers of the abdominal wall. The abdomen was insufflated and inspected, and there was no injury from entry.   The operation was continued by insertion of 2 additional trocars, one 5mm in the suprapubic region and one 12mm in the left lower quadrant, both placed after local under direct visualization.     The appendix was evaluated and found in the right paracolic gutter. It was extremely inflamed with pus surrounding it, but no evidence of stool spillage. There was adhesion to the terminal ileum and cecum and lateral abdominal wall.  There was purulent fluid in the pelvis.  The retroappendiceal window was created at the base of the appendix using [place]. Two firings of a [place] Laparoscopic stapler with a blue load were used to transect the appendix at its base. Here there was viable tissue.  Then the Harmonic device was used to transect the mesentery of the appendix    The appendix and an additional piece of the base were then removed  through the LLQ port site with the aid of an endo catch bag.    Next, the abdomen was suctioned and judiciously irrigated.  The field had no evidence of injury, staple line leak, or bleeding.  All trocars were then removed under direct vision.  The left lower quadrant fascia was closed with 0 Vicryl suture via endo pass instrument.  All skin incisions were closed with 4-0 Monocryl and surgical glue.      All counts were correct at the end of the case. The patient tolerated the procedure without complication. She was extubated in the recovery room in stable condition.       Jamee Panda MD  General, Minimally Invasive and Endoscopic Surgery  [facility]    [address], [address]  [place], [place], [address]  P: [phone]  F: [phone]       Electronically signed by Jamee Panda MD at 02/14/24 1037          Physician Progress Notes (all)        Jamee Panda MD at 02/14/24 1052          Postop lap appy. Recommend continued IV antibiotics for appendicitis with purulent peritonitis.  Ok to restart Eliquis 24h postop. May trial clear liquids. Monitor abdominal exam.    Jamee Panda M.D.  General, Robotic, and Endoscopic Surgery  [facility]    [address], [address]  [place], [place], [address]  P: [phone]  F: [phone]       Electronically signed by Jamee Panda MD at 02/14/24 1053          Consult Notes (all)        Jamee Panda MD at 02/13/24 2056        Consult Orders    1. Inpatient General Surgery Consult [[id number]] ordered by Yany Mallory APRN at 02/13/24 0411    2. Inpatient General Surgery Consult [[id number]] ordered by Victorina Mendoza PA-C at 02/13/24 0341                 General Surgery Consultation    Consulting Physician: Jamee Panda MD  Referring:  Yany Mallory APRN    Reason for consultation:   Acute appendicitis, colitis possible perforation    CC:   Abdominal pain    HPI:   The patient is a 53 y.o.  female who presented to the hospital with right lower quadrant abdominal pain. She states her pain started a couple of days ago. She has had nausea but no vomiting. She has had decreased appetite. She reports feeling hot/cold chills. She underwent evaluation in the ER and was noted to have CT evidence of acute appendicitis as well as sigmoid diverticulitis. She states she has had diverticulitis before. She denies any changes to her bowel function, stating she had a normal bowel movement before arriving to the hospital. Denies any blood in her stool.  She has had multiple colonoscopies in the past, believes her last one was 3 years ago and was told to return for follow up colonscopy in 5 years. She has a possible history of colon cancer in her grandmother. 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 She is unsure of prior hypercoag workup.  She states her blood clots were provoked by ankle surgery in the past. She states her last dose of Eliquis was yesterday evening at 9pm.     Past Medical History:  Morbid obesity BMI 37 - on rybelsus and metformin for weight loss, denies history of T2DM   Past Medical History:   Diagnosis Date    Asthma     BPPV (benign paroxysmal positional vertigo)     Diverticulitis     DVT (deep venous thrombosis)     Right    Hypothyroid     Kidney stones     Migraines     Mixed hyperlipidemia     Slow to wake up after anesthesia     Weight loss      Past Surgical History:  Tubal ligation  Denies other abdominal surgeries  Past Surgical History:   Procedure Laterality Date    ANKLE SURGERY Right 2015    Removal of hardware    COLONOSCOPY N/A 08/2020    COLONOSCOPY N/A 9/17/2020    Procedure: COLONOSCOPY to cecum with 20mm colonic balloon dilation;  Surgeon: Walter Verdin MD;  Location: [facility] ENDOSCOPY;  Service: General;  Laterality: N/A;  pre - diverticulitis induced stricture   post - colonic stricture    COLONOSCOPY N/A 10/28/2021     Procedure: COLONOSCOPY into cecum with bx;  Surgeon: Walter Verdin MD;  Location: [facility] ENDOSCOPY;  Service: General;  Laterality: N/A;  pre: hx of colonic stricture   post: diverticulosis     CYSTOSCOPY W/ URETERAL STENT PLACEMENT Right 7/1/2022    Procedure: CYSTOSCOPY, RIGHT URETEROSCOPY RIGHT STENT PLACEMENT, STONE BASKET EXTRACTION, LASER LITHOTRIPSY;  Surgeon: Cyrus Au MD;  Location: [facility] MAIN OR;  Service: Urology;  Laterality: Right;    ENDOMETRIAL ABLATION      FOOT SURGERY Left 2006    Fracture , postop DVT and PE    JOINT REPLACEMENT      ankle, s/p two surgery    LAPAROSCOPIC TUBAL LIGATION      ORIF ANKLE FRACTURE Right 2014    DVT and PE perioperatively    TUBAL ABDOMINAL LIGATION      VENA CAVA FILTER INSERTION  2014       Medications:  Medications Prior to Admission   Medication Sig Dispense Refill Last Dose    apixaban (Eliquis) 2.5 MG tablet tablet Take 1 tablet by mouth Every 12 (Twelve) Hours. 60 tablet 5 2/12/2024    fenofibrate (TRICOR) 145 MG tablet Take 1 tablet by mouth Daily. 30 tablet 5 2/12/2024    finasteride (PROSCAR) 5 MG tablet TAKE 1/4 TABLET BY MOUTH EVERY DAY   2/12/2024    levothyroxine (SYNTHROID, LEVOTHROID) 25 MCG tablet Take 1 tablet by mouth Daily. 90 tablet 2 2/12/2024    metFORMIN ER (GLUCOPHAGE-XR) 500 MG 24 hr tablet Take 2 tablets by mouth Daily. (Patient taking differently: Take 1 tablet by mouth Daily.) 180 tablet 0 2/12/2024    minoxidil (LONITEN) 2.5 MG tablet Take 0.5 tablets by mouth Daily.   2/12/2024    omeprazole (priLOSEC) 20 MG capsule Take 1 capsule by mouth Daily. (Patient taking differently: Take 1 capsule by mouth Daily As Needed.) 30 capsule 3 Past Month    ondansetron (Zofran) 4 MG tablet Take 1 tablet by mouth Every 8 (Eight) Hours As Needed for Nausea or Vomiting. 30 tablet 1 Past Week    Semaglutide (Rybelsus) 7 MG tablet Take 7 mg by mouth Daily. Please take only Rybelsus 3 mg or Rybelsus 7 mg once daily.  Take only 1 dose at a  time that is no more than 1 pill/day. 30 tablet 3 2/12/2024    albuterol sulfate  (90 Base) MCG/ACT inhaler Inhale 2 puffs Every 6 (Six) Hours As Needed for Shortness of Air (shortness of breath). 8 g 3     ondansetron (Zofran) 4 MG tablet Take 1 tablet by mouth Every 12 (Twelve) Hours As Needed for Nausea or Vomiting. 30 tablet 0     pravastatin (PRAVACHOL) 10 MG tablet TAKE 1 TABLET BY MOUTH DAILY 90 tablet 0        Current Facility-Administered Medications:     acetaminophen (TYLENOL) tablet 650 mg, 650 mg, Oral, Q4H PRN, 650 mg at 02/13/24 2043 **OR** acetaminophen (TYLENOL) 160 MG/5ML oral solution 650 mg, 650 mg, Oral, Q4H PRN **OR** acetaminophen (TYLENOL) suppository 650 mg, 650 mg, Rectal, Q4H PRN, Yany Mallory APRN    sennosides-docusate (PERICOLACE) 8.6-50 MG per tablet 2 tablet, 2 tablet, Oral, BID PRN **AND** polyethylene glycol (MIRALAX) packet 17 g, 17 g, Oral, Daily PRN **AND** bisacodyl (DULCOLAX) EC tablet 5 mg, 5 mg, Oral, Daily PRN **AND** bisacodyl (DULCOLAX) suppository 10 mg, 10 mg, Rectal, Daily PRN, Yany Mallory APRN    dextrose (D50W) (25 g/50 mL) IV injection 25 g, 25 g, Intravenous, Q15 Min PRN, Yany Mallory APRN    dextrose (GLUTOSE) oral gel 15 g, 15 g, Oral, Q15 Min PRN, Yany Mallory APRN    Enoxaparin Sodium (LOVENOX) syringe 40 mg, 40 mg, Subcutaneous, Nightly, Donovan Sosa MD, 40 mg at 02/13/24 2046    glucagon (GLUCAGEN) injection 1 mg, 1 mg, Intramuscular, Q15 Min PRN, Yany Mallory APRN    HYDROmorphone (DILAUDID) injection 1 mg, 1 mg, Intravenous, Q4H PRN, Donovan Sosa MD, 1 mg at 02/13/24 1622    insulin regular (humuLIN R,novoLIN R) injection 2-7 Units, 2-7 Units, Subcutaneous, Q6H, Yany Mallory APRN    nitroglycerin (NITROSTAT) SL tablet 0.4 mg, 0.4 mg, Sublingual, Q5 Min PRN, Yany Mallory APRN    ondansetron (ZOFRAN) injection 4 mg, 4 mg, Intravenous, Q6H PRN, Donovan Sosa MD, 4 mg at  02/13/24 1622    piperacillin-tazobactam (ZOSYN) 3.375 g in iso-osmotic dextrose 50 ml (premix), 3.375 g, Intravenous, Q8H, Yany Mallory, APRN, Last Rate: 0 mL/hr at 02/13/24 1547, 3.375 g at 02/13/24 2021    sodium chloride 0.9 % flush 10 mL, 10 mL, Intravenous, Q12H, MohamudSaeedie M, APRN, 10 mL at 02/13/24 2021    sodium chloride 0.9 % flush 10 mL, 10 mL, Intravenous, PRN, Mohamud, Yany M, APRN    sodium chloride 0.9 % infusion 40 mL, 40 mL, Intravenous, PRN, Mohamud, Yany M, APRN    sodium chloride 0.9 % infusion, 100 mL/hr, Intravenous, Continuous, Mohamud, Yany M, APRN, Last Rate: 100 mL/hr at 02/13/24 2046, 100 mL/hr at 02/13/24 2046    Allergies:   Allergies   Allergen Reactions    Codeine Nausea And Vomiting       Social History:   Denies tobacco, alcohol, or recreational drug use  Social History     Socioeconomic History    Marital status:    Tobacco Use    Smoking status: Never     Passive exposure: Never    Smokeless tobacco: Never   Vaping Use    Vaping Use: Never used   Substance and Sexual Activity    Alcohol use: No    Drug use: No    Sexual activity: Defer       Family History:   Grandmother may have had colon cancer  Family History   Problem Relation Age of Onset    Other Mother         Vertigo    Hyperlipidemia Mother     Cancer Father         hodgkins disease    Heart disease Brother     Malig Hyperthermia Neg Hx        Review of Systems:  Constitutional: denies any weight changes, fatigue, or weakness  Cardiovascular: denies chest pain or palpitations   Respiratory: denies cough or shortness of breath  Gastrointestinal:  + abdominal pain, +nausea, denies vomiting, diarrhea, constipation, melena, hematochezia  Genitourinary: denies dysuria or hematuria      Physical Exam:   Vitals:    02/13/24 1950   BP: 156/94   Pulse: 98   Resp: 18   Temp: 98.8 °F (37.1 °C)   SpO2: 97%     GENERAL: alert, interactive, cooperative  HEENT: no scleral icterus; moist mucous  membranes  NECK: Supple  RESPIRATORY: normal work of breathing on room air  CARDIOVASCULAR: regular rate  GASTROINTESTINAL: abdomen obese with panniculus, abd soft, tender in the RLQ with focal guarding, no rebound, there is no tenderness in the left abdomen or suprapubic region  MUSCULOSKELETAL: no cyanosis or edema   NEUROLOGIC: alert and oriented, normal speech, no gross focal deficits   SKIN: warm, no rash, no jaundice      Diagnostic workup:     Pertinent labs:   Results from last 7 days   Lab Units 02/13/24  0737 02/13/24  0134   WBC 10*3/mm3 14.76* 12.87*   HEMOGLOBIN g/dL 11.2* 11.7*   HEMATOCRIT % 34.4 36.2   PLATELETS 10*3/mm3 388 423     Results from last 7 days   Lab Units 02/13/24  0737 02/13/24  0134   SODIUM mmol/L 138 137   POTASSIUM mmol/L 3.7 4.0   CHLORIDE mmol/L 104 107   CO2 mmol/L 24.0 21.0*   BUN mg/dL 18 22*   CREATININE mg/dL 0.90 0.99   CALCIUM mg/dL 9.5 9.9   BILIRUBIN mg/dL 0.4 0.2   ALK PHOS U/L 48 46   ALT (SGPT) U/L 13 15   AST (SGOT) U/L 13 14   GLUCOSE mg/dL 118* 129*       Imaging:  CT abd/pelvis images and report independently reviewed; there is evidence of appendicitis as well as sigmoid diverticulitis    Assessment and plan:   The patient is a 53 y.o. female with morbid obesity (BMI 37), history of DVT/PE on Eliquis with IVCF in place, presenting with acute appendicitis and sigmoid diverticulitis    I discussed with the patient the risks and benefits of nonoperative management with IV antibiotics and drainage for development of abscess and interval appendectomy vs. immediate laparoscopic appendectomy. Risks, benefits, alternatives, and postoperative expectations were discussed. Risks include and are not limited to bleeding; ongoing infection requiring antibiotics and drainage procedures; injury to surrounding structures including colon, small bowel, ureter, other intraabdominal organs; risk of need for ileocectomy or ostomy. I explained that postoperatively, most patients  undergoing laparoscopic appendectomy are able to be discharged unless there is requirement for open appendectomy, concern for need for ongoing IV antibiotic therapy, or in her case, for ongoing monitoring to ensure resolution of her sigmoid diverticulitis noted on CT (notably, she is nontender on the left/over the pelvis and I think her symptoms are all related to her appendix). All questions were answered.     At this time patient wishes to proceed with laparoscopic appendectomy.  Will plan for surgery in the morning.  Continue zosyn.  Continue to hold Eliquis.      Jamee Panda M.D.  General, Robotic, and Endoscopic Surgery  [facility]    [address], [address]  [place], [place], [address]  P: [phone]  F: [phone]       Electronically signed by Jamee Panda MD at 02/13/24 0627

## 2024-02-14 NOTE — OP NOTE
PREOPERATIVE DIAGNOSIS:  Acute Appendicitis  POSTOPERATIVE DIAGNOSIS:  Acute suppurative appendicitis   PROCEDURE:  Laparoscopic Appendectomy  SURGEON/STAFF: Jamee Panda MD  ASSISTANT:  Assistant: Raeann Boyle RNFA was responsible for performing the following activities: Retraction, Suturing, Closing, Placing Dressing, and Held/Positioned Camera and their skilled assistance was necessary for the success of this case.  ANESTHESIA:  General  BLOOD LOSS: Minimal  COUNTS:  Needle and sponge count correct.  SPECIMENS:  Appendix    Findings: acute appendicitis with appendix along the right paracolic gutter with surrounding pus and adherence to terminal ileum and cecum, with pus in the pelvis  Incidental endometriosis of the anterior abdominal wall and pelvis  Inflamed appearing left ovary and fallopian tube in proximity to appendix  No obvious inflammation in the LLQ     INDICATIONS FOR OPERATION:  Li Kimball is a 53 y.o. year old female who presented to to the ED for evaluation of abdominal pain.  The patient was found to have acute appendicitis on imaging.  The patient was examined and was found to have findings consistent with acute appendicitis.  I discussed with the patient about treatment options that would include conservative management with antibiotics versus laparoscopic appendectomy.  Risk and benefits of both approaches were discussed in detail with the patient including the possibility failure of antibiotic only treatment versus the risk of bleeding, infection, abscess formation, intra-abdominal injuries, stump leak and the risk of anesthesia. After considering risk and benefits the patient has decided to proceed with laparoscopic appendectomy.    OPERATION:  The patient was brought to the operating room in stable condition.   Preoperative antibiotics were given and sequential compression devices were applied.  At this time, the patient was laid supine on the operating room table.  General  anesthesia was induced by the Anesthesia service without difficulty. A meza catheter was placed by the nursing staff.  The patient's abdomen was prepped and draped in the usual sterile fashion.      The patient's abdomen was accessed at the level of the left upper quadrant via Optiview technique. Local anesthetic was injected and a 5mm incision made through which the 5mm optiview trocar was advance through all layers of the abdominal wall. The abdomen was insufflated and inspected, and there was no injury from entry.   The operation was continued by insertion of 2 additional trocars, one 5mm in the suprapubic region and one 12mm in the left lower quadrant, both placed after local under direct visualization.     The appendix was evaluated and found in the right paracolic gutter. It was extremely inflamed with pus surrounding it, but no evidence of stool spillage. There was adhesion to the terminal ileum and cecum and lateral abdominal wall.  There was purulent fluid in the pelvis.  The retroappendiceal window was created at the base of the appendix using [place]. Two firings of a [place] Laparoscopic stapler with a blue load were used to transect the appendix at its base. Here there was viable tissue.  Then the Harmonic device was used to transect the mesentery of the appendix    The appendix and an additional piece of the base were then removed through the LLQ port site with the aid of an endo catch bag.    Next, the abdomen was suctioned and judiciously irrigated.  The field had no evidence of injury, staple line leak, or bleeding.  All trocars were then removed under direct vision.  The left lower quadrant fascia was closed with 0 Vicryl suture via endo pass instrument.  All skin incisions were closed with 4-0 Monocryl and surgical glue.      All counts were correct at the end of the case. The patient tolerated the procedure without complication. She was extubated in the recovery room in stable condition.        Jamee Panda MD  General, Minimally Invasive and Endoscopic Surgery  [facility]    [address], [address]  [place], [place], [address]  P: [phone]  F: [phone]

## 2024-02-15 LAB
ANION GAP SERPL CALCULATED.3IONS-SCNC: 12.9 MMOL/L (ref 5–15)
BASOPHILS # BLD AUTO: 0.03 10*3/MM3 (ref 0–0.2)
BASOPHILS NFR BLD AUTO: 0.2 % (ref 0–1.5)
BUN SERPL-MCNC: 19 MG/DL (ref 6–20)
BUN/CREAT SERPL: 20.9 (ref 7–25)
CALCIUM SPEC-SCNC: 8.2 MG/DL (ref 8.6–10.5)
CHLORIDE SERPL-SCNC: 104 MMOL/L (ref 98–107)
CO2 SERPL-SCNC: 22.1 MMOL/L (ref 22–29)
CREAT SERPL-MCNC: 0.91 MG/DL (ref 0.57–1)
DEPRECATED RDW RBC AUTO: 40.3 FL (ref 37–54)
EGFRCR SERPLBLD CKD-EPI 2021: 75.6 ML/MIN/1.73
EOSINOPHIL # BLD AUTO: 0.03 10*3/MM3 (ref 0–0.4)
EOSINOPHIL NFR BLD AUTO: 0.2 % (ref 0.3–6.2)
ERYTHROCYTE [DISTWIDTH] IN BLOOD BY AUTOMATED COUNT: 13.1 % (ref 12.3–15.4)
GLUCOSE BLDC GLUCOMTR-MCNC: 105 MG/DL (ref 70–130)
GLUCOSE BLDC GLUCOMTR-MCNC: 120 MG/DL (ref 70–130)
GLUCOSE BLDC GLUCOMTR-MCNC: 85 MG/DL (ref 70–130)
GLUCOSE BLDC GLUCOMTR-MCNC: 91 MG/DL (ref 70–130)
GLUCOSE BLDC GLUCOMTR-MCNC: 93 MG/DL (ref 70–130)
GLUCOSE SERPL-MCNC: 114 MG/DL (ref 65–99)
HBA1C MFR BLD: 6.2 % (ref 4.8–5.6)
HCT VFR BLD AUTO: 32.4 % (ref 34–46.6)
HGB BLD-MCNC: 10.7 G/DL (ref 12–15.9)
IMM GRANULOCYTES # BLD AUTO: 0.15 10*3/MM3 (ref 0–0.05)
IMM GRANULOCYTES NFR BLD AUTO: 1.1 % (ref 0–0.5)
LAB AP CASE REPORT: NORMAL
LYMPHOCYTES # BLD AUTO: 1.18 10*3/MM3 (ref 0.7–3.1)
LYMPHOCYTES NFR BLD AUTO: 8.5 % (ref 19.6–45.3)
MCH RBC QN AUTO: 27.7 PG (ref 26.6–33)
MCHC RBC AUTO-ENTMCNC: 33 G/DL (ref 31.5–35.7)
MCV RBC AUTO: 83.9 FL (ref 79–97)
MONOCYTES # BLD AUTO: 0.38 10*3/MM3 (ref 0.1–0.9)
MONOCYTES NFR BLD AUTO: 2.7 % (ref 5–12)
NEUTROPHILS NFR BLD AUTO: 12.07 10*3/MM3 (ref 1.7–7)
NEUTROPHILS NFR BLD AUTO: 87.3 % (ref 42.7–76)
NRBC BLD AUTO-RTO: 0 /100 WBC (ref 0–0.2)
PATH REPORT.FINAL DX SPEC: NORMAL
PATH REPORT.GROSS SPEC: NORMAL
PLATELET # BLD AUTO: 356 10*3/MM3 (ref 140–450)
PMV BLD AUTO: 9.2 FL (ref 6–12)
POTASSIUM SERPL-SCNC: 3.1 MMOL/L (ref 3.5–5.2)
RBC # BLD AUTO: 3.86 10*6/MM3 (ref 3.77–5.28)
SODIUM SERPL-SCNC: 139 MMOL/L (ref 136–145)
WBC NRBC COR # BLD AUTO: 13.84 10*3/MM3 (ref 3.4–10.8)

## 2024-02-15 PROCEDURE — 25010000002 PROCHLORPERAZINE 10 MG/2ML SOLUTION: Performed by: INTERNAL MEDICINE

## 2024-02-15 PROCEDURE — 83036 HEMOGLOBIN GLYCOSYLATED A1C: CPT | Performed by: INTERNAL MEDICINE

## 2024-02-15 PROCEDURE — 25010000002 ENOXAPARIN PER 10 MG: Performed by: INTERNAL MEDICINE

## 2024-02-15 PROCEDURE — 80048 BASIC METABOLIC PNL TOTAL CA: CPT | Performed by: STUDENT IN AN ORGANIZED HEALTH CARE EDUCATION/TRAINING PROGRAM

## 2024-02-15 PROCEDURE — 25010000002 PIPERACILLIN SOD-TAZOBACTAM PER 1 G: Performed by: STUDENT IN AN ORGANIZED HEALTH CARE EDUCATION/TRAINING PROGRAM

## 2024-02-15 PROCEDURE — 99024 POSTOP FOLLOW-UP VISIT: CPT | Performed by: STUDENT IN AN ORGANIZED HEALTH CARE EDUCATION/TRAINING PROGRAM

## 2024-02-15 PROCEDURE — 82948 REAGENT STRIP/BLOOD GLUCOSE: CPT

## 2024-02-15 PROCEDURE — 25010000002 HYDROMORPHONE 1 MG/ML SOLUTION: Performed by: STUDENT IN AN ORGANIZED HEALTH CARE EDUCATION/TRAINING PROGRAM

## 2024-02-15 PROCEDURE — 25010000002 ONDANSETRON PER 1 MG: Performed by: STUDENT IN AN ORGANIZED HEALTH CARE EDUCATION/TRAINING PROGRAM

## 2024-02-15 PROCEDURE — 85025 COMPLETE CBC W/AUTO DIFF WBC: CPT | Performed by: STUDENT IN AN ORGANIZED HEALTH CARE EDUCATION/TRAINING PROGRAM

## 2024-02-15 RX ORDER — PROCHLORPERAZINE EDISYLATE 5 MG/ML
10 INJECTION INTRAMUSCULAR; INTRAVENOUS EVERY 4 HOURS PRN
Status: DISCONTINUED | OUTPATIENT
Start: 2024-02-15 | End: 2024-02-19 | Stop reason: HOSPADM

## 2024-02-15 RX ORDER — POTASSIUM CHLORIDE 750 MG/1
40 TABLET, FILM COATED, EXTENDED RELEASE ORAL EVERY 4 HOURS
Status: COMPLETED | OUTPATIENT
Start: 2024-02-15 | End: 2024-02-15

## 2024-02-15 RX ORDER — ENOXAPARIN SODIUM 100 MG/ML
40 INJECTION SUBCUTANEOUS NIGHTLY
Status: DISCONTINUED | OUTPATIENT
Start: 2024-02-15 | End: 2024-02-16

## 2024-02-15 RX ADMIN — POTASSIUM CHLORIDE 40 MEQ: 750 TABLET, EXTENDED RELEASE ORAL at 12:01

## 2024-02-15 RX ADMIN — PROCHLORPERAZINE EDISYLATE 10 MG: 5 INJECTION INTRAMUSCULAR; INTRAVENOUS at 23:41

## 2024-02-15 RX ADMIN — PIPERACILLIN SODIUM AND TAZOBACTAM SODIUM 3.38 G: 3; .375 INJECTION, SOLUTION INTRAVENOUS at 11:07

## 2024-02-15 RX ADMIN — PIPERACILLIN SODIUM AND TAZOBACTAM SODIUM 3.38 G: 3; .375 INJECTION, SOLUTION INTRAVENOUS at 20:06

## 2024-02-15 RX ADMIN — DOCUSATE SODIUM 50MG AND SENNOSIDES 8.6MG 2 TABLET: 8.6; 5 TABLET, FILM COATED ORAL at 17:52

## 2024-02-15 RX ADMIN — OXYCODONE HYDROCHLORIDE 10 MG: 5 TABLET ORAL at 04:44

## 2024-02-15 RX ADMIN — HYDROMORPHONE HYDROCHLORIDE 1 MG: 1 INJECTION, SOLUTION INTRAMUSCULAR; INTRAVENOUS; SUBCUTANEOUS at 13:57

## 2024-02-15 RX ADMIN — POTASSIUM CHLORIDE 40 MEQ: 750 TABLET, EXTENDED RELEASE ORAL at 20:07

## 2024-02-15 RX ADMIN — Medication 10 ML: at 08:04

## 2024-02-15 RX ADMIN — OXYCODONE HYDROCHLORIDE 5 MG: 5 TABLET ORAL at 23:41

## 2024-02-15 RX ADMIN — OXYCODONE HYDROCHLORIDE 10 MG: 5 TABLET ORAL at 11:06

## 2024-02-15 RX ADMIN — LEVOTHYROXINE SODIUM 25 MCG: 25 TABLET ORAL at 06:58

## 2024-02-15 RX ADMIN — PROCHLORPERAZINE EDISYLATE 10 MG: 5 INJECTION INTRAMUSCULAR; INTRAVENOUS at 17:51

## 2024-02-15 RX ADMIN — MINOXIDIL 1.25 MG: 2.5 TABLET ORAL at 08:04

## 2024-02-15 RX ADMIN — ENOXAPARIN SODIUM 40 MG: 100 INJECTION SUBCUTANEOUS at 20:07

## 2024-02-15 RX ADMIN — ONDANSETRON 4 MG: 2 INJECTION INTRAMUSCULAR; INTRAVENOUS at 13:57

## 2024-02-15 RX ADMIN — PIPERACILLIN SODIUM AND TAZOBACTAM SODIUM 3.38 G: 3; .375 INJECTION, SOLUTION INTRAVENOUS at 04:40

## 2024-02-15 RX ADMIN — ONDANSETRON 4 MG: 2 INJECTION INTRAMUSCULAR; INTRAVENOUS at 04:44

## 2024-02-15 RX ADMIN — POTASSIUM CHLORIDE 40 MEQ: 750 TABLET, EXTENDED RELEASE ORAL at 16:13

## 2024-02-15 NOTE — PROGRESS NOTES
"General Surgery Progress Note    POD1 laparoscopic appendectomy    S: Patient states she is feeling somewhat better today.  She is sore but her right lower quadrant pain is much improved.  She has had some nausea but no vomiting and is tolerating her clear liquids.  She has been passing flatus but denies bowel movement.    O:/65 (BP Location: Left arm, Patient Position: Lying)   Pulse 88   Temp 97.3 °F (36.3 °C) (Oral)   Resp 18   Ht 160 cm (63\")   Wt 95.7 kg (211 lb)   LMP 10/20/2021 Comment: neg hcg 2/14/2024  SpO2 95%   BMI 37.38 kg/m²     Intake & Output (last day)         02/14 0701  02/15 0700 02/15 0701  02/16 0700    P.O. 240     I.V. (mL/kg) 500 (5.2)     Total Intake(mL/kg) 740 (7.7)     Urine (mL/kg/hr) 400 (0.2)     Total Output 400     Net +340           Urine Unmeasured Occurrence 2 x             GENERAL: awake, alert, comfortable appearing lying in bed  HEENT: EOMI, clear sclera, moist mucus membranes   CHEST: normal work of breathing on room air  ABDOMEN: Obese, soft, tender around surgical incisions which are clean and dry with skin glue in place, right lower quadrant tenderness is diminished from prior exam, there is no rebound or guarding  EXTREMITIES: OROURKE, no cyanosis or edema; SCDs are in place  SKIN: Warm and dry, no rash    LABS  Results from last 7 days   Lab Units 02/15/24  0544 02/14/24  0547 02/13/24  0737   WBC 10*3/mm3 13.84* 17.95* 14.76*   HEMOGLOBIN g/dL 10.7* 11.5* 11.2*   HEMATOCRIT % 32.4* 35.2 34.4   PLATELETS 10*3/mm3 356 393 388     Results from last 7 days   Lab Units 02/15/24  0544 02/14/24  0547 02/13/24  0737 02/13/24  0134   SODIUM mmol/L 139 136 138 137   POTASSIUM mmol/L 3.1* 3.3* 3.7 4.0   CHLORIDE mmol/L 104 103 104 107   CO2 mmol/L 22.1 20.2* 24.0 21.0*   BUN mg/dL 19 14 18 22*   CREATININE mg/dL 0.91 0.80 0.90 0.99   CALCIUM mg/dL 8.2* 8.6 9.5 9.9   BILIRUBIN mg/dL  --   --  0.4 0.2   ALK PHOS U/L  --   --  48 46   ALT (SGPT) U/L  --   --  13 15   AST " (SGOT) U/L  --   --  13 14   GLUCOSE mg/dL 114* 137* 118* 129*     Results from last 7 days   Lab Units 02/13/24  0738   INR  1.03     A/P: 53 y.o. female with morbid obesity (BMI 37), history of DVT/PE on Eliquis with IVCF in place, presenting with acute appendicitis and CT evidence of sigmoid diverticulitis     AVSS.  Patient pain has improved and she is tolerating clear liquids.  Passing flatus, has not had a bowel movement.  It appears her symptomatology was secondary to acute appendicitis given the lack of sigmoid inflammation noted during her case, with improved appearance of the sigmoid colon on follow-up CT scan.  Recommend advance diet as tolerated.  Encourage out of bed, ambulation, I-S.  May restart Eliquis 24 hours postop.  WBC 13.8 from 17; continue Zosyn for intraoperatively noted purulent peritonitis.  Will recommend at least 4 days of antibiotics postoperatively.    Jamee Panda MD  General, Robotic and Endoscopic Surgery  [facility]    [address], [address]  [place], [address]  P: [phone]  F: [phone]

## 2024-02-15 NOTE — PLAN OF CARE
Goal Outcome Evaluation:      Pain control provided for patient per MAR, ambulation to the bathroom encouraged and provided per staff, pt tolerating advanced diet per surgery, prn nausea meds and bowel regimen started for patient comfort.

## 2024-02-15 NOTE — PROGRESS NOTES
Name: Li Kimball ADMIT: 2024   : 1970  PCP: Tank Shea APRN    MRN: [id number] LOS: 1 days   AGE/SEX: 53 y.o. female  ROOM: [facility]     Subjective   Subjective   Patient is lying on the bed and is complaining of abdominal discomfort/pain.  Denies nausea, vomiting, chest pain, shortness of breath.       Objective   Objective   Vital Signs  Temp:  [97.3 °F (36.3 °C)-98.5 °F (36.9 °C)] 98.5 °F (36.9 °C)  Heart Rate:  [88-98] 88  Resp:  [18] 18  BP: (104-135)/(65-89) 135/89  SpO2:  [93 %-96 %] 95 %  on   ;   Device (Oxygen Therapy): room air  Body mass index is 37.38 kg/m².  Physical Exam  HEENT: PERRLA, extraocular movements intact, Scleras no icterus  Neck: Supple, no JVD  Cardiovascular: Regular rate and rhythm with normal S1 and S2  Respiratory: Fairly clear to auscultation bilaterally with no wheezes  GI: Soft, appropriately tender postoperatively and incision sites noted, bowel sounds are present.  Extremities: No edema, palpable pedal pulses  Neurologic: Grossly nonfocal, no facial asymmetry      Results Review     I reviewed the patient's new clinical results.  Results from last 7 days   Lab Units 02/15/24  0544 24  0547 24  0737 24  0134   WBC 10*3/mm3 13.84* 17.95* 14.76* 12.87*   HEMOGLOBIN g/dL 10.7* 11.5* 11.2* 11.7*   PLATELETS 10*3/mm3 356 393 388 423     Results from last 7 days   Lab Units 02/15/24  0544 24  0547 24  0737 24  0134   SODIUM mmol/L 139 136 138 137   POTASSIUM mmol/L 3.1* 3.3* 3.7 4.0   CHLORIDE mmol/L 104 103 104 107   CO2 mmol/L 22.1 20.2* 24.0 21.0*   BUN mg/dL 19 14 18 22*   CREATININE mg/dL 0.91 0.80 0.90 0.99   GLUCOSE mg/dL 114* 137* 118* 129*   EGFR mL/min/1.73 75.6 88.2 76.6 68.3     Results from last 7 days   Lab Units 24  0737 24  0134   ALBUMIN g/dL 4.0 4.0   BILIRUBIN mg/dL 0.4 0.2   ALK PHOS U/L 48 46   AST (SGOT) U/L 13 14   ALT (SGPT) U/L 13 15     Results from last 7 days   Lab Units  02/15/24  0544 02/14/24  0547 02/13/24  0737 02/13/24  0134   CALCIUM mg/dL 8.2* 8.6 9.5 9.9   ALBUMIN g/dL  --   --  4.0 4.0   MAGNESIUM mg/dL  --   --  1.8  --      Results from last 7 days   Lab Units 02/13/24  0408   LACTATE mmol/L 1.9     Hemoglobin A1C   Date/Time Value Ref Range Status   02/15/2024 0544 6.20 (H) 4.80 - 5.60 % Final     Glucose   Date/Time Value Ref Range Status   02/15/2024 1106 85 70 - 130 mg/dL Final   02/15/2024 0616 91 70 - 130 mg/dL Final   02/15/2024 0028 120 70 - 130 mg/dL Final   02/14/2024 2039 157 (H) 70 - 130 mg/dL Final   02/14/2024 1612 150 (H) 70 - 130 mg/dL Final   02/14/2024 1213 140 (H) 70 - 130 mg/dL Final   02/14/2024 1056 138 (H) 70 - 130 mg/dL Final       No radiology results for the last day    I have personally reviewed all medications:  Scheduled Medications  insulin regular, 2-7 Units, Subcutaneous, Q6H  levothyroxine, 25 mcg, Oral, Q AM  minoxidil, 1.25 mg, Oral, Daily  piperacillin-tazobactam, 3.375 g, Intravenous, Q8H  potassium chloride ER, 40 mEq, Oral, Q4H  sodium chloride, 10 mL, Intravenous, Q12H    Infusions  sodium chloride, 100 mL/hr, Last Rate: 100 mL/hr (02/14/24 0404)    Diet  Diet: Regular/House Diet; Texture: Regular Texture (IDDSI 7); Fluid Consistency: Thin (IDDSI 0)    I have personally reviewed:  [x]  Laboratory   [x]  Microbiology   [x]  Radiology   [x]  EKG/Telemetry  [x]  Cardiology/Vascular   []  Pathology    []  Records       Assessment/Plan     Active Hospital Problems    Diagnosis  POA    **Acute appendicitis [K35.80]  Yes    Colitis [K52.9]  Yes    Diabetes mellitus type 2, diet-controlled [E11.9]  Yes    Chronic anticoagulation [Z79.01]  Not Applicable    Recurrent deep vein thrombosis (DVT) [I82.409]  Yes    Hypothyroidism, adult [E03.9]  Yes      Resolved Hospital Problems   No resolved problems to display.       53 y.o. female admitted with Acute appendicitis.    Acute appendicitis with purulent peritonitis, continue with fluids and  status post appendectomy and today's postop day #1.  Continue with IV Zosyn and appreciate surgical input.  Solid diet has been initiated and Eliquis will be resumed tomorrow.  Encouraged her to ambulate and use incentive spirometry.    2.  Morbid obesity, counseled to lose weight.    3.  Hypothyroidism, on Synthroid.    4.  Hyperglycemia, hemoglobin A1c level is 6.2 and will be placed on metformin upon discharge.  Continue with corrective dose insulin.    5.  History of multiple blood clots including DVT/PE and does have an IVC filter in place. Resume Eliquis tomorrow as per surgical team recommendation.    6.  Hypokalemia, on replacement protocol.    7.  CODE STATUS is full code.    8.  DVT prophylaxis, on Lovenox.    Estimated discharge date, next 1 to 2 days.    Copied text on this note has been reviewed by me on 2/15/2024    Donovan Sosa MD  [facility]  02/15/24  14:52 EST

## 2024-02-15 NOTE — PLAN OF CARE
Goal Outcome Evaluation:              Outcome Evaluation: Patient a/o x4, cooperative with care. Sleeping between care this shift. All meds given as ordered. No new issues noted. This RN wore appropriate PPE during care. Pt continues on clear liquid diet. See v/s and labs.

## 2024-02-16 LAB
ANION GAP SERPL CALCULATED.3IONS-SCNC: 9.1 MMOL/L (ref 5–15)
BASOPHILS # BLD AUTO: 0.02 10*3/MM3 (ref 0–0.2)
BASOPHILS NFR BLD AUTO: 0.2 % (ref 0–1.5)
BUN SERPL-MCNC: 23 MG/DL (ref 6–20)
BUN/CREAT SERPL: 28.4 (ref 7–25)
CALCIUM SPEC-SCNC: 8.1 MG/DL (ref 8.6–10.5)
CHLORIDE SERPL-SCNC: 108 MMOL/L (ref 98–107)
CO2 SERPL-SCNC: 21.9 MMOL/L (ref 22–29)
CREAT SERPL-MCNC: 0.81 MG/DL (ref 0.57–1)
DEPRECATED RDW RBC AUTO: 40.5 FL (ref 37–54)
EGFRCR SERPLBLD CKD-EPI 2021: 86.9 ML/MIN/1.73
EOSINOPHIL # BLD AUTO: 0.2 10*3/MM3 (ref 0–0.4)
EOSINOPHIL NFR BLD AUTO: 1.9 % (ref 0.3–6.2)
ERYTHROCYTE [DISTWIDTH] IN BLOOD BY AUTOMATED COUNT: 13.1 % (ref 12.3–15.4)
GLUCOSE BLDC GLUCOMTR-MCNC: 104 MG/DL (ref 70–130)
GLUCOSE BLDC GLUCOMTR-MCNC: 112 MG/DL (ref 70–130)
GLUCOSE BLDC GLUCOMTR-MCNC: 88 MG/DL (ref 70–130)
GLUCOSE BLDC GLUCOMTR-MCNC: 89 MG/DL (ref 70–130)
GLUCOSE BLDC GLUCOMTR-MCNC: 96 MG/DL (ref 70–130)
GLUCOSE SERPL-MCNC: 102 MG/DL (ref 65–99)
HCT VFR BLD AUTO: 32.5 % (ref 34–46.6)
HGB BLD-MCNC: 10.6 G/DL (ref 12–15.9)
IMM GRANULOCYTES # BLD AUTO: 0.1 10*3/MM3 (ref 0–0.05)
IMM GRANULOCYTES NFR BLD AUTO: 1 % (ref 0–0.5)
LYMPHOCYTES # BLD AUTO: 1.36 10*3/MM3 (ref 0.7–3.1)
LYMPHOCYTES NFR BLD AUTO: 13 % (ref 19.6–45.3)
MCH RBC QN AUTO: 28.1 PG (ref 26.6–33)
MCHC RBC AUTO-ENTMCNC: 32.6 G/DL (ref 31.5–35.7)
MCV RBC AUTO: 86.2 FL (ref 79–97)
MONOCYTES # BLD AUTO: 0.4 10*3/MM3 (ref 0.1–0.9)
MONOCYTES NFR BLD AUTO: 3.8 % (ref 5–12)
NEUTROPHILS NFR BLD AUTO: 8.35 10*3/MM3 (ref 1.7–7)
NEUTROPHILS NFR BLD AUTO: 80.1 % (ref 42.7–76)
NRBC BLD AUTO-RTO: 0 /100 WBC (ref 0–0.2)
PLATELET # BLD AUTO: 460 10*3/MM3 (ref 140–450)
PMV BLD AUTO: 9.8 FL (ref 6–12)
POTASSIUM SERPL-SCNC: 3.7 MMOL/L (ref 3.5–5.2)
POTASSIUM SERPL-SCNC: 3.8 MMOL/L (ref 3.5–5.2)
RBC # BLD AUTO: 3.77 10*6/MM3 (ref 3.77–5.28)
SODIUM SERPL-SCNC: 139 MMOL/L (ref 136–145)
WBC NRBC COR # BLD AUTO: 10.43 10*3/MM3 (ref 3.4–10.8)

## 2024-02-16 PROCEDURE — 85025 COMPLETE CBC W/AUTO DIFF WBC: CPT | Performed by: STUDENT IN AN ORGANIZED HEALTH CARE EDUCATION/TRAINING PROGRAM

## 2024-02-16 PROCEDURE — 82948 REAGENT STRIP/BLOOD GLUCOSE: CPT

## 2024-02-16 PROCEDURE — 99024 POSTOP FOLLOW-UP VISIT: CPT | Performed by: STUDENT IN AN ORGANIZED HEALTH CARE EDUCATION/TRAINING PROGRAM

## 2024-02-16 PROCEDURE — 25810000003 SODIUM CHLORIDE 0.9 % SOLUTION: Performed by: STUDENT IN AN ORGANIZED HEALTH CARE EDUCATION/TRAINING PROGRAM

## 2024-02-16 PROCEDURE — 80048 BASIC METABOLIC PNL TOTAL CA: CPT | Performed by: STUDENT IN AN ORGANIZED HEALTH CARE EDUCATION/TRAINING PROGRAM

## 2024-02-16 PROCEDURE — 25010000002 ENOXAPARIN PER 10 MG: Performed by: STUDENT IN AN ORGANIZED HEALTH CARE EDUCATION/TRAINING PROGRAM

## 2024-02-16 PROCEDURE — 84132 ASSAY OF SERUM POTASSIUM: CPT | Performed by: INTERNAL MEDICINE

## 2024-02-16 PROCEDURE — 25010000002 PIPERACILLIN SOD-TAZOBACTAM PER 1 G: Performed by: STUDENT IN AN ORGANIZED HEALTH CARE EDUCATION/TRAINING PROGRAM

## 2024-02-16 RX ORDER — POLYETHYLENE GLYCOL 3350 17 G/17G
17 POWDER, FOR SOLUTION ORAL DAILY
Status: DISCONTINUED | OUTPATIENT
Start: 2024-02-16 | End: 2024-02-19 | Stop reason: HOSPADM

## 2024-02-16 RX ORDER — ENOXAPARIN SODIUM 100 MG/ML
40 INJECTION SUBCUTANEOUS NIGHTLY
Status: DISCONTINUED | OUTPATIENT
Start: 2024-02-16 | End: 2024-02-18

## 2024-02-16 RX ORDER — DOCUSATE SODIUM 100 MG/1
100 CAPSULE, LIQUID FILLED ORAL 2 TIMES DAILY
Status: DISCONTINUED | OUTPATIENT
Start: 2024-02-16 | End: 2024-02-19 | Stop reason: HOSPADM

## 2024-02-16 RX ADMIN — MINOXIDIL 1.25 MG: 2.5 TABLET ORAL at 08:59

## 2024-02-16 RX ADMIN — PIPERACILLIN SODIUM AND TAZOBACTAM SODIUM 3.38 G: 3; .375 INJECTION, SOLUTION INTRAVENOUS at 11:49

## 2024-02-16 RX ADMIN — OXYCODONE HYDROCHLORIDE 5 MG: 5 TABLET ORAL at 06:30

## 2024-02-16 RX ADMIN — ENOXAPARIN SODIUM 40 MG: 100 INJECTION SUBCUTANEOUS at 21:04

## 2024-02-16 RX ADMIN — PIPERACILLIN SODIUM AND TAZOBACTAM SODIUM 3.38 G: 3; .375 INJECTION, SOLUTION INTRAVENOUS at 06:30

## 2024-02-16 RX ADMIN — OXYCODONE HYDROCHLORIDE 5 MG: 5 TABLET ORAL at 13:41

## 2024-02-16 RX ADMIN — OXYCODONE HYDROCHLORIDE 5 MG: 5 TABLET ORAL at 23:34

## 2024-02-16 RX ADMIN — PIPERACILLIN SODIUM AND TAZOBACTAM SODIUM 3.38 G: 3; .375 INJECTION, SOLUTION INTRAVENOUS at 21:04

## 2024-02-16 RX ADMIN — OXYCODONE HYDROCHLORIDE 5 MG: 5 TABLET ORAL at 17:39

## 2024-02-16 RX ADMIN — Medication 10 ML: at 08:59

## 2024-02-16 RX ADMIN — Medication 10 ML: at 21:05

## 2024-02-16 RX ADMIN — POLYETHYLENE GLYCOL 3350 17 G: 17 POWDER, FOR SOLUTION ORAL at 19:10

## 2024-02-16 RX ADMIN — SODIUM CHLORIDE 100 ML/HR: 9 INJECTION, SOLUTION INTRAVENOUS at 17:40

## 2024-02-16 RX ADMIN — LEVOTHYROXINE SODIUM 25 MCG: 25 TABLET ORAL at 06:30

## 2024-02-16 RX ADMIN — DOCUSATE SODIUM 100 MG: 100 CAPSULE, LIQUID FILLED ORAL at 21:04

## 2024-02-16 NOTE — PROGRESS NOTES
Name: Li Kimball ADMIT: 2024   : 1970  PCP: Tank Shea APRN    MRN: [id number] LOS: 2 days   AGE/SEX: 53 y.o. female  ROOM: [facility]     Subjective   Subjective   Patient is lying on the bed and continues to complain  of abdominal discomfort/pain.  Denies nausea, vomiting, chest pain, shortness of breath.       Objective   Objective   Vital Signs  Temp:  [97.5 °F (36.4 °C)-98.2 °F (36.8 °C)] 97.9 °F (36.6 °C)  Heart Rate:  [] 100  Resp:  [18-22] 18  BP: (114-123)/(67-98) 114/71  SpO2:  [100 %] 100 %  on   ;   Device (Oxygen Therapy): room air  Body mass index is 37.38 kg/m².  Physical Exam  HEENT: PERRLA, extraocular movements intact, Scleras no icterus  Neck: Supple, no JVD  Cardiovascular: Regular rate and rhythm with normal S1 and S2  Respiratory: Fairly clear to auscultation bilaterally with no wheezes  GI: Soft, appropriately tender postoperatively and incision sites noted, bowel sounds are present.  Extremities: No edema, palpable pedal pulses  Neurologic: Grossly nonfocal, no facial asymmetry      Results Review     I reviewed the patient's new clinical results.  Results from last 7 days   Lab Units 24  0524 02/15/24  0544 24  0547 24  0737   WBC 10*3/mm3 10.43 13.84* 17.95* 14.76*   HEMOGLOBIN g/dL 10.6* 10.7* 11.5* 11.2*   PLATELETS 10*3/mm3 460* 356 393 388     Results from last 7 days   Lab Units 24  0524 24  0034 02/15/24  0544 24  0547 24  0737   SODIUM mmol/L 139  --  139 136 138   POTASSIUM mmol/L 3.7 3.8 3.1* 3.3* 3.7   CHLORIDE mmol/L 108*  --  104 103 104   CO2 mmol/L 21.9*  --  22.1 20.2* 24.0   BUN mg/dL 23*  --  19 14 18   CREATININE mg/dL 0.81  --  0.91 0.80 0.90   GLUCOSE mg/dL 102*  --  114* 137* 118*   EGFR mL/min/1.73 86.9  --  75.6 88.2 76.6     Results from last 7 days   Lab Units 24  0737 24  0134   ALBUMIN g/dL 4.0 4.0   BILIRUBIN mg/dL 0.4 0.2   ALK PHOS U/L 48 46   AST (SGOT) U/L 13 14   ALT  (SGPT) U/L 13 15     Results from last 7 days   Lab Units 02/16/24  0524 02/15/24  0544 02/14/24  0547 02/13/24  0737 02/13/24  0134   CALCIUM mg/dL 8.1* 8.2* 8.6 9.5 9.9   ALBUMIN g/dL  --   --   --  4.0 4.0   MAGNESIUM mg/dL  --   --   --  1.8  --      Results from last 7 days   Lab Units 02/13/24  0408   LACTATE mmol/L 1.9     Hemoglobin A1C   Date/Time Value Ref Range Status   02/15/2024 0544 6.20 (H) 4.80 - 5.60 % Final     Glucose   Date/Time Value Ref Range Status   02/16/2024 1124 88 70 - 130 mg/dL Final   02/16/2024 0627 89 70 - 130 mg/dL Final   02/16/2024 0024 96 70 - 130 mg/dL Final   02/15/2024 2024 105 70 - 130 mg/dL Final   02/15/2024 1624 93 70 - 130 mg/dL Final   02/15/2024 1106 85 70 - 130 mg/dL Final   02/15/2024 0616 91 70 - 130 mg/dL Final       No radiology results for the last day    I have personally reviewed all medications:  Scheduled Medications  enoxaparin, 40 mg, Subcutaneous, Nightly  insulin regular, 2-7 Units, Subcutaneous, Q6H  levothyroxine, 25 mcg, Oral, Q AM  minoxidil, 1.25 mg, Oral, Daily  piperacillin-tazobactam, 3.375 g, Intravenous, Q8H  sodium chloride, 10 mL, Intravenous, Q12H    Infusions  sodium chloride, 100 mL/hr, Last Rate: 100 mL/hr (02/14/24 0404)    Diet  Diet: Regular/House Diet; Texture: Regular Texture (IDDSI 7); Fluid Consistency: Thin (IDDSI 0)    I have personally reviewed:  [x]  Laboratory   [x]  Microbiology   [x]  Radiology   [x]  EKG/Telemetry  [x]  Cardiology/Vascular   []  Pathology    []  Records       Assessment/Plan     Active Hospital Problems    Diagnosis  POA    **Acute appendicitis [K35.80]  Yes    Colitis [K52.9]  Yes    Diabetes mellitus type 2, diet-controlled [E11.9]  Yes    Chronic anticoagulation [Z79.01]  Not Applicable    Recurrent deep vein thrombosis (DVT) [I82.409]  Yes    Hypothyroidism, adult [E03.9]  Yes      Resolved Hospital Problems   No resolved problems to display.       53 y.o. female admitted with Acute appendicitis.    Acute  appendicitis with purulent peritonitis, continue with fluids and status post appendectomy and today's postop day #2.  Continue with IV Zosyn and appreciate surgical input.  Solid diet has been initiated and is tolerating well.  Eliquis will be resumed today.  Encouraged her to ambulate and use incentive spirometry.  WBC is normal and patient is afebrile.    2.  Morbid obesity, counseled to lose weight.    3.  Hypothyroidism, on Synthroid.    4.  Hyperglycemia, hemoglobin A1c level is 6.2 and will be placed on metformin upon discharge.  Continue with corrective dose insulin.    5.  History of multiple blood clots including DVT/PE and does have an IVC filter in place.  Eliquis is  being resumed today and surgery did clear.    6.  Hypokalemia, on replacement protocol.    7.  CODE STATUS is full code.    8.  DVT prophylaxis, on Lovenox.    Estimated discharge date, next 1 to 2 days.    Copied text on this note has been reviewed by me on 2/16/2024    Donovan Sosa MD  [facility]  02/16/24  14:10 EST

## 2024-02-16 NOTE — PLAN OF CARE
Goal Outcome Evaluation:              Outcome Evaluation: A&O x4. RA. C/o sharp pain in LLQ, prn medication given. IVF continued. IV abx. Surgery came to see this afternoon, placed pt back on clear liquid diet and resume lovenox. Up to BSC with assist x1. NSR. VSS

## 2024-02-16 NOTE — PROGRESS NOTES
"General Surgery Progress Note    POD2 laparoscopic appendectomy    S: Patient reports her right sided pain has resolved, but now she is feeling a lot of severe pain in her left lower abdomen, worst when she gets up to walk around. Has passed gas but denies BM. Denies NV and has been eating everything.     O:/71 (BP Location: Left arm, Patient Position: Lying)   Pulse 102   Temp 97.9 °F (36.6 °C) (Oral)   Resp 18   Ht 160 cm (63\")   Wt 95.7 kg (211 lb)   LMP 10/20/2021 Comment: neg hcg 2/14/2024  SpO2 100%   BMI 37.38 kg/m²     Intake & Output (last day)         02/15 0701 02/16 0700 02/16 0701 02/17 0700    P.O. 480 720    I.V. (mL/kg)      Total Intake(mL/kg) 480 (5) 720 (7.5)    Urine (mL/kg/hr) 700 (0.3)     Total Output 700     Net -220 +720          Urine Unmeasured Occurrence  2 x            GENERAL: awake, alert, uncomfortable appearing lying in bed with washcloth on forehead  HEENT: EOMI, clear sclera, moist mucus membranes   CHEST: normal work of breathing on room air  ABDOMEN: Obese, soft, tender around surgical incisions which are clean and dry with skin glue in place, with a small amount of redness around each incision without drainage or other signs of infection; right lower quadrant tenderness is absent; left lower quadrant is tender without rebound or guarding   EXTREMITIES: OROURKE, no cyanosis or edema; SCDs are in place  SKIN: Warm and dry, no rash    LABS  Results from last 7 days   Lab Units 02/16/24  0524 02/15/24  0544 02/14/24  0547   WBC 10*3/mm3 10.43 13.84* 17.95*   HEMOGLOBIN g/dL 10.6* 10.7* 11.5*   HEMATOCRIT % 32.5* 32.4* 35.2   PLATELETS 10*3/mm3 460* 356 393     Results from last 7 days   Lab Units 02/16/24  0524 02/16/24  0034 02/15/24  0544 02/14/24  0547 02/13/24  0737 02/13/24  0134   SODIUM mmol/L 139  --  139 136 138 137   POTASSIUM mmol/L 3.7 3.8 3.1* 3.3* 3.7 4.0   CHLORIDE mmol/L 108*  --  104 103 104 107   CO2 mmol/L 21.9*  --  22.1 20.2* 24.0 21.0*   BUN mg/dL " 23*  --  19 14 18 22*   CREATININE mg/dL 0.81  --  0.91 0.80 0.90 0.99   CALCIUM mg/dL 8.1*  --  8.2* 8.6 9.5 9.9   BILIRUBIN mg/dL  --   --   --   --  0.4 0.2   ALK PHOS U/L  --   --   --   --  48 46   ALT (SGPT) U/L  --   --   --   --  13 15   AST (SGOT) U/L  --   --   --   --  13 14   GLUCOSE mg/dL 102*  --  114* 137* 118* 129*     Results from last 7 days   Lab Units 02/13/24  0738   INR  1.03     A/P: 53 y.o. female with morbid obesity (BMI 37), history of DVT/PE on Eliquis with IVCF in place, presenting with acute appendicitis and CT evidence of sigmoid diverticulitis     AVSS. WBC normalized. Remains on zosyn, recommend at least 4 days postop for intraabdominal contamination from appendicitis.  Symptomatically patient has regressed somewhat in that she is now demonstrating left sided abdominal pain. Still passing flatus, has not had a bowel movement.  Concern for worsening sigmoid diverticulitis given her initial presentation versus development of intraabdominal abscess.   Recommend returning to clear liquids, hold off restarting Eliquis and continue Lovenox.  If no improvement will consider repeat CT abd/pelvis in the next 1-2 days to evaluate for abscess/diverticulitis.  Encourage out of bed as able, ambulation, I-S.    Jamee Panda MD  General, Robotic and Endoscopic Surgery  [facility]    [address] [place] [address], [address]  [place], [place], [address]  P: [phone]  F: [phone]

## 2024-02-17 LAB
ANION GAP SERPL CALCULATED.3IONS-SCNC: 8.4 MMOL/L (ref 5–15)
BASOPHILS # BLD MANUAL: 0.08 10*3/MM3 (ref 0–0.2)
BASOPHILS NFR BLD MANUAL: 1 % (ref 0–1.5)
BUN SERPL-MCNC: 14 MG/DL (ref 6–20)
BUN/CREAT SERPL: 21.2 (ref 7–25)
CALCIUM SPEC-SCNC: 7.7 MG/DL (ref 8.6–10.5)
CHLORIDE SERPL-SCNC: 110 MMOL/L (ref 98–107)
CO2 SERPL-SCNC: 20.6 MMOL/L (ref 22–29)
CREAT SERPL-MCNC: 0.66 MG/DL (ref 0.57–1)
DEPRECATED RDW RBC AUTO: 40.4 FL (ref 37–54)
EGFRCR SERPLBLD CKD-EPI 2021: 105 ML/MIN/1.73
EOSINOPHIL # BLD MANUAL: 0.16 10*3/MM3 (ref 0–0.4)
EOSINOPHIL NFR BLD MANUAL: 2 % (ref 0.3–6.2)
ERYTHROCYTE [DISTWIDTH] IN BLOOD BY AUTOMATED COUNT: 13 % (ref 12.3–15.4)
GLUCOSE BLDC GLUCOMTR-MCNC: 100 MG/DL (ref 70–130)
GLUCOSE BLDC GLUCOMTR-MCNC: 69 MG/DL (ref 70–130)
GLUCOSE BLDC GLUCOMTR-MCNC: 79 MG/DL (ref 70–130)
GLUCOSE SERPL-MCNC: 102 MG/DL (ref 65–99)
HCT VFR BLD AUTO: 30.5 % (ref 34–46.6)
HGB BLD-MCNC: 10 G/DL (ref 12–15.9)
LYMPHOCYTES # BLD MANUAL: 2.33 10*3/MM3 (ref 0.7–3.1)
LYMPHOCYTES NFR BLD MANUAL: 8.1 % (ref 5–12)
MCH RBC QN AUTO: 28.1 PG (ref 26.6–33)
MCHC RBC AUTO-ENTMCNC: 32.8 G/DL (ref 31.5–35.7)
MCV RBC AUTO: 85.7 FL (ref 79–97)
MONOCYTES # BLD: 0.64 10*3/MM3 (ref 0.1–0.9)
NEUTROPHILS # BLD AUTO: 4.74 10*3/MM3 (ref 1.7–7)
NEUTROPHILS NFR BLD MANUAL: 59.6 % (ref 42.7–76)
NRBC BLD AUTO-RTO: 0 /100 WBC (ref 0–0.2)
PLAT MORPH BLD: NORMAL
PLATELET # BLD AUTO: 471 10*3/MM3 (ref 140–450)
PMV BLD AUTO: 9.2 FL (ref 6–12)
POTASSIUM SERPL-SCNC: 3.4 MMOL/L (ref 3.5–5.2)
POTASSIUM SERPL-SCNC: 4.2 MMOL/L (ref 3.5–5.2)
RBC # BLD AUTO: 3.56 10*6/MM3 (ref 3.77–5.28)
RBC MORPH BLD: NORMAL
SODIUM SERPL-SCNC: 139 MMOL/L (ref 136–145)
VARIANT LYMPHS NFR BLD MANUAL: 29.3 % (ref 19.6–45.3)
WBC MORPH BLD: NORMAL
WBC NRBC COR # BLD AUTO: 7.96 10*3/MM3 (ref 3.4–10.8)

## 2024-02-17 PROCEDURE — 25810000003 SODIUM CHLORIDE 0.9 % SOLUTION: Performed by: STUDENT IN AN ORGANIZED HEALTH CARE EDUCATION/TRAINING PROGRAM

## 2024-02-17 PROCEDURE — 84132 ASSAY OF SERUM POTASSIUM: CPT | Performed by: INTERNAL MEDICINE

## 2024-02-17 PROCEDURE — 25010000002 PIPERACILLIN SOD-TAZOBACTAM PER 1 G: Performed by: STUDENT IN AN ORGANIZED HEALTH CARE EDUCATION/TRAINING PROGRAM

## 2024-02-17 PROCEDURE — 85007 BL SMEAR W/DIFF WBC COUNT: CPT | Performed by: STUDENT IN AN ORGANIZED HEALTH CARE EDUCATION/TRAINING PROGRAM

## 2024-02-17 PROCEDURE — 85025 COMPLETE CBC W/AUTO DIFF WBC: CPT | Performed by: STUDENT IN AN ORGANIZED HEALTH CARE EDUCATION/TRAINING PROGRAM

## 2024-02-17 PROCEDURE — 82948 REAGENT STRIP/BLOOD GLUCOSE: CPT

## 2024-02-17 PROCEDURE — 80048 BASIC METABOLIC PNL TOTAL CA: CPT | Performed by: STUDENT IN AN ORGANIZED HEALTH CARE EDUCATION/TRAINING PROGRAM

## 2024-02-17 PROCEDURE — 99024 POSTOP FOLLOW-UP VISIT: CPT | Performed by: STUDENT IN AN ORGANIZED HEALTH CARE EDUCATION/TRAINING PROGRAM

## 2024-02-17 PROCEDURE — 25010000002 ENOXAPARIN PER 10 MG: Performed by: STUDENT IN AN ORGANIZED HEALTH CARE EDUCATION/TRAINING PROGRAM

## 2024-02-17 RX ORDER — ACETAMINOPHEN 325 MG/1
975 TABLET ORAL EVERY 6 HOURS
Status: DISCONTINUED | OUTPATIENT
Start: 2024-02-17 | End: 2024-02-19 | Stop reason: HOSPADM

## 2024-02-17 RX ORDER — POTASSIUM CHLORIDE 750 MG/1
40 TABLET, FILM COATED, EXTENDED RELEASE ORAL EVERY 4 HOURS
Status: COMPLETED | OUTPATIENT
Start: 2024-02-17 | End: 2024-02-17

## 2024-02-17 RX ADMIN — Medication 10 ML: at 08:38

## 2024-02-17 RX ADMIN — SODIUM CHLORIDE 100 ML/HR: 9 INJECTION, SOLUTION INTRAVENOUS at 11:57

## 2024-02-17 RX ADMIN — POTASSIUM CHLORIDE 40 MEQ: 750 TABLET, EXTENDED RELEASE ORAL at 11:57

## 2024-02-17 RX ADMIN — MINOXIDIL 1.25 MG: 2.5 TABLET ORAL at 08:36

## 2024-02-17 RX ADMIN — PIPERACILLIN SODIUM AND TAZOBACTAM SODIUM 3.38 G: 3; .375 INJECTION, SOLUTION INTRAVENOUS at 11:57

## 2024-02-17 RX ADMIN — OXYCODONE HYDROCHLORIDE 5 MG: 5 TABLET ORAL at 03:34

## 2024-02-17 RX ADMIN — Medication 10 ML: at 20:01

## 2024-02-17 RX ADMIN — ENOXAPARIN SODIUM 40 MG: 100 INJECTION SUBCUTANEOUS at 19:57

## 2024-02-17 RX ADMIN — DOCUSATE SODIUM 100 MG: 100 CAPSULE, LIQUID FILLED ORAL at 08:36

## 2024-02-17 RX ADMIN — POTASSIUM CHLORIDE 40 MEQ: 750 TABLET, EXTENDED RELEASE ORAL at 06:35

## 2024-02-17 RX ADMIN — ACETAMINOPHEN 325MG 975 MG: 325 TABLET ORAL at 18:11

## 2024-02-17 RX ADMIN — OXYCODONE HYDROCHLORIDE 5 MG: 5 TABLET ORAL at 08:43

## 2024-02-17 RX ADMIN — POLYETHYLENE GLYCOL 3350 17 G: 17 POWDER, FOR SOLUTION ORAL at 08:36

## 2024-02-17 RX ADMIN — PIPERACILLIN SODIUM AND TAZOBACTAM SODIUM 3.38 G: 3; .375 INJECTION, SOLUTION INTRAVENOUS at 04:52

## 2024-02-17 RX ADMIN — ACETAMINOPHEN 325MG 975 MG: 325 TABLET ORAL at 12:40

## 2024-02-17 RX ADMIN — LEVOTHYROXINE SODIUM 25 MCG: 25 TABLET ORAL at 06:30

## 2024-02-17 RX ADMIN — PIPERACILLIN SODIUM AND TAZOBACTAM SODIUM 3.38 G: 3; .375 INJECTION, SOLUTION INTRAVENOUS at 19:57

## 2024-02-17 RX ADMIN — DOCUSATE SODIUM 100 MG: 100 CAPSULE, LIQUID FILLED ORAL at 19:57

## 2024-02-17 NOTE — PROGRESS NOTES
"General Surgery Progress Note    POD3 laparoscopic appendectomy    S: Patient reports her right sided pain has improved today.  Denies NV overnight. Does not want to get out of bed due to pain. Has been using her incentive spirometer.    O:/79 (BP Location: Right arm, Patient Position: Lying)   Pulse 77   Temp 97.3 °F (36.3 °C) (Oral)   Resp 20   Ht 160 cm (63\")   Wt 95.7 kg (211 lb)   LMP 10/20/2021 Comment: neg hcg 2/14/2024  SpO2 98%   BMI 37.38 kg/m²     Intake & Output (last day)         02/16 0701  02/17 0700 02/17 0701  02/18 0700    P.O. 720 240    Total Intake(mL/kg) 720 (7.5) 240 (2.5)    Urine (mL/kg/hr) 300 (0.1) 300 (0.6)    Total Output 300 300    Net +420 -60          Urine Unmeasured Occurrence 2 x             GENERAL: awake, alert, comfortable appearing lying in bed, disheveled hair  HEENT: EOMI, clear sclera, moist mucus membranes   CHEST: normal work of breathing on room air  ABDOMEN: Obese, soft, mildly tender in the left lower quadrant without rebound or guarding, laparoscopic incisions are clean and dry with skin glue in place and no signs of infection   EXTREMITIES: OROURKE, no cyanosis or edema   SKIN: Warm and dry, no rash    LABS  Results from last 7 days   Lab Units 02/17/24  0508 02/16/24  0524 02/15/24  0544   WBC 10*3/mm3 7.96 10.43 13.84*   HEMOGLOBIN g/dL 10.0* 10.6* 10.7*   HEMATOCRIT % 30.5* 32.5* 32.4*   PLATELETS 10*3/mm3 471* 460* 356   MONOCYTES % % 8.1  --   --    EOSINOPHIL % % 2.0  --   --      Results from last 7 days   Lab Units 02/17/24  0508 02/16/24  0524 02/16/24  0034 02/15/24  0544 02/14/24  0547 02/13/24  0737 02/13/24  0134   SODIUM mmol/L 139 139  --  139   < > 138 137   POTASSIUM mmol/L 3.4* 3.7 3.8 3.1*   < > 3.7 4.0   CHLORIDE mmol/L 110* 108*  --  104   < > 104 107   CO2 mmol/L 20.6* 21.9*  --  22.1   < > 24.0 21.0*   BUN mg/dL 14 23*  --  19   < > 18 22*   CREATININE mg/dL 0.66 0.81  --  0.91   < > 0.90 0.99   CALCIUM mg/dL 7.7* 8.1*  --  8.2*   < > " 9.5 9.9   BILIRUBIN mg/dL  --   --   --   --   --  0.4 0.2   ALK PHOS U/L  --   --   --   --   --  48 46   ALT (SGPT) U/L  --   --   --   --   --  13 15   AST (SGOT) U/L  --   --   --   --   --  13 14   GLUCOSE mg/dL 102* 102*  --  114*   < > 118* 129*    < > = values in this interval not displayed.     Results from last 7 days   Lab Units 02/13/24  0738   INR  1.03     A/P: 53 y.o. female with morbid obesity (BMI 37), history of DVT/PE on Eliquis with IVCF in place, presenting with acute appendicitis and CT evidence of sigmoid diverticulitis     Postop day 3 laparoscopic appendectomy, path demonstrating acute necrotizing gangrenous appendicitis     AVSS.   Symptoms somewhat better this morning.  Abdominal exam with improved tenderness in the left lower quadrant, incisions healing well. Continue multimodal pain control, schedule tylenol. Encourage out of bed with meals and frequent ambulation, I-S.  Regular diet. Continue colace and miralax.  WBC normalized.  Would continue antibiotics for 10 day course given concern for diverticulitis in addition to intraabdominal contamination from appendicitis.  Restart Eliquis prior to discharge.  Anticipate discharge when demonstrating improved bowel function and pain control, and can take oral antibiotics. Hopefully next 1-2 days.    Jamee Panda MD  General, Robotic and Endoscopic Surgery  [facility]    [address], [address]  [place], [place], [address]  P: [phone]  F: [phone]

## 2024-02-17 NOTE — PLAN OF CARE
Goal Outcome Evaluation:  Plan of Care Reviewed With: patient        Progress: no change  Outcome Evaluation: Pt needs encouragement to increase activity. Ambulated to bathroom. Medicated for pain as needed. Started on scheduled Tylenol per order. Diet changed to regular. Pt with flatus but no BM as yet. Lap sites with glue intact. IV fluids and antibiotics continue.

## 2024-02-17 NOTE — SIGNIFICANT NOTE
02/17/24 1436   OTHER   Discipline physical therapist   Rehab Time/Intention   Session Not Performed patient/family declined evaluation  (Pt declined PT evaluation, reports increased weakness and fatigue and wishes to wait until she has had something to eat once diet changes. Will f/u tomorrow if tolerable.)   Recommendation   PT - Next Appointment 02/18/24

## 2024-02-18 LAB
ANION GAP SERPL CALCULATED.3IONS-SCNC: 8 MMOL/L (ref 5–15)
BUN SERPL-MCNC: 13 MG/DL (ref 6–20)
BUN/CREAT SERPL: 18.1 (ref 7–25)
CALCIUM SPEC-SCNC: 8.3 MG/DL (ref 8.6–10.5)
CHLORIDE SERPL-SCNC: 111 MMOL/L (ref 98–107)
CO2 SERPL-SCNC: 21 MMOL/L (ref 22–29)
CREAT SERPL-MCNC: 0.72 MG/DL (ref 0.57–1)
DEPRECATED RDW RBC AUTO: 44.1 FL (ref 37–54)
EGFRCR SERPLBLD CKD-EPI 2021: 100.1 ML/MIN/1.73
ERYTHROCYTE [DISTWIDTH] IN BLOOD BY AUTOMATED COUNT: 13.7 % (ref 12.3–15.4)
GLUCOSE BLDC GLUCOMTR-MCNC: 108 MG/DL (ref 70–130)
GLUCOSE BLDC GLUCOMTR-MCNC: 108 MG/DL (ref 70–130)
GLUCOSE BLDC GLUCOMTR-MCNC: 149 MG/DL (ref 70–130)
GLUCOSE BLDC GLUCOMTR-MCNC: 91 MG/DL (ref 70–130)
GLUCOSE BLDC GLUCOMTR-MCNC: 97 MG/DL (ref 70–130)
GLUCOSE SERPL-MCNC: 93 MG/DL (ref 65–99)
HCT VFR BLD AUTO: 35 % (ref 34–46.6)
HGB BLD-MCNC: 11 G/DL (ref 12–15.9)
LYMPHOCYTES # BLD MANUAL: 4.6 10*3/MM3 (ref 0.7–3.1)
LYMPHOCYTES NFR BLD MANUAL: 8 % (ref 5–12)
MCH RBC QN AUTO: 27.4 PG (ref 26.6–33)
MCHC RBC AUTO-ENTMCNC: 31.4 G/DL (ref 31.5–35.7)
MCV RBC AUTO: 87.3 FL (ref 79–97)
MONOCYTES # BLD: 0.77 10*3/MM3 (ref 0.1–0.9)
NEUTROPHILS # BLD AUTO: 4.22 10*3/MM3 (ref 1.7–7)
NEUTROPHILS NFR BLD MANUAL: 44 % (ref 42.7–76)
NRBC BLD AUTO-RTO: 0 /100 WBC (ref 0–0.2)
PLAT MORPH BLD: NORMAL
PLATELET # BLD AUTO: 414 10*3/MM3 (ref 140–450)
PMV BLD AUTO: 10.3 FL (ref 6–12)
POTASSIUM SERPL-SCNC: 5.3 MMOL/L (ref 3.5–5.2)
RBC # BLD AUTO: 4.01 10*6/MM3 (ref 3.77–5.28)
RBC MORPH BLD: NORMAL
SODIUM SERPL-SCNC: 140 MMOL/L (ref 136–145)
VARIANT LYMPHS NFR BLD MANUAL: 3 % (ref 0–5)
VARIANT LYMPHS NFR BLD MANUAL: 45 % (ref 19.6–45.3)
WBC MORPH BLD: NORMAL
WBC NRBC COR # BLD AUTO: 9.59 10*3/MM3 (ref 3.4–10.8)

## 2024-02-18 PROCEDURE — 99024 POSTOP FOLLOW-UP VISIT: CPT | Performed by: STUDENT IN AN ORGANIZED HEALTH CARE EDUCATION/TRAINING PROGRAM

## 2024-02-18 PROCEDURE — 82948 REAGENT STRIP/BLOOD GLUCOSE: CPT

## 2024-02-18 PROCEDURE — 85007 BL SMEAR W/DIFF WBC COUNT: CPT | Performed by: STUDENT IN AN ORGANIZED HEALTH CARE EDUCATION/TRAINING PROGRAM

## 2024-02-18 PROCEDURE — 85025 COMPLETE CBC W/AUTO DIFF WBC: CPT | Performed by: STUDENT IN AN ORGANIZED HEALTH CARE EDUCATION/TRAINING PROGRAM

## 2024-02-18 PROCEDURE — 80048 BASIC METABOLIC PNL TOTAL CA: CPT | Performed by: STUDENT IN AN ORGANIZED HEALTH CARE EDUCATION/TRAINING PROGRAM

## 2024-02-18 PROCEDURE — 25010000002 PIPERACILLIN SOD-TAZOBACTAM PER 1 G: Performed by: STUDENT IN AN ORGANIZED HEALTH CARE EDUCATION/TRAINING PROGRAM

## 2024-02-18 PROCEDURE — 25010000002 PROCHLORPERAZINE 10 MG/2ML SOLUTION: Performed by: INTERNAL MEDICINE

## 2024-02-18 PROCEDURE — 97162 PT EVAL MOD COMPLEX 30 MIN: CPT

## 2024-02-18 RX ORDER — PSYLLIUM SEED (WITH DEXTROSE)
2 POWDER (GRAM) ORAL DAILY
Status: DISCONTINUED | OUTPATIENT
Start: 2024-02-18 | End: 2024-02-19 | Stop reason: HOSPADM

## 2024-02-18 RX ORDER — AMOXICILLIN AND CLAVULANATE POTASSIUM 875; 125 MG/1; MG/1
1 TABLET, FILM COATED ORAL EVERY 12 HOURS SCHEDULED
Status: DISCONTINUED | OUTPATIENT
Start: 2024-02-18 | End: 2024-02-19 | Stop reason: HOSPADM

## 2024-02-18 RX ADMIN — AMOXICILLIN AND CLAVULANATE POTASSIUM 1 TABLET: 875; 125 TABLET, FILM COATED ORAL at 20:40

## 2024-02-18 RX ADMIN — MINOXIDIL 1.25 MG: 2.5 TABLET ORAL at 09:10

## 2024-02-18 RX ADMIN — PROCHLORPERAZINE EDISYLATE 10 MG: 5 INJECTION INTRAMUSCULAR; INTRAVENOUS at 05:55

## 2024-02-18 RX ADMIN — ACETAMINOPHEN 325MG 975 MG: 325 TABLET ORAL at 00:00

## 2024-02-18 RX ADMIN — DOCUSATE SODIUM 100 MG: 100 CAPSULE, LIQUID FILLED ORAL at 09:10

## 2024-02-18 RX ADMIN — ACETAMINOPHEN 325MG 975 MG: 325 TABLET ORAL at 12:10

## 2024-02-18 RX ADMIN — Medication 2 WAFER: at 14:48

## 2024-02-18 RX ADMIN — PIPERACILLIN SODIUM AND TAZOBACTAM SODIUM 3.38 G: 3; .375 INJECTION, SOLUTION INTRAVENOUS at 04:25

## 2024-02-18 RX ADMIN — POLYETHYLENE GLYCOL 3350 17 G: 17 POWDER, FOR SOLUTION ORAL at 09:10

## 2024-02-18 RX ADMIN — DOCUSATE SODIUM 100 MG: 100 CAPSULE, LIQUID FILLED ORAL at 20:40

## 2024-02-18 RX ADMIN — ACETAMINOPHEN 325MG 975 MG: 325 TABLET ORAL at 05:44

## 2024-02-18 RX ADMIN — Medication 10 ML: at 22:52

## 2024-02-18 RX ADMIN — APIXABAN 5 MG: 5 TABLET, FILM COATED ORAL at 12:53

## 2024-02-18 RX ADMIN — APIXABAN 5 MG: 5 TABLET, FILM COATED ORAL at 20:40

## 2024-02-18 RX ADMIN — AMOXICILLIN AND CLAVULANATE POTASSIUM 1 TABLET: 875; 125 TABLET, FILM COATED ORAL at 12:53

## 2024-02-18 RX ADMIN — ACETAMINOPHEN 325MG 975 MG: 325 TABLET ORAL at 18:21

## 2024-02-18 RX ADMIN — Medication 10 ML: at 09:12

## 2024-02-18 RX ADMIN — OXYCODONE HYDROCHLORIDE 5 MG: 5 TABLET ORAL at 05:55

## 2024-02-18 RX ADMIN — LEVOTHYROXINE SODIUM 25 MCG: 25 TABLET ORAL at 05:44

## 2024-02-18 NOTE — PLAN OF CARE
Goal Outcome Evaluation:  Plan of Care Reviewed With: patient        Progress: no change  Outcome Evaluation: IV fluids stopped. Started on PO antibiotics. Await BM. Voids per bathroom. Up with assist. Ambulated with PT. Plan is discharge home in 1-2 days.

## 2024-02-18 NOTE — PROGRESS NOTES
"General Surgery Progress Note    POD4 laparoscopic appendectomy    S: Patient says pain is better today. She is passing gas, no BM. Has been up out of bed some. Denies NV and is tolerated diet. No fevers.    O:/78 (BP Location: Right arm, Patient Position: Sitting)   Pulse 72   Temp 98.4 °F (36.9 °C) (Oral)   Resp 20   Ht 160 cm (63\")   Wt 95.7 kg (211 lb)   LMP 10/20/2021 Comment: neg hcg 2/14/2024  SpO2 95%   BMI 37.38 kg/m²     Intake & Output (last day)         02/17 0701  02/18 0700 02/18 0701  02/19 0700    P.O. 780     Total Intake(mL/kg) 780 (8.2)     Urine (mL/kg/hr) 300 (0.1)     Total Output 300     Net +480           Urine Unmeasured Occurrence 4 x 1 x            GENERAL: awake, alert, comfortable appearing lying in bed   HEENT: EOMI, clear sclera, moist mucus membranes   CHEST: normal work of breathing on room air  ABDOMEN: Obese, soft, nontender except around incisions which are clean and dry with skin glue in place and no signs of infection   EXTREMITIES: OROURKE, no cyanosis or edema   SKIN: Warm and dry, no rash    LABS  Results from last 7 days   Lab Units 02/18/24  0457 02/17/24  0508 02/16/24  0524   WBC 10*3/mm3 9.59 7.96 10.43   HEMOGLOBIN g/dL 11.0* 10.0* 10.6*   HEMATOCRIT % 35.0 30.5* 32.5*   PLATELETS 10*3/mm3 414 471* 460*   MONOCYTES % % 8.0 8.1  --    EOSINOPHIL % %  --  2.0  --      Results from last 7 days   Lab Units 02/18/24  0457 02/17/24  1516 02/17/24  0508 02/16/24  0524 02/14/24  0547 02/13/24  0737 02/13/24  0134   SODIUM mmol/L 140  --  139 139   < > 138 137   POTASSIUM mmol/L 5.3* 4.2 3.4* 3.7   < > 3.7 4.0   CHLORIDE mmol/L 111*  --  110* 108*   < > 104 107   CO2 mmol/L 21.0*  --  20.6* 21.9*   < > 24.0 21.0*   BUN mg/dL 13  --  14 23*   < > 18 22*   CREATININE mg/dL 0.72  --  0.66 0.81   < > 0.90 0.99   CALCIUM mg/dL 8.3*  --  7.7* 8.1*   < > 9.5 9.9   BILIRUBIN mg/dL  --   --   --   --   --  0.4 0.2   ALK PHOS U/L  --   --   --   --   --  48 46   ALT (SGPT) U/L  " --   --   --   --   --  13 15   AST (SGOT) U/L  --   --   --   --   --  13 14   GLUCOSE mg/dL 93  --  102* 102*   < > 118* 129*    < > = values in this interval not displayed.     Results from last 7 days   Lab Units 02/13/24  0738   INR  1.03     A/P: 53 y.o. female with morbid obesity (BMI 37), history of DVT/PE on Eliquis with IVCF in place, presenting with acute appendicitis and CT evidence of sigmoid diverticulitis     Postop day 4 laparoscopic appendectomy, path demonstrating acute necrotizing gangrenous appendicitis     AVSS.   Clinically improving. Abdomen exam reassuring.  Encourage out of bed with meals and frequent ambulation, I-S.  Regular diet. Continue colace and miralax. Awaiting more substantial bowel function prior to DC.   WBC normalized.  Would continue antibiotics for 10 day course given concern for diverticulitis in addition to intraabdominal contamination from appendicitis.  Ok to restart Eliquis.    Jamee Panda MD  General, Robotic and Endoscopic Surgery  [facility]    [address] [place] [address], [address]  [place], [place], [address]  P: [phone]  F: [phone]

## 2024-02-18 NOTE — PLAN OF CARE
Goal Outcome Evaluation:              Outcome Evaluation: Pt is a 53 y.o. female s/p laproscopic appendectomy POD4. Prior to admission pt lives alone in a 2 story home with option for first floor setup, and has friends that can check in on her upon d/c. IADL and no AD prior to admission, does not have any medical equipment at home. Pt requires mod I for all mobility today, including ambulation for 150 ft with FWW. Pt has no acute PT needs at this time, but will benefit from FWW upon d/c. PT to sign of pt, appropriate for d/c home with assist and FWW once medically appropriate.      Anticipated Discharge Disposition (PT): home with assist

## 2024-02-18 NOTE — PROGRESS NOTES
Name: Li Kimball ADMIT: 2024   : 1970  PCP: Tank Shea APRN    MRN: [id number] LOS: 4 days   AGE/SEX: 53 y.o. female  ROOM: [facility]     Subjective   Subjective   Abdominal pain slowly improving.  Tolerated viscous gravy for breakfast without any nausea or vomiting.  Appetite has returned.  Having flatus, no bowel movement yet.    Review of Systems   Constitutional:  Negative for chills and fever.   Respiratory:  Negative for cough and shortness of breath.    Cardiovascular:  Negative for chest pain and leg swelling.   Gastrointestinal:  Positive for abdominal pain. Negative for diarrhea and nausea.     As above     Objective   Objective   Vital Signs  Temp:  [97.5 °F (36.4 °C)-98.4 °F (36.9 °C)] 98.4 °F (36.9 °C)  Heart Rate:  [72-88] 72  Resp:  [20] 20  BP: (119-142)/(78-88) 142/78  SpO2:  [95 %-98 %] 95 %  on   ;   Device (Oxygen Therapy): room air  Body mass index is 37.38 kg/m².  Physical Exam  Constitutional:       General: She is not in acute distress.     Appearance: She is not ill-appearing.   Cardiovascular:      Rate and Rhythm: Normal rate and regular rhythm.   Pulmonary:      Effort: Pulmonary effort is normal. No respiratory distress.   Abdominal:      General: Abdomen is flat. There is no distension.      Tenderness: There is no abdominal tenderness.      Comments: Laparoscopic incisions.  Mild tenderness to palpation.   Musculoskeletal:         General: No swelling or deformity. Normal range of motion.   Skin:     General: Skin is warm and dry.   Neurological:      General: No focal deficit present.      Mental Status: She is alert. Mental status is at baseline.         Results Review     I reviewed the patient's new clinical results.  Results from last 7 days   Lab Units 24  0457 24  0508 24  0524 02/15/24  0544   WBC 10*3/mm3 9.59 7.96 10.43 13.84*   HEMOGLOBIN g/dL 11.0* 10.0* 10.6* 10.7*   PLATELETS 10*3/mm3 414 471* 460* 356     Results from  last 7 days   Lab Units 02/18/24  0457 02/17/24  1516 02/17/24  0508 02/16/24  0524 02/16/24  0034 02/15/24  0544   SODIUM mmol/L 140  --  139 139  --  139   POTASSIUM mmol/L 5.3* 4.2 3.4* 3.7   < > 3.1*   CHLORIDE mmol/L 111*  --  110* 108*  --  104   CO2 mmol/L 21.0*  --  20.6* 21.9*  --  22.1   BUN mg/dL 13  --  14 23*  --  19   CREATININE mg/dL 0.72  --  0.66 0.81  --  0.91   GLUCOSE mg/dL 93  --  102* 102*  --  114*    < > = values in this interval not displayed.   Estimated Creatinine Clearance: 99.4 mL/min (by C-G formula based on SCr of 0.72 mg/dL).  Results from last 7 days   Lab Units 02/13/24  0737 02/13/24  0134   ALBUMIN g/dL 4.0 4.0   BILIRUBIN mg/dL 0.4 0.2   ALK PHOS U/L 48 46   AST (SGOT) U/L 13 14   ALT (SGPT) U/L 13 15     Results from last 7 days   Lab Units 02/18/24  0457 02/17/24  0508 02/16/24  0524 02/15/24  0544 02/14/24  0547 02/13/24  0737 02/13/24  0134   CALCIUM mg/dL 8.3* 7.7* 8.1* 8.2*   < > 9.5 9.9   ALBUMIN g/dL  --   --   --   --   --  4.0 4.0   MAGNESIUM mg/dL  --   --   --   --   --  1.8  --     < > = values in this interval not displayed.     Results from last 7 days   Lab Units 02/13/24  0408   LACTATE mmol/L 1.9     COVID19   Date Value Ref Range Status   06/29/2022 Not Detected Not Detected - Ref. Range Final     SARS-CoV-2 PCR   Date Value Ref Range Status   09/15/2020 Not Detected Not Detected Final     Comment:     Nucleic acid specific to SARS-CoV-2 (COVID-19) virus was not detected inthis sample by the TaqPath (TM) COVID-19 Combo Kit.SARS-CoV-2 (COVID-19) nucleic acid testing performed using [employer] Aptima (R) SARS-CoV-2 Assay or [employer] TaqPath   (TM)COVID-19 Combo Kit as indicated above under Emergency Use Authorization (EUA) from the FDA. Aptima (R) and TaqPath (TM) test performancecharacteristics verified by [facility] in accordance with the FDAs Guidance Document (Policy for Diagnostic   Tests for Coronavirus Disease-2019during the Public Health  Emergency) issued on March 16, 2020. The laboratory is regulated under CLIA as qualified to perform high-complexity testingUnless otherwise noted, all testing was performed at [facility],   CLIA No. [id number], [place] State Licensee No. [id number]     Glucose   Date/Time Value Ref Range Status   02/18/2024 1100 149 (H) 70 - 130 mg/dL Final   02/18/2024 0635 97 70 - 130 mg/dL Final   02/18/2024 0000 91 70 - 130 mg/dL Final   02/17/2024 1636 79 70 - 130 mg/dL Final   02/17/2024 1128 69 (L) 70 - 130 mg/dL Final   02/17/2024 0621 100 70 - 130 mg/dL Final   02/16/2024 2354 104 70 - 130 mg/dL Final       US Pelvis Transvaginal Non OB, US Testicular or Ovarian Vascular Limited  US PELVIC AND TRANSVAGINAL NONOBSTETRICAL-, US TESTICULAR OR OVARIAN  VASCULAR LIMITED-     Clinical: Abnormal left adnexa/left ovary on CT, patient with acute  appendicitis.     FINDINGS: Transabdominal and transvaginal ultrasound performed. Uterus  measures 8.6 cm in length, 3.8 cm AP and 6.1 cm transverse. The  endometrial echo is curvilinear at 7 mm in width. No endometrial fluid  seen. There are nabothian cysts with the largest of these 16 mm.     Bowel obscures the right ovary.     There is a hypodense nodular area demonstrated in the expected area of  the left ovary measuring 16 x 12 x 9 mm. Color flow imaging and Doppler  analysis performed. A portion demonstrates satisfactory arterial  waveforms and likely represents a portion of the left ovary. Color flow  imaging demonstrates a portion having an appearance consistent with a  small amount of complex fluid. Either free fluid or loculated. The  appearance corresponds well with the CT examination. Short-term  follow-up advised when the patient's condition permits.     This report was finalized on 2/13/2024 5:24 PM by Dr. Pee Nogueira M.D  on Workstation: [id number]     CT Abdomen Pelvis With Contrast  Narrative: CT ABDOMEN PELVIS W CONTRAST-     INDICATION: Right flank pain     COMPARISON: CT  abdomen and pelvis February 13, 2014 at 2:16 a.m.     TECHNIQUE:  Routine CT abdomen and pelvis with IV contrast. Coronal and sagittal  reformats. Radiation dose reduction techniques were utilized, including  automated exposure control and exposure modulation based on body size.     FINDINGS:      Lung bases: Subsegmental atelectasis at the bases.     ABDOMEN: Normal liver, gallbladder, spleen, pancreas and adrenal glands.  Incidental splenule. Suspect tiny cyst in the superior pole right  kidney. Nonobstructing nephrolithiasis in the inferior pole left kidney,  series 2, axial image 63, measures 2 mm. Small parapelvic left renal  cysts.     Pelvis: Underdistended bladder. No bladder calculus. Anteverted uterus.  Questionable small mass or leiomyoma in the left uterine fundus. Suspect  tubal ligation. Small corpus luteal cyst suspected in the left ovary.  Small amount of asymmetric fat stranding in the left adnexa.     Bowel: Small amount of free fluid in the cul-de-sac. Colonic  diverticulosis. No obstruction. Appendix is enlarged, series 2, axial  image 93, measuring 12 mm, with periappendiceal edema and trace  ill-defined fluid, consistent with appendicitis. Possible tiny  appendicolith at the appendicular origin, series 3, coronal image 61. No  extraluminal gas. No walled off periappendiceal fluid collection/abscess  seen.     Abdominal wall: Small fat-containing umbilical hernia. Periumbilical  scarring. Small fat-containing left inguinal hernia.     Retroperitoneum: No lymphadenopathy.     Vasculature: Patent. IVC filter seen with the legs extending through the  caval wall.     Osseous structures: No destructive osseous lesions.     Impression:    1. Acute nonperforated appendicitis.  2. Small amount of free fluid in the cul-de-sac.  3. Small amount of asymmetric stranding/edema in the left adnexa is  nonspecific. Could be related to a corpus luteal cyst in the left ovary,  diverticulitis thought less  likely.  4. Nonobstructing left nephrolithiasis.           This report was finalized on 2/13/2024 9:54 AM by Dr. Jeffy Santana M.D on Workstation: [id number]     CT Abdomen Pelvis Without Contrast  Addendum: ADDENDUM:   02 13 24 03:39 Call Doctor Regarding Above results, called  Dr. Mendoza    on 02 13 03:38 (-05:00)     Electronically signed by Walter Zhang DO, Diplomate American Board of  Narrative: CR  Patient: J LUIS HOPPER  Time Out: 03:37  Exam(s): CT ABDOMEN + PELVIS Without Contrast     EXAM:    CT Abdomen and Pelvis Without Intravenous Contrast    CLINICAL HISTORY:     Reason for exam: Right flank pain, dysuria, known UTI.    TECHNIQUE:    Axial computed tomography images of the abdomen and pelvis without   intravenous contrast.  CTDI is 19.4 mGy and DLP is 1075 mGy-cm.  This CT   exam was performed according to the principle of ALARA (As Low As   Reasonably Achievable) by using one or more of the following dose   reduction techniques: automated exposure control, adjustment of the mA   and or kV according to patient size, and or use of iterative   reconstruction technique.    COMPARISON:    6 29 22    FINDINGS:    Lung bases:  Unremarkable.  No mass.  No consolidation.     ABDOMEN:    Liver:  Unremarkable.    Gallbladder and bile ducts:  Unremarkable.  No calcified stones.  No   ductal dilation.    Pancreas:  Unremarkable.  No ductal dilation.    Spleen:  Unremarkable.  No splenomegaly.    Adrenals:  Unremarkable.  No mass.    Kidneys and ureters:  Tiny bilateral nonobstructing intrarenal calculi.    Negative for obstructive uropathy.  Negative for perirenal or pararenal   fluid collections.    Stomach and bowel:  Negative for GI tract obstruction or pneumatosis.    Mild wall thickening proximal to mid sigmoid colon.  There are mild   inflammatory changes adjacent to the sigmoid colon, most prominent as it   crosses the left adnexa.  There are irregular collections of gas adjacent   to the  sigmoid colon, the most proximal region is at the level of the   sigmoid colon across in the left adnexa where the gas collection is   lateral to the left lateral wall.  A second region of irregular gas along   the right lateral wall of the sigmoid colon, near the level of the lower   uterine segment and cervix.     PELVIS:    Appendix:  Pathologic enlargement of the appendix measuring 12 mm in   short axis.  Multiple tiny appendicoliths.  Moderate periappendiceal   inflammation.    Bladder:  Unremarkable.  No stones.    Reproductive:  Soft tissue prominence in the left adnexa measuring   approximately 3.0 x 4.7 cm.  There are mild inflammatory changes   immediately adjacent to the left adnexal soft tissue.     ABDOMEN and PELVIS:    Intraperitoneal space:  Unremarkable.  No free air.  No significant   fluid collection.    Bones joints:  No acute fracture.  No dislocation.    Soft tissues:  Unremarkable.    Vasculature:  Unremarkable.  No abdominal aortic aneurysm.  No change   in position of IVC filter with the apex below the level of both renal   veins.    Lymph nodes:  Unremarkable.  No enlarged lymph nodes.    IMPRESSION:       1.  Acute appendicitis without imaging evidence of perforation.  2.  Mild fat stranding adjacent to the sigmoid colon and prominent left   adnexal soft tissue . mild wall thickening of the sigmoid colon, likely   reflecting colitis diverticulitis.  Irregular gas collections adjacent to   the sigmoid colon are indeterminate for irregular prominent diverticuli   or areas of locally contained perforation.  Etiology of the soft tissue   prominence in the left adnexa is unknown.  Recommend follow-up pelvic   ultrasound and contrast-enhanced CT. diagnostic sensitivity is reduced by   the absence of IV contrast.  Impression: Communications:     Call Doctor Above results    Electronically signed by Iftikhar Valencia DOomate American Board of   Radiology on 02-13-24 at 0334    I reviewed the  patient's daily medications.  Scheduled Medications  acetaminophen, 975 mg, Oral, Q6H  amoxicillin-clavulanate, 1 tablet, Oral, Q12H  apixaban, 5 mg, Oral, Q12H  docusate sodium, 100 mg, Oral, BID  insulin regular, 2-7 Units, Subcutaneous, Q6H  levothyroxine, 25 mcg, Oral, Q AM  Metamucil, 2 wafer, Oral, Daily  minoxidil, 1.25 mg, Oral, Daily  polyethylene glycol, 17 g, Oral, Daily  sodium chloride, 10 mL, Intravenous, Q12H    Infusions   Diet  Diet: Regular/House Diet; Texture: Regular Texture (IDDSI 7); Fluid Consistency: Thin (IDDSI 0)         I have personally reviewed:  [x]  Laboratory   []  Microbiology   [x]  Radiology   []  EKG/Telemetry   []  Cardiology/Vascular   []  Pathology   [x]  Records     Assessment/Plan     Active Hospital Problems    Diagnosis  POA    **Acute appendicitis [K35.80]  Yes    Colitis [K52.9]  Yes    Diabetes mellitus type 2, diet-controlled [E11.9]  Yes    Chronic anticoagulation [Z79.01]  Not Applicable    Recurrent deep vein thrombosis (DVT) [I82.409]  Yes    Hypothyroidism, adult [E03.9]  Yes      Resolved Hospital Problems   No resolved problems to display.       53 y.o. female admitted with Acute appendicitis.    Acute appendicitis with purulent peritonitis  Diverticulitis  -Patient antibiotics to Augmentin today, discussed with Dr. Panda.  Patient tolerating diet well.    -Continue docusate twice a day, increase MiraLAX to twice a day.  Add needed Metamucil.    History of DVT/PE-discussed with surgery and okay to resume Eliquis    Prediabetes, A1c 6.2%  -Lifestyle modifications versus metformin on discharge    Morbid obesity, BMI 37    Hypothyroidism-continue home Synthroid    Eliquis (home med) for DVT prophylaxis.  Full code.  Discussed with patient, nursing staff, and consulting provider.  Anticipate discharge home in 1-2 days. After return of bowel function    Expected Discharge Date: 2/19/2024; Expected Discharge Time:       Edwar Del Real MD  [facility]  02/18/24  16:00 EST

## 2024-02-18 NOTE — THERAPY EVALUATION
Patient Name: Li Kimball  : 1970    MRN: [id number]                              Today's Date: 2024       Admit Date: 2024    Visit Dx:     ICD-10-CM ICD-9-CM   1. Acute appendicitis with localized peritonitis, without perforation, abscess, or gangrene  K35.30 540.9   2. Acute diverticulitis  K57.92 562.11     Patient Active Problem List   Diagnosis    Hypothyroidism, adult    Mixed hyperlipidemia    BPPV (benign paroxysmal positional vertigo)    Asthma    Migraines    DVT (deep venous thrombosis)    Colonic stricture    Screen for colon cancer    Chronic anticoagulation    Recurrent deep vein thrombosis (DVT)    Hair loss    History of skin cancer    Right ureteral stone    Diabetes mellitus type 2, diet-controlled    Obesity (BMI 30.0-34.9)    Hypertriglyceridemia    Class 2 severe obesity with serious comorbidity and body mass index (BMI) of 37.0 to 37.9 in adult    Type 2 diabetes mellitus without complication, without long-term current use of insulin    Acute appendicitis    Colitis     Past Medical History:   Diagnosis Date    Asthma     BPPV (benign paroxysmal positional vertigo)     Diverticulitis     DVT (deep venous thrombosis)     Right    Hypothyroid     Kidney stones     Migraines     Mixed hyperlipidemia     Slow to wake up after anesthesia     Weight loss      Past Surgical History:   Procedure Laterality Date    ANKLE SURGERY Right 2015    Removal of hardware    APPENDECTOMY N/A 2024    Procedure: APPENDECTOMY LAPAROSCOPIC, POSSIBLE OPEN;  Surgeon: Jamee Panda MD;  Location: [facility] MAIN OR;  Service: General;  Laterality: N/A;    COLONOSCOPY N/A 2020    COLONOSCOPY N/A 2020    Procedure: COLONOSCOPY to cecum with 20mm colonic balloon dilation;  Surgeon: Walter Verdin MD;  Location: [facility] ENDOSCOPY;  Service: General;  Laterality: N/A;  pre - diverticulitis induced stricture   post - colonic stricture    COLONOSCOPY N/A 10/28/2021    Procedure:  COLONOSCOPY into cecum with bx;  Surgeon: Walter Verdin MD;  Location: [facility] ENDOSCOPY;  Service: General;  Laterality: N/A;  pre: hx of colonic stricture   post: diverticulosis     CYSTOSCOPY W/ URETERAL STENT PLACEMENT Right 7/1/2022    Procedure: CYSTOSCOPY, RIGHT URETEROSCOPY RIGHT STENT PLACEMENT, STONE BASKET EXTRACTION, LASER LITHOTRIPSY;  Surgeon: Cyrus Au MD;  Location: [facility] MAIN OR;  Service: Urology;  Laterality: Right;    ENDOMETRIAL ABLATION      FOOT SURGERY Left 2006    Fracture , postop DVT and PE    JOINT REPLACEMENT      ankle, s/p two surgery    LAPAROSCOPIC TUBAL LIGATION      ORIF ANKLE FRACTURE Right 2014    DVT and PE perioperatively    TUBAL ABDOMINAL LIGATION      VENA CAVA FILTER INSERTION  2014      General Information       Row Name 02/18/24 1429          Physical Therapy Time and Intention    Document Type evaluation  -     Mode of Treatment individual therapy;physical therapy  -       Row Name 02/18/24 1429          General Information    Patient Profile Reviewed yes  -CC     Prior Level of Function independent:  -CC     Existing Precautions/Restrictions no known precautions/restrictions  -     Barriers to Rehab none identified  -       Row Name 02/18/24 1429          Living Environment    People in Home alone  -       Row Name 02/18/24 1429          Stairs Within Home, Primary    Stairs, Within Home, Primary can do first floor setup  -CC       Row Name 02/18/24 1429          Cognition    Orientation Status (Cognition) oriented x 4  -CC       Row Name 02/18/24 1429          Safety Issues, Functional Mobility    Impairments Affecting Function (Mobility) strength  -               User Key  (r) = Recorded By, (t) = Taken By, (c) = Cosigned By      Initials Name Provider Type    CC Emely Redding PT Physical Therapist                   Mobility       Row Name 02/18/24 1429          Bed Mobility    Bed Mobility bed mobility (all) activities  -      All Activities, [place] (Bed Mobility) modified independence  -     Assistive Device (Bed Mobility) head of bed elevated  -[facility] Name 02/18/24 1429          Sit-Stand Transfer    Sit-Stand [place] (Transfers) modified independence  -[facility] Name 02/18/24 1429          Gait/Stairs (Locomotion)    [place] Level (Gait) modified independence  -     Assistive Device (Gait) walker, front-wheeled  -     Distance in Feet (Gait) 150  -               User Key  (r) = Recorded By, (t) = Taken By, (c) = Cosigned By      Initials Name Provider Type    Emely Hill PT Physical Therapist                   Obj/Interventions       Row Name 02/18/24 1429          Strength Comprehensive (MMT)    General Manual Muscle Testing (MMT) Assessment lower extremity strength deficits identified  -               User Key  (r) = Recorded By, (t) = Taken By, (c) = Cosigned By      Initials Name Provider Type    Emely Hill PT Physical Therapist                   Goals/Plan    No documentation.                  Clinical Impression       [facility] Name 02/18/24 1429          Plan of Care Review    Outcome Evaluation Pt is a 53 y.o. female s/p laproscopic appendectomy POD4. Prior to admission pt lives alone in a 2 story home with option for first floor setup, and has friends that can check in on her upon d/c. IADL and no AD prior to admission, does not have any medical equipment at home. Pt requires mod I for all mobility today, including ambulation for 150 ft with FWW. Pt has no acute PT needs at this time, but will benefit from FWW upon d/c. PT to sign of pt, appropriate for d/c home with assist and FWW once medically appropriate.  -       Row Name 02/18/24 1429          Therapy Assessment/Plan (PT)    Criteria for Skilled Interventions Met (PT) no problems identified which require skilled intervention  -     Therapy Frequency (PT) evaluation only  -[facility] Name 02/18/24 142           Positioning and Restraints    Pre-Treatment Position in bed  -CC     Post Treatment Position bed  -CC     In Bed supine;call light within reach;encouraged to call for assist  -CC               User Key  (r) = Recorded By, (t) = Taken By, (c) = Cosigned By      Initials Name Provider Type    Emely Hill, KAY Physical Therapist                   Outcome Measures       Row Name 02/18/24 1431 02/18/24 0908       How much help from another person do you currently need...    Turning from your back to your side while in flat bed without using bedrails? 4  -CC 3  -DS    Moving from lying on back to sitting on the side of a flat bed without bedrails? 4  -CC 3  -DS    Moving to and from a bed to a chair (including a wheelchair)? 4  -CC 3  -DS    Standing up from a chair using your arms (e.g., wheelchair, bedside chair)? 4  -CC 3  -DS    Climbing 3-5 steps with a railing? 3  -CC 3  -DS    To walk in hospital room? 4  -CC 3  -DS    AM-PAC 6 Clicks Score (PT) 23  -CC 18  -DS    Highest Level of Mobility Goal 7 --> Walk 25 feet or more  -CC 6 --> Walk 10 steps or more  -DS      Row Name 02/18/24 1431          Functional Assessment    Outcome Measure Options AM-PAC 6 Clicks Basic Mobility (PT)  -CC               User Key  (r) = Recorded By, (t) = Taken By, (c) = Cosigned By      Initials Name Provider Type    Ade Ball RN Registered Nurse    Emely Hill PT Physical Therapist                                   PT Recommendation and Plan     Outcome Evaluation: Pt is a 53 y.o. female s/p laproscopic appendectomy POD4. Prior to admission pt lives alone in a 2 story home with option for first floor setup, and has friends that can check in on her upon d/c. IADL and no AD prior to admission, does not have any medical equipment at home. Pt requires mod I for all mobility today, including ambulation for 150 ft with FWW. Pt has no acute PT needs at this time, but will benefit from FWW upon d/c. PT to sign of  pt, appropriate for d/c home with assist and FWW once medically appropriate.     Time Calculation:         PT Charges       Row Name 02/18/24 1432             Time Calculation    Start Time 1415  -CC      Stop Time 1427  -CC      Time Calculation (min) 12 min  -CC      PT Received On 02/18/24  -CC         Untimed Charges    PT Eval/Re-eval Minutes 12  -CC         Total Minutes    Untimed Charges Total Minutes 12  -CC       Total Minutes 12  -CC                User Key  (r) = Recorded By, (t) = Taken By, (c) = Cosigned By      Initials Name Provider Type    CC Emely Redding, PT Physical Therapist                  Therapy Charges for Today       Code Description Service Date Service Provider Modifiers Qty    [id number] HC PT EVAL MOD COMPLEXITY 2 2/18/2024 Emely Redding, PT GP 1            PT G-Codes  Outcome Measure Options: AM-PAC 6 Clicks Basic Mobility (PT)  AM-PAC 6 Clicks Score (PT): 23  PT Discharge Summary  Anticipated Discharge Disposition (PT): home with assist    Emely Redding PT  2/18/2024

## 2024-02-19 ENCOUNTER — READMISSION MANAGEMENT (OUTPATIENT)
Dept: CALL CENTER | Facility: HOSPITAL | Age: 54
End: 2024-02-19
Payer: MEDICAID

## 2024-02-19 VITALS
HEIGHT: 63 IN | RESPIRATION RATE: 18 BRPM | OXYGEN SATURATION: 99 % | WEIGHT: 211 LBS | TEMPERATURE: 98.1 F | SYSTOLIC BLOOD PRESSURE: 127 MMHG | DIASTOLIC BLOOD PRESSURE: 85 MMHG | BODY MASS INDEX: 37.39 KG/M2 | HEART RATE: 94 BPM

## 2024-02-19 LAB
ANION GAP SERPL CALCULATED.3IONS-SCNC: 7 MMOL/L (ref 5–15)
ANISOCYTOSIS BLD QL: ABNORMAL
BUN SERPL-MCNC: 11 MG/DL (ref 6–20)
BUN/CREAT SERPL: 17.2 (ref 7–25)
CALCIUM SPEC-SCNC: 8.5 MG/DL (ref 8.6–10.5)
CHLORIDE SERPL-SCNC: 109 MMOL/L (ref 98–107)
CO2 SERPL-SCNC: 22 MMOL/L (ref 22–29)
CREAT SERPL-MCNC: 0.64 MG/DL (ref 0.57–1)
DEPRECATED RDW RBC AUTO: 40.5 FL (ref 37–54)
EGFRCR SERPLBLD CKD-EPI 2021: 105.8 ML/MIN/1.73
ELLIPTOCYTES BLD QL SMEAR: ABNORMAL
EOSINOPHIL # BLD MANUAL: 0.31 10*3/MM3 (ref 0–0.4)
EOSINOPHIL NFR BLD MANUAL: 3 % (ref 0.3–6.2)
ERYTHROCYTE [DISTWIDTH] IN BLOOD BY AUTOMATED COUNT: 13.1 % (ref 12.3–15.4)
GLUCOSE BLDC GLUCOMTR-MCNC: 103 MG/DL (ref 70–130)
GLUCOSE BLDC GLUCOMTR-MCNC: 93 MG/DL (ref 70–130)
GLUCOSE SERPL-MCNC: 113 MG/DL (ref 65–99)
HCT VFR BLD AUTO: 33.4 % (ref 34–46.6)
HGB BLD-MCNC: 10.6 G/DL (ref 12–15.9)
LYMPHOCYTES # BLD MANUAL: 2.1 10*3/MM3 (ref 0.7–3.1)
LYMPHOCYTES NFR BLD MANUAL: 2 % (ref 5–12)
MCH RBC QN AUTO: 27 PG (ref 26.6–33)
MCHC RBC AUTO-ENTMCNC: 31.7 G/DL (ref 31.5–35.7)
MCV RBC AUTO: 85.2 FL (ref 79–97)
METAMYELOCYTES NFR BLD MANUAL: 4 % (ref 0–0)
MICROCYTES BLD QL: ABNORMAL
MONOCYTES # BLD: 0.21 10*3/MM3 (ref 0.1–0.9)
NEUTROPHILS # BLD AUTO: 7.37 10*3/MM3 (ref 1.7–7)
NEUTROPHILS NFR BLD MANUAL: 70.7 % (ref 42.7–76)
OVALOCYTES BLD QL SMEAR: ABNORMAL
PLAT MORPH BLD: NORMAL
PLATELET # BLD AUTO: 495 10*3/MM3 (ref 140–450)
PMV BLD AUTO: 9.1 FL (ref 6–12)
POIKILOCYTOSIS BLD QL SMEAR: ABNORMAL
POLYCHROMASIA BLD QL SMEAR: ABNORMAL
POTASSIUM SERPL-SCNC: 3.7 MMOL/L (ref 3.5–5.2)
RBC # BLD AUTO: 3.92 10*6/MM3 (ref 3.77–5.28)
SODIUM SERPL-SCNC: 138 MMOL/L (ref 136–145)
VARIANT LYMPHS NFR BLD MANUAL: 20.2 % (ref 19.6–45.3)
WBC MORPH BLD: NORMAL
WBC NRBC COR # BLD AUTO: 10.42 10*3/MM3 (ref 3.4–10.8)

## 2024-02-19 PROCEDURE — 82948 REAGENT STRIP/BLOOD GLUCOSE: CPT

## 2024-02-19 PROCEDURE — 85007 BL SMEAR W/DIFF WBC COUNT: CPT | Performed by: STUDENT IN AN ORGANIZED HEALTH CARE EDUCATION/TRAINING PROGRAM

## 2024-02-19 PROCEDURE — 85025 COMPLETE CBC W/AUTO DIFF WBC: CPT | Performed by: STUDENT IN AN ORGANIZED HEALTH CARE EDUCATION/TRAINING PROGRAM

## 2024-02-19 PROCEDURE — 99024 POSTOP FOLLOW-UP VISIT: CPT | Performed by: STUDENT IN AN ORGANIZED HEALTH CARE EDUCATION/TRAINING PROGRAM

## 2024-02-19 PROCEDURE — 80048 BASIC METABOLIC PNL TOTAL CA: CPT | Performed by: STUDENT IN AN ORGANIZED HEALTH CARE EDUCATION/TRAINING PROGRAM

## 2024-02-19 RX ORDER — DOCUSATE SODIUM 100 MG/1
100 CAPSULE, LIQUID FILLED ORAL DAILY
Qty: 14 CAPSULE | Refills: 0 | Status: SHIPPED | OUTPATIENT
Start: 2024-02-19 | End: 2024-03-04

## 2024-02-19 RX ORDER — AMOXICILLIN AND CLAVULANATE POTASSIUM 875; 125 MG/1; MG/1
1 TABLET, FILM COATED ORAL EVERY 12 HOURS SCHEDULED
Qty: 7 TABLET | Refills: 0 | Status: SHIPPED | OUTPATIENT
Start: 2024-02-19 | End: 2024-02-23

## 2024-02-19 RX ORDER — POLYETHYLENE GLYCOL 3350 17 G/17G
17 POWDER, FOR SOLUTION ORAL DAILY
Qty: 238 G | Refills: 0 | Status: SHIPPED | OUTPATIENT
Start: 2024-02-20 | End: 2024-03-05

## 2024-02-19 RX ORDER — OXYCODONE HYDROCHLORIDE 5 MG/1
5 TABLET ORAL EVERY 6 HOURS PRN
Qty: 12 TABLET | Refills: 0 | Status: SHIPPED | OUTPATIENT
Start: 2024-02-19 | End: 2024-02-22

## 2024-02-19 RX ORDER — ONDANSETRON 4 MG/1
4 TABLET, ORALLY DISINTEGRATING ORAL EVERY 8 HOURS PRN
Qty: 9 TABLET | Refills: 0 | Status: SHIPPED | OUTPATIENT
Start: 2024-02-19 | End: 2024-02-22

## 2024-02-19 RX ADMIN — ACETAMINOPHEN 325MG 975 MG: 325 TABLET ORAL at 02:34

## 2024-02-19 RX ADMIN — MINOXIDIL 1.25 MG: 2.5 TABLET ORAL at 08:37

## 2024-02-19 RX ADMIN — ACETAMINOPHEN 325MG 975 MG: 325 TABLET ORAL at 08:37

## 2024-02-19 RX ADMIN — Medication 10 ML: at 08:38

## 2024-02-19 RX ADMIN — AMOXICILLIN AND CLAVULANATE POTASSIUM 1 TABLET: 875; 125 TABLET, FILM COATED ORAL at 08:37

## 2024-02-19 RX ADMIN — APIXABAN 5 MG: 5 TABLET, FILM COATED ORAL at 08:37

## 2024-02-19 RX ADMIN — LEVOTHYROXINE SODIUM 25 MCG: 25 TABLET ORAL at 05:49

## 2024-02-19 RX ADMIN — DOCUSATE SODIUM 100 MG: 100 CAPSULE, LIQUID FILLED ORAL at 08:37

## 2024-02-19 RX ADMIN — POLYETHYLENE GLYCOL 3350 17 G: 17 POWDER, FOR SOLUTION ORAL at 08:37

## 2024-02-19 NOTE — OUTREACH NOTE
Prep Survey      Flowsheet Row Responses   [facility] patient discharged from? [facility]   Is LACE score < 7 ? No   Eligibility [facility]   Date of Admission 02/13/24   Date of Discharge 02/19/24   Discharge diagnosis APPENDECTOMY LAPAROSCOPIC, POSSIBLE OPEN   Does the patient have one of the following disease processes/diagnoses(primary or secondary)? General Surgery   Does the patient have Home health ordered? No   Is there a DME ordered? Yes   What DME was ordered? [facility]   Prep survey completed? Yes            Harriet TALBERT - Registered Nurse

## 2024-02-19 NOTE — DISCHARGE PLACEMENT REQUEST
"J Luis Hopper (53 y.o. Female)       Date of Birth   1970    Social Security Number       Address   [address] [place] [address]  [facility] [address]    Home Phone   [phone]    MRN   [id number]       [employer]   [employer]    Marital Status                               Admission Date   2/13/24    Admission Type   Emergency    Admitting Provider   Donovan Sosa MD    Attending Provider   Edwar Del Real MD    Department, Room/Bed   [address], S607/1       Discharge Date       Discharge Disposition   Home or Self Care    Discharge Destination                                 Attending Provider: Edwar Del Real MD    Allergies: Codeine    Isolation: None   Infection: MRSA/History Only (09/17/20)   Code Status: CPR    Ht: 160 cm (63\")   Wt: 95.7 kg (211 lb)    Admission Cmt: None   Principal Problem: Acute appendicitis [K35.80]                   Active Insurance as of 2/13/2024       Primary Coverage       Payor Plan Insurance Group Employer/Plan Group    [facility] COMMUNITY PLAN [facility] COMMUNITY PLAN [facility]       Payor Plan Address Payor Plan Phone Number Payor Plan Fax Number Effective Dates    [address]   7/1/2023 - None Entered    [facility] [id number]         Subscriber Name Subscriber Birth Date Member ID       J LUIS HOPPER 1970 [id number]                     Emergency Contacts        (Rel.) Home Phone Work Phone Mobile Phone    Jose Saenz (Brother) [phone] -- --    Elizabeth Pride (Mother) [phone] -- --                "

## 2024-02-19 NOTE — PROGRESS NOTES
"General Surgery Progress Note    POD5 laparoscopic appendectomy    S: Patient states she is feeling well today.  Her pain is controlled.  She had 3 bowel movements this morning.  She denies any nausea or vomiting is tolerating her diet.  Denies any fever or chills.  She was able to get up and ambulate.    O:/85 (BP Location: Right arm, Patient Position: Lying)   Pulse 94   Temp 98.1 °F (36.7 °C) (Oral)   Resp 18   Ht 160 cm (63\")   Wt 95.7 kg (211 lb)   LMP 10/20/2021 Comment: neg hcg 2/14/2024  SpO2 99%   BMI 37.38 kg/m²     Intake & Output (last day)         02/18 0701  02/19 0700 02/19 0701 02/20 0700    P.O. 240     Total Intake(mL/kg) 240 (2.5)     Urine (mL/kg/hr)      Total Output      Net +240           Urine Unmeasured Occurrence 3 x     Stool Unmeasured Occurrence 1 x             GENERAL: awake, alert, comfortable appearing lying in bed   HEENT: EOMI, clear sclera, moist mucus membranes   CHEST: normal work of breathing on room air  ABDOMEN: Obese, soft, nontender, incisions are healing well without signs of infection, skin glue in place   EXTREMITIES: OROURKE, no cyanosis or edema   SKIN: Warm and dry, no rash    LABS  Results from last 7 days   Lab Units 02/19/24  0511 02/18/24  0457 02/17/24  0508   WBC 10*3/mm3 10.42 9.59 7.96   HEMOGLOBIN g/dL 10.6* 11.0* 10.0*   HEMATOCRIT % 33.4* 35.0 30.5*   PLATELETS 10*3/mm3 495* 414 471*   MONOCYTES % % 2.0* 8.0 8.1   EOSINOPHIL % % 3.0  --  2.0     Results from last 7 days   Lab Units 02/19/24  0511 02/18/24  0457 02/17/24  1516 02/17/24  0508 02/14/24  0547 02/13/24  0737 02/13/24  0134   SODIUM mmol/L 138 140  --  139   < > 138 137   POTASSIUM mmol/L 3.7 5.3* 4.2 3.4*   < > 3.7 4.0   CHLORIDE mmol/L 109* 111*  --  110*   < > 104 107   CO2 mmol/L 22.0 21.0*  --  20.6*   < > 24.0 21.0*   BUN mg/dL 11 13  --  14   < > 18 22*   CREATININE mg/dL 0.64 0.72  --  0.66   < > 0.90 0.99   CALCIUM mg/dL 8.5* 8.3*  --  7.7*   < > 9.5 9.9   BILIRUBIN mg/dL  --   " --   --   --   --  0.4 0.2   ALK PHOS U/L  --   --   --   --   --  48 46   ALT (SGPT) U/L  --   --   --   --   --  13 15   AST (SGOT) U/L  --   --   --   --   --  13 14   GLUCOSE mg/dL 113* 93  --  102*   < > 118* 129*    < > = values in this interval not displayed.     Results from last 7 days   Lab Units 02/13/24  0738   INR  1.03     A/P: 53 y.o. 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 Complete 10 day course of PO antibiotics.  Discussed discharge recommendations for diet, wound care, activity restrictions, pain control.  Recommend follow-up with me in the clinic in 2 weeks for postop check.  Patient voiced understanding and is agreeable to proceed with recommendations.    Jamee Panda MD  General, Robotic and Endoscopic Surgery  [facility]    [address], [address]  [place], [place], [address]  P: [phone]  F: [phone]

## 2024-02-19 NOTE — PROGRESS NOTES
Continued Stay Note  [facility]     Patient Name: Li Kimball  MRN: [id number]  Today's Date: 2/19/2024    Admit Date: 2/13/2024    Plan: Return home - lives alone   Discharge Plan       Row Name 02/19/24 1412       Plan    Plan Return home - lives alone    Patient/Family in Agreement with Plan yes    Plan Comments Spoke with patient at bedside.  Plan remains to return home.  States she does not have anyone that can transport her home.  States her car is in the parking lot and she plans to drive.  Offered patient cab voucher.  She states she does not have anyone to bring her back to the hospital to get her car later.  States she is fine and plans to drive home.  [place] has provided a walker as ordered.  Lizbeth PHILLIPS                    Expected Discharge Date and Time       Expected Discharge Date Expected Discharge Time    Feb 19, 2024               Becky S. [place], RN

## 2024-02-19 NOTE — NURSING NOTE
Pt gathered all belongings and started ambulating with a walker off the unit despite efforts to get patient alternate transportation. Pt states she is discharged and she is driving herself home.  This RN and AMANDA Reed assist patient to a wheelchair and escort her outside with all of her belongings to her vehicle safely.

## 2024-02-19 NOTE — DISCHARGE INSTRUCTIONS
Discharge Instructions: Laparoscopic Appendectomy     Go home, rest and take it easy today; however, you should get up and move about several times a day to reduce the risk of developing a blood clot in your legs.   Do not make any important legal decisions or sign any legal papers while taking narcotic medication.  You may take a regular diet.  Your incisions are covered with skin glue. Do not try to peel it off. This will fall off on its own in 1-2 weeks. Do not worry if it comes off sooner.   You may notice some bleeding/drainage on your outer dressings. A little bloody drainage is normal. If the bleeding/drainage is such that the bandage cannot absorb it, remove the dressing, apply clean gauze and apply firm pressure for a full 15 minutes. If the bleeding continues, please call me.   You may shower 24 hours after surgery. No tub baths or swimming for two weeks and until your incisions are completely healed.   You have received a prescription for a narcotic pain medicine, as you will have some pain following surgery.  You will not be totally pain free, but your pain medicine should make the pain tolerable. Please take your pain medicine as prescribed and always take your pills with food to prevent nausea. If you are having severe pain that cannot be controlled by the pain medicine, please contact me.   Narcotic pain medicine can cause constipation. Take an over the counter stool softener, like Miaralax or docusate to prevent constipation while taking narcotics.  You have also received a prescription for an anti-nausea medicine. Please take this as prescribed for any nausea or vomiting. Nausea could be a result of the anesthesia or a result of the narcotic pain medicine. If you experience severe nausea and vomiting that cannot be controlled by the nausea medicine, please call me.   It is not unusual to experience pain/discomfort in your shoulders or your ribs after surgery. It is from the gas used during the  laparoscopic procedure and usually lasts 1-3 days. The prescription pain medicine is used to treat the surgical pain and does not typically alleviate this “gassy” pain.   No driving for 24 hours and for as long as you are taking your prescription pain medicine.   You will need to call the office at [phone] to schedule a follow-up appointment in 10-14 days.   Remember to contact me for any of the following:   Fever greater than 101 degrees fahrenheit  Severe pain that cannot be controlled by taking your pain pills  Severe nausea or vomiting that cannot be controlled by taking your nausea pills  Significant bleeding of your incisions  Drainage that has a bad smell or is yellow or green in appearance  Any other questions or concerns

## 2024-02-19 NOTE — NURSING NOTE
Pt does not have a ride home from any family members/friends.  Pt refused cab voucher at this time. Pt car is here and pt is adamant about driving herself home. She has not had any narcotics this calendar day.  Notified attending physician.  Surgeon states patient needs to have alternate transportation home.

## 2024-02-19 NOTE — TELEPHONE ENCOUNTER
Caller: Li Kimball    Relationship: Self    Best call back number: [phone]    What is the best time to reach you: AFTER 1:30PM     Who are you requesting to speak with (clinical staff, provider,  specific staff member):      What was the call regarding: PATIENT STATED SHE WAS HUNG UP ON BY [employer]IN. PATIENT WOULD LIKE TO SPEAK TO PRACTICE . UNABLE TO WARM TRANSFER     Do you require a callback: YES    19-Feb-2024 09:47

## 2024-02-19 NOTE — DISCHARGE SUMMARY
Patient Name: Li Kimball  : 1970  MRN: [id number]    Date of Admission: 2024  Date of Discharge:  2024  Primary Care Physician: Tank Shea APRN      Chief Complaint:   Flank Pain      Discharge Diagnoses     Active Hospital Problems    Diagnosis  POA    **Acute appendicitis [K35.80]  Yes    Colitis [K52.9]  Yes    Diabetes mellitus type 2, diet-controlled [E11.9]  Yes    Chronic anticoagulation [Z79.01]  Not Applicable    Recurrent deep vein thrombosis (DVT) [I82.409]  Yes    Hypothyroidism, adult [E03.9]  Yes      Resolved Hospital Problems   No resolved problems to display.        Hospital Course     Ms. Kimball is a 53 y.o. female with a history of obesity, hypothyroidism, history of DVT/PE, prediabetes presenting with abdominal pain found to have acute appendicitis with purulent peritonitis and diverticulitis.  Surgery was consulted and patient had a laparoscopic appendectomy.  Had some significant abdominal pain with nausea and vomiting for a few days after surgery but now is tolerating a diet and oral medications.  Patient had a bowel movement on .  Discussed with surgery and they are okay with discharge.  Plan for Augmentin to finish a 10-day total course of antibiotics.    Patient with multiple A1c is in the prediabetic range.  Defer to PCP to start any medications versus lifestyle modifications.    At the time of discharge patient was told to take all medications as prescribed, keep all follow-up appointments, and call their doctor or return to the hospital with any worsening or concerning symptoms.    Day of Discharge     Subjective:  Patient resting comfortably in bed.  Abdominal pain is well-controlled on current regimen.  Tolerating a diet and oral medications.    Review of Systems   Constitutional:  Negative for chills and fever.   Respiratory:  Negative for cough and shortness of breath.    Cardiovascular:  Negative for chest pain and leg swelling.    Gastrointestinal:  Negative for abdominal pain, diarrhea and nausea.       Physical Exam:  Temp:  [98.1 °F (36.7 °C)-98.4 °F (36.9 °C)] 98.1 °F (36.7 °C)  Heart Rate:  [94-99] 94  Resp:  [18-20] 18  BP: (127-160)/() 127/85  Body mass index is 37.38 kg/m².  Physical Exam  Constitutional:       General: She is not in acute distress.     Appearance: She is not ill-appearing.   Cardiovascular:      Rate and Rhythm: Normal rate and regular rhythm.   Pulmonary:      Effort: Pulmonary effort is normal. No respiratory distress.   Abdominal:      General: Abdomen is flat. There is no distension.      Tenderness: There is no abdominal tenderness.      Comments: Mild tenderness around laparoscopic incisions   Musculoskeletal:         General: No swelling or deformity. Normal range of motion.   Skin:     General: Skin is warm and dry.   Neurological:      General: No focal deficit present.      Mental Status: She is alert. Mental status is at baseline.         Consultants     Consult Orders (all) (From admission, onward)       Start     Ordered    02/13/24 0702  Inpatient General Surgery Consult  IN AM        Specialty:  General Surgery  Provider:  Jamee Panda MD    02/13/24 0411    02/13/24 0354  [facility] (on-call MD unless specified) Details  Once        Specialty:  Hospitalist  Provider:  (Not yet assigned)    02/13/24 0353    02/13/24 0342  Inpatient General Surgery Consult  STAT        Specialty:  General Surgery  Provider:  (Not yet assigned)    02/13/24 0341                  Procedures     Imaging Results (All)       Procedure Component Value Units Date/Time    US Pelvis Transvaginal Non OB [[id number]] Collected: 02/13/24 1712     Updated: 02/13/24 1727    Narrative:      US PELVIC AND TRANSVAGINAL NONOBSTETRICAL-, US TESTICULAR OR OVARIAN  VASCULAR LIMITED-     Clinical: Abnormal left adnexa/left ovary on CT, patient with acute  appendicitis.     FINDINGS: Transabdominal and transvaginal ultrasound performed.  Uterus  measures 8.6 cm in length, 3.8 cm AP and 6.1 cm transverse. The  endometrial echo is curvilinear at 7 mm in width. No endometrial fluid  seen. There are nabothian cysts with the largest of these 16 mm.     Bowel obscures the right ovary.     There is a hypodense nodular area demonstrated in the expected area of  the left ovary measuring 16 x 12 x 9 mm. Color flow imaging and Doppler  analysis performed. A portion demonstrates satisfactory arterial  waveforms and likely represents a portion of the left ovary. Color flow  imaging demonstrates a portion having an appearance consistent with a  small amount of complex fluid. Either free fluid or loculated. The  appearance corresponds well with the CT examination. Short-term  follow-up advised when the patient's condition permits.     This report was finalized on 2/13/2024 5:24 PM by Dr. Pee Nogueira M.D  on Workstation: [id number]       US Testicular or Ovarian Vascular Limited [[id number]] Collected: 02/13/24 1712     Updated: 02/13/24 1727    Narrative:      US PELVIC AND TRANSVAGINAL NONOBSTETRICAL-, US TESTICULAR OR OVARIAN  VASCULAR LIMITED-     Clinical: Abnormal left adnexa/left ovary on CT, patient with acute  appendicitis.     FINDINGS: Transabdominal and transvaginal ultrasound performed. Uterus  measures 8.6 cm in length, 3.8 cm AP and 6.1 cm transverse. The  endometrial echo is curvilinear at 7 mm in width. No endometrial fluid  seen. There are nabothian cysts with the largest of these 16 mm.     Bowel obscures the right ovary.     There is a hypodense nodular area demonstrated in the expected area of  the left ovary measuring 16 x 12 x 9 mm. Color flow imaging and Doppler  analysis performed. A portion demonstrates satisfactory arterial  waveforms and likely represents a portion of the left ovary. Color flow  imaging demonstrates a portion having an appearance consistent with a  small amount of complex fluid. Either free fluid or loculated.  The  appearance corresponds well with the CT examination. Short-term  follow-up advised when the patient's condition permits.     This report was finalized on 2/13/2024 5:24 PM by Dr. Pee Nogueira M.D  on Workstation: [id number]       CT Abdomen Pelvis With Contrast [[id number]] Collected: 02/13/24 0925     Updated: 02/13/24 0957    Narrative:      CT ABDOMEN PELVIS W CONTRAST-     INDICATION: Right flank pain     COMPARISON: CT abdomen and pelvis February 13, 2014 at 2:16 a.m.     TECHNIQUE:  Routine CT abdomen and pelvis with IV contrast. Coronal and sagittal  reformats. Radiation dose reduction techniques were utilized, including  automated exposure control and exposure modulation based on body size.     FINDINGS:      Lung bases: Subsegmental atelectasis at the bases.     ABDOMEN: Normal liver, gallbladder, spleen, pancreas and adrenal glands.  Incidental splenule. Suspect tiny cyst in the superior pole right  kidney. Nonobstructing nephrolithiasis in the inferior pole left kidney,  series 2, axial image 63, measures 2 mm. Small parapelvic left renal  cysts.     Pelvis: Underdistended bladder. No bladder calculus. Anteverted uterus.  Questionable small mass or leiomyoma in the left uterine fundus. Suspect  tubal ligation. Small corpus luteal cyst suspected in the left ovary.  Small amount of asymmetric fat stranding in the left adnexa.     Bowel: Small amount of free fluid in the cul-de-sac. Colonic  diverticulosis. No obstruction. Appendix is enlarged, series 2, axial  image 93, measuring 12 mm, with periappendiceal edema and trace  ill-defined fluid, consistent with appendicitis. Possible tiny  appendicolith at the appendicular origin, series 3, coronal image 61. No  extraluminal gas. No walled off periappendiceal fluid collection/abscess  seen.     Abdominal wall: Small fat-containing umbilical hernia. Periumbilical  scarring. Small fat-containing left inguinal hernia.     Retroperitoneum: No  lymphadenopathy.     Vasculature: Patent. IVC filter seen with the legs extending through the  caval wall.     Osseous structures: No destructive osseous lesions.       Impression:         1. Acute nonperforated appendicitis.  2. Small amount of free fluid in the cul-de-sac.  3. Small amount of asymmetric stranding/edema in the left adnexa is  nonspecific. Could be related to a corpus luteal cyst in the left ovary,  diverticulitis thought less likely.  4. Nonobstructing left nephrolithiasis.           This report was finalized on 2/13/2024 9:54 AM by Dr. Jeffy Santana M.D on Workstation: [id number]       CT Abdomen Pelvis Without Contrast [[id number]] Collected: 02/13/24 0337     Updated: 02/13/24 0340    Addenda:        ADDENDUM:  02 13 24 03:39 Call Doctor Regarding Above results, called  Dr. Mendoza   on 02 13 03:38 (-05:00)    Electronically signed by Iftikhar Valencia DOomatwill [employer] Board of     Signed: 02/13/24 0337 by Walter Zhang DO    Narrative:      CR  Patient: J LUIS HOPPER  Time Out: 03:37  Exam(s): CT ABDOMEN + PELVIS Without Contrast     EXAM:    CT Abdomen and Pelvis Without Intravenous Contrast    CLINICAL HISTORY:     Reason for exam: Right flank pain, dysuria, known UTI.    TECHNIQUE:    Axial computed tomography images of the abdomen and pelvis without   intravenous contrast.  CTDI is 19.4 mGy and DLP is 1075 mGy-cm.  This CT   exam was performed according to the principle of ALARA (As Low As   Reasonably Achievable) by using one or more of the following dose   reduction techniques: automated exposure control, adjustment of the mA   and or kV according to patient size, and or use of iterative   reconstruction technique.    COMPARISON:    6 29 22    FINDINGS:    Lung bases:  Unremarkable.  No mass.  No consolidation.     ABDOMEN:    Liver:  Unremarkable.    Gallbladder and bile ducts:  Unremarkable.  No calcified stones.  No   ductal dilation.    Pancreas:  Unremarkable.  No  ductal dilation.    Spleen:  Unremarkable.  No splenomegaly.    Adrenals:  Unremarkable.  No mass.    Kidneys and ureters:  Tiny bilateral nonobstructing intrarenal calculi.    Negative for obstructive uropathy.  Negative for perirenal or pararenal   fluid collections.    Stomach and bowel:  Negative for GI tract obstruction or pneumatosis.    Mild wall thickening proximal to mid sigmoid colon.  There are mild   inflammatory changes adjacent to the sigmoid colon, most prominent as it   crosses the left adnexa.  There are irregular collections of gas adjacent   to the sigmoid colon, the most proximal region is at the level of the   sigmoid colon across in the left adnexa where the gas collection is   lateral to the left lateral wall.  A second region of irregular gas along   the right lateral wall of the sigmoid colon, near the level of the lower   uterine segment and cervix.     PELVIS:    Appendix:  Pathologic enlargement of the appendix measuring 12 mm in   short axis.  Multiple tiny appendicoliths.  Moderate periappendiceal   inflammation.    Bladder:  Unremarkable.  No stones.    Reproductive:  Soft tissue prominence in the left adnexa measuring   approximately 3.0 x 4.7 cm.  There are mild inflammatory changes   immediately adjacent to the left adnexal soft tissue.     ABDOMEN and PELVIS:    Intraperitoneal space:  Unremarkable.  No free air.  No significant   fluid collection.    Bones joints:  No acute fracture.  No dislocation.    Soft tissues:  Unremarkable.    Vasculature:  Unremarkable.  No abdominal aortic aneurysm.  No change   in position of IVC filter with the apex below the level of both renal   veins.    Lymph nodes:  Unremarkable.  No enlarged lymph nodes.    IMPRESSION:       1.  Acute appendicitis without imaging evidence of perforation.  2.  Mild fat stranding adjacent to the sigmoid colon and prominent left   adnexal soft tissue . mild wall thickening of the sigmoid colon, likely   reflecting  "colitis diverticulitis.  Irregular gas collections adjacent to   the sigmoid colon are indeterminate for irregular prominent diverticuli   or areas of locally contained perforation.  Etiology of the soft tissue   prominence in the left adnexa is unknown.  Recommend follow-up pelvic   ultrasound and contrast-enhanced CT. diagnostic sensitivity is reduced by   the absence of IV contrast.      Impression:            Communications:     Call Doctor Above results    Electronically signed by Walter Zhang DO, Diplomate American Board of   Radiology on 02-13-24 at 0337            Pertinent Labs     Results from last 7 days   Lab Units 02/19/24  0511 02/18/24  0457 02/17/24  0508 02/16/24  0524   WBC 10*3/mm3 10.42 9.59 7.96 10.43   HEMOGLOBIN g/dL 10.6* 11.0* 10.0* 10.6*   PLATELETS 10*3/mm3 495* 414 471* 460*     Results from last 7 days   Lab Units 02/19/24  0511 02/18/24  0457 02/17/24  1516 02/17/24  0508 02/16/24  0524   SODIUM mmol/L 138 140  --  139 139   POTASSIUM mmol/L 3.7 5.3* 4.2 3.4* 3.7   CHLORIDE mmol/L 109* 111*  --  110* 108*   CO2 mmol/L 22.0 21.0*  --  20.6* 21.9*   BUN mg/dL 11 13  --  14 23*   CREATININE mg/dL 0.64 0.72  --  0.66 0.81   GLUCOSE mg/dL 113* 93  --  102* 102*   Estimated Creatinine Clearance: 111.9 mL/min (by C-G formula based on SCr of 0.64 mg/dL).  Results from last 7 days   Lab Units 02/13/24  0737 02/13/24  0134   ALBUMIN g/dL 4.0 4.0   BILIRUBIN mg/dL 0.4 0.2   ALK PHOS U/L 48 46   AST (SGOT) U/L 13 14   ALT (SGPT) U/L 13 15     Results from last 7 days   Lab Units 02/19/24  0511 02/18/24  0457 02/17/24  0508 02/16/24  0524 02/14/24  0547 02/13/24  0737 02/13/24  0134   CALCIUM mg/dL 8.5* 8.3* 7.7* 8.1*   < > 9.5 9.9   ALBUMIN g/dL  --   --   --   --   --  4.0 4.0   MAGNESIUM mg/dL  --   --   --   --   --  1.8  --     < > = values in this interval not displayed.               Invalid input(s): \"LDLCALC\"        Test Results Pending at Discharge       Discharge Details      "   Discharge Medications        New Medications        Instructions Start Date   amoxicillin-clavulanate 875-125 MG per tablet  Commonly known as: AUGMENTIN   1 tablet, Oral, Every 12 Hours Scheduled      docusate sodium 100 MG capsule  Commonly known as: Colace   100 mg, Oral, Daily      ondansetron ODT 4 MG disintegrating tablet  Commonly known as: ZOFRAN-ODT   4 mg, Translingual, Every 8 Hours PRN      oxyCODONE 5 MG immediate release tablet  Commonly known as: ROXICODONE   5 mg, Oral, Every 6 Hours PRN, Do not take on an empty stomach.      polyethylene glycol 17 GM/SCOOP powder  Commonly known as: MIRALAX   17 g, Oral, Daily   Start Date: February 20, 2024            Changes to Medications        Instructions Start Date   metFORMIN  MG 24 hr tablet  Commonly known as: GLUCOPHAGE-XR  What changed: how much to take   1,000 mg, Oral, Daily             Continue These Medications        Instructions Start Date   albuterol sulfate  (90 Base) MCG/ACT inhaler  Commonly known as: PROVENTIL HFA;VENTOLIN HFA;PROAIR HFA   2 puffs, Inhalation, Every 6 Hours PRN      apixaban 2.5 MG tablet tablet  Commonly known as: Eliquis   2.5 mg, Oral, Every 12 Hours      fenofibrate 145 MG tablet  Commonly known as: TRICOR   145 mg, Oral, Daily      finasteride 5 MG tablet  Commonly known as: PROSCAR   TAKE 1/4 TABLET BY MOUTH EVERY DAY      levothyroxine 25 MCG tablet  Commonly known as: SYNTHROID, LEVOTHROID   25 mcg, Oral, Daily      minoxidil 2.5 MG tablet  Commonly known as: LONITEN   0.5 tablets, Oral, Daily      omeprazole 20 MG capsule  Commonly known as: priLOSEC   20 mg, Oral, Daily      pravastatin 10 MG tablet  Commonly known as: PRAVACHOL   10 mg, Oral, Daily      Rybelsus 7 MG tablet  Generic drug: Semaglutide   7 mg, Oral, Daily, Please take only Rybelsus 3 mg or Rybelsus 7 mg once daily.  Take only 1 dose at a time that is no more than 1 pill/day.             Stop These Medications      ondansetron 4 MG  tablet  Commonly known as: Zofran              Allergies   Allergen Reactions    Codeine Nausea And Vomiting         Discharge Disposition:  Home or Self Care    Discharge Diet:  Diet Order   Procedures    Diet: Regular/House Diet; Texture: Regular Texture (IDDSI 7); Fluid Consistency: Thin (IDDSI 0)       Discharge Activity:   As tolerated    CODE STATUS:    Code Status and Medical Interventions:   Ordered at: 02/13/24 0411     Code Status (Patient has no pulse and is not breathing):    CPR (Attempt to Resuscitate)     Medical Interventions (Patient has pulse or is breathing):    Full Support       Future Appointments   Date Time Provider Department Center   3/6/2024  4:30 PM Tank Shea APRN [facility]      Follow-up Information       Tank Shea APRN .    Specialty: Nurse Practitioner  Contact information:  [address] [place] [address] [address]  [facility] [id number] [phone]               Jamee Panda MD Follow up in 2 week(s).    Specialty: General Surgery  Why: Please call to confirm your follow up appointment.  Contact information:  [address] [facility] [address]  [facility] [address] [phone]                             Time Spent on Discharge:  Greater than 30 minutes      Edwar Del Real MD  [facility]  02/19/24  12:58 EST

## 2024-02-20 ENCOUNTER — TRANSITIONAL CARE MANAGEMENT TELEPHONE ENCOUNTER (OUTPATIENT)
Dept: CALL CENTER | Facility: HOSPITAL | Age: 54
End: 2024-02-20
Payer: MEDICAID

## 2024-02-20 ENCOUNTER — TELEPHONE (OUTPATIENT)
Dept: SURGERY | Facility: CLINIC | Age: 54
End: 2024-02-20
Payer: MEDICAID

## 2024-02-20 NOTE — PROGRESS NOTES
Case Management Discharge Note      Final Note: DC'd home 2/19               Transportation Services  Private: Car    Final Discharge Disposition Code: 01 - home or self-care

## 2024-02-20 NOTE — OUTREACH NOTE
Call Center TCM Note      Flowsheet Row Responses   [facility] patient discharged from? [facility]   Does the patient have one of the following disease processes/diagnoses(primary or secondary)? General Surgery   TCM attempt successful? Yes   Call start time 1026   Call end time 1028   Discharge diagnosis APPENDECTOMY LAPAROSCOPIC, POSSIBLE OPEN   Meds reviewed with patient/caregiver? Yes   Is the patient having any side effects they believe may be caused by any medication additions or changes? No   Does the patient have all medications related to this admission filled (includes all antibiotics, pain medications, etc.) Yes   Is the patient taking all medications as directed (includes completed medication regime)? Yes   Does the patient have an appointment with their PCP within 7-14 days of discharge? No   Nursing Interventions Routed TCM call to PCP office, Patient declined scheduling/rescheduling appointment at this time   Has home health visited the patient within 72 hours of discharge? N/A   What DME was ordered? [employer]   Has all DME been delivered? Yes   Psychosocial issues? No   Did the patient receive a copy of their discharge instructions? Yes   Nursing interventions Reviewed instructions with patient   What is the patient's perception of their health status since discharge? Same   Nursing interventions Nurse provided patient education   Is the patient /caregiver able to teach back basic post-op care? Continue use of incentive spirometry at least 1 week post discharge, Take showers only when approved by MD-sponge bathe until then, Do not remove steri-strips, Lifting as instructed by MD in discharge instructions, No tub bath, swimming, or hot tub until instructed by MD, Practice 'cough and deep breath', Drive as instructed by MD in discharge instructions, Keep incision areas clean,dry and protected   Is the patient/caregiver able to teach back signs and symptoms of incisional infection?  Incisional warmth, Fever, Increased drainage or bleeding, Increased redness, swelling or pain at the incisonal site, Pus or odor from incision   Is the patient/caregiver able to teach back steps to recovery at home? Set small, achievable goals for return to baseline health, Practice good oral hygiene, Eat a well-balance diet, Rest and rebuild strength, gradually increase activity, Weigh daily, Make a list of questions for surgeon's appointment   If the patient is a current smoker, are they able to teach back resources for cessation? Not a smoker   Is the patient/caregiver able to teach back the hierarchy of who to call/visit for symptoms/problems? PCP, Specialist, Home health nurse, Urgent Care, ED, 911 Yes   TCM call completed? Yes   Wrap up additional comments D/C DX: acute abdominal pain,  acute appy w/purulent peritonitis,  diverticulitis,  emergent appendectomy - Pt doing fair, did not feel like talking long. Feels about the same as at d/c. New rx's Amoxicillin-clavulanate, Colace, Ondanswtron, OXycodone, Miralax in place, along with updated order for [facility]. New ARIELLA lo with wheels. Pt states she will call today to sched POST OP appt with surgeon. Pt is sched with PCP DANIEL Shea 03/06/2024 for reg follow up, declines TCM APPT with PCP although he has multiple openings. TCM APPT would need to be completed by 03/04/2024.   Call end time 1028            Jacinta Umana MA    2/20/2024, 10:32 EST

## 2024-02-21 NOTE — PAYOR COMM NOTE
"J Luis Hopper (53 y.o. Female)        Please see attached dc SUMMARY    REF#[id number]     THANK YOU    FELICIA PORRAS LPN         Social Security Number       Address   [address] [place] [address]  [facility] [address]    Home Phone   [phone]    MRN   [id number]        Marital Status                                Admission Type   Emergency    Admitting Provider   Donovan Sosa MD    Attending Provider       Department, Room/Bed   [address], S607/1        Discharge Disposition   Home or Self Care    Discharge Destination                                 Attending Provider: (none)   Allergies: Codeine    Isolation: None   Infection: MRSA/History Only (20)   Code Status: Prior    Ht: 160 cm (63\")   Wt: 95.7 kg (211 lb)    Admission Cmt: None   Principal Problem: Acute appendicitis [K35.80]                   Active Insurance as of 2024       Primary Coverage       Payor Plan Insurance Group Employer/Plan Group    [facility] COMMUNITY PLAN [facility] COMMUNITY PLAN [facility]       Payor Plan Address Payor Plan Phone Number Payor Plan Fax Number Effective Dates    [address]   2023 - None Entered    [facility] [id number]         Subscriber Name Subscriber Birth Date Member ID       J LUIS HOPPER 1970 [id number]                     Emergency Contacts        (Rel.) Home Phone Work Phone Mobile Phone    Jose Saenz (Brother) [phone] -- --    BiggElizabeth (Mother) [phone] -- --                 Discharge Summary        Edwar Del Real MD at 24 1257              Patient Name: J Luis Hopper  : 1970  MRN: [id number]    Date of Admission: 2024  Date of Discharge:  2024  Primary Care Physician: Tank Shea APRN      Chief Complaint:   Flank Pain      Discharge Diagnoses     Active Hospital Problems    Diagnosis  POA    **Acute appendicitis [K35.80]  Yes    Colitis [K52.9]  Yes    Diabetes mellitus type 2, " diet-controlled [E11.9]  Yes    Chronic anticoagulation [Z79.01]  Not Applicable    Recurrent deep vein thrombosis (DVT) [I82.409]  Yes    Hypothyroidism, adult [E03.9]  Yes      Resolved Hospital Problems   No resolved problems to display.        Hospital Course     Ms. Kimball is a 53 y.o. female with a history of obesity, hypothyroidism, history of DVT/PE, prediabetes presenting with abdominal pain found to have acute appendicitis with purulent peritonitis and diverticulitis.  Surgery was consulted and patient had a laparoscopic appendectomy.  Had some significant abdominal pain with nausea and vomiting for a few days after surgery but now is tolerating a diet and oral medications.  Patient had a bowel movement on 2/18.  Discussed with surgery and they are okay with discharge.  Plan for Augmentin to finish a 10-day total course of antibiotics.    Patient with multiple A1c is in the prediabetic range.  Defer to PCP to start any medications versus lifestyle modifications.    At the time of discharge patient was told to take all medications as prescribed, keep all follow-up appointments, and call their doctor or return to the hospital with any worsening or concerning symptoms.    Day of Discharge     Subjective:  Patient resting comfortably in bed.  Abdominal pain is well-controlled on current regimen.  Tolerating a diet and oral medications.    Review of Systems   Constitutional:  Negative for chills and fever.   Respiratory:  Negative for cough and shortness of breath.    Cardiovascular:  Negative for chest pain and leg swelling.   Gastrointestinal:  Negative for abdominal pain, diarrhea and nausea.       Physical Exam:  Temp:  [98.1 °F (36.7 °C)-98.4 °F (36.9 °C)] 98.1 °F (36.7 °C)  Heart Rate:  [94-99] 94  Resp:  [18-20] 18  BP: (127-160)/() 127/85  Body mass index is 37.38 kg/m².  Physical Exam  Constitutional:       General: She is not in acute distress.     Appearance: She is not ill-appearing.    Cardiovascular:      Rate and Rhythm: Normal rate and regular rhythm.   Pulmonary:      Effort: Pulmonary effort is normal. No respiratory distress.   Abdominal:      General: Abdomen is flat. There is no distension.      Tenderness: There is no abdominal tenderness.      Comments: Mild tenderness around laparoscopic incisions   Musculoskeletal:         General: No swelling or deformity. Normal range of motion.   Skin:     General: Skin is warm and dry.   Neurological:      General: No focal deficit present.      Mental Status: She is alert. Mental status is at baseline.         Consultants     Consult Orders (all) (From admission, onward)       Start     Ordered    02/13/24 0702  Inpatient General Surgery Consult  IN AM        Specialty:  General Surgery  Provider:  Jamee Panda MD    02/13/24 0411    02/13/24 0354  [facility] (on-call MD unless specified) Details  Once        Specialty:  Hospitalist  Provider:  (Not yet assigned)    02/13/24 0353    02/13/24 0342  Inpatient General Surgery Consult  STAT        Specialty:  General Surgery  Provider:  (Not yet assigned)    02/13/24 0341                  Procedures     Imaging Results (All)       Procedure Component Value Units Date/Time    US Pelvis Transvaginal Non OB [[id number]] Collected: 02/13/24 1712     Updated: 02/13/24 1727    Narrative:      US PELVIC AND TRANSVAGINAL NONOBSTETRICAL-, US TESTICULAR OR OVARIAN  VASCULAR LIMITED-     Clinical: Abnormal left adnexa/left ovary on CT, patient with acute  appendicitis.     FINDINGS: Transabdominal and transvaginal ultrasound performed. Uterus  measures 8.6 cm in length, 3.8 cm AP and 6.1 cm transverse. The  endometrial echo is curvilinear at 7 mm in width. No endometrial fluid  seen. There are nabothian cysts with the largest of these 16 mm.     Bowel obscures the right ovary.     There is a hypodense nodular area demonstrated in the expected area of  the left ovary measuring 16 x 12 x 9 mm. Color flow imaging  and Doppler  analysis performed. A portion demonstrates satisfactory arterial  waveforms and likely represents a portion of the left ovary. Color flow  imaging demonstrates a portion having an appearance consistent with a  small amount of complex fluid. Either free fluid or loculated. The  appearance corresponds well with the CT examination. Short-term  follow-up advised when the patient's condition permits.     This report was finalized on 2/13/2024 5:24 PM by Dr. Pee Nogueira M.D  on Workstation: [id number]       US Testicular or Ovarian Vascular Limited [[id number]] Collected: 02/13/24 1712     Updated: 02/13/24 1727    Narrative:      US PELVIC AND TRANSVAGINAL NONOBSTETRICAL-, US TESTICULAR OR OVARIAN  VASCULAR LIMITED-     Clinical: Abnormal left adnexa/left ovary on CT, patient with acute  appendicitis.     FINDINGS: Transabdominal and transvaginal ultrasound performed. Uterus  measures 8.6 cm in length, 3.8 cm AP and 6.1 cm transverse. The  endometrial echo is curvilinear at 7 mm in width. No endometrial fluid  seen. There are nabothian cysts with the largest of these 16 mm.     Bowel obscures the right ovary.     There is a hypodense nodular area demonstrated in the expected area of  the left ovary measuring 16 x 12 x 9 mm. Color flow imaging and Doppler  analysis performed. A portion demonstrates satisfactory arterial  waveforms and likely represents a portion of the left ovary. Color flow  imaging demonstrates a portion having an appearance consistent with a  small amount of complex fluid. Either free fluid or loculated. The  appearance corresponds well with the CT examination. Short-term  follow-up advised when the patient's condition permits.     This report was finalized on 2/13/2024 5:24 PM by Dr. Pee Nogueira M.D  on Workstation: [id number]       CT Abdomen Pelvis With Contrast [[id number]] Collected: 02/13/24 0925     Updated: 02/13/24 0957    Narrative:      CT ABDOMEN PELVIS W CONTRAST-      INDICATION: Right flank pain     COMPARISON: CT abdomen and pelvis February 13, 2014 at 2:16 a.m.     TECHNIQUE:  Routine CT abdomen and pelvis with IV contrast. Coronal and sagittal  reformats. Radiation dose reduction techniques were utilized, including  automated exposure control and exposure modulation based on body size.     FINDINGS:      Lung bases: Subsegmental atelectasis at the bases.     ABDOMEN: Normal liver, gallbladder, spleen, pancreas and adrenal glands.  Incidental splenule. Suspect tiny cyst in the superior pole right  kidney. Nonobstructing nephrolithiasis in the inferior pole left kidney,  series 2, axial image 63, measures 2 mm. Small parapelvic left renal  cysts.     Pelvis: Underdistended bladder. No bladder calculus. Anteverted uterus.  Questionable small mass or leiomyoma in the left uterine fundus. Suspect  tubal ligation. Small corpus luteal cyst suspected in the left ovary.  Small amount of asymmetric fat stranding in the left adnexa.     Bowel: Small amount of free fluid in the cul-de-sac. Colonic  diverticulosis. No obstruction. Appendix is enlarged, series 2, axial  image 93, measuring 12 mm, with periappendiceal edema and trace  ill-defined fluid, consistent with appendicitis. Possible tiny  appendicolith at the appendicular origin, series 3, coronal image 61. No  extraluminal gas. No walled off periappendiceal fluid collection/abscess  seen.     Abdominal wall: Small fat-containing umbilical hernia. Periumbilical  scarring. Small fat-containing left inguinal hernia.     Retroperitoneum: No lymphadenopathy.     Vasculature: Patent. IVC filter seen with the legs extending through the  caval wall.     Osseous structures: No destructive osseous lesions.       Impression:         1. Acute nonperforated appendicitis.  2. Small amount of free fluid in the cul-de-sac.  3. Small amount of asymmetric stranding/edema in the left adnexa is  nonspecific. Could be related to a corpus luteal cyst  in the left ovary,  diverticulitis thought less likely.  4. Nonobstructing left nephrolithiasis.           This report was finalized on 2/13/2024 9:54 AM by Dr. Jeffy Santana M.D on Workstation: [id number]       CT Abdomen Pelvis Without Contrast [[id number]] Collected: 02/13/24 0337     Updated: 02/13/24 0340    Addenda:        ADDENDUM:  02 13 24 03:39 Call Doctor Regarding Above results, called  Dr. Mendoza   on 02 13 03:38 (-05:00)    Electronically signed by Walter Zhang DO, Diplomate American Board of     Signed: 02/13/24 0337 by Walter Zhang DO    Narrative:      CR  Patient: J LUIS HOPPER  Time Out: 03:37  Exam(s): CT ABDOMEN + PELVIS Without Contrast     EXAM:    CT Abdomen and Pelvis Without Intravenous Contrast    CLINICAL HISTORY:     Reason for exam: Right flank pain, dysuria, known UTI.    TECHNIQUE:    Axial computed tomography images of the abdomen and pelvis without   intravenous contrast.  CTDI is 19.4 mGy and DLP is 1075 mGy-cm.  This CT   exam was performed according to the principle of ALARA (As Low As   Reasonably Achievable) by using one or more of the following dose   reduction techniques: automated exposure control, adjustment of the mA   and or kV according to patient size, and or use of iterative   reconstruction technique.    COMPARISON:    6 29 22    FINDINGS:    Lung bases:  Unremarkable.  No mass.  No consolidation.     ABDOMEN:    Liver:  Unremarkable.    Gallbladder and bile ducts:  Unremarkable.  No calcified stones.  No   ductal dilation.    Pancreas:  Unremarkable.  No ductal dilation.    Spleen:  Unremarkable.  No splenomegaly.    Adrenals:  Unremarkable.  No mass.    Kidneys and ureters:  Tiny bilateral nonobstructing intrarenal calculi.    Negative for obstructive uropathy.  Negative for perirenal or pararenal   fluid collections.    Stomach and bowel:  Negative for GI tract obstruction or pneumatosis.    Mild wall thickening proximal to mid sigmoid colon.   There are mild   inflammatory changes adjacent to the sigmoid colon, most prominent as it   crosses the left adnexa.  There are irregular collections of gas adjacent   to the sigmoid colon, the most proximal region is at the level of the   sigmoid colon across in the left adnexa where the gas collection is   lateral to the left lateral wall.  A second region of irregular gas along   the right lateral wall of the sigmoid colon, near the level of the lower   uterine segment and cervix.     PELVIS:    Appendix:  Pathologic enlargement of the appendix measuring 12 mm in   short axis.  Multiple tiny appendicoliths.  Moderate periappendiceal   inflammation.    Bladder:  Unremarkable.  No stones.    Reproductive:  Soft tissue prominence in the left adnexa measuring   approximately 3.0 x 4.7 cm.  There are mild inflammatory changes   immediately adjacent to the left adnexal soft tissue.     ABDOMEN and PELVIS:    Intraperitoneal space:  Unremarkable.  No free air.  No significant   fluid collection.    Bones joints:  No acute fracture.  No dislocation.    Soft tissues:  Unremarkable.    Vasculature:  Unremarkable.  No abdominal aortic aneurysm.  No change   in position of IVC filter with the apex below the level of both renal   veins.    Lymph nodes:  Unremarkable.  No enlarged lymph nodes.    IMPRESSION:       1.  Acute appendicitis without imaging evidence of perforation.  2.  Mild fat stranding adjacent to the sigmoid colon and prominent left   adnexal soft tissue . mild wall thickening of the sigmoid colon, likely   reflecting colitis diverticulitis.  Irregular gas collections adjacent to   the sigmoid colon are indeterminate for irregular prominent diverticuli   or areas of locally contained perforation.  Etiology of the soft tissue   prominence in the left adnexa is unknown.  Recommend follow-up pelvic   ultrasound and contrast-enhanced CT. diagnostic sensitivity is reduced by   the absence of IV contrast.       "Impression:            Communications:     Call Doctor Above results    Electronically signed by Walter Zhang DO, Diplomate American Board of   Radiology on 02-13-24 at 0337            Pertinent Labs     Results from last 7 days   Lab Units 02/19/24  0511 02/18/24  0457 02/17/24  0508 02/16/24  0524   WBC 10*3/mm3 10.42 9.59 7.96 10.43   HEMOGLOBIN g/dL 10.6* 11.0* 10.0* 10.6*   PLATELETS 10*3/mm3 495* 414 471* 460*     Results from last 7 days   Lab Units 02/19/24  0511 02/18/24  0457 02/17/24  1516 02/17/24  0508 02/16/24 0524   SODIUM mmol/L 138 140  --  139 139   POTASSIUM mmol/L 3.7 5.3* 4.2 3.4* 3.7   CHLORIDE mmol/L 109* 111*  --  110* 108*   CO2 mmol/L 22.0 21.0*  --  20.6* 21.9*   BUN mg/dL 11 13  --  14 23*   CREATININE mg/dL 0.64 0.72  --  0.66 0.81   GLUCOSE mg/dL 113* 93  --  102* 102*   Estimated Creatinine Clearance: 111.9 mL/min (by C-G formula based on SCr of 0.64 mg/dL).  Results from last 7 days   Lab Units 02/13/24  0737 02/13/24  0134   ALBUMIN g/dL 4.0 4.0   BILIRUBIN mg/dL 0.4 0.2   ALK PHOS U/L 48 46   AST (SGOT) U/L 13 14   ALT (SGPT) U/L 13 15     Results from last 7 days   Lab Units 02/19/24  0511 02/18/24  0457 02/17/24  0508 02/16/24  0524 02/14/24  0547 02/13/24  0737 02/13/24  0134   CALCIUM mg/dL 8.5* 8.3* 7.7* 8.1*   < > 9.5 9.9   ALBUMIN g/dL  --   --   --   --   --  4.0 4.0   MAGNESIUM mg/dL  --   --   --   --   --  1.8  --     < > = values in this interval not displayed.               Invalid input(s): \"LDLCALC\"        Test Results Pending at Discharge       Discharge Details        Discharge Medications        New Medications        Instructions Start Date   amoxicillin-clavulanate 875-125 MG per tablet  Commonly known as: AUGMENTIN   1 tablet, Oral, Every 12 Hours Scheduled      docusate sodium 100 MG capsule  Commonly known as: Colace   100 mg, Oral, Daily      ondansetron ODT 4 MG disintegrating tablet  Commonly known as: ZOFRAN-ODT   4 mg, Translingual, Every 8 Hours " PRN      oxyCODONE 5 MG immediate release tablet  Commonly known as: ROXICODONE   5 mg, Oral, Every 6 Hours PRN, Do not take on an empty stomach.      polyethylene glycol 17 GM/SCOOP powder  Commonly known as: MIRALAX   17 g, Oral, Daily   Start Date: February 20, 2024            Changes to Medications        Instructions Start Date   metFORMIN  MG 24 hr tablet  Commonly known as: GLUCOPHAGE-XR  What changed: how much to take   1,000 mg, Oral, Daily             Continue These Medications        Instructions Start Date   albuterol sulfate  (90 Base) MCG/ACT inhaler  Commonly known as: PROVENTIL HFA;VENTOLIN HFA;PROAIR HFA   2 puffs, Inhalation, Every 6 Hours PRN      apixaban 2.5 MG tablet tablet  Commonly known as: Eliquis   2.5 mg, Oral, Every 12 Hours      fenofibrate 145 MG tablet  Commonly known as: TRICOR   145 mg, Oral, Daily      finasteride 5 MG tablet  Commonly known as: PROSCAR   TAKE 1/4 TABLET BY MOUTH EVERY DAY      levothyroxine 25 MCG tablet  Commonly known as: SYNTHROID, LEVOTHROID   25 mcg, Oral, Daily      minoxidil 2.5 MG tablet  Commonly known as: LONITEN   0.5 tablets, Oral, Daily      omeprazole 20 MG capsule  Commonly known as: priLOSEC   20 mg, Oral, Daily      pravastatin 10 MG tablet  Commonly known as: PRAVACHOL   10 mg, Oral, Daily      Rybelsus 7 MG tablet  Generic drug: Semaglutide   7 mg, Oral, Daily, Please take only Rybelsus 3 mg or Rybelsus 7 mg once daily.  Take only 1 dose at a time that is no more than 1 pill/day.             Stop These Medications      ondansetron 4 MG tablet  Commonly known as: Zofran              Allergies   Allergen Reactions    Codeine Nausea And Vomiting         Discharge Disposition:  Home or Self Care    Discharge Diet:  Diet Order   Procedures    Diet: Regular/House Diet; Texture: Regular Texture (IDDSI 7); Fluid Consistency: Thin (IDDSI 0)       Discharge Activity:   As tolerated    CODE STATUS:    Code Status and Medical Interventions:    Ordered at: 02/13/24 0411     Code Status (Patient has no pulse and is not breathing):    CPR (Attempt to Resuscitate)     Medical Interventions (Patient has pulse or is breathing):    Full Support       Future Appointments   Date Time Provider Department Center   3/6/2024  4:30 PM Tank Shea APRN [facility]      Follow-up Information       Tank Shea APRN .    Specialty: Nurse Practitioner  Contact information:  [address] [facility]  [address]  [facility] [id number] [phone]               Jamee Panda MD Follow up in 2 week(s).    Specialty: General Surgery  Why: Please call to confirm your follow up appointment.  Contact information:  [address] [facility] [address]  [address]  [facility] [id number] [phone]                             Time Spent on Discharge:  Greater than 30 minutes      Mynor Del Real MD  [facility]  02/19/24  12:58 EST                Electronically signed by Mynor Del Real MD at 02/19/24 1424       Discharge Order (From admission, onward)       Start     Ordered    02/19/24 0859  Discharge patient  Once        Expected Discharge Date: 02/19/24   Discharge Disposition: Home or Self Care   Physician of Record for Attribution - Please select from Treatment Team: MYNOR DEL REAL [[id number]]   Review needed by CMO to determine Physician of Record: No      Question Answer Comment   Physician of Record for Attribution - Please select from Treatment Team MYNOR DEL REAL    Review needed by CMO to determine Physician of Record No        02/19/24 0859

## 2024-02-26 ENCOUNTER — OFFICE VISIT (OUTPATIENT)
Dept: SURGERY | Facility: CLINIC | Age: 54
End: 2024-02-26
Payer: MEDICAID

## 2024-02-26 VITALS
OXYGEN SATURATION: 97 % | HEART RATE: 112 BPM | BODY MASS INDEX: 37.25 KG/M2 | WEIGHT: 210.2 LBS | HEIGHT: 63 IN | DIASTOLIC BLOOD PRESSURE: 82 MMHG | TEMPERATURE: 98 F | SYSTOLIC BLOOD PRESSURE: 118 MMHG

## 2024-02-26 DIAGNOSIS — K57.92 DIVERTICULITIS: ICD-10-CM

## 2024-02-26 DIAGNOSIS — Z90.49 STATUS POST LAPAROSCOPIC APPENDECTOMY: Primary | ICD-10-CM

## 2024-02-26 PROCEDURE — 1159F MED LIST DOCD IN RCRD: CPT | Performed by: STUDENT IN AN ORGANIZED HEALTH CARE EDUCATION/TRAINING PROGRAM

## 2024-02-26 PROCEDURE — 99024 POSTOP FOLLOW-UP VISIT: CPT | Performed by: STUDENT IN AN ORGANIZED HEALTH CARE EDUCATION/TRAINING PROGRAM

## 2024-02-26 PROCEDURE — 1160F RVW MEDS BY RX/DR IN RCRD: CPT | Performed by: STUDENT IN AN ORGANIZED HEALTH CARE EDUCATION/TRAINING PROGRAM

## 2024-02-26 NOTE — PROGRESS NOTES
"General Surgery Office Follow Up Note     History of Present Illness:    Li Kimball is a 53 y.o. 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  Path demonstrated acute necrotizing gangrenous appendicitis.  The patient was admitted from 2/13/2024 to 2/19/2024.  During her hospitalization she was also noted to have possible sigmoid diverticulitis on her CT scan.  She was treated for a total of 10-day course of antibiotics following her appendectomy and recommended follow-up in clinic in 2 weeks following discharge for recheck.    Interval history:  The patient states that she is feeling better, back to \"about 95%\" her baseline.  She denies any nausea, vomiting, fever, chills, states that she is having normal bowel movements daily without blood in her stool.  She states that her abdominal pain has improved.  She reports some back pain which is still bothering her.  She has been taking 2 Tylenols for her pain and has not needed daily narcotics.  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    Allergies:  Allergies   Allergen Reactions    Codeine Nausea And Vomiting       Meds:    Current Outpatient Medications:     albuterol sulfate  (90 Base) MCG/ACT inhaler, Inhale 2 puffs Every 6 (Six) Hours As Needed for Shortness of Air (shortness of breath)., Disp: 8 g, Rfl: 3    apixaban (Eliquis) 2.5 MG tablet tablet, Take 1 tablet by mouth Every 12 (Twelve) Hours., Disp: 60 tablet, Rfl: 5    docusate sodium (Colace) 100 MG capsule, Take 1 capsule by mouth Daily for 14 days., Disp: 14 " "capsule, Rfl: 0    fenofibrate (TRICOR) 145 MG tablet, Take 1 tablet by mouth Daily., Disp: 30 tablet, Rfl: 5    finasteride (PROSCAR) 5 MG tablet, TAKE 1/4 TABLET BY MOUTH EVERY DAY, Disp: , Rfl:     levothyroxine (SYNTHROID, LEVOTHROID) 25 MCG tablet, Take 1 tablet by mouth Daily., Disp: 90 tablet, Rfl: 2    metFORMIN ER (GLUCOPHAGE-XR) 500 MG 24 hr tablet, Take 2 tablets by mouth Daily., Disp: 180 tablet, Rfl: 0    minoxidil (LONITEN) 2.5 MG tablet, Take 0.5 tablets by mouth Daily., Disp: , Rfl:     omeprazole (priLOSEC) 20 MG capsule, Take 1 capsule by mouth Daily. (Patient taking differently: Take 1 capsule by mouth Daily As Needed.), Disp: 30 capsule, Rfl: 3    polyethylene glycol (MIRALAX) 17 GM/SCOOP powder, Take 17 g by mouth Daily for 14 days., Disp: 238 g, Rfl: 0    pravastatin (PRAVACHOL) 10 MG tablet, TAKE 1 TABLET BY MOUTH DAILY, Disp: 90 tablet, Rfl: 0    Semaglutide (Rybelsus) 7 MG tablet, Take 7 mg by mouth Daily. Please take only Rybelsus 3 mg or Rybelsus 7 mg once daily.  Take only 1 dose at a time that is no more than 1 pill/day., Disp: 30 tablet, Rfl: 3    Physical Exam:   /82   Pulse 112   Temp 98 °F (36.7 °C)   Ht 160 cm (62.99\")   Wt 95.3 kg (210 lb 3.2 oz)   LMP 10/20/2021 Comment: neg hcg 2/14/2024  SpO2 97%   BMI 37.24 kg/m²   Constitutional: Obese, well-developed, comfortable appearing, interactive, cooperative   Eyes: Conjunctiva normal, sclera nonicteric  HENT: Hearing grossly normal, oral mucosa moist  Respiratory: Normal inspiratory effort on room air  Cardiovascular: Regular rate   Gastrointestinal: Abd obese, soft, nontender, nondistended, incisions are c/d/I with skin glue in place, no signs of infection, no palpable masses  Musculoskeletal: Symmetric strength, normal gait  Psychiatric: Normal mood and affect  Skin:  Warm, dry, no rash on visualized skin surfaces    Assessment/Plan:  53-year-old female status post laparoscopic appendectomy on 2/14/2024 for acute " appendicitis, also noted to have recurrent sigmoid diverticulitis    Patient is doing well postoperatively.  Incisions are healing.  Having regular GI function.  She has finished her 10-day course of antibiotics postoperatively.  No further workup required for appendicitis. She may return to work with light duties only, no heavy lifting greater than 20 lbs for 6 weeks postop.  I discussed with the patient signs/symptoms concerning for calling the office or returning to ED, including fever, new or worsening abdominal pain, intractable nausea/vomiting, constipation/obstipation, or other concerns. The patient voiced understanding and is agreeable with the recommendations.    I also discussed with her recommendations for follow-up colonoscopy at 6 to 8 weeks to evaluate given her recent diverticulitis episode.  She has been followed by Dr. Verdin for diverticulitis in the past and has undergone balloon dilation of a colonic diverticular stricture with him previously, so I will defer to him for further management. Patient is agreeable.    Jamee Panda M.D.  General, Robotic and Endoscopic Surgery  [facility]    [address], [address]  [place], [place], [address]  P: [phone]  F: [phone]

## 2024-02-28 ENCOUNTER — READMISSION MANAGEMENT (OUTPATIENT)
Dept: CALL CENTER | Facility: HOSPITAL | Age: 54
End: 2024-02-28
Payer: MEDICAID

## 2024-02-28 NOTE — OUTREACH NOTE
General Surgery Week 2 Survey      Flowsheet Row Responses   [facility] patient discharged from? [place]   Does the patient have one of the following disease processes/diagnoses(primary or secondary)? General Surgery   Week 2 attempt successful? No   Unsuccessful attempts Attempt 1            Ada CARRANZA - Registered Nurse

## 2024-03-06 ENCOUNTER — OFFICE VISIT (OUTPATIENT)
Dept: FAMILY MEDICINE CLINIC | Facility: CLINIC | Age: 54
End: 2024-03-06
Payer: MEDICAID

## 2024-03-06 VITALS
OXYGEN SATURATION: 97 % | BODY MASS INDEX: 37.42 KG/M2 | HEART RATE: 91 BPM | DIASTOLIC BLOOD PRESSURE: 68 MMHG | WEIGHT: 211.2 LBS | TEMPERATURE: 97.8 F | SYSTOLIC BLOOD PRESSURE: 114 MMHG | HEIGHT: 63 IN

## 2024-03-06 DIAGNOSIS — E03.9 HYPOTHYROIDISM, ADULT: Chronic | ICD-10-CM

## 2024-03-06 DIAGNOSIS — E78.1 HYPERTRIGLYCERIDEMIA: ICD-10-CM

## 2024-03-06 DIAGNOSIS — E78.2 MIXED HYPERLIPIDEMIA: Chronic | ICD-10-CM

## 2024-03-06 DIAGNOSIS — Z23 IMMUNIZATION DUE: ICD-10-CM

## 2024-03-06 DIAGNOSIS — E66.9 OBESITY (BMI 30.0-34.9): Chronic | ICD-10-CM

## 2024-03-06 DIAGNOSIS — E11.9 TYPE 2 DIABETES MELLITUS WITHOUT COMPLICATION, WITHOUT LONG-TERM CURRENT USE OF INSULIN: Chronic | ICD-10-CM

## 2024-03-06 DIAGNOSIS — Z09 HOSPITAL DISCHARGE FOLLOW-UP: Primary | ICD-10-CM

## 2024-03-06 PROBLEM — K52.9 COLITIS: Status: RESOLVED | Noted: 2024-02-13 | Resolved: 2024-03-06

## 2024-03-06 PROBLEM — K35.80 ACUTE APPENDICITIS: Status: RESOLVED | Noted: 2024-02-13 | Resolved: 2024-03-06

## 2024-03-06 RX ORDER — ORAL SEMAGLUTIDE 14 MG/1
1 TABLET ORAL DAILY
Qty: 30 TABLET | Refills: 3 | Status: SHIPPED | OUTPATIENT
Start: 2024-03-06

## 2024-03-06 NOTE — ASSESSMENT & PLAN NOTE
Patient's (Body mass index is 37.41 kg/m².) indicates that they are morbidly/severely obese (BMI > 40 or > 35 with obesity - related health condition) with health conditions that include diabetes mellitus, dyslipidemias, and GERD . Weight is unchanged. BMI  is above average; BMI management plan is completed. We discussed portion control and increasing exercise.   Discussed the importance of healthy diet, nutrition, and lifestyle. Recommend low salt, fat/cholesterol diet and avoid concentrated sweets. Encouraged DASH diet along with fresh fruits & vegetables and low fat dairy products. Counseled patient to exercise/walk as tolerated. Avoid tobacco and alcohol use.

## 2024-03-06 NOTE — ASSESSMENT & PLAN NOTE
Diabetes is worsening.   Medication changes per orders.  Reminded to bring in blood sugar diary at next visit.  Recommended an ADA diet.  Recommended a Mediterranean style of eating  Regular aerobic exercise.  Discussed ways to avoid symptomatic hypoglycemia.  Discussed foot care.  Reminded to get yearly retinal exam.  Diabetes will be reassessed in 3 months    Discussed diabetes management and need for adequate BS control. Educated patient high A1c puts him/her at risk for MI, CVA, CKD, gastroparesis, foot ulcers, and frequent infections.   Pt informed of Rx for glucometer/test strips, and lancets as needed and explained to keep a blood sugar log. Return to office as instructed. Additionally diet compliance explained - avoid sugar candy, soda, high carbohydrate loads. Explained how losing weight can improve your health and achieve better blood sugar control. Being active can also help prevent or control the disorder. Recommended annual opthalmology follow -up due to diagnosis of diabetes. Annual Podiatry visit prn.

## 2024-03-06 NOTE — ASSESSMENT & PLAN NOTE
Lipid abnormalities are  we will recheck fasting lipid panel    Plan:  Continue same medication/s without change.      Discussed medication dosage, use, side effects, and goals of treatment in detail.    Counseled patient on lifestyle modifications to help control hyperlipidemia.     Patient Treatment Goals:   LDL goal is under 100    Followup in 6 months.

## 2024-03-06 NOTE — PROGRESS NOTES
"Chief Complaint  meds discussions changing (Weight loss)    Subjective        Li Kimball presents to [facility]  History of Present Illness  53-year-old white female with a history of diabetes type 2 and obesity here for follow-up visit.  Patient was recently discharged from hospital about 2 weeks ago for laparoscopic appendectomy.  Patient reports she has seen the surgeon since hospital discharge and is doing well.  Patient reports she had exam and incisions look good by surgeon and everything was fine.    Pt desires increase in dose of rebylsus to 14 mg qday.  Patient also discussed medication to suppress appetite informed patient Mounjaro is the best medicine but prefer patient to go to full dose of Rybelsus 14 mg and see its effect on diabetes and weight loss prior to switching, patient voiced understanding.  Pt has f/u appt with Gyn April 2024.    Instructed patient to follow-up with an eye doctor, either opthalmology or optometry with preference for opthalmology, for an annual diabetic eye exam.        Objective   Vital Signs:  /68   Pulse 91   Temp 97.8 °F (36.6 °C)   Ht 160 cm (63\")   Wt 95.8 kg (211 lb 3.2 oz)   SpO2 97%   BMI 37.41 kg/m²   Estimated body mass index is 37.41 kg/m² as calculated from the following:    Height as of this encounter: 160 cm (63\").    Weight as of this encounter: 95.8 kg (211 lb 3.2 oz).               Physical Exam  Constitutional:       Appearance: Normal appearance. She is obese.   HENT:      Head: Normocephalic and atraumatic.   Eyes:      Conjunctiva/sclera: Conjunctivae normal.   Cardiovascular:      Rate and Rhythm: Normal rate and regular rhythm.      Heart sounds: Normal heart sounds.   Pulmonary:      Effort: Pulmonary effort is normal.      Breath sounds: Normal breath sounds.   Abdominal:      General: Bowel sounds are normal.      Palpations: Abdomen is soft.      Comments: Non-tender   Skin:     General: Skin is warm. "   Neurological:      General: No focal deficit present.      Mental Status: She is alert and oriented to person, place, and time.   Psychiatric:         Mood and Affect: Mood normal.         Behavior: Behavior normal.        Result Review :    The following data was reviewed by: DANIEL Martínez on 03/06/2024:  CMP          2/17/2024    05:08 2/17/2024    15:16 2/18/2024    04:57 2/19/2024    05:11   CMP   Glucose 102   93  113    BUN 14   13  11    Creatinine 0.66   0.72  0.64    EGFR 105.0   100.1  105.8    Sodium 139   140  138    Potassium 3.4  4.2  5.3  3.7    Chloride 110   111  109    Calcium 7.7   8.3  8.5    BUN/Creatinine Ratio 21.2   18.1  17.2    Anion Gap 8.4   8.0  7.0      CBC          2/17/2024    05:08 2/18/2024    04:57 2/19/2024    05:11   CBC   WBC 7.96  9.59  10.42    RBC 3.56  4.01  3.92    Hemoglobin 10.0  11.0  10.6    Hematocrit 30.5  35.0  33.4    MCV 85.7  87.3  85.2    MCH 28.1  27.4  27.0    MCHC 32.8  31.4  31.7    RDW 13.0  13.7  13.1    Platelets 471  414  495      Lipid Panel          4/18/2023    09:14 8/22/2023    09:44 11/6/2023    08:57   Lipid Panel   Total Cholesterol 166  191  174    Triglycerides 134  233  384    HDL Cholesterol 44  41  35    VLDL Cholesterol 24  40  62    LDL Cholesterol  98  110  77      TSH          4/18/2023    09:14 8/22/2023    09:44 11/6/2023    08:57   TSH   TSH 2.350  2.500  3.200      A1C Last 3 Results          8/22/2023    09:44 11/6/2023    08:57 2/15/2024    05:44   HGBA1C Last 3 Results   Hemoglobin A1C 5.70  5.80  6.20      Microalbumin          8/22/2023    11:50   Microalbumin   Microalbumin, Urine 13.8      Data reviewed : Recent hospitalization notes reviewed recent hospital discharge summary from February 2019 2024 patient was discharged status post laparoscopic appendectomy.             Assessment and Plan     Diagnoses and all orders for this visit:    1. Hospital discharge follow-up (Primary)  Assessment &  Plan:  Improved.    Orders:  -     Comprehensive Metabolic Panel    2. Obesity (BMI 30.0-34.9)  Assessment & Plan:  Patient's (Body mass index is 37.41 kg/m².) indicates that they are morbidly/severely obese (BMI > 40 or > 35 with obesity - related health condition) with health conditions that include diabetes mellitus, dyslipidemias, and GERD . Weight is unchanged. BMI  is above average; BMI management plan is completed. We discussed portion control and increasing exercise.   Discussed the importance of healthy diet, nutrition, and lifestyle. Recommend low salt, fat/cholesterol diet and avoid concentrated sweets. Encouraged DASH diet along with fresh fruits & vegetables and low fat dairy products. Counseled patient to exercise/walk as tolerated. Avoid tobacco and alcohol use.        3. Hypothyroidism, adult  Assessment & Plan:  Will recheck thyroid panel.    Orders:  -     TSH  -     T4, free    4. Mixed hyperlipidemia  Assessment & Plan:   Lipid abnormalities are  we will recheck fasting lipids today.    Plan:  Continue same medication/s without change.      Discussed medication dosage, use, side effects, and goals of treatment in detail.    Counseled patient on lifestyle modifications to help control hyperlipidemia.     Patient Treatment Goals:   LDL goal is under 100    Followup in 6 months.  Discussed the importance of healthy diet, nutrition, and lifestyle. Recommend low salt, fat/cholesterol diet and avoid concentrated sweets. Encouraged DASH diet along with fresh fruits & vegetables and low fat dairy products. Counseled patient to exercise/walk as tolerated. Avoid tobacco and alcohol use.      Orders:  -     Comprehensive Metabolic Panel  -     Lipid Panel    5. Type 2 diabetes mellitus without complication, without long-term current use of insulin  Assessment & Plan:  Diabetes is worsening.   Medication changes per orders.  Reminded to bring in blood sugar diary at next visit.  Recommended an ADA  diet.  Recommended a Mediterranean style of eating  Regular aerobic exercise.  Discussed ways to avoid symptomatic hypoglycemia.  Discussed foot care.  Reminded to get yearly retinal exam.  Diabetes will be reassessed in 3 months    Discussed diabetes management and need for adequate BS control. Educated patient high A1c puts him/her at risk for MI, CVA, CKD, gastroparesis, foot ulcers, and frequent infections.   Pt informed of Rx for glucometer/test strips, and lancets as needed and explained to keep a blood sugar log. Return to office as instructed. Additionally diet compliance explained - avoid sugar candy, soda, high carbohydrate loads. Explained how losing weight can improve your health and achieve better blood sugar control. Being active can also help prevent or control the disorder. Recommended annual opthalmology follow -up due to diagnosis of diabetes. Annual Podiatry visit prn.      Orders:  -     Semaglutide (Rybelsus) 14 MG tablet; Take 1 tablet by mouth Daily.  Dispense: 30 tablet; Refill: 3  -     MicroAlbumin, Urine, Random - Urine, Clean Catch    6. Hypertriglyceridemia  Assessment & Plan:   Lipid abnormalities are  we will recheck fasting lipid panel    Plan:  Continue same medication/s without change.      Discussed medication dosage, use, side effects, and goals of treatment in detail.    Counseled patient on lifestyle modifications to help control hyperlipidemia.     Patient Treatment Goals:   LDL goal is under 100    Followup in 6 months.    Orders:  -     Comprehensive Metabolic Panel  -     Lipid Panel    7. Immunization due  -     Pneumococcal Conjugate Vaccine 20-Valent (PCV20)        All chronic conditions have been addressed and treated by the practice or other specialists. Medications have been reconciled and refilled as appropriate. Reiterated compliance and timely follow up appointments. Side effects of all new and old medications reviewed with the patient and patient willing to accept  all risks involved. Advised RTO if no improvement or worsening of symptoms or if any new complaints arise. Patient advised to follow up with clinic or call after diagnostic tests, if patient does not hear from office 3 days after the test completion.          Follow Up     Return in about 3 months (around 6/6/2024) for Next scheduled follow up.  Patient was given instructions and counseling regarding her condition or for health maintenance advice. Please see specific information pulled into the AVS if appropriate.

## 2024-03-06 NOTE — ASSESSMENT & PLAN NOTE
Lipid abnormalities are  we will recheck fasting lipids today.    Plan:  Continue same medication/s without change.      Discussed medication dosage, use, side effects, and goals of treatment in detail.    Counseled patient on lifestyle modifications to help control hyperlipidemia.     Patient Treatment Goals:   LDL goal is under 100    Followup in 6 months.  Discussed the importance of healthy diet, nutrition, and lifestyle. Recommend low salt, fat/cholesterol diet and avoid concentrated sweets. Encouraged DASH diet along with fresh fruits & vegetables and low fat dairy products. Counseled patient to exercise/walk as tolerated. Avoid tobacco and alcohol use.

## 2024-03-07 ENCOUNTER — READMISSION MANAGEMENT (OUTPATIENT)
Dept: CALL CENTER | Facility: HOSPITAL | Age: 54
End: 2024-03-07
Payer: MEDICAID

## 2024-03-07 LAB
ALBUMIN SERPL-MCNC: 4.5 G/DL (ref 3.8–4.9)
ALBUMIN/GLOB SERPL: 1.3 {RATIO} (ref 1.2–2.2)
ALP SERPL-CCNC: 65 IU/L (ref 44–121)
ALT SERPL-CCNC: 24 IU/L (ref 0–32)
AST SERPL-CCNC: 25 IU/L (ref 0–40)
BILIRUB SERPL-MCNC: 0.3 MG/DL (ref 0–1.2)
BUN SERPL-MCNC: 18 MG/DL (ref 6–24)
BUN/CREAT SERPL: 21 (ref 9–23)
CALCIUM SERPL-MCNC: 10.1 MG/DL (ref 8.7–10.2)
CHLORIDE SERPL-SCNC: 104 MMOL/L (ref 96–106)
CHOLEST SERPL-MCNC: 187 MG/DL (ref 100–199)
CO2 SERPL-SCNC: 22 MMOL/L (ref 20–29)
CREAT SERPL-MCNC: 0.84 MG/DL (ref 0.57–1)
EGFRCR SERPLBLD CKD-EPI 2021: 83 ML/MIN/1.73
GLOBULIN SER CALC-MCNC: 3.6 G/DL (ref 1.5–4.5)
GLUCOSE SERPL-MCNC: 85 MG/DL (ref 70–99)
HDLC SERPL-MCNC: 41 MG/DL
LDLC SERPL CALC-MCNC: 115 MG/DL (ref 0–99)
MICROALBUMIN UR-MCNC: 4.3 UG/ML
POTASSIUM SERPL-SCNC: 3.9 MMOL/L (ref 3.5–5.2)
PROT SERPL-MCNC: 8.1 G/DL (ref 6–8.5)
SODIUM SERPL-SCNC: 140 MMOL/L (ref 134–144)
T4 FREE SERPL-MCNC: 0.8 NG/DL (ref 0.82–1.77)
TRIGL SERPL-MCNC: 175 MG/DL (ref 0–149)
TSH SERPL DL<=0.005 MIU/L-ACNC: 2 UIU/ML (ref 0.45–4.5)
VLDLC SERPL CALC-MCNC: 31 MG/DL (ref 5–40)

## 2024-03-07 NOTE — OUTREACH NOTE
General Surgery Week 3 Survey      Flowsheet Row Responses   [facility] patient discharged from? [place]   Does the patient have one of the following disease processes/diagnoses(primary or secondary)? General Surgery   Week 3 attempt successful? No  [Patient unable to talk at present time.]   Call start time 1427   Unsuccessful attempts Attempt 1   Revoke Decline to participate   Call end time 1428   Call end time 1428            Jesus Manuel GRANT - Registered Nurse

## 2024-04-01 ENCOUNTER — TELEPHONE (OUTPATIENT)
Dept: SURGERY | Facility: CLINIC | Age: 54
End: 2024-04-01
Payer: MEDICAID

## 2024-04-01 ENCOUNTER — TELEPHONE (OUTPATIENT)
Dept: FAMILY MEDICINE CLINIC | Facility: CLINIC | Age: 54
End: 2024-04-01
Payer: MEDICAID

## 2024-04-01 DIAGNOSIS — G89.29 CHRONIC MIDLINE LOW BACK PAIN, UNSPECIFIED WHETHER SCIATICA PRESENT: Primary | ICD-10-CM

## 2024-04-01 DIAGNOSIS — M54.50 CHRONIC MIDLINE LOW BACK PAIN, UNSPECIFIED WHETHER SCIATICA PRESENT: Primary | ICD-10-CM

## 2024-04-01 RX ORDER — BACLOFEN 10 MG/1
10 TABLET ORAL NIGHTLY PRN
Qty: 30 TABLET | Refills: 1 | Status: SHIPPED | OUTPATIENT
Start: 2024-04-01 | End: 2024-06-30

## 2024-04-01 NOTE — TELEPHONE ENCOUNTER
4/1/2024 16:43  Pt here at clinic w/o appt c/o h/o chronic LBP x 6mo. Pt reports she works at a [employer]  and her job involves standing 8 hours a day.     Pt c/o LBP prevents her from standing for prolonged periods of time and desires a PCP note stating she can sit down periodically. Pt reports she can stand for about 2 hours but needs to sit down after that.    Pt also c/o obesity and high BMI keeps her from standing for prolonged periods of time.    Work note provided to patient as requested but also low back x-ray requisition sheet provided to patient with instructions to go to [facility] through entrance today for low back x-ray.  Also discussed as needed medications for low back pain, patient voiced understanding.                                        ----------------------------------------------------  Caller: Li Kimball    Relationship: Self    Best call back number: [phone]    What is the best time to reach you: ANYTIME, LEAVE A VOICEMAIL IF SHE DOES NOT ANSWER.     Who are you requesting to speak with (clinical staff, provider,  specific staff member): ALESSIO MEZAIUDDIN    What was the call regarding: PATIENT STATES SHE IS WANTING TO KNOW IF ALESSIO BAXTER CAN WRITE HER A NOTE FOR WORK STATING SHE CAN NOT STAND THE WHOLE 8 HOURS WITHOUT BEING ABLE TO SIT DOWN DUE TO HER HEALTH ISSUES.     PATIENT STATES SHE CAN COME BY THE OFFICE TO GET THE NOTE.     PATIENT IS REQUESTING A CALL BACK TO LET HER KNOW.

## 2024-04-01 NOTE — TELEPHONE ENCOUNTER
Pt called requesting another work note expressly stating she cannot stand for an 8-hr shift.  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    I did advise pt that I was not sure having her appendix removed in Feb would be a factor in standing long periods, but Dr Panda would know if anything else had been going on.    Pt stated if Dr Panda could not give note she would contact her PCP for it.  I advised pt I would send a message and have Dr tam.

## 2024-04-06 ENCOUNTER — HOSPITAL ENCOUNTER (OUTPATIENT)
Dept: GENERAL RADIOLOGY | Facility: HOSPITAL | Age: 54
Discharge: HOME | End: 2024-04-06
Payer: MEDICAID

## 2024-04-06 DIAGNOSIS — G89.29 CHRONIC MIDLINE LOW BACK PAIN, UNSPECIFIED WHETHER SCIATICA PRESENT: ICD-10-CM

## 2024-04-06 DIAGNOSIS — M54.50 CHRONIC MIDLINE LOW BACK PAIN, UNSPECIFIED WHETHER SCIATICA PRESENT: ICD-10-CM

## 2024-04-06 PROCEDURE — 72100 X-RAY EXAM L-S SPINE 2/3 VWS: CPT

## 2024-04-10 ENCOUNTER — TELEPHONE (OUTPATIENT)
Dept: FAMILY MEDICINE CLINIC | Facility: CLINIC | Age: 54
End: 2024-04-10
Payer: MEDICAID

## 2024-04-10 NOTE — TELEPHONE ENCOUNTER
Caller: Li Kimball    Relationship: Self    Best call back number: [phone]     Caller requesting test results: PATIENT     What test was performed: X RAY RESULTS     When was the test performed: 4/6/24    Additional notes: HAS QUESTIONS ABOUT THE RESULTS AND WOULD LIKE TO SPEAK TO SOMEONE TODAY

## 2024-05-23 DIAGNOSIS — E78.1 HYPERTRIGLYCERIDEMIA: ICD-10-CM

## 2024-05-23 RX ORDER — FENOFIBRATE 145 MG/1
145 TABLET, COATED ORAL DAILY
Qty: 30 TABLET | Refills: 5 | Status: SHIPPED | OUTPATIENT
Start: 2024-05-23

## 2024-06-10 ENCOUNTER — OFFICE VISIT (OUTPATIENT)
Dept: FAMILY MEDICINE CLINIC | Facility: CLINIC | Age: 54
End: 2024-06-10
Payer: COMMERCIAL

## 2024-06-10 VITALS
HEIGHT: 63 IN | WEIGHT: 212 LBS | HEART RATE: 86 BPM | OXYGEN SATURATION: 99 % | TEMPERATURE: 98 F | SYSTOLIC BLOOD PRESSURE: 124 MMHG | BODY MASS INDEX: 37.56 KG/M2 | DIASTOLIC BLOOD PRESSURE: 72 MMHG

## 2024-06-10 DIAGNOSIS — R09.89 DECREASED PEDAL PULSES: ICD-10-CM

## 2024-06-10 DIAGNOSIS — M54.50 CHRONIC MIDLINE LOW BACK PAIN, UNSPECIFIED WHETHER SCIATICA PRESENT: Chronic | ICD-10-CM

## 2024-06-10 DIAGNOSIS — E66.01 CLASS 2 SEVERE OBESITY WITH SERIOUS COMORBIDITY AND BODY MASS INDEX (BMI) OF 37.0 TO 37.9 IN ADULT, UNSPECIFIED OBESITY TYPE: Chronic | ICD-10-CM

## 2024-06-10 DIAGNOSIS — I82.401 DEEP VEIN THROMBOSIS (DVT) OF RIGHT LOWER EXTREMITY, UNSPECIFIED CHRONICITY, UNSPECIFIED VEIN: Chronic | ICD-10-CM

## 2024-06-10 DIAGNOSIS — J45.909 MILD ASTHMA, UNSPECIFIED WHETHER COMPLICATED, UNSPECIFIED WHETHER PERSISTENT: Chronic | ICD-10-CM

## 2024-06-10 DIAGNOSIS — E11.9 TYPE 2 DIABETES MELLITUS WITHOUT COMPLICATION, WITHOUT LONG-TERM CURRENT USE OF INSULIN: Chronic | ICD-10-CM

## 2024-06-10 DIAGNOSIS — E78.1 HYPERTRIGLYCERIDEMIA: Primary | Chronic | ICD-10-CM

## 2024-06-10 DIAGNOSIS — G89.29 CHRONIC MIDLINE LOW BACK PAIN, UNSPECIFIED WHETHER SCIATICA PRESENT: Chronic | ICD-10-CM

## 2024-06-10 DIAGNOSIS — E78.2 MIXED HYPERLIPIDEMIA: Chronic | ICD-10-CM

## 2024-06-10 DIAGNOSIS — E03.9 HYPOTHYROIDISM, ADULT: Chronic | ICD-10-CM

## 2024-06-10 DIAGNOSIS — Z95.828 PRESENCE OF IVC FILTER: ICD-10-CM

## 2024-06-10 PROBLEM — Z09 HOSPITAL DISCHARGE FOLLOW-UP: Status: RESOLVED | Noted: 2024-03-06 | Resolved: 2024-06-10

## 2024-06-10 PROBLEM — E66.812 CLASS 2 SEVERE OBESITY WITH SERIOUS COMORBIDITY AND BODY MASS INDEX (BMI) OF 37.0 TO 37.9 IN ADULT: Status: ACTIVE | Noted: 2022-08-12

## 2024-06-10 PROBLEM — Z23 IMMUNIZATION DUE: Status: RESOLVED | Noted: 2024-03-06 | Resolved: 2024-06-10

## 2024-06-10 PROBLEM — Z12.11 SCREEN FOR COLON CANCER: Status: RESOLVED | Noted: 2021-08-11 | Resolved: 2024-06-10

## 2024-06-10 PROCEDURE — 99214 OFFICE O/P EST MOD 30 MIN: CPT | Performed by: STUDENT IN AN ORGANIZED HEALTH CARE EDUCATION/TRAINING PROGRAM

## 2024-06-10 RX ORDER — OMEGA-3/DHA/EPA/FISH OIL 300-1000MG
1 CAPSULE ORAL DAILY
Qty: 180 CAPSULE | Refills: 3 | Status: SHIPPED | OUTPATIENT
Start: 2024-06-10

## 2024-06-10 RX ORDER — BACLOFEN 10 MG/1
10 TABLET ORAL NIGHTLY PRN
Qty: 30 TABLET | Refills: 1 | Status: SHIPPED | OUTPATIENT
Start: 2024-06-10 | End: 2024-09-08

## 2024-06-10 RX ORDER — LEVOTHYROXINE SODIUM 0.03 MG/1
25 TABLET ORAL DAILY
Qty: 90 TABLET | Refills: 2 | Status: SHIPPED | OUTPATIENT
Start: 2024-06-10

## 2024-06-10 RX ORDER — ATORVASTATIN CALCIUM 10 MG/1
10 TABLET, FILM COATED ORAL NIGHTLY
Qty: 30 TABLET | Refills: 5 | Status: SHIPPED | OUTPATIENT
Start: 2024-06-10

## 2024-06-10 RX ORDER — ALBUTEROL SULFATE 90 UG/1
2 AEROSOL, METERED RESPIRATORY (INHALATION) EVERY 6 HOURS PRN
Qty: 8 G | Refills: 3 | Status: SHIPPED | OUTPATIENT
Start: 2024-06-10

## 2024-06-10 NOTE — ASSESSMENT & PLAN NOTE
Diabetes is  change medication dose yesterday and also repeated labs .   Medication changes per orders.  Reminded to bring in blood sugar diary at next visit.  Recommended an ADA diet.  Recommended a Mediterranean style of eating  Regular aerobic exercise.  Discussed ways to avoid symptomatic hypoglycemia.  Discussed sick day management.  Discussed foot care.  Reminded to get yearly retinal exam.  Diabetes will be reassessed in 3 months  Stop Rybelsus.  Started Mounjaro.  Discussed diabetes management and need for adequate BS control. Educated patient high A1c puts him/her at risk for MI, CVA, CKD, gastroparesis, foot ulcers, and frequent infections.   Pt informed of Rx for glucometer/test strips, and lancets as needed and explained to keep a blood sugar log. Return to office as instructed. Additionally diet compliance explained - avoid sugar candy, soda, high carbohydrate loads. Explained how losing weight can improve your health and achieve better blood sugar control. Being active can also help prevent or control the disorder. Recommended annual opthalmology follow -up due to diagnosis of diabetes. Annual Podiatry visit prn.

## 2024-06-10 NOTE — ASSESSMENT & PLAN NOTE
Continue facial capsules.  Discussed the importance of healthy diet, nutrition, and lifestyle. Recommend low salt, fat/cholesterol diet and avoid concentrated sweets. Encouraged DASH diet along with fresh fruits & vegetables and low fat dairy products. Counseled patient to exercise/walk as tolerated. Avoid tobacco and alcohol use.

## 2024-06-10 NOTE — PROGRESS NOTES
"Chief Complaint  Diabetes (3 month f/u)    Subjective        Li Kimball presents to [facility]  History of Present Illness  53-year-old white female with a history of hyperlipidemia, asthma, diabetes type 2 here for chronic disease management follow-up visit and annual physical.    Patient reports Rybelsus 14 mg once a day caused diarrhea and she had to immediately stop the medication.  Patient desires to go back to [facility] for diabetes and weight loss.    Instructed patient to follow-up with an eye doctor, either opthalmology or optometry with preference for opthalmology, for an annual diabetic eye exam.    Pt reports she is status post dentist and recent PAP smear recently.    Instructed patient to get the adult preventive immunization that are due at  the pharmacy, including -hepatitis B virus vaccination.    Diabetes        Objective   Vital Signs:  /72   Pulse 86   Temp 98 °F (36.7 °C) (Temporal)   Ht 160 cm (62.99\")   Wt 96.2 kg (212 lb)   SpO2 99%   BMI 37.56 kg/m²   Estimated body mass index is 37.56 kg/m² as calculated from the following:    Height as of this encounter: 160 cm (62.99\").    Weight as of this encounter: 96.2 kg (212 lb).               Physical Exam  Constitutional:       Appearance: Normal appearance. She is obese.   HENT:      Head: Normocephalic and atraumatic.   Eyes:      Conjunctiva/sclera: Conjunctivae normal.   Cardiovascular:      Rate and Rhythm: Normal rate and regular rhythm.      Heart sounds: Normal heart sounds.   Pulmonary:      Effort: Pulmonary effort is normal.      Breath sounds: Normal breath sounds.   Abdominal:      General: Bowel sounds are normal.      Palpations: Abdomen is soft.      Comments: Non-tender   Skin:     General: Skin is warm.   Neurological:      General: No focal deficit present.      Mental Status: She is alert and oriented to person, place, and time.      Comments: Patient status post normal diabetic " foot exam today.  Normal gross motor and sensory sensation bilateral feet.  Skin intact bilateral feet.  Normal monofilament test bilateral feet.  Normal pedal pulses on right, diminished pedal pulses on left foot.     Psychiatric:         Mood and Affect: Mood normal.         Behavior: Behavior normal.        Result Review :    The following data was reviewed by: DANIEL Martínez on 06/10/2024:  CMP          2/18/2024    04:57 2/19/2024    05:11 3/6/2024    15:41   CMP   Glucose 93  113  85    BUN 13  11  18    Creatinine 0.72  0.64  0.84    EGFR 100.1  105.8     Sodium 140  138  140    Potassium 5.3  3.7  3.9    Chloride 111  109  104    Calcium 8.3  8.5  10.1    Total Protein   8.1    Albumin   4.5    Globulin   3.6    Total Bilirubin   0.3    Alkaline Phosphatase   65    AST (SGOT)   25    ALT (SGPT)   24    BUN/Creatinine Ratio 18.1  17.2  21    Anion Gap 8.0  7.0       CBC          2/17/2024    05:08 2/18/2024    04:57 2/19/2024    05:11   CBC   WBC 7.96  9.59  10.42    RBC 3.56  4.01  3.92    Hemoglobin 10.0  11.0  10.6    Hematocrit 30.5  35.0  33.4    MCV 85.7  87.3  85.2    MCH 28.1  27.4  27.0    MCHC 32.8  31.4  31.7    RDW 13.0  13.7  13.1    Platelets 471  414  495      Lipid Panel          8/22/2023    09:44 11/6/2023    08:57 3/6/2024    15:41   Lipid Panel   Total Cholesterol 191  174  187    Triglycerides 233  384  175    HDL Cholesterol 41  35  41    VLDL Cholesterol 40  62  31    LDL Cholesterol  110  77  115      TSH          8/22/2023    09:44 11/6/2023    08:57 3/6/2024    15:41   TSH   TSH 2.500  3.200  2.000      A1C Last 3 Results          8/22/2023    09:44 11/6/2023    08:57 2/15/2024    05:44   HGBA1C Last 3 Results   Hemoglobin A1C 5.70  5.80  6.20      Microalbumin          8/22/2023    11:50 3/6/2024    15:44   Microalbumin   Microalbumin, Urine 13.8  4.3                     Assessment and Plan     Diagnoses and all orders for this visit:    1. Hypertriglyceridemia  (Primary)  Assessment & Plan:  Continue facial capsules.  Discussed the importance of healthy diet, nutrition, and lifestyle. Recommend low salt, fat/cholesterol diet and avoid concentrated sweets. Encouraged DASH diet along with fresh fruits & vegetables and low fat dairy products. Counseled patient to exercise/walk as tolerated. Avoid tobacco and alcohol use.      Orders:  -     fish oil-omega-3 fatty acids 1000 MG capsule; Take 1 capsule by mouth Daily.  Dispense: 180 capsule; Refill: 3    2. Chronic midline low back pain, unspecified whether sciatica present  -     baclofen (LIORESAL) 10 MG tablet; Take 1 tablet by mouth At Night As Needed for Muscle Spasms for up to 90 days.  Dispense: 30 tablet; Refill: 1    3. Hypothyroidism, adult  -     levothyroxine (SYNTHROID, LEVOTHROID) 25 MCG tablet; Take 1 tablet by mouth Daily.  Dispense: 90 tablet; Refill: 2  -     TSH Rfx On Abnormal To Free T4    4. Mixed hyperlipidemia  Assessment & Plan:  Based on most recent lipid panel results stopped pravastatin and started atorvastatin.  Also start fenofibrate but continue omega-3 fish oil capsules, patient was understanding.  Discussed the importance of healthy diet, nutrition, and lifestyle. Recommend low salt, fat/cholesterol diet and avoid concentrated sweets. Encouraged DASH diet along with fresh fruits & vegetables and low fat dairy products. Counseled patient to exercise/walk as tolerated. Avoid tobacco and alcohol use.      Orders:  -     atorvastatin (LIPITOR) 10 MG tablet; Take 1 tablet by mouth Every Night.  Dispense: 30 tablet; Refill: 5    5. Mild asthma, unspecified whether complicated, unspecified whether persistent  Assessment & Plan:  Stable.        Orders:  -     albuterol sulfate  (90 Base) MCG/ACT inhaler; Inhale 2 puffs Every 6 (Six) Hours As Needed for Shortness of Air (shortness of breath).  Dispense: 8 g; Refill: 3    6. Deep vein thrombosis (DVT) of right lower extremity, unspecified  chronicity, unspecified vein  Assessment & Plan:  Continue Eliquis.  Patient reports she also has IVC filter.    Orders:  -     apixaban (Eliquis) 2.5 MG tablet tablet; Take 1 tablet by mouth Every 12 (Twelve) Hours.  Dispense: 60 tablet; Refill: 5    7. Class 2 severe obesity with serious comorbidity and body mass index (BMI) of 37.0 to 37.9 in adult, unspecified obesity type  Assessment & Plan:  Patient's (Body mass index is 37.56 kg/m².) indicates that they are morbidly/severely obese (BMI > 40 or > 35 with obesity - related health condition) with health conditions that include diabetes mellitus and dyslipidemias . Weight is unchanged. BMI  is above average; BMI management plan is completed. We discussed portion control and increasing exercise.     Discussed the importance of healthy diet, nutrition, and lifestyle. Recommend low salt, fat/cholesterol diet and avoid concentrated sweets. Encouraged DASH diet along with fresh fruits & vegetables and low fat dairy products. Counseled patient to exercise/walk as tolerated. Avoid tobacco and alcohol use.      Orders:  -     Hemoglobin A1c  -     MicroAlbumin, Urine, Random - Urine, Clean Catch    8. Decreased pedal pulses  Assessment & Plan:  Discussed ERIN testing, patient voiced understanding.    Orders:  -     Doppler Ankle Brachial Index Single Level CAR; Future    9. Type 2 diabetes mellitus without complication, without long-term current use of insulin  Assessment & Plan:  Diabetes is  change medication dose yesterday and also repeated labs .   Medication changes per orders.  Reminded to bring in blood sugar diary at next visit.  Recommended an ADA diet.  Recommended a Mediterranean style of eating  Regular aerobic exercise.  Discussed ways to avoid symptomatic hypoglycemia.  Discussed sick day management.  Discussed foot care.  Reminded to get yearly retinal exam.  Diabetes will be reassessed in 3 months  Stop Rybelsus.  Started Mounjaro.  Discussed diabetes  management and need for adequate BS control. Educated patient high A1c puts him/her at risk for MI, CVA, CKD, gastroparesis, foot ulcers, and frequent infections.   Pt informed of Rx for glucometer/test strips, and lancets as needed and explained to keep a blood sugar log. Return to office as instructed. Additionally diet compliance explained - avoid sugar candy, soda, high carbohydrate loads. Explained how losing weight can improve your health and achieve better blood sugar control. Being active can also help prevent or control the disorder. Recommended annual opthalmology follow -up due to diagnosis of diabetes. Annual Podiatry visit prn.      Orders:  -     Tirzepatide (MOUNJARO) 2.5 MG/0.5ML solution pen-injector pen; Inject 0.5 mL under the skin into the appropriate area as directed 1 (One) Time Per Week.  Dispense: 2 mL; Refill: 1        All chronic conditions have been addressed and treated by the practice or other specialists. Medications have been reconciled and refilled as appropriate. Reiterated compliance and timely follow up appointments. Side effects of all new and old medications reviewed with the patient and patient willing to accept all risks involved. Advised RTO if no improvement or worsening of symptoms or if any new complaints arise. Patient advised to follow up with clinic or call after diagnostic tests, if patient does not hear from office 3 days after the test completion.          Follow Up     Return in about 10 weeks (around 8/19/2024) for Next scheduled follow up.  Patient was given instructions and counseling regarding her condition or for health maintenance advice. Please see specific information pulled into the AVS if appropriate.

## 2024-06-10 NOTE — ASSESSMENT & PLAN NOTE
Based on most recent lipid panel results stopped pravastatin and started atorvastatin.  Also start fenofibrate but continue omega-3 fish oil capsules, patient was understanding.  Discussed the importance of healthy diet, nutrition, and lifestyle. Recommend low salt, fat/cholesterol diet and avoid concentrated sweets. Encouraged DASH diet along with fresh fruits & vegetables and low fat dairy products. Counseled patient to exercise/walk as tolerated. Avoid tobacco and alcohol use.

## 2024-06-10 NOTE — ASSESSMENT & PLAN NOTE
Patient's (Body mass index is 37.56 kg/m².) indicates that they are morbidly/severely obese (BMI > 40 or > 35 with obesity - related health condition) with health conditions that include diabetes mellitus and dyslipidemias . Weight is unchanged. BMI  is above average; BMI management plan is completed. We discussed portion control and increasing exercise.     Discussed the importance of healthy diet, nutrition, and lifestyle. Recommend low salt, fat/cholesterol diet and avoid concentrated sweets. Encouraged DASH diet along with fresh fruits & vegetables and low fat dairy products. Counseled patient to exercise/walk as tolerated. Avoid tobacco and alcohol use.

## 2024-06-11 ENCOUNTER — TELEPHONE (OUTPATIENT)
Dept: FAMILY MEDICINE CLINIC | Facility: CLINIC | Age: 54
End: 2024-06-11
Payer: COMMERCIAL

## 2024-06-11 LAB
HBA1C MFR BLD: 6 % (ref 4.8–5.6)
MICROALBUMIN UR-MCNC: 3.6 UG/ML
TSH SERPL DL<=0.005 MIU/L-ACNC: 1.68 UIU/ML (ref 0.45–4.5)

## 2024-06-14 ENCOUNTER — TELEPHONE (OUTPATIENT)
Dept: FAMILY MEDICINE CLINIC | Facility: CLINIC | Age: 54
End: 2024-06-14
Payer: MEDICAID

## 2024-06-14 NOTE — TELEPHONE ENCOUNTER
Caller: Li Kimball    Relationship to patient: Self    Best call back number: [phone]    Patient is needing: SHE IS SUPPOSED TO HAVE A TEST DONE ON HER LEGS AND SHE IS WONDERING WHY SHE IS SUPPOSED TO HAVE IT DONE.  SHE IS ON BLOOD THINNERS AND IS NOT COMING OFF OF THEM THE REST OF HER LIFE.  WHAT IS THE PURPOSE OF THE TEST?  WHAT IS IT GOING TO ACCOMPLISH?  SHE NEEDS TO UNDERSTAND WHAT IT IS GOING TO ACCOMPLISH.  PLEASE CALL AND ADVISE.       IF SHE DOESN'T ANSWER, PLEASE LEAVE A MESSAGE.  MONDAY WORKS BEST FOR HER.

## 2024-06-17 ENCOUNTER — TELEPHONE (OUTPATIENT)
Dept: FAMILY MEDICINE CLINIC | Facility: CLINIC | Age: 54
End: 2024-06-17

## 2024-06-17 NOTE — TELEPHONE ENCOUNTER
Caller: Li Kimball    Relationship to patient: Self    Best call back number: [phone]     PATIENT STATES SHE WOULD LIKE A CALLBACK TODAY BECAUSE SHE IS NOT WORKING TODAY

## 2024-06-17 NOTE — TELEPHONE ENCOUNTER
I have called patient this afternoon and spoke with her at length explaining the blood vascular system which includes 2 components the venous system which carries blood back to the heart and responsible for the clots.  I also clarified patient has extensive history of clots which required IVC filter placement in the past and she needs to stay on the blood thinners as they have been prescribed by previous providers.  I explained to the patient I do need to ask sometimes whether or not the blood clot was provoked and why patient was on long-term anticoagulation.  But the question did not mean patient had to stop the blood thinners which I had already explained to patient during clinic visit that she needed to be on the blood thinners as prescribed previously    I went ahead and also explained to patient the arterial system has to do with blood going away from the heart. since I could not feel one of the pulses in her foot as strongly, I was trying to get an arterial study known as ERIN to screen the arterial flow to her legs.  Patient questioned what was going to be done after the arterial study.  I informed the patient it is too early to speculate what the test results would be, however if the test results come back abnormal will have an appropriate treatment plan in place.

## 2024-06-17 NOTE — TELEPHONE ENCOUNTER
PATIENT CALLED BACK, SHARED INFO WITH HER. SHE IS WANTING TO KNOW IF SHE HAS THE TEST DONE, WHAT WILL BE THE NEXT STEP IS AND WHAT WILL THIS ACCOMPLISH. SHE IS JUST TRYING TO UNDERSTAND  WHAT IS THE PURPOSE OF THE TEST.    SHE STATES SHE UNDERSTANDS WHY SHE IS ON BLOOD THINNERS. SHE DOESN'T WANT TO GET OFF BLOOD THINNER. SHE UNDERSTANDS HER VEINS IN HER LEGS ARE DAMAGED.   SHE WAS JUST A LITTLE CONFUSED WITH THE QUESTION ABOUT GETTING OFF BLOOD THINNERS.   SHE IS  OFF WORK TODAY AND WOULD LIKE TO HAVE A CALL BACK BECAUSE SHE IS WORKING TOMORROW.      CALL BACK NUMBER  [phone]

## 2024-06-17 NOTE — TELEPHONE ENCOUNTER
Relay       I have already discussed at length during recent clinic visit the patient had diminished pulses in one of her lower extremities and that an arterial study will be ordered known as ERIN or ankle-brachial index.     This is not a venous study in the venous system has to do with DVT.  Regardless if patient does not want to follow-up with the test I respect the patient's decision.     Thank you    Lm with info per verbal release. Please let us know if you would like to cancel or move forward.

## 2024-06-17 NOTE — TELEPHONE ENCOUNTER
Caller: Li Kimball    Relationship: Self    Best call back number:     [phone] (Mobile)       What was the call regarding: PATIENT IS UNABLE TO GET AN APPT FOR THE DOPPLER     SHE CANNOT TAKE OFF WORK FOR IT, AND NEEDS LATE EVENING OR WEEKEND    DOES [facility] DO THESE TESTS       Is it okay if the provider responds through MyChart: CALL AND ADVISE

## 2024-06-18 NOTE — TELEPHONE ENCOUNTER
PATIENT CALLING FOR AN UPDATE ON THIS, SHE STATED [facility] HAS ADVISED THEY CANNOT ACCOMMODATE WITH HER SCHEDULE.    PLEASE CALL.

## 2024-06-18 NOTE — TELEPHONE ENCOUNTER
Sent message through ref to [facility] to see if they can accommodate her with the evening or weekend.

## 2024-06-20 NOTE — TELEPHONE ENCOUNTER
Called pt, informed her that we faxed the referral to [facility] which covers all of their locations. I also gave her the number she can call to schedule that appointment with them, [phone]

## 2024-06-20 NOTE — TELEPHONE ENCOUNTER
Caller: Li Kimball    Relationship to patient: Self    Best call back number: [phone]    Patient is needing:     CALLING TO GET UPDATE ON IF ANYWHERE [facility] CAN GET HER IN SOON.     WOULD LIKE A CALLBACK BEFORE 3PM

## 2024-07-02 ENCOUNTER — TELEPHONE (OUTPATIENT)
Dept: FAMILY MEDICINE CLINIC | Facility: CLINIC | Age: 54
End: 2024-07-02

## 2024-07-02 NOTE — TELEPHONE ENCOUNTER
Caller: J Luis Kimball    Relationship: Self    Best call back number: [phone]     Caller requesting test results: J LUIS KIMBALL    What test was performed: ANKLE /DOPPLER BRACHIAL INDEX    When was the test performed: 06/27/24    Where was the test performed: [facility]    Additional notes: PATIENT IS CALLING TO REQUEST A CALL WITH THE RESULTS OF THE ABOVE TEST THAT WAS DONE ON 06/27/24.    PLEASE ADVISE.

## 2024-07-05 ENCOUNTER — TELEPHONE (OUTPATIENT)
Dept: FAMILY MEDICINE CLINIC | Facility: CLINIC | Age: 54
End: 2024-07-05
Payer: MEDICAID

## 2024-07-05 DIAGNOSIS — E78.2 MIXED HYPERLIPIDEMIA: Chronic | ICD-10-CM

## 2024-07-05 DIAGNOSIS — E03.9 HYPOTHYROIDISM, ADULT: Chronic | ICD-10-CM

## 2024-07-05 RX ORDER — ATORVASTATIN CALCIUM 10 MG/1
10 TABLET, FILM COATED ORAL NIGHTLY
Qty: 30 TABLET | Refills: 3 | Status: SHIPPED | OUTPATIENT
Start: 2024-07-05

## 2024-07-05 RX ORDER — LEVOTHYROXINE SODIUM 0.03 MG/1
25 TABLET ORAL DAILY
Qty: 90 TABLET | Refills: 1 | Status: SHIPPED | OUTPATIENT
Start: 2024-07-05

## 2024-07-05 NOTE — TELEPHONE ENCOUNTER
Caller:     Relationship:     Best call back number: [phone]       What test was performed: ANKLE BRACHIAL INDEX     When was the test performed: 6/27/24    Where was the test performed: [facility]    Additional notes: PATIENT WOULD LIKE A CALL BACK TO DISCUSS THE RESULTS OF THIS TEST.

## 2024-07-05 NOTE — TELEPHONE ENCOUNTER
Caller: Li Kimball    Relationship: Self    Best call back number: [phone]     Requested Prescriptions:   Requested Prescriptions     Pending Prescriptions Disp Refills    levothyroxine (SYNTHROID, LEVOTHROID) 25 MCG tablet 90 tablet 2     Sig: Take 1 tablet by mouth Daily.    atorvastatin (LIPITOR) 10 MG tablet 30 tablet 5     Sig: Take 1 tablet by mouth Every Night.        Pharmacy where request should be sent: [id number] [address] [facility] [address] [facility] [phone] [facility] [phone]      Last office visit with prescribing clinician: 6/10/2024   Last telemedicine visit with prescribing clinician: Visit date not found   Next office visit with prescribing clinician: 8/21/2024     Additional details provided by patient:     Does the patient have less than a 3 day supply:  [] Yes  [x] No    Would you like a call back once the refill request has been completed: [] Yes [x] No    If the office needs to give you a call back, can they leave a voicemail: [] Yes [x] No    Iron Schultz Rep   07/05/24 11:26 EDT

## 2024-07-08 NOTE — TELEPHONE ENCOUNTER
Collette Gift is a 27 y.o. female (: 1992) presenting to address:    Chief Complaint   Patient presents with   [address]     follow up       Vitals:    22 1611   Temp: 97.1 °F (36.2 °C)   TempSrc: Temporal   Weight: 118 lb (53.5 kg)       Hearing/Vision:   No exam data present    Learning Assessment:     Learning Assessment 3/16/2016   PRIMARY LEARNER Patient   HIGHEST LEVEL OF EDUCATION - PRIMARY LEARNER  SOME COLLEGE   BARRIERS PRIMARY LEARNER NONE   CO-LEARNER CAREGIVER No   PRIMARY LANGUAGE ENGLISH   LEARNER PREFERENCE PRIMARY OTHER (COMMENT)   ANSWERED BY patient   RELATIONSHIP SELF     Depression Screening:     3 most recent PHQ Screens 2019   PHQ Not Done Active Diagnosis of Depression or Bipolar Disorder   Little interest or pleasure in doing things -   Feeling down, depressed, irritable, or hopeless -   Total Score PHQ 2 -   Trouble falling or staying asleep, or sleeping too much -   Feeling tired or having little energy -   Poor appetite, weight loss, or overeating -   Feeling bad about yourself - or that you are a failure or have let yourself or your family down -   Trouble concentrating on things such as school, work, reading, or watching TV -   Moving or speaking so slowly that other people could have noticed; or the opposite being so fidgety that others notice -   Thoughts of being better off dead, or hurting yourself in some way -   PHQ 9 Score -   How difficult have these problems made it for you to do your work, take care of your home and get along with others -     Fall Risk Assessment:     Fall Risk Assessment, last 12 mths 2019   Able to walk? Yes   Fall in past 12 months? No     Abuse Screening:     Abuse Screening Questionnaire 2019   Do you ever feel afraid of your partner? N   Are you in a relationship with someone who physically or mentally threatens you? N   Is it safe for you to go home? Y     ADL Assessment:   No flowsheet data found. There is an ankle-brachial index done on June 28, 2024 was normal in both legs.    Basically the study was normal and it was done to evaluate blood flowing from the heart to the feet which is totally normal.  The pulses are strong in both legs and there is no arterial insufficiency.   Nothing needs to be done additionally based on the test results.       Patient informed    Coordination of Care Questionaire:   1. \"Have you been to the ER, urgent care clinic since your last visit? Hospitalized since your last visit? \" Yes Where: [facility]    2. \"Have you seen or consulted any other health care providers outside of the [address] [place] [address] since your last visit? \" Yes Where: [address] [place] for iron transfusion     3. For patients aged 39-70: Has the patient had a colonoscopy? NA - based on age     If the patient is female:    4. For patients aged 41-77: Has the patient had a mammogram within the past 2 years? NA - based on age    11. For patients aged 21-65: Has the patient had a pap smear? No    Advanced Directive:   1. Do you have an Advanced Directive? NO    2. Would you like information on Advanced Directives?  NO

## 2024-07-22 ENCOUNTER — TELEPHONE (OUTPATIENT)
Dept: FAMILY MEDICINE CLINIC | Facility: CLINIC | Age: 54
End: 2024-07-22

## 2024-07-22 NOTE — TELEPHONE ENCOUNTER
Caller: Li Kimball    Relationship: Self    Best call back number: [phone]    What is the best time to reach you: ANYTIME TODAY 7.22.24 , ANY DAY BEFORE 8AM    Who are you requesting to speak with (clinical staff, provider,  specific staff member): PROVIDER, CLINICAL     What was the call regarding: PATIENT STATED THAT SHE IS HAVING DIARRHEA WITH     Tirzepatide (MOUNJARO) 2.5 MG/0.5ML solution pen-injector pen       SHE WANTS TO KNOW IF THIS NORMAL AND WHEN WOULD IT STOP. SHE DOES NOT WANT TO STOP TAKING MEDICATION BECAUSE ITS WORKING FOR HER. SHE JUST WANTS TO KNOW IF SHE CAN TAKE SOMETHING FOR THE DIARRHEA.

## 2024-07-23 NOTE — TELEPHONE ENCOUNTER
Diarrhea occurs 3-4 days after taking the injection.  Pt did not have diarrhea from last strength but did not suppress pt's appetite.  Pt started current dose of 0.5 mg for 2 weeks.  The current strength is suppressing pt's appetite.  She is wanting to stay on current dosage.  Would like to know if there are options to be aware of and continue the Mounjaro.

## 2024-07-24 NOTE — TELEPHONE ENCOUNTER
FYI:    Called and relayed information to pt.  Pt voiced she is currently doing all those things.  She does request when the time comes for an increase of Mounjaro, she would like to stay on her current dose of 0.50mg for another month before an increase.     Yes

## 2024-08-15 ENCOUNTER — TELEPHONE (OUTPATIENT)
Dept: FAMILY MEDICINE CLINIC | Facility: CLINIC | Age: 54
End: 2024-08-15

## 2024-08-15 NOTE — TELEPHONE ENCOUNTER
Caller: Li Kimball    Relationship: Self    Best call back number: [phone]     What was the call regarding: PATIENT STATES SHE IS NEEDING TO SEE A DERMATOLOGIST WHO TAKES HER INSURANCE TO GET HER MEDICATIONS REFILLED. SHE STATES THE CURRENT PERSON SHE SEES DOES NOT TAKE HER INSURANCE ANYMORE. PATIENT IS NOT SURE IF SHE NEEDS A REFERRAL

## 2024-08-16 DIAGNOSIS — L65.9 HAIR LOSS: Primary | ICD-10-CM

## 2024-08-21 ENCOUNTER — OFFICE VISIT (OUTPATIENT)
Dept: FAMILY MEDICINE CLINIC | Facility: CLINIC | Age: 54
End: 2024-08-21
Payer: MEDICAID

## 2024-08-21 VITALS
HEART RATE: 92 BPM | OXYGEN SATURATION: 99 % | SYSTOLIC BLOOD PRESSURE: 110 MMHG | TEMPERATURE: 97.7 F | WEIGHT: 208.2 LBS | HEIGHT: 63 IN | DIASTOLIC BLOOD PRESSURE: 84 MMHG | RESPIRATION RATE: 16 BRPM | BODY MASS INDEX: 36.89 KG/M2

## 2024-08-21 DIAGNOSIS — E78.2 MIXED HYPERLIPIDEMIA: Chronic | ICD-10-CM

## 2024-08-21 DIAGNOSIS — E78.1 HYPERTRIGLYCERIDEMIA: Chronic | ICD-10-CM

## 2024-08-21 DIAGNOSIS — I82.401 DEEP VEIN THROMBOSIS (DVT) OF RIGHT LOWER EXTREMITY, UNSPECIFIED CHRONICITY, UNSPECIFIED VEIN: Chronic | ICD-10-CM

## 2024-08-21 DIAGNOSIS — E11.9 TYPE 2 DIABETES MELLITUS WITHOUT COMPLICATION, WITHOUT LONG-TERM CURRENT USE OF INSULIN: Primary | ICD-10-CM

## 2024-08-21 DIAGNOSIS — E03.9 HYPOTHYROIDISM, ADULT: Chronic | ICD-10-CM

## 2024-08-21 DIAGNOSIS — L65.9 HAIR LOSS: ICD-10-CM

## 2024-08-21 PROCEDURE — 1126F AMNT PAIN NOTED NONE PRSNT: CPT | Performed by: STUDENT IN AN ORGANIZED HEALTH CARE EDUCATION/TRAINING PROGRAM

## 2024-08-21 PROCEDURE — 1160F RVW MEDS BY RX/DR IN RCRD: CPT | Performed by: STUDENT IN AN ORGANIZED HEALTH CARE EDUCATION/TRAINING PROGRAM

## 2024-08-21 PROCEDURE — 3044F HG A1C LEVEL LT 7.0%: CPT | Performed by: STUDENT IN AN ORGANIZED HEALTH CARE EDUCATION/TRAINING PROGRAM

## 2024-08-21 PROCEDURE — 1159F MED LIST DOCD IN RCRD: CPT | Performed by: STUDENT IN AN ORGANIZED HEALTH CARE EDUCATION/TRAINING PROGRAM

## 2024-08-21 PROCEDURE — 99214 OFFICE O/P EST MOD 30 MIN: CPT | Performed by: STUDENT IN AN ORGANIZED HEALTH CARE EDUCATION/TRAINING PROGRAM

## 2024-08-21 RX ORDER — OMEGA-3/DHA/EPA/FISH OIL 300-1000MG
1 CAPSULE ORAL DAILY
Qty: 180 CAPSULE | Refills: 3 | Status: SHIPPED | OUTPATIENT
Start: 2024-08-21

## 2024-08-21 RX ORDER — MINOXIDIL 2.5 MG/1
1.25 TABLET ORAL DAILY
Qty: 30 TABLET | Refills: 2 | Status: SHIPPED | OUTPATIENT
Start: 2024-08-21

## 2024-08-21 RX ORDER — LEVOTHYROXINE SODIUM 0.03 MG/1
25 TABLET ORAL DAILY
Qty: 90 TABLET | Refills: 1 | Status: SHIPPED | OUTPATIENT
Start: 2024-08-21

## 2024-08-21 RX ORDER — FINASTERIDE 5 MG/1
5 TABLET, FILM COATED ORAL DAILY
Qty: 30 TABLET | Refills: 2 | Status: SHIPPED | OUTPATIENT
Start: 2024-08-21

## 2024-08-21 RX ORDER — BLOOD-GLUCOSE METER
1 KIT MISCELLANEOUS DAILY
Qty: 1 EACH | Refills: 0 | Status: SHIPPED | OUTPATIENT
Start: 2024-08-21 | End: 2024-09-20

## 2024-08-21 RX ORDER — DIPHENOXYLATE HYDROCHLORIDE AND ATROPINE SULFATE 2.5; .025 MG/1; MG/1
TABLET ORAL DAILY
COMMUNITY

## 2024-08-21 RX ORDER — METFORMIN HYDROCHLORIDE 500 MG/1
1000 TABLET, EXTENDED RELEASE ORAL DAILY
Qty: 180 TABLET | Refills: 0 | Status: SHIPPED | OUTPATIENT
Start: 2024-08-21

## 2024-08-21 RX ORDER — ATORVASTATIN CALCIUM 10 MG/1
10 TABLET, FILM COATED ORAL NIGHTLY
Qty: 30 TABLET | Refills: 3 | Status: SHIPPED | OUTPATIENT
Start: 2024-08-21

## 2024-08-21 RX ORDER — BISMUTH SUBSALICYLATE 262MG/15ML
1 SUSPENSION, ORAL (FINAL DOSE FORM) ORAL 2 TIMES DAILY
Qty: 100 EACH | Refills: 5 | Status: SHIPPED | OUTPATIENT
Start: 2024-08-21

## 2024-08-21 NOTE — PROGRESS NOTES
Chief Complaint  Prediabetes (10 weeks f/u)    Subjective        Li Kimball presents to [facility]  History of Present Illness  53-year-old white female with a history of hypothyroidism, diabetes type 2 and other medical problems here for chronic disease management follow-up visit.  Patient reports she has been tolerating Mounjaro 5 mg without any issues at this time.  Patient reported she had some diarrhea on Mounjaro initially but subsequently it had resolved completely.  Patient says she is ready to increase her dose to Mounjaro 7.5 mg once a week.    Instructed and reminded patient to follow-up with an eye doctor, either opthalmology or optometry with preference for opthalmology, for an annual diabetic eye exam.    Instructed patient to get the adult preventive immunization that are due at  the pharmacy, including - HBV.    Patient reports she has a history of hair loss and her prior dermatologist prescribed her minoxidil and finasteride tablets.  However the dermatologist no longer accepts her current insurance.  A new dermatology referral was recently placed when patient sent a MyChart note to follow-up for hair loss.  Patient reports she does not have the new dermatology appointment yet and is waiting to schedule an appointment.  In the meantime patient desires refill of her minoxidil and finasteride.    I clearly explained to the patient I do not typically prescribe these pills which include minoxidil and finasteride for hair loss to patient's in general and especially to women.  Because special use of medications in women require special monitoring.  I informed patient I prefer she follow-up with a dermatologist for these pills.  Her patient requested couple refills on minoxidil and finasteride until she gets in with dermatologist.  I informed the patient she would have to resume the risks of taking these medications as I am not comfortable prescribing these medications,  "patient agreed to assume all risk from taking minoxidil and finasteride, patient voiced understanding.        Objective   Vital Signs:  /84 (BP Location: Right arm, Patient Position: Sitting, Cuff Size: Adult)   Pulse 92   Temp 97.7 °F (36.5 °C) (Temporal)   Resp 16   Ht 160 cm (62.99\")   Wt 94.4 kg (208 lb 3.2 oz)   SpO2 99%   BMI 36.89 kg/m²   Estimated body mass index is 36.89 kg/m² as calculated from the following:    Height as of this encounter: 160 cm (62.99\").    Weight as of this encounter: 94.4 kg (208 lb 3.2 oz).               Physical Exam  Constitutional:       Appearance: Normal appearance. She is obese.   HENT:      Head: Normocephalic and atraumatic.   Eyes:      Conjunctiva/sclera: Conjunctivae normal.   Cardiovascular:      Rate and Rhythm: Normal rate and regular rhythm.      Heart sounds: Normal heart sounds.   Pulmonary:      Effort: Pulmonary effort is normal.      Breath sounds: Normal breath sounds.   Abdominal:      General: Bowel sounds are normal.      Palpations: Abdomen is soft.      Comments: Non-tender   Skin:     General: Skin is warm.   Neurological:      General: No focal deficit present.      Mental Status: She is alert and oriented to person, place, and time.   Psychiatric:         Mood and Affect: Mood normal.         Behavior: Behavior normal.        Result Review :    The following data was reviewed by: DANIEL Martínez on 08/21/2024:  CMP          2/18/2024    04:57 2/19/2024    05:11 3/6/2024    15:41   CMP   Glucose 93  113  85    BUN 13  11  18    Creatinine 0.72  0.64  0.84    EGFR 100.1  105.8     Sodium 140  138  140    Potassium 5.3  3.7  3.9    Chloride 111  109  104    Calcium 8.3  8.5  10.1    Total Protein   8.1    Albumin   4.5    Globulin   3.6    Total Bilirubin   0.3    Alkaline Phosphatase   65    AST (SGOT)   25    ALT (SGPT)   24    BUN/Creatinine Ratio 18.1  17.2  21    Anion Gap 8.0  7.0       CBC          2/17/2024    05:08 " 2/18/2024    04:57 2/19/2024    05:11   CBC   WBC 7.96  9.59  10.42    RBC 3.56  4.01  3.92    Hemoglobin 10.0  11.0  10.6    Hematocrit 30.5  35.0  33.4    MCV 85.7  87.3  85.2    MCH 28.1  27.4  27.0    MCHC 32.8  31.4  31.7    RDW 13.0  13.7  13.1    Platelets 471  414  495      Lipid Panel          11/6/2023    08:57 3/6/2024    15:41   Lipid Panel   Total Cholesterol 174  187    Triglycerides 384  175    HDL Cholesterol 35  41    VLDL Cholesterol 62  31    LDL Cholesterol  77  115      TSH          11/6/2023    08:57 3/6/2024    15:41 6/10/2024    12:17   TSH   TSH 3.200  2.000  1.680      A1C Last 3 Results          11/6/2023    08:57 2/15/2024    05:44 6/10/2024    12:17   HGBA1C Last 3 Results   Hemoglobin A1C 5.80  6.20  6.0      Microalbumin          3/6/2024    15:44 6/10/2024    12:17   Microalbumin   Microalbumin, Urine 4.3  3.6                     Assessment and Plan     Diagnoses and all orders for this visit:    1. Type 2 diabetes mellitus without complication, without long-term current use of insulin (Primary)  Assessment & Plan:  Diabetes is stable.   Medication changes per orders.  Reminded to bring in blood sugar diary at next visit.  Recommended an ADA diet.  Recommended a Mediterranean style of eating  Regular aerobic exercise.  Discussed ways to avoid symptomatic hypoglycemia.  Discussed sick day management.  Discussed foot care.  Reminded to get yearly retinal exam.  Diabetes will be reassessed in 3 months  Increase the dose of Mounjaro to Mounjaro 7.5 mg once a week.  Instructed patient to monitor her portion size of her meals and to decrease by one third if she has any issues.  Also reminded patient to follow-up with eye doctor for annual diabetic eye exam.  Instructed patient to check her blood sugar specially when she wakes up and watch for blood glucose to be around 100.  Instructed patient to follow-up with clinic for abdominal pain blood sugar below 85 and also educated patient 70 is  considered hypoglycemia, patient voiced understanding.  Discussed diabetes management and need for adequate BS control. Educated patient high A1c puts him/her at risk for MI, CVA, CKD, gastroparesis, foot ulcers, and frequent infections.   Pt informed of Rx for glucometer/test strips, and lancets as needed and explained to keep a blood sugar log. Return to office as instructed. Additionally diet compliance explained - avoid sugar candy, soda, high carbohydrate loads. Explained how losing weight can improve your health and achieve better blood sugar control. Being active can also help prevent or control the disorder. Recommended annual opthalmology follow -up due to diagnosis of diabetes. Annual Podiatry visit prn.      Orders:  -     Tirzepatide (MOUNJARO) 7.5 MG/0.5ML solution pen-injector pen; Inject 0.5 mL under the skin into the appropriate area as directed 1 (One) Time Per Week.  Dispense: 2 mL; Refill: 3  -     glucose blood test strip; Twice Daily  Dispense: 100 each; Refill: 5  -     glucose monitor monitoring kit; Use 1 each Daily for 30 days. DX- DM-2  Dispense: 1 each; Refill: 0  -     Lancets Ultra Thin 30G misc; Inject 1 each under the skin into the appropriate area as directed 2 (Two) Times a Day.  Dispense: 100 each; Refill: 5  -     CBC & Differential    2. Deep vein thrombosis (DVT) of right lower extremity, unspecified chronicity, unspecified vein  Assessment & Plan:  Stable.  Continue Eliquis    Orders:  -     apixaban (Eliquis) 2.5 MG tablet tablet; Take 1 tablet by mouth Every 12 (Twelve) Hours.  Dispense: 60 tablet; Refill: 5  -     CBC & Differential    3. Mixed hyperlipidemia  Assessment & Plan:   Lipid abnormalities are  we will recheck fasting lipid panel today    Plan:  Continue same medication/s without change.      Discussed medication dosage, use, side effects, and goals of treatment in detail.    Counseled patient on lifestyle modifications to help control hyperlipidemia.     Patient  Treatment Goals:   LDL goal is less than 70    Followup in 6 months.  Discussed the importance of healthy diet, nutrition, and lifestyle. Recommend low salt, fat/cholesterol diet and avoid concentrated sweets. Encouraged DASH diet along with fresh fruits & vegetables and low fat dairy products. Counseled patient to exercise/walk as tolerated. Avoid tobacco and alcohol use.      Orders:  -     atorvastatin (LIPITOR) 10 MG tablet; Take 1 tablet by mouth Every Night.  Dispense: 30 tablet; Refill: 3    4. Hypertriglyceridemia  Assessment & Plan:  Discussed the importance of healthy diet, nutrition, and lifestyle. Recommend low salt, fat/cholesterol diet and avoid concentrated sweets. Encouraged DASH diet along with fresh fruits & vegetables and low fat dairy products. Counseled patient to exercise/walk as tolerated. Avoid tobacco and alcohol use.      Orders:  -     fish oil-omega-3 fatty acids 1000 MG capsule; Take 1 capsule by mouth Daily.  Dispense: 180 capsule; Refill: 3  -     Comprehensive Metabolic Panel  -     Lipid Panel    5. Hypothyroidism, adult  -     levothyroxine (SYNTHROID, LEVOTHROID) 25 MCG tablet; Take 1 tablet by mouth Daily.  Dispense: 90 tablet; Refill: 1    6. Hair loss  Assessment & Plan:  Refilled minoxidil and finasteride per patient request.  Instructed patient to follow-up with dermatology in the future for this med refills.    Orders:  -     finasteride (PROSCAR) 5 MG tablet; Take 1 tablet by mouth Daily.  Dispense: 30 tablet; Refill: 2  -     minoxidil (LONITEN) 2.5 MG tablet; Take 0.5 tablets by mouth Daily.  Dispense: 30 tablet; Refill: 2    Other orders  -     metFORMIN ER (GLUCOPHAGE-XR) 500 MG 24 hr tablet; Take 2 tablets by mouth Daily.  Dispense: 180 tablet; Refill: 0      All chronic conditions have been addressed and treated by the practice or other specialists. Medications have been reconciled and refilled as appropriate. Reiterated compliance and timely follow up appointments.  Side effects of all new and old medications reviewed with the patient and patient willing to accept all risks involved. Advised RTO if no improvement or worsening of symptoms or if any new complaints arise. Patient advised to follow up with clinic or call after diagnostic tests, if patient does not hear from office 3 days after the test completion.            Follow Up     Return in about 3 months (around 11/21/2024) for Next scheduled follow up.  Patient was given instructions and counseling regarding her condition or for health maintenance advice. Please see specific information pulled into the AVS if appropriate.

## 2024-08-21 NOTE — ASSESSMENT & PLAN NOTE
Refilled minoxidil and finasteride per patient request.  Instructed patient to follow-up with dermatology in the future for this med refills.

## 2024-08-21 NOTE — ASSESSMENT & PLAN NOTE
Diabetes is stable.   Medication changes per orders.  Reminded to bring in blood sugar diary at next visit.  Recommended an ADA diet.  Recommended a Mediterranean style of eating  Regular aerobic exercise.  Discussed ways to avoid symptomatic hypoglycemia.  Discussed sick day management.  Discussed foot care.  Reminded to get yearly retinal exam.  Diabetes will be reassessed in 3 months  Increase the dose of Mounjaro to Mounjaro 7.5 mg once a week.  Instructed patient to monitor her portion size of her meals and to decrease by one third if she has any issues.  Also reminded patient to follow-up with eye doctor for annual diabetic eye exam.  Instructed patient to check her blood sugar specially when she wakes up and watch for blood glucose to be around 100.  Instructed patient to follow-up with clinic for abdominal pain blood sugar below 85 and also educated patient 70 is considered hypoglycemia, patient voiced understanding.  Discussed diabetes management and need for adequate BS control. Educated patient high A1c puts him/her at risk for MI, CVA, CKD, gastroparesis, foot ulcers, and frequent infections.   Pt informed of Rx for glucometer/test strips, and lancets as needed and explained to keep a blood sugar log. Return to office as instructed. Additionally diet compliance explained - avoid sugar candy, soda, high carbohydrate loads. Explained how losing weight can improve your health and achieve better blood sugar control. Being active can also help prevent or control the disorder. Recommended annual opthalmology follow -up due to diagnosis of diabetes. Annual Podiatry visit prn.

## 2024-08-21 NOTE — ASSESSMENT & PLAN NOTE
Lipid abnormalities are  we will recheck fasting lipid panel today    Plan:  Continue same medication/s without change.      Discussed medication dosage, use, side effects, and goals of treatment in detail.    Counseled patient on lifestyle modifications to help control hyperlipidemia.     Patient Treatment Goals:   LDL goal is less than 70    Followup in 6 months.  Discussed the importance of healthy diet, nutrition, and lifestyle. Recommend low salt, fat/cholesterol diet and avoid concentrated sweets. Encouraged DASH diet along with fresh fruits & vegetables and low fat dairy products. Counseled patient to exercise/walk as tolerated. Avoid tobacco and alcohol use.

## 2024-08-22 LAB
ALBUMIN SERPL-MCNC: 4.3 G/DL (ref 3.8–4.9)
ALP SERPL-CCNC: 107 IU/L (ref 44–121)
ALT SERPL-CCNC: 19 IU/L (ref 0–32)
AST SERPL-CCNC: 15 IU/L (ref 0–40)
BASOPHILS # BLD AUTO: 0.1 X10E3/UL (ref 0–0.2)
BASOPHILS NFR BLD AUTO: 1 %
BILIRUB SERPL-MCNC: 0.4 MG/DL (ref 0–1.2)
BUN SERPL-MCNC: 12 MG/DL (ref 6–24)
BUN/CREAT SERPL: 16 (ref 9–23)
CALCIUM SERPL-MCNC: 9.6 MG/DL (ref 8.7–10.2)
CHLORIDE SERPL-SCNC: 104 MMOL/L (ref 96–106)
CHOLEST SERPL-MCNC: 149 MG/DL (ref 100–199)
CO2 SERPL-SCNC: 24 MMOL/L (ref 20–29)
CREAT SERPL-MCNC: 0.73 MG/DL (ref 0.57–1)
EGFRCR SERPLBLD CKD-EPI 2021: 98 ML/MIN/1.73
EOSINOPHIL # BLD AUTO: 0.3 X10E3/UL (ref 0–0.4)
EOSINOPHIL NFR BLD AUTO: 4 %
ERYTHROCYTE [DISTWIDTH] IN BLOOD BY AUTOMATED COUNT: 13.5 % (ref 11.7–15.4)
GLOBULIN SER CALC-MCNC: 3 G/DL (ref 1.5–4.5)
GLUCOSE SERPL-MCNC: 87 MG/DL (ref 70–99)
HCT VFR BLD AUTO: 42.7 % (ref 34–46.6)
HDLC SERPL-MCNC: 38 MG/DL
HGB BLD-MCNC: 13.6 G/DL (ref 11.1–15.9)
IMM GRANULOCYTES # BLD AUTO: 0 X10E3/UL (ref 0–0.1)
IMM GRANULOCYTES NFR BLD AUTO: 0 %
LDLC SERPL CALC-MCNC: 79 MG/DL (ref 0–99)
LYMPHOCYTES # BLD AUTO: 2.7 X10E3/UL (ref 0.7–3.1)
LYMPHOCYTES NFR BLD AUTO: 35 %
MCH RBC QN AUTO: 28.9 PG (ref 26.6–33)
MCHC RBC AUTO-ENTMCNC: 31.9 G/DL (ref 31.5–35.7)
MCV RBC AUTO: 91 FL (ref 79–97)
MONOCYTES # BLD AUTO: 0.7 X10E3/UL (ref 0.1–0.9)
MONOCYTES NFR BLD AUTO: 9 %
NEUTROPHILS # BLD AUTO: 3.9 X10E3/UL (ref 1.4–7)
NEUTROPHILS NFR BLD AUTO: 51 %
PLATELET # BLD AUTO: 407 X10E3/UL (ref 150–450)
POTASSIUM SERPL-SCNC: 4.2 MMOL/L (ref 3.5–5.2)
PROT SERPL-MCNC: 7.3 G/DL (ref 6–8.5)
RBC # BLD AUTO: 4.71 X10E6/UL (ref 3.77–5.28)
SODIUM SERPL-SCNC: 140 MMOL/L (ref 134–144)
TRIGL SERPL-MCNC: 186 MG/DL (ref 0–149)
VLDLC SERPL CALC-MCNC: 32 MG/DL (ref 5–40)
WBC # BLD AUTO: 7.5 X10E3/UL (ref 3.4–10.8)

## 2024-08-23 ENCOUNTER — TELEPHONE (OUTPATIENT)
Dept: FAMILY MEDICINE CLINIC | Facility: CLINIC | Age: 54
End: 2024-08-23
Payer: MEDICAID

## 2024-08-23 NOTE — TELEPHONE ENCOUNTER
"Relay     \"Please return the office's call to discuss your recent lab results.\"                "

## 2024-08-23 NOTE — TELEPHONE ENCOUNTER
----- Message from Tank Shea sent at 8/23/2024 12:09 PM EDT -----  On recent labs blood count, liver kidney function, were normal and cholesterol levels were stable.  No change in treatment plan based on current lab test results.    Recommend low fat, low cholesterol, low salt, and low sugar diet along with fresh fruits and vegetables. Also recommend walking for exercise 20 to 30 minutes per day about 5 days per week.

## 2024-09-18 DIAGNOSIS — E11.9 TYPE 2 DIABETES MELLITUS WITHOUT COMPLICATION, WITHOUT LONG-TERM CURRENT USE OF INSULIN: ICD-10-CM

## 2024-09-23 DIAGNOSIS — L65.9 HAIR LOSS: ICD-10-CM

## 2024-09-23 RX ORDER — MINOXIDIL 2.5 MG/1
1.25 TABLET ORAL DAILY
Qty: 30 TABLET | Refills: 2 | Status: SHIPPED | OUTPATIENT
Start: 2024-09-23
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
## 2024-10-16 ENCOUNTER — TELEPHONE (OUTPATIENT)
Dept: FAMILY MEDICINE CLINIC | Facility: CLINIC | Age: 54
End: 2024-10-16
Payer: MEDICAID

## 2024-10-16 DIAGNOSIS — E11.9 TYPE 2 DIABETES MELLITUS WITHOUT COMPLICATION, WITHOUT LONG-TERM CURRENT USE OF INSULIN: Primary | ICD-10-CM

## 2024-10-16 NOTE — TELEPHONE ENCOUNTER
PATIENT CALLED FOR INCREASE DOSAGE OF   Tirzepatide (MOUNJARO) 7.5 MG/0.5ML solution pen-injector pen     TO NEXT LEVEL    [id number] - [place], [place] - [address] [facility] [address] [facility] - [phone] [facility] [phone]  [phone]     CALL BACK NUMBER [phone]

## 2024-10-18 NOTE — TELEPHONE ENCOUNTER
Called and informed pt Mounjaro has been increased and new script sent to pharmacy.  Pt voiced understanding.

## 2024-11-14 ENCOUNTER — TELEPHONE (OUTPATIENT)
Dept: FAMILY MEDICINE CLINIC | Facility: CLINIC | Age: 54
End: 2024-11-14

## 2024-11-14 NOTE — TELEPHONE ENCOUNTER
Called and spoke with pt in regards to her current knee pain.  Informed pt this office does not do cortisone injections and she requested if provider knows a location that does.  Suggested pt may go to ER for injections due to pain; ongoing for a few weeks now.  Pt declined adamantly.  Informed her, provider may have to see pt prior to any referral being placed if necessary, pt did not want to wait until her appt with provider on 11/22/24.  Pt stated if provider can give her a location, then she would call herself to see if a referral is necessary.

## 2024-11-14 NOTE — TELEPHONE ENCOUNTER
Caller: Li Kimball    Relationship: Self    Best call back number: [phone]     Who are you requesting to speak with (clinical staff, provider,  specific staff member): CLINICAL STAFF     What was the call regarding: PATIENT WANTS TO KNOW IF SHE CAN GET A CORTISONE INJECTION RIGHT KNEE AT APPOINTMENT ON 11/22/24 HAVING KNEE PAIN PLEASE CALL AND ADVISE

## 2024-11-15 DIAGNOSIS — M25.561 RIGHT KNEE PAIN, UNSPECIFIED CHRONICITY: Primary | ICD-10-CM

## 2024-11-15 NOTE — TELEPHONE ENCOUNTER
Called and spoke with pt.  Relayed to pt a referral has been placed for Orthopedic Surgery, gave pt contact information.  She voiced understanding and will reach out to them with further questions for discussion.

## 2024-11-21 ENCOUNTER — OFFICE VISIT (OUTPATIENT)
Dept: SPORTS MEDICINE | Facility: CLINIC | Age: 54
End: 2024-11-21
Payer: COMMERCIAL

## 2024-11-21 VITALS
SYSTOLIC BLOOD PRESSURE: 116 MMHG | OXYGEN SATURATION: 100 % | HEART RATE: 87 BPM | TEMPERATURE: 97.7 F | HEIGHT: 63 IN | BODY MASS INDEX: 36.86 KG/M2 | DIASTOLIC BLOOD PRESSURE: 74 MMHG | WEIGHT: 208 LBS

## 2024-11-21 DIAGNOSIS — R29.898 WEAKNESS OF RIGHT LOWER EXTREMITY: ICD-10-CM

## 2024-11-21 DIAGNOSIS — M17.11 PRIMARY OSTEOARTHRITIS OF RIGHT KNEE: Primary | ICD-10-CM

## 2024-11-21 DIAGNOSIS — M23.51 CHRONIC INSTABILITY OF RIGHT KNEE: ICD-10-CM

## 2024-11-21 DIAGNOSIS — R29.898 WEAKNESS OF RIGHT HIP: ICD-10-CM

## 2024-11-21 DIAGNOSIS — M76.31 IT BAND SYNDROME, RIGHT: ICD-10-CM

## 2024-11-21 RX ORDER — LIDOCAINE HYDROCHLORIDE 10 MG/ML
2 INJECTION, SOLUTION INFILTRATION; PERINEURAL
Status: COMPLETED | OUTPATIENT
Start: 2024-11-21 | End: 2024-11-21

## 2024-11-21 RX ORDER — TRIAMCINOLONE ACETONIDE 40 MG/ML
40 INJECTION, SUSPENSION INTRA-ARTICULAR; INTRAMUSCULAR
Status: COMPLETED | OUTPATIENT
Start: 2024-11-21 | End: 2024-11-21

## 2024-11-21 RX ADMIN — LIDOCAINE HYDROCHLORIDE 2 ML: 10 INJECTION, SOLUTION INFILTRATION; PERINEURAL at 10:20

## 2024-11-21 RX ADMIN — TRIAMCINOLONE ACETONIDE 40 MG: 40 INJECTION, SUSPENSION INTRA-ARTICULAR; INTRAMUSCULAR at 10:20

## 2024-11-21 NOTE — PROGRESS NOTES
Li is a 54 y.o. year old female here today for consultation requested by Tank Shea*     Chief Complaint   Patient presents with    Right Knee - Pain     Pt has knee pain in both knees but states right is worse.    Left Knee - Pain       History of Present Illness  Li is a 54 y.o. year old female here today for right knee pain that has been present intermittently for years with recent worsening over the past few months with no known injury or trauma.   History of Present Illness  She has been experiencing intermittent right knee pain for several years, which has become more noticeable and severe over the past few months. The pain is described as aching and shooting, and she rates it as an 8 out of 10 in severity. The pain is more pronounced towards the front of the knee and tends to worsen at the end of the day after prolonged standing or walking. She also reports pain in her left knee, but it is less severe than the right. She has not noticed any swelling in the knee. She has been using a brace at work, which seems to alleviate the pain, and she also finds relief from soaking in a hot bathtub. She has tried Tylenol for the pain, but it has not been effective. The pain seems to be exacerbated by cold weather. She has not had any previous injuries to her knees.    She has a history of blood clots in her legs, behind her kneecaps, and in her lungs, and is currently on Eliquis for life. She also has a filter in her chest.    She has been seen previously for this complaint, once in 2018 (Rebecca) and once in 2020 (Helen Marin) and received cortisone injections which relieved her symptoms.    SOCIAL HISTORY  She works in a hotel.      The following data was reviewed by: Kya Griffiths DO on 11/21/2024:  Data reviewed : Ortho note from 11/19/18 and 3/5/2020      Right Knee X-Ray from 3/20/24  Images personally reviewed and interpreted  Indication: Pain  Views: Bilateral AP and Tunnel, Right  "Lateral  Findings:  No fracture  No bony lesion  Mild to moderate tricompartmental narrowing bilaterally, worse on the medial aspect   Mild marginal spurring      /74   Pulse 87   Temp 97.7 °F (36.5 °C) (Infrared)   Ht 160 cm (63\")   Wt 94.3 kg (208 lb)   SpO2 100%   BMI 36.85 kg/m²        Physical Exam  Vital signs reviewed.   General: Well developed, well nourished; No acute distress.  Eyes: conjunctiva clear; pupils equally round and reactive  ENT: external ears and nose atraumatic; hearing normal  Resp: normal respiratory effort, no use of accessory muscles  Skin: normal color and pigmentation; no rashes or wounds; normal turgor  Psych: alert and oriented; mood and affect appropriate; recent and remote memory intact    MSK Exam:  The right knee is without obvious signs of acute bony deformity, quadriceps atrophy, swelling, erythema, ecchymosis or joint effusion. The patellar facets are tender. The lateral joint line is tender. No bony crepitus or step-off. Tenderness of the distal IT band. Flexion & extension are full and symmetrical. Knee and hip strength is 4/5 compared to left. Varus & valgus stress, Lachman's, and anterior drawer with slight laxity compared to the left, but no pain. Jose Roberto's and posterior drawer are negative.       Right Knee X-Ray  Indication: Pain  Views: AP, lateral, and sunrise  Findings:  No fracture  No bony lesion  Mild to moderate tricompartmental narrowing and spurring bilaterally, worse on the medial and PF compartments  Slight lateral patellar tilt with worsening narrowing of the lateral PF compartment      - Large Joint Arthrocentesis: R knee on 11/21/2024 10:20 AM  Indications: pain  Details: 22 G needle, ultrasound-guided superolateral approach  Medications: 40 mg triamcinolone acetonide 40 MG/ML; 2 mL lidocaine 1 %  Outcome: tolerated well, no immediate complications  Procedure, treatment alternatives, risks and benefits explained, specific risks discussed. " Consent was given by the patient. Immediately prior to procedure a time out was called to verify the correct patient, procedure, equipment, support staff and site/side marked as required. Patient was prepped and draped in the usual sterile fashion.            Assessment and Plan  Diagnoses and all orders for this visit:    1. Primary osteoarthritis of right knee (Primary)  -     Ambulatory Referral to Physical Therapy for Evaluation & Treatment    2. Chronic instability of right knee  -     Ambulatory Referral to Physical Therapy for Evaluation & Treatment    3. It band syndrome, right  -     Ambulatory Referral to Physical Therapy for Evaluation & Treatment    4. Weakness of right lower extremity  -     Ambulatory Referral to Physical Therapy for Evaluation & Treatment    5. Weakness of right hip  -     Ambulatory Referral to Physical Therapy for Evaluation & Treatment    Other orders  -     - Large Joint Arthrocentesis: ANDRY Jefferson is a 54 y.o. year old female here today for right knee pain that has been present intermittently for years with recent worsening over the past few months with no known injury or trauma. We reviewed images and exam findings. Initial x-rays show tricompartmental degenerative changes. On exam, she does have some subtle ligamentous laxity compared to the left, but does not appear acute and no significant findings.  Assessment & Plan  Muscle imbalance and weakness in the hips and legs may also be contributing to the discomfort. Physical therapy is recommended to improve overall strength and address the identified weaknesses. A topical anti-inflammatory, Voltaren, is suggested for use 3 to 4 times daily. She is advised to continue using the brace for additional stability during activity.      She is on chronic anticoagulation and has responded well to previous injections. So decision was made to proceed with injection. The procedure was well tolerated. She has been educated on the signs  and symptoms of local reaction and infection and should call the office if she experiences any of these. She should take it easy for the next 24 to 48 hours. After that, she may return to normal activity. It was recommended to continue low impact activity.     We will follow-up in 6-8 weeks. All of her questions were answered and she is agreeable with the plan.    Dictated utilizing Dragon dictation.  Patient or patient representative verbalized consent for the use of Ambient Listening during the visit with  Kya Griffiths DO for chart documentation. 11/21/2024  09:58 EST

## 2024-11-22 ENCOUNTER — OFFICE VISIT (OUTPATIENT)
Dept: FAMILY MEDICINE CLINIC | Facility: CLINIC | Age: 54
End: 2024-11-22
Payer: COMMERCIAL

## 2024-11-22 VITALS
DIASTOLIC BLOOD PRESSURE: 84 MMHG | WEIGHT: 202 LBS | HEART RATE: 85 BPM | BODY MASS INDEX: 35.79 KG/M2 | SYSTOLIC BLOOD PRESSURE: 120 MMHG | TEMPERATURE: 99.8 F | RESPIRATION RATE: 16 BRPM | HEIGHT: 63 IN | OXYGEN SATURATION: 98 %

## 2024-11-22 DIAGNOSIS — E78.2 MIXED HYPERLIPIDEMIA: Chronic | ICD-10-CM

## 2024-11-22 DIAGNOSIS — I82.401 DEEP VEIN THROMBOSIS (DVT) OF RIGHT LOWER EXTREMITY, UNSPECIFIED CHRONICITY, UNSPECIFIED VEIN: Chronic | ICD-10-CM

## 2024-11-22 DIAGNOSIS — L65.9 HAIR LOSS: ICD-10-CM

## 2024-11-22 DIAGNOSIS — E11.9 TYPE 2 DIABETES MELLITUS WITHOUT COMPLICATION, WITHOUT LONG-TERM CURRENT USE OF INSULIN: Primary | ICD-10-CM

## 2024-11-22 DIAGNOSIS — M17.11 PRIMARY OSTEOARTHRITIS OF RIGHT KNEE: ICD-10-CM

## 2024-11-22 DIAGNOSIS — E03.9 HYPOTHYROIDISM, ADULT: Chronic | ICD-10-CM

## 2024-11-22 DIAGNOSIS — E78.1 HYPERTRIGLYCERIDEMIA: Chronic | ICD-10-CM

## 2024-11-22 DIAGNOSIS — Z86.718 HISTORY OF DEEP VENOUS THROMBOSIS (DVT) OF DISTAL VEIN OF RIGHT LOWER EXTREMITY: ICD-10-CM

## 2024-11-22 PROBLEM — R09.89 DECREASED PEDAL PULSES: Status: RESOLVED | Noted: 2024-06-10 | Resolved: 2024-11-22

## 2024-11-22 RX ORDER — DIPHENOXYLATE HYDROCHLORIDE AND ATROPINE SULFATE 2.5; .025 MG/1; MG/1
1 TABLET ORAL DAILY
Qty: 90 TABLET | Refills: 3 | Status: SHIPPED | OUTPATIENT
Start: 2024-11-22

## 2024-11-22 RX ORDER — FINASTERIDE 5 MG/1
5 TABLET, FILM COATED ORAL DAILY
Qty: 30 TABLET | Refills: 2 | Status: SHIPPED | OUTPATIENT
Start: 2024-11-22

## 2024-11-22 RX ORDER — BISMUTH SUBSALICYLATE 262MG/15ML
1 SUSPENSION, ORAL (FINAL DOSE FORM) ORAL 2 TIMES DAILY
Qty: 100 EACH | Refills: 5 | Status: SHIPPED | OUTPATIENT
Start: 2024-11-22

## 2024-11-22 RX ORDER — OMEGA-3/DHA/EPA/FISH OIL 300-1000MG
1 CAPSULE ORAL DAILY
Qty: 180 CAPSULE | Refills: 3 | Status: SHIPPED | OUTPATIENT
Start: 2024-11-22

## 2024-11-22 RX ORDER — LEVOTHYROXINE SODIUM 25 UG/1
25 TABLET ORAL DAILY
Qty: 90 TABLET | Refills: 1 | Status: SHIPPED | OUTPATIENT
Start: 2024-11-22

## 2024-11-22 RX ORDER — ATORVASTATIN CALCIUM 10 MG/1
10 TABLET, FILM COATED ORAL NIGHTLY
Qty: 90 TABLET | Refills: 3 | Status: SHIPPED | OUTPATIENT
Start: 2024-11-22

## 2024-11-22 RX ORDER — MINOXIDIL 2.5 MG/1
1.25 TABLET ORAL DAILY
Qty: 30 TABLET | Refills: 2 | Status: SHIPPED | OUTPATIENT
Start: 2024-11-22

## 2024-11-22 NOTE — ASSESSMENT & PLAN NOTE
I recommend daily multivitamin.  Hypothyroidism controlled.  Patient resumes responsibility for any side effects or long-term complications from chronic use of finasteride and minoxidil.  Instructed patient to continue to work on finding a dermatology who would accept her insurance.

## 2024-11-22 NOTE — ASSESSMENT & PLAN NOTE
Patient's (Body mass index is 35.79 kg/m².) indicates that they are morbidly/severely obese (BMI > 40 or > 35 with obesity - related health condition) with health conditions that include diabetes mellitus and dyslipidemias . Weight is improving with treatment. BMI  is above average; BMI management plan is completed. We discussed portion control and increasing exercise.   Discussed the importance of healthy diet, nutrition, and lifestyle. Recommend low salt, fat/cholesterol diet and avoid concentrated sweets. Encouraged DASH diet along with fresh fruits & vegetables and low fat dairy products. Counseled patient to exercise/walk as tolerated. Avoid tobacco and alcohol use.

## 2024-11-22 NOTE — PROGRESS NOTES
Chief Complaint  Diabetes (3 month f/u)    Subjective        Li Kimball presents to [facility]  History of Present Illness  54-year-old white female here for chronic disease management follow-up visit.  Patient reports she has been taking Mounjaro 10 every week and does not have any side effect issues anymore.  Chart review indicates patient has lost 10 pounds since summer of this year.      Discussed with medication refills today and once again informed patient I am not comfortable refilling patient's finasteride and minoxidil for hair loss.  Educated patient hair loss is most likely due to thyroid problem and patient needs to take also multivitamin in addition to the medication for hypothyroidism.    However patient states none of the dermatologist are excepting her insurance and she has even called [facility] dermatology without any success.  Informed patient I am uncomfortable prescribing minoxidil and finasteride as this is not routine part of practice in primary care for me and patient bears sole responsibility for any long-term side effects from the abortive medications-minoxidil and finasteride    Pt reports stopped metformin 5 mo ago.    Reminded patient to follow-up with an eye doctor, either opthalmology or optometry with preference for opthalmology, for an annual diabetic eye exam.  Patient states she is too busy right now to do eye doctor appointment.    Patient reports she status post orthopedic surgery yesterday and had an injection in her knee pain has improved at this time.      Instructed patient to get the adult preventive immunization that are due at  the pharmacy, including - HBV.    Pt has appt with mammogram 12/2024. Pt sees GYN Dr Chou, PAP smear within last 12 mo.    Diabetes        Objective   Vital Signs:  /84 (BP Location: Left arm, Patient Position: Sitting, Cuff Size: Adult)   Pulse 85   Temp 99.8 °F (37.7 °C) (Temporal)   Resp 16   Ht 160  "cm (62.99\")   Wt 91.6 kg (202 lb)   SpO2 98%   BMI 35.79 kg/m²   Estimated body mass index is 35.79 kg/m² as calculated from the following:    Height as of this encounter: 160 cm (62.99\").    Weight as of this encounter: 91.6 kg (202 lb).               Physical Exam  Constitutional:       Appearance: Normal appearance.   HENT:      Head: Normocephalic and atraumatic.   Eyes:      Conjunctiva/sclera: Conjunctivae normal.   Cardiovascular:      Rate and Rhythm: Normal rate and regular rhythm.      Heart sounds: Normal heart sounds.   Pulmonary:      Effort: Pulmonary effort is normal.      Breath sounds: Normal breath sounds.   Abdominal:      General: Bowel sounds are normal.      Palpations: Abdomen is soft.      Comments: Non-tender   Skin:     General: Skin is warm.   Neurological:      General: No focal deficit present.      Mental Status: She is alert and oriented to person, place, and time.   Psychiatric:         Mood and Affect: Mood normal.         Behavior: Behavior normal.        Result Review :    The following data was reviewed by: DANIEL Martínez on 11/22/2024:  CMP          2/19/2024    05:11 3/6/2024    15:41 8/21/2024    12:10   CMP   Glucose 113  85  87    BUN 11  18  12    Creatinine 0.64  0.84  0.73    EGFR 105.8      Sodium 138  140  140    Potassium 3.7  3.9  4.2    Chloride 109  104  104    Calcium 8.5  10.1  9.6    Total Protein  8.1  7.3    Albumin  4.5  4.3    Globulin  3.6  3.0    Total Bilirubin  0.3  0.4    Alkaline Phosphatase  65  107    AST (SGOT)  25  15    ALT (SGPT)  24  19    BUN/Creatinine Ratio 17.2  21  16    Anion Gap 7.0        CBC          2/18/2024    04:57 2/19/2024    05:11 8/21/2024    12:10   CBC   WBC 9.59  10.42  7.5    RBC 4.01  3.92  4.71    Hemoglobin 11.0  10.6  13.6    Hematocrit 35.0  33.4  42.7    MCV 87.3  85.2  91    MCH 27.4  27.0  28.9    MCHC 31.4  31.7  31.9    RDW 13.7  13.1  13.5    Platelets 414  495  407      Lipid Panel          " 3/6/2024    15:41 8/21/2024    12:10   Lipid Panel   Total Cholesterol 187  149    Triglycerides 175  186    HDL Cholesterol 41  38    VLDL Cholesterol 31  32    LDL Cholesterol  115  79      TSH          3/6/2024    15:41 6/10/2024    12:17   TSH   TSH 2.000  1.680      A1C Last 3 Results          2/15/2024    05:44 6/10/2024    12:17   HGBA1C Last 3 Results   Hemoglobin A1C 6.20  6.0      Microalbumin          3/6/2024    15:44 6/10/2024    12:17   Microalbumin   Microalbumin, Urine 4.3  3.6        Data reviewed : Consultant notes reviewed orthopedic surgery consult note from yesterday patient status post steroid injection in right knee for osteoarthritis of right knee.             Assessment and Plan     Diagnoses and all orders for this visit:    1. Type 2 diabetes mellitus without complication, without long-term current use of insulin (Primary)  Assessment & Plan:  Diabetes is improving with treatment.   Continue current treatment regimen.  Reminded to bring in blood sugar diary at next visit.  Recommended an ADA diet.  Recommended a Mediterranean style of eating  Regular aerobic exercise.  Discussed ways to avoid symptomatic hypoglycemia.  Discussed sick day management.  Discussed foot care.  Reminded to get yearly retinal exam.  Diabetes will be reassessed in 3 months  Discussed diabetes management and need for adequate BS control. Educated patient high A1c puts him/her at risk for MI, CVA, CKD, gastroparesis, foot ulcers, and frequent infections.   Pt informed of Rx for glucometer/test strips, and lancets as needed and explained to keep a blood sugar log. Return to office as instructed. Additionally diet compliance explained - avoid sugar candy, soda, high carbohydrate loads. Explained how losing weight can improve your health and achieve better blood sugar control. Being active can also help prevent or control the disorder. Recommended annual opthalmology follow -up due to diagnosis of diabetes. Annual  Podiatry visit prn.      Orders:  -     CBC & Differential  -     Comprehensive Metabolic Panel  -     Hemoglobin A1c  -     Lancets Ultra Thin 30G misc; Inject 1 each under the skin into the appropriate area as directed 2 (Two) Times a Day.  Dispense: 100 each; Refill: 5  -     glucose blood test strip; Twice Daily  Dispense: 100 each; Refill: 5  -     Tirzepatide 10 MG/0.5ML solution auto-injector; Inject 10 mg under the skin into the appropriate area as directed 1 (One) Time Per Week.  Dispense: 6 mL; Refill: 1    2. Hypertriglyceridemia  Assessment & Plan:   Lipid abnormalities are stable    Plan:  Continue same medication/s without change.      Discussed medication dosage, use, side effects, and goals of treatment in detail.    Counseled patient on lifestyle modifications to help control hyperlipidemia.     Patient Treatment Goals:   LDL goal is less than 70    Followup in 6 months.  Discussed the importance of healthy diet, nutrition, and lifestyle. Recommend low salt, fat/cholesterol diet and avoid concentrated sweets. Encouraged DASH diet along with fresh fruits & vegetables and low fat dairy products. Counseled patient to exercise/walk as tolerated. Avoid tobacco and alcohol use.      Orders:  -     Comprehensive Metabolic Panel  -     fish oil-omega-3 fatty acids 1000 MG capsule; Take 1 capsule by mouth Daily.  Dispense: 180 capsule; Refill: 3    3. Hypothyroidism, adult  -     CBC & Differential  -     TSH  -     T4, free  -     levothyroxine (SYNTHROID, LEVOTHROID) 25 MCG tablet; Take 1 tablet by mouth Daily.  Dispense: 90 tablet; Refill: 1    4. Mixed hyperlipidemia  Assessment & Plan:   Lipid abnormalities are stable    Plan:  Continue same medication/s without change.      Discussed medication dosage, use, side effects, and goals of treatment in detail.    Counseled patient on lifestyle modifications to help control hyperlipidemia.     Patient Treatment Goals:   LDL goal is less than 70    Followup in  6 months.  Discussed the importance of healthy diet, nutrition, and lifestyle. Recommend low salt, fat/cholesterol diet and avoid concentrated sweets. Encouraged DASH diet along with fresh fruits & vegetables and low fat dairy products. Counseled patient to exercise/walk as tolerated. Avoid tobacco and alcohol use.      Orders:  -     atorvastatin (LIPITOR) 10 MG tablet; Take 1 tablet by mouth Every Night.  Dispense: 90 tablet; Refill: 3    5. Hair loss  Assessment & Plan:  I recommend daily multivitamin.  Hypothyroidism controlled.  Patient resumes responsibility for any side effects or long-term complications from chronic use of finasteride and minoxidil.  Instructed patient to continue to work on finding a dermatology who would accept her insurance.    Orders:  -     finasteride (PROSCAR) 5 MG tablet; Take 1 tablet by mouth Daily.  Dispense: 30 tablet; Refill: 2  -     minoxidil (LONITEN) 2.5 MG tablet; Take 0.5 tablets by mouth Daily.  Dispense: 30 tablet; Refill: 2    6. History of deep venous thrombosis (DVT) of distal vein of right lower extremity    7. Deep vein thrombosis (DVT) of right lower extremity, unspecified chronicity, unspecified vein  -     apixaban (Eliquis) 2.5 MG tablet tablet; Take 1 tablet by mouth Every 12 (Twelve) Hours.  Dispense: 60 tablet; Refill: 5    8. Primary osteoarthritis of right knee  Assessment & Plan:  Recommend continue follow-up with orthopedic surgery discussed gel injections for the knee if knee pain returns quickly for persistent knee pain, patient voiced understanding.      Other orders  -     multivitamin (MULTIVITAMIN PO); Take 1 tablet by mouth Daily.  Dispense: 90 tablet; Refill: 3        All chronic conditions have been addressed and treated by the practice or other specialists. Medications have been reconciled and refilled as appropriate. Reiterated compliance and timely follow up appointments. Side effects of all new and old medications reviewed with the patient  and patient willing to accept all risks involved. Advised RTO if no improvement or worsening of symptoms or if any new complaints arise. Patient advised to follow up with clinic or call after diagnostic tests, if patient does not hear from office 3 days after the test completion.          Follow Up     Return in about 3 months (around 2/22/2025) for Next scheduled follow up.  Patient was given instructions and counseling regarding her condition or for health maintenance advice. Please see specific information pulled into the AVS if appropriate.

## 2024-11-22 NOTE — ASSESSMENT & PLAN NOTE
Recommend continue follow-up with orthopedic surgery discussed gel injections for the knee if knee pain returns quickly for persistent knee pain, patient voiced understanding.

## 2024-11-22 NOTE — ASSESSMENT & PLAN NOTE
Diabetes is improving with treatment.   Continue current treatment regimen.  Reminded to bring in blood sugar diary at next visit.  Recommended an ADA diet.  Recommended a Mediterranean style of eating  Regular aerobic exercise.  Discussed ways to avoid symptomatic hypoglycemia.  Discussed sick day management.  Discussed foot care.  Reminded to get yearly retinal exam.  Diabetes will be reassessed in 3 months  Discussed diabetes management and need for adequate BS control. Educated patient high A1c puts him/her at risk for MI, CVA, CKD, gastroparesis, foot ulcers, and frequent infections.   Pt informed of Rx for glucometer/test strips, and lancets as needed and explained to keep a blood sugar log. Return to office as instructed. Additionally diet compliance explained - avoid sugar candy, soda, high carbohydrate loads. Explained how losing weight can improve your health and achieve better blood sugar control. Being active can also help prevent or control the disorder. Recommended annual opthalmology follow -up due to diagnosis of diabetes. Annual Podiatry visit prn.

## 2024-11-23 LAB
ALBUMIN SERPL-MCNC: 4.5 G/DL (ref 3.8–4.9)
ALP SERPL-CCNC: 113 IU/L (ref 44–121)
ALT SERPL-CCNC: 19 IU/L (ref 0–32)
AST SERPL-CCNC: 14 IU/L (ref 0–40)
BASOPHILS # BLD AUTO: 0.1 X10E3/UL (ref 0–0.2)
BASOPHILS NFR BLD AUTO: 1 %
BILIRUB SERPL-MCNC: 0.6 MG/DL (ref 0–1.2)
BUN SERPL-MCNC: 19 MG/DL (ref 6–24)
BUN/CREAT SERPL: 22 (ref 9–23)
CALCIUM SERPL-MCNC: 9.9 MG/DL (ref 8.7–10.2)
CHLORIDE SERPL-SCNC: 105 MMOL/L (ref 96–106)
CO2 SERPL-SCNC: 22 MMOL/L (ref 20–29)
CREAT SERPL-MCNC: 0.86 MG/DL (ref 0.57–1)
EGFRCR SERPLBLD CKD-EPI 2021: 80 ML/MIN/1.73
EOSINOPHIL # BLD AUTO: 0 X10E3/UL (ref 0–0.4)
EOSINOPHIL NFR BLD AUTO: 0 %
ERYTHROCYTE [DISTWIDTH] IN BLOOD BY AUTOMATED COUNT: 13 % (ref 11.7–15.4)
GLOBULIN SER CALC-MCNC: 3.5 G/DL (ref 1.5–4.5)
GLUCOSE SERPL-MCNC: 92 MG/DL (ref 70–99)
HBA1C MFR BLD: 5.8 % (ref 4.8–5.6)
HCT VFR BLD AUTO: 40.4 % (ref 34–46.6)
HGB BLD-MCNC: 13.1 G/DL (ref 11.1–15.9)
IMM GRANULOCYTES # BLD AUTO: 0 X10E3/UL (ref 0–0.1)
IMM GRANULOCYTES NFR BLD AUTO: 0 %
LYMPHOCYTES # BLD AUTO: 2.7 X10E3/UL (ref 0.7–3.1)
LYMPHOCYTES NFR BLD AUTO: 29 %
MCH RBC QN AUTO: 28.9 PG (ref 26.6–33)
MCHC RBC AUTO-ENTMCNC: 32.4 G/DL (ref 31.5–35.7)
MCV RBC AUTO: 89 FL (ref 79–97)
MONOCYTES # BLD AUTO: 0.7 X10E3/UL (ref 0.1–0.9)
MONOCYTES NFR BLD AUTO: 8 %
NEUTROPHILS # BLD AUTO: 5.8 X10E3/UL (ref 1.4–7)
NEUTROPHILS NFR BLD AUTO: 62 %
PLATELET # BLD AUTO: 377 X10E3/UL (ref 150–450)
POTASSIUM SERPL-SCNC: 4.4 MMOL/L (ref 3.5–5.2)
PROT SERPL-MCNC: 8 G/DL (ref 6–8.5)
RBC # BLD AUTO: 4.54 X10E6/UL (ref 3.77–5.28)
SODIUM SERPL-SCNC: 140 MMOL/L (ref 134–144)
T4 FREE SERPL-MCNC: 0.92 NG/DL (ref 0.82–1.77)
TSH SERPL DL<=0.005 MIU/L-ACNC: 0.97 UIU/ML (ref 0.45–4.5)
WBC # BLD AUTO: 9.3 X10E3/UL (ref 3.4–10.8)

## 2024-12-06 ENCOUNTER — TREATMENT (OUTPATIENT)
Dept: PHYSICAL THERAPY | Facility: CLINIC | Age: 54
End: 2024-12-06
Payer: COMMERCIAL

## 2024-12-06 DIAGNOSIS — R29.898 WEAKNESS OF RIGHT HIP: ICD-10-CM

## 2024-12-06 DIAGNOSIS — R29.898 WEAKNESS OF RIGHT LOWER EXTREMITY: ICD-10-CM

## 2024-12-06 DIAGNOSIS — M17.11 PRIMARY OSTEOARTHRITIS OF RIGHT KNEE: Primary | ICD-10-CM

## 2024-12-06 DIAGNOSIS — M23.51 CHRONIC INSTABILITY OF RIGHT KNEE: ICD-10-CM

## 2024-12-06 DIAGNOSIS — M76.31 ILIOTIBIAL BAND SYNDROME OF RIGHT SIDE: ICD-10-CM

## 2024-12-06 PROCEDURE — 97161 PT EVAL LOW COMPLEX 20 MIN: CPT

## 2024-12-06 PROCEDURE — 97110 THERAPEUTIC EXERCISES: CPT

## 2024-12-06 PROCEDURE — 97530 THERAPEUTIC ACTIVITIES: CPT

## 2024-12-06 NOTE — PROGRESS NOTES
Physical Therapy Initial Evaluation and Plan of Care                                               [address] [facility] [address], [address]                                                           [place], [place]                                                         [phone]    Patient: Li Kimball   : 1970  Diagnosis/ICD-10 Code:  Primary osteoarthritis of right knee [M17.11]  Referring practitioner: Kya Griffiths DO  Date of Initial Visit: 2024  Today's Date: 2024  Patient seen for 1 sessions           Subjective Questionnaire: LEFS: 78      Subjective Evaluation    History of Present Illness  Mechanism of injury: Pt describes chronic pain in her R knee which has occurred intermittently. A few months ago, her pain began to worsen without known injury or trauma. She is required to stand for 8 hours a day at work and that has become unbearable. X-ray imaging showed mild to moderate tricompartmental narrowing and mild marginal spurring. She received a cortisone injection on 24 which helped to significantly decrease her pain. She now only experiences pain in her R knee toward the end of her shift at work. Cold weather also causes onset of R knee pain.     She describes her pain as aching and it is felt along the anterior- lateral joint line. She takes frequent warm baths to help with the pain.     PMHx significant for asthma, BPPV, colitis, diverticulitis, DVT on the R LE, hypothyroid, migreaines, hyperlipidemia, right ankle broken with surgery      Quality of life: good    Pain  Current pain ratin  At worst pain ratin  Location: R knee pain; anterior  Quality: dull ache  Relieving factors: rest (warm bath)  Aggravating factors: standing and squatting  Progression: improved    Diagnostic Tests  X-ray: abnormal    Treatments  Previous treatment: injection treatment  Current treatment: injection treatment           Objective          Palpation     Additional Palpation Details  No  "TTP    Tenderness     Additional Tenderness Details  No TTP    Neurological Testing     Sensation     Knee   Left Knee   Intact: Light touch    Right Knee   Intact: light touch     Active Range of Motion   Left Knee   Normal active range of motion  Flexion: 135 degrees   Extension: 0 degrees     Right Knee   Normal active range of motion  Flexion: 135 degrees   Extension: 0 degrees   Left Ankle/Foot   Normal active range of motion    Right Ankle/Foot   Normal active range of motion    Strength/Myotome Testing     Left Hip   Planes of Motion   Flexion: 4  Abduction: 4+    Right Hip   Planes of Motion   Flexion: 4-  Abduction: 4    Left Knee   Flexion: 4+  Extension: 5  Quadriceps contraction: good    Right Knee   Flexion: 4  Extension: 4+  Quadriceps contraction: good    Left Ankle/Foot   Dorsiflexion: 5  Plantar flexion: 5    Right Ankle/Foot   Dorsiflexion: 5    Ambulation     Observational Gait   Gait: within functional limits   Walking speed and stride length within functional limits.           Assessment & Plan       Assessment  Impairments: activity intolerance, impaired physical strength, lacks appropriate home exercise program, pain with function and weight-bearing intolerance   Functional limitations: standing   Assessment details: Li is a 53 y/o female reporting to OP PT for initial evaluation regarding chronic R knee pain which worsened over the past several months without known injury or trauma. X-ray imaging showed mild to moderate tricompartmental narrowing and mild marginal spurring. She received a cortisone injection on 11/21/24 which helped to decrease her pain significantly. She states that she has had injections before which provided relief of pain for \"a long time.\" Li reports difficulty making it through a full work day where she is required to stand for 8 hours at a time. She denies other limitations to her daily life due to R knee pain. Upon evaluation, she presents with deficits in " bilateral knee and hip strength, worse on the R LE. Li was educated on a HEP consisting of strengthening exercises for her R knee and R hip which she will work on independently to assist with improving R LE stabilization and ability to tolerate prolonged weight-bearing. She will not be seen for skilled OP PT at this time.   Prognosis: good    Goals  Plan Goals: Short Term: to be met today  1. Pt will have all questions and concerns regarding her R knee pain addressed  2. Pt will be educated on a HEP and demonstrate ability to be independent     Plan  Therapy options: will not be seen for skilled therapy services  Treatment plan discussed with: patient        Manual Therapy:    0     mins  27372;  Therapeutic Exercise:    10     mins  43373;     Neuromuscular Yanet:    0    mins  07642;    Therapeutic Activity:     10     mins  15491;     Gait Trainin     mins  28505;     Ultrasound:     0     mins  08389;    Electrical Stimulation:    0     mins  90941 ( );  Dry Needling     0     mins self-pay    Timed Treatment:   20   mins   Total Treatment:     33   mins    PT SIGNATURE: Ian Vasques PT, DPT  [place] License #: [id number]   Ian Vasques PT, 24, 10:11 AM EST  DATE TREATMENT INITIATED: 2024    Initial Certification  Certification Period: 3/6/2025  I certify that the therapy services are furnished while this patient is under my care.  The services outlined above are required by this patient, and will be reviewed every 90 days.     PHYSICIAN: Kya Griffiths,       DATE:     Please sign and return via fax to [phone].. Thank you, [facility].

## 2024-12-18 ENCOUNTER — TELEPHONE (OUTPATIENT)
Dept: FAMILY MEDICINE CLINIC | Facility: CLINIC | Age: 54
End: 2024-12-18
Payer: COMMERCIAL

## 2024-12-18 NOTE — TELEPHONE ENCOUNTER
Called and spoke with pt, informing her there is no PA needed for Finasteride per provider.  Pt voiced understanding.

## 2025-01-14 ENCOUNTER — TELEPHONE (OUTPATIENT)
Dept: FAMILY MEDICINE CLINIC | Facility: CLINIC | Age: 55
End: 2025-01-14
Payer: COMMERCIAL

## 2025-01-14 DIAGNOSIS — E11.9 TYPE 2 DIABETES MELLITUS WITHOUT COMPLICATION, WITHOUT LONG-TERM CURRENT USE OF INSULIN: Primary | ICD-10-CM

## 2025-01-14 NOTE — TELEPHONE ENCOUNTER
Caller: Li Kimball    Relationship to patient: Self    Best call back number: [phone]      Patient is needing:     PATIENT CALLED IN WANTING TO KNOW IF HER MOUNJARO CAN GO UP IN A DOSAGE, BECAUSE IT IS NOT CESAR PRESSING HER APPETITE AT ALL ANYMORE.     PATIENT WOULD LIKE A CALL BACK TO DISCUSS.

## 2025-01-14 NOTE — TELEPHONE ENCOUNTER
I have sent the higher dose of Mounjaro 12.5 mg to patient pharmacy.  Patient may start the higher dose as long as she does not have any GI symptoms not limited to nausea, vomiting, bloating.     Patient informed

## 2025-02-03 ENCOUNTER — DOCUMENTATION (OUTPATIENT)
Dept: PHYSICAL THERAPY | Facility: CLINIC | Age: 55
End: 2025-02-03
Payer: COMMERCIAL

## 2025-02-06 DIAGNOSIS — E11.9 TYPE 2 DIABETES MELLITUS WITHOUT COMPLICATION, WITHOUT LONG-TERM CURRENT USE OF INSULIN: ICD-10-CM

## 2025-02-06 NOTE — TELEPHONE ENCOUNTER
Caller: [place]    Relationship: Self    Best call back number: [phone]     Requested Prescriptions:   Requested Prescriptions     Pending Prescriptions Disp Refills    Tirzepatide 12.5 MG/0.5ML solution auto-injector 2 mL 2     Sig: Inject 0.5 mL under the skin into the appropriate area as directed 1 (One) Time Per Week.        Pharmacy where request should be sent: [id number] [place], [place] - [address] [facility] [address] [phone] [facility] [phone]      Last office visit with prescribing clinician: 11/22/2024   Last telemedicine visit with prescribing clinician: Visit date not found   Next office visit with prescribing clinician: 3/7/2025     Additional details provided by patient:      Does the patient have less than a 3 day supply:  [x] Yes  [] No    Would you like a call back once the refill request has been completed: [x] Yes [] No    If the office needs to give you a call back, can they leave a voicemail: [x] Yes [] No    Iron Weiss Rep   02/06/25 15:37 EST

## 2025-02-20 ENCOUNTER — TELEPHONE (OUTPATIENT)
Dept: FAMILY MEDICINE CLINIC | Facility: CLINIC | Age: 55
End: 2025-02-20
Payer: COMMERCIAL

## 2025-02-20 DIAGNOSIS — E11.9 TYPE 2 DIABETES MELLITUS WITHOUT COMPLICATION, WITHOUT LONG-TERM CURRENT USE OF INSULIN: ICD-10-CM

## 2025-02-20 NOTE — TELEPHONE ENCOUNTER
Caller: Li Kimball    Relationship: Self    Best call back number: [phone]    Requested Prescriptions:   10 mg, Subcutaneous, Weekly          Pharmacy where request should be sent: [id number] [place], [place] - [address] [facility] [address] [facility] [phone] [facility] [phone] FX     Last office visit with prescribing clinician: 11/22/2024   Last telemedicine visit with prescribing clinician: Visit date not found   Next office visit with prescribing clinician: 3/7/2025     Additional details provided by patient: PATIENT IS REQUESTING TO STAY ON THE 10 MG SHOT    Does the patient have less than a 3 day supply:  [] Yes  [x] No    Would you like a call back once the refill request has been completed: [] Yes [x] No    If the office needs to give you a call back, can they leave a voicemail: [] Yes [x] No    Iron Jacome Rep   02/20/25 15:37 EST

## 2025-02-21 ENCOUNTER — TELEPHONE (OUTPATIENT)
Dept: FAMILY MEDICINE CLINIC | Facility: CLINIC | Age: 55
End: 2025-02-21
Payer: COMMERCIAL

## 2025-02-21 DIAGNOSIS — E11.9 TYPE 2 DIABETES MELLITUS WITHOUT COMPLICATION, WITHOUT LONG-TERM CURRENT USE OF INSULIN: Primary | ICD-10-CM

## 2025-02-21 NOTE — TELEPHONE ENCOUNTER
Caller: Li Kimball    Relationship: Self    Best call back number: [phone]     What medication are you requesting: PATIENT CALLED BECAUSE THE MOUNJARO 12.5 MG WAS SENT IN. PATIENT NEEDS THE 10 MG SENT IN. THE MEDICATION COMES IN PREFILLED AUTO INJECTORS SO IT ISNT POSSIBLE TO JUST DO SOME OF THE 12.5 MG PEN.    PLEASE SEND IN MOUNJARO 10MG    If a prescription is needed, what is your preferred pharmacy and phone number: [facility] #[id number] [place], [place] - [address] [facility] [address] [facility] [phone] [facility] [phone]      Additional notes:    PLEASE ADVISE PATIENT WHEN DONE

## 2025-02-21 NOTE — TELEPHONE ENCOUNTER
Called and relayed to pt provider has sent in pt's current dosage of Mounjaro 10 mg.  Pt voiced understanding.

## 2025-03-07 ENCOUNTER — OFFICE VISIT (OUTPATIENT)
Dept: FAMILY MEDICINE CLINIC | Facility: CLINIC | Age: 55
End: 2025-03-07
Payer: COMMERCIAL

## 2025-03-07 VITALS
HEIGHT: 63 IN | TEMPERATURE: 98 F | DIASTOLIC BLOOD PRESSURE: 76 MMHG | RESPIRATION RATE: 16 BRPM | WEIGHT: 204.4 LBS | BODY MASS INDEX: 36.21 KG/M2 | OXYGEN SATURATION: 98 % | HEART RATE: 100 BPM | SYSTOLIC BLOOD PRESSURE: 124 MMHG

## 2025-03-07 DIAGNOSIS — E66.812 CLASS 2 SEVERE OBESITY WITH SERIOUS COMORBIDITY AND BODY MASS INDEX (BMI) OF 37.0 TO 37.9 IN ADULT, UNSPECIFIED OBESITY TYPE: Chronic | ICD-10-CM

## 2025-03-07 DIAGNOSIS — E11.9 TYPE 2 DIABETES MELLITUS WITHOUT COMPLICATION, WITHOUT LONG-TERM CURRENT USE OF INSULIN: ICD-10-CM

## 2025-03-07 DIAGNOSIS — J30.89 ENVIRONMENTAL AND SEASONAL ALLERGIES: ICD-10-CM

## 2025-03-07 DIAGNOSIS — E78.1 HYPERTRIGLYCERIDEMIA: Chronic | ICD-10-CM

## 2025-03-07 DIAGNOSIS — F41.9 ANXIETY: Primary | ICD-10-CM

## 2025-03-07 DIAGNOSIS — H61.23 BILATERAL IMPACTED CERUMEN: ICD-10-CM

## 2025-03-07 DIAGNOSIS — E66.01 CLASS 2 SEVERE OBESITY WITH SERIOUS COMORBIDITY AND BODY MASS INDEX (BMI) OF 37.0 TO 37.9 IN ADULT, UNSPECIFIED OBESITY TYPE: Chronic | ICD-10-CM

## 2025-03-07 DIAGNOSIS — H81.13 BENIGN PAROXYSMAL POSITIONAL VERTIGO DUE TO BILATERAL VESTIBULAR DISORDER: ICD-10-CM

## 2025-03-07 DIAGNOSIS — Z91.199 NON-COMPLIANCE: ICD-10-CM

## 2025-03-07 DIAGNOSIS — E03.9 HYPOTHYROIDISM, ADULT: Chronic | ICD-10-CM

## 2025-03-07 DIAGNOSIS — E78.2 MIXED HYPERLIPIDEMIA: Chronic | ICD-10-CM

## 2025-03-07 DIAGNOSIS — I82.401 DEEP VEIN THROMBOSIS (DVT) OF RIGHT LOWER EXTREMITY, UNSPECIFIED CHRONICITY, UNSPECIFIED VEIN: Chronic | ICD-10-CM

## 2025-03-07 RX ORDER — MECLIZINE HCL 12.5 MG 12.5 MG/1
12.5 TABLET ORAL 3 TIMES DAILY PRN
Qty: 30 TABLET | Refills: 1 | Status: SHIPPED | OUTPATIENT
Start: 2025-03-07

## 2025-03-07 RX ORDER — DIPHENOXYLATE HYDROCHLORIDE AND ATROPINE SULFATE 2.5; .025 MG/1; MG/1
1 TABLET ORAL DAILY
Qty: 90 TABLET | Refills: 3 | Status: SHIPPED | OUTPATIENT
Start: 2025-03-07 | End: 2025-03-07

## 2025-03-07 RX ORDER — CETIRIZINE HYDROCHLORIDE 10 MG/1
10 TABLET ORAL NIGHTLY
Qty: 30 TABLET | Refills: 5 | Status: SHIPPED | OUTPATIENT
Start: 2025-03-07 | End: 2025-03-07

## 2025-03-07 RX ORDER — OMEGA-3/DHA/EPA/FISH OIL 300-1000MG
1 CAPSULE ORAL DAILY
Qty: 180 CAPSULE | Refills: 3 | Status: SHIPPED | OUTPATIENT
Start: 2025-03-07 | End: 2025-03-07

## 2025-03-07 RX ORDER — LEVOTHYROXINE SODIUM 25 UG/1
25 TABLET ORAL DAILY
Qty: 90 TABLET | Refills: 1 | Status: SHIPPED | OUTPATIENT
Start: 2025-03-07

## 2025-03-07 RX ORDER — ATORVASTATIN CALCIUM 10 MG/1
10 TABLET, FILM COATED ORAL NIGHTLY
Qty: 90 TABLET | Refills: 3 | Status: SHIPPED | OUTPATIENT
Start: 2025-03-07

## 2025-03-07 RX ORDER — FLUTICASONE PROPIONATE 50 MCG
2 SPRAY, SUSPENSION (ML) NASAL DAILY
Qty: 16 G | Refills: 11 | Status: SHIPPED | OUTPATIENT
Start: 2025-03-07 | End: 2025-03-07

## 2025-03-07 NOTE — ASSESSMENT & PLAN NOTE
Patient after going to the lobby informed the staff to remove facial capsules from her medication list.  Discussed the importance of healthy diet, nutrition, and lifestyle. Recommend low salt, fat/cholesterol diet and avoid concentrated sweets. Encouraged DASH diet along with fresh fruits & vegetables and low fat dairy products. Counseled patient to exercise/walk as tolerated. Avoid tobacco and alcohol use.

## 2025-03-07 NOTE — PROGRESS NOTES
"Chief Complaint  Prediabetes    Subjective        Li Kimball presents to [facility]  History of Present Illness  54-year-old white female here for chronic disease management follow-up visit.  Patient states she is taking Mounjaro 10 mg weekly.    Pt trying to find a new place so that her mom can move with her and that way she can take of her mom.  Pt c/o anxiety due to her current psychosocial situation.  Pt denied any SI/HI.    Instructed patient to follow-up with an eye doctor, either opthalmology or optometry with preference for opthalmology, for an annual diabetic eye exam.    Instructed patient to get the adult preventive immunization that are due at  the pharmacy, including - HBV.    Patient reports prior history of vertigo and has been having episodes worsening of her anxiety symptoms recently.  Discussed physical therapy for vestibular PT however patient wants meclizine.  Instructed patient to follow-up with PT if her vertigo persists.  Patient also found to have cerumen impaction bilaterally and status post cerumen disimpaction with ear lavage today here at the clinic.    I have also discussed which patient reported upon inquiry could also cause sinus inflammation and cause dizziness or vertigo.  I started patient on cetirizine and Flonase today after discussion with patient in the exam room.  However after checkout patient informed the  she wanted to have some medications taken out from her medication list which included cetirizine, Flonase, fish oil capsules and multivitamin.  However the cetirizine and Flonase was started after discussing with patient during the clinic visit today.        Objective   Vital Signs:  /76 (BP Location: Left arm, Patient Position: Sitting, Cuff Size: Adult)   Pulse 100   Temp 98 °F (36.7 °C) (Temporal)   Resp 16   Ht 160 cm (62.99\")   Wt 92.7 kg (204 lb 6.4 oz)   SpO2 98%   BMI 36.22 kg/m²   Estimated body mass index " "is 36.22 kg/m² as calculated from the following:    Height as of this encounter: 160 cm (62.99\").    Weight as of this encounter: 92.7 kg (204 lb 6.4 oz).               Physical Exam  Constitutional:       Appearance: Normal appearance.   HENT:      Head: Normocephalic and atraumatic.      Right Ear: There is impacted cerumen.      Left Ear: There is impacted cerumen.   Eyes:      Conjunctiva/sclera: Conjunctivae normal.   Cardiovascular:      Rate and Rhythm: Normal rate and regular rhythm.      Heart sounds: Normal heart sounds.   Pulmonary:      Effort: Pulmonary effort is normal.      Breath sounds: Normal breath sounds.   Abdominal:      General: Bowel sounds are normal.      Palpations: Abdomen is soft.      Comments: Non-tender   Skin:     General: Skin is warm.   Neurological:      General: No focal deficit present.      Mental Status: She is alert and oriented to person, place, and time.   Psychiatric:         Mood and Affect: Mood normal.         Behavior: Behavior normal.        Result Review :    The following data was reviewed by: DANIEL Martínez on 03/07/2025:  CMP          8/21/2024    12:10 11/22/2024    12:11   CMP   Glucose 87  92    BUN 12  19    Creatinine 0.73  0.86    EGFR 98  80    Sodium 140  140    Potassium 4.2  4.4    Chloride 104  105    Calcium 9.6  9.9    Total Protein 7.3  8.0    Albumin 4.3  4.5    Globulin 3.0  3.5    Total Bilirubin 0.4  0.6    Alkaline Phosphatase 107  113    AST (SGOT) 15  14    ALT (SGPT) 19  19    BUN/Creatinine Ratio 16  22      CBC          8/21/2024    12:10 11/22/2024    12:11   CBC   WBC 7.5  9.3    RBC 4.71  4.54    Hemoglobin 13.6  13.1    Hematocrit 42.7  40.4    MCV 91  89    MCH 28.9  28.9    MCHC 31.9  32.4    RDW 13.5  13.0    Platelets 407  377      Lipid Panel          8/21/2024    12:10   Lipid Panel   Total Cholesterol 149    Triglycerides 186    HDL Cholesterol 38    VLDL Cholesterol 32    LDL Cholesterol  79      TSH          " 6/10/2024    12:17 11/22/2024    12:11   TSH   TSH 1.680  0.968      A1C Last 3 Results          6/10/2024    12:17 11/22/2024    12:11   HGBA1C Last 3 Results   Hemoglobin A1C 6.0  5.8      Microalbumin          6/10/2024    12:17   Microalbumin   Microalbumin, Urine 3.6        Data reviewed : Consultant notes reviewed recent [facility] visit note for vertigo patient was prescribed meclizine.      Ear Cerumen Removal    Date/Time: 3/7/2025 12:18 PM    Performed by: Tank Shea APRN  Authorized by: Tank Shea APRN  Consent: Verbal consent obtained.  Risks and benefits: risks, benefits and alternatives were discussed  Consent given by: patient  Patient understanding: patient states understanding of the procedure being performed            Assessment and Plan     Diagnoses and all orders for this visit:    1. Anxiety (Primary)  Assessment & Plan:  Discussed psychology referral and encouraged patient to talk to therapist for worsening anxiety symptoms which might be leading to stress eating and inadequate weight loss while on Mounjaro.    Orders:  -     Ambulatory Referral to Psychology  -     TSH  -     T4, free    2. Type 2 diabetes mellitus without complication, without long-term current use of insulin  Assessment & Plan:  Diabetes is  we will recheck A1c today .   Continue current treatment regimen.  Reminded to bring in blood sugar diary at next visit.  Recommended an ADA diet.  Recommended a Mediterranean style of eating  Regular aerobic exercise.  Discussed ways to avoid symptomatic hypoglycemia.  Discussed sick day management.  Discussed foot care.  Reminded to get yearly retinal exam.  Diabetes will be reassessed in 3 months  Discussed the importance of healthy diet, nutrition, and lifestyle. Recommend low salt, fat/cholesterol diet and avoid concentrated sweets. Encouraged DASH diet along with fresh fruits & vegetables and low fat dairy products. Counseled patient to exercise/walk  as tolerated. Avoid tobacco and alcohol use.      Orders:  -     Tirzepatide 10 MG/0.5ML solution auto-injector; Inject 10 mg under the skin into the appropriate area as directed 1 (One) Time Per Week.  Dispense: 2 mL; Refill: 2  -     CBC & Differential  -     Comprehensive Metabolic Panel  -     Hemoglobin A1c  -     Microalbumin / Creatinine Urine Ratio - Urine, Clean Catch  -     TSH  -     T4, free    3. Hypothyroidism, adult  Assessment & Plan:  Will recheck thyroid panel today.    Orders:  -     levothyroxine (SYNTHROID, LEVOTHROID) 25 MCG tablet; Take 1 tablet by mouth Daily.  Dispense: 90 tablet; Refill: 1    4. Mixed hyperlipidemia  Assessment & Plan:   Lipid abnormalities are stable    Plan:  Continue same medication/s without change.      Discussed medication dosage, use, side effects, and goals of treatment in detail.    Counseled patient on lifestyle modifications to help control hyperlipidemia.     Patient Treatment Goals:   LDL goal is under 100    Followup in 3 months.    Discussed the importance of healthy diet, nutrition, and lifestyle. Recommend low salt, fat/cholesterol diet and avoid concentrated sweets. Encouraged DASH diet along with fresh fruits & vegetables and low fat dairy products. Counseled patient to exercise/walk as tolerated. Avoid tobacco and alcohol use.      Orders:  -     atorvastatin (LIPITOR) 10 MG tablet; Take 1 tablet by mouth Every Night.  Dispense: 90 tablet; Refill: 3  -     Comprehensive Metabolic Panel  -     Lipid Panel    5. Class 2 severe obesity with serious comorbidity and body mass index (BMI) of 37.0 to 37.9 in adult, unspecified obesity type  Assessment & Plan:  Patient's (Body mass index is 36.22 kg/m².) indicates that they are morbidly/severely obese (BMI > 40 or > 35 with obesity - related health condition) with health conditions that include diabetes mellitus and dyslipidemias . Weight is unchanged. BMI  is above average; BMI management plan is completed. We  discussed portion control and increasing exercise.   Discussed the importance of healthy diet, nutrition, and lifestyle. Recommend low salt, fat/cholesterol diet and avoid concentrated sweets. Encouraged DASH diet along with fresh fruits & vegetables and low fat dairy products. Counseled patient to exercise/walk as tolerated. Avoid tobacco and alcohol use.        6. Hypertriglyceridemia  Assessment & Plan:  [facility] informed the staff to remove facial capsules from her medication list.  Discussed the importance of healthy diet, nutrition, and lifestyle. Recommend low salt, fat/cholesterol diet and avoid concentrated sweets. Encouraged DASH diet along with fresh fruits & vegetables and low fat dairy products. Counseled patient to exercise/walk as tolerated. Avoid tobacco and alcohol use.      Orders:  -     Discontinue: fish oil-omega-3 fatty acids 1000 MG capsule; Take 1 capsule by mouth Daily.  Dispense: 180 capsule; Refill: 3    7. Deep vein thrombosis (DVT) of right lower extremity, unspecified chronicity, unspecified vein  Assessment & Plan:  Patient on Eliquis.    Orders:  -     apixaban (Eliquis) 2.5 MG tablet tablet; Take 1 tablet by mouth Every 12 (Twelve) Hours.  Dispense: 60 tablet; Refill: 5    8. Benign paroxysmal positional vertigo due to bilateral vestibular disorder  Assessment & Plan:  Patient is meclizine but will hold off on physical therapy at this time.  Return to clinic for persistent vertigo.  Patient status post bilateral ear lavage today tolerated the procedure well.    Orders:  -     meclizine (ANTIVERT) 12.5 MG tablet; Take 1 tablet by mouth 3 (Three) Times a Day As Needed for Dizziness (VERTIGO).  Dispense: 30 tablet; Refill: 1  -     Cancel: Ambulatory Referral to Physical Therapy for Evaluation & Treatment  -     Ambulatory Referral to Physical Therapy for Evaluation & Treatment  -     CBC & Differential  -     Comprehensive Metabolic Panel  -     Hemoglobin  A1c  -     TSH  -     T4, free    9. Environmental and seasonal allergies  Assessment & Plan:  See HPI    Orders:  -     Discontinue: fluticasone (FLONASE) 50 MCG/ACT nasal spray; Administer 2 sprays into the nostril(s) as directed by provider Daily.  Dispense: 16 g; Refill: 11  -     Discontinue: cetirizine (zyrTEC) 10 MG tablet; Take 1 tablet by mouth Every Night.  Dispense: 30 tablet; Refill: 5    10. Bilateral impacted cerumen  Assessment & Plan:  Patient status post bilateral ear lavage today tolerated procedure well.    Orders:  -     Ear Cerumen Removal    11. Non-compliance    Other orders  -     Discontinue: multivitamin (MULTIVITAMIN PO); Take 1 tablet by mouth Daily.  Dispense: 90 tablet; Refill: 3        All chronic conditions have been addressed and treated by the practice or other specialists. Medications have been reconciled and refilled as appropriate. Reiterated compliance and timely follow up appointments. Side effects of all new and old medications reviewed with the patient and patient willing to accept all risks involved. Advised RTO if no improvement or worsening of symptoms or if any new complaints arise. Patient advised to follow up with clinic or call after diagnostic tests, if patient does not hear from office 3 days after the test completion.          Follow Up     Return in about 3 months (around 6/7/2025) for Next scheduled follow up.  Patient was given instructions and counseling regarding her condition or for health maintenance advice. Please see specific information pulled into the AVS if appropriate.

## 2025-03-07 NOTE — ASSESSMENT & PLAN NOTE
Discussed psychology referral and encouraged patient to talk to therapist for worsening anxiety symptoms which might be leading to stress eating and inadequate weight loss while on Mounjaro.

## 2025-03-07 NOTE — ASSESSMENT & PLAN NOTE
Diabetes is  we will recheck A1c today .   Continue current treatment regimen.  Reminded to bring in blood sugar diary at next visit.  Recommended an ADA diet.  Recommended a Mediterranean style of eating  Regular aerobic exercise.  Discussed ways to avoid symptomatic hypoglycemia.  Discussed sick day management.  Discussed foot care.  Reminded to get yearly retinal exam.  Diabetes will be reassessed in 3 months  Discussed the importance of healthy diet, nutrition, and lifestyle. Recommend low salt, fat/cholesterol diet and avoid concentrated sweets. Encouraged DASH diet along with fresh fruits & vegetables and low fat dairy products. Counseled patient to exercise/walk as tolerated. Avoid tobacco and alcohol use.

## 2025-03-07 NOTE — ASSESSMENT & PLAN NOTE
Lipid abnormalities are stable    Plan:  Continue same medication/s without change.      Discussed medication dosage, use, side effects, and goals of treatment in detail.    Counseled patient on lifestyle modifications to help control hyperlipidemia.     Patient Treatment Goals:   LDL goal is under 100    Followup in 3 months.    Discussed the importance of healthy diet, nutrition, and lifestyle. Recommend low salt, fat/cholesterol diet and avoid concentrated sweets. Encouraged DASH diet along with fresh fruits & vegetables and low fat dairy products. Counseled patient to exercise/walk as tolerated. Avoid tobacco and alcohol use.

## 2025-03-07 NOTE — ASSESSMENT & PLAN NOTE
Patient is meclizine but will hold off on physical therapy at this time.  Return to clinic for persistent vertigo.  Patient status post bilateral ear lavage today tolerated the procedure well.

## 2025-03-07 NOTE — ASSESSMENT & PLAN NOTE
Patient's (Body mass index is 36.22 kg/m².) indicates that they are morbidly/severely obese (BMI > 40 or > 35 with obesity - related health condition) with health conditions that include diabetes mellitus and dyslipidemias . Weight is unchanged. BMI  is above average; BMI management plan is completed. We discussed portion control and increasing exercise.   Discussed the importance of healthy diet, nutrition, and lifestyle. Recommend low salt, fat/cholesterol diet and avoid concentrated sweets. Encouraged DASH diet along with fresh fruits & vegetables and low fat dairy products. Counseled patient to exercise/walk as tolerated. Avoid tobacco and alcohol use.

## 2025-03-08 LAB
ALBUMIN SERPL-MCNC: 4.6 G/DL (ref 3.8–4.9)
ALBUMIN/CREAT UR: 5 MG/G CREAT (ref 0–29)
ALP SERPL-CCNC: 169 IU/L (ref 44–121)
ALT SERPL-CCNC: 27 IU/L (ref 0–32)
AST SERPL-CCNC: 18 IU/L (ref 0–40)
BASOPHILS # BLD AUTO: 0.1 X10E3/UL (ref 0–0.2)
BASOPHILS NFR BLD AUTO: 1 %
BILIRUB SERPL-MCNC: 0.5 MG/DL (ref 0–1.2)
BUN SERPL-MCNC: 15 MG/DL (ref 6–24)
BUN/CREAT SERPL: 19 (ref 9–23)
CALCIUM SERPL-MCNC: 10.2 MG/DL (ref 8.7–10.2)
CHLORIDE SERPL-SCNC: 104 MMOL/L (ref 96–106)
CHOLEST SERPL-MCNC: 177 MG/DL (ref 100–199)
CO2 SERPL-SCNC: 25 MMOL/L (ref 20–29)
CREAT SERPL-MCNC: 0.79 MG/DL (ref 0.57–1)
CREAT UR-MCNC: 142.6 MG/DL
EGFRCR SERPLBLD CKD-EPI 2021: 89 ML/MIN/1.73
EOSINOPHIL # BLD AUTO: 0.8 X10E3/UL (ref 0–0.4)
EOSINOPHIL NFR BLD AUTO: 8 %
ERYTHROCYTE [DISTWIDTH] IN BLOOD BY AUTOMATED COUNT: 13.2 % (ref 11.7–15.4)
GLOBULIN SER CALC-MCNC: 3.4 G/DL (ref 1.5–4.5)
GLUCOSE SERPL-MCNC: 100 MG/DL (ref 70–99)
HBA1C MFR BLD: 6 % (ref 4.8–5.6)
HCT VFR BLD AUTO: 43.9 % (ref 34–46.6)
HDLC SERPL-MCNC: 39 MG/DL
HGB BLD-MCNC: 14 G/DL (ref 11.1–15.9)
IMM GRANULOCYTES # BLD AUTO: 0 X10E3/UL (ref 0–0.1)
IMM GRANULOCYTES NFR BLD AUTO: 0 %
LDLC SERPL CALC-MCNC: 94 MG/DL (ref 0–99)
LYMPHOCYTES # BLD AUTO: 3.5 X10E3/UL (ref 0.7–3.1)
LYMPHOCYTES NFR BLD AUTO: 34 %
MCH RBC QN AUTO: 28.5 PG (ref 26.6–33)
MCHC RBC AUTO-ENTMCNC: 31.9 G/DL (ref 31.5–35.7)
MCV RBC AUTO: 89 FL (ref 79–97)
MICROALBUMIN UR-MCNC: 7.5 UG/ML
MONOCYTES # BLD AUTO: 0.8 X10E3/UL (ref 0.1–0.9)
MONOCYTES NFR BLD AUTO: 8 %
NEUTROPHILS # BLD AUTO: 5 X10E3/UL (ref 1.4–7)
NEUTROPHILS NFR BLD AUTO: 49 %
PLATELET # BLD AUTO: 425 X10E3/UL (ref 150–450)
POTASSIUM SERPL-SCNC: 4.2 MMOL/L (ref 3.5–5.2)
PROT SERPL-MCNC: 8 G/DL (ref 6–8.5)
RBC # BLD AUTO: 4.92 X10E6/UL (ref 3.77–5.28)
SODIUM SERPL-SCNC: 142 MMOL/L (ref 134–144)
T4 FREE SERPL-MCNC: 0.86 NG/DL (ref 0.82–1.77)
TRIGL SERPL-MCNC: 260 MG/DL (ref 0–149)
TSH SERPL DL<=0.005 MIU/L-ACNC: 1.76 UIU/ML (ref 0.45–4.5)
VLDLC SERPL CALC-MCNC: 44 MG/DL (ref 5–40)
WBC # BLD AUTO: 10.1 X10E3/UL (ref 3.4–10.8)

## 2025-03-11 DIAGNOSIS — R74.8 ELEVATED ALKALINE PHOSPHATASE LEVEL: Primary | ICD-10-CM

## 2025-03-12 LAB
GGT SERPL-CCNC: 105 IU/L (ref 0–60)
WRITTEN AUTHORIZATION: NORMAL

## 2025-03-14 ENCOUNTER — TELEPHONE (OUTPATIENT)
Dept: FAMILY MEDICINE CLINIC | Facility: CLINIC | Age: 55
End: 2025-03-14

## 2025-03-14 NOTE — TELEPHONE ENCOUNTER
Called and relayed to pt the provider has not had a chance to read the results.  Once he does, he will put his annotations down and the pt will receive a call.  Pt voiced understanding.

## 2025-03-14 NOTE — TELEPHONE ENCOUNTER
Caller: Li Kimball    Relationship: Self    Best call back number: [phone]       Who are you requesting to speak with (clinical staff, provider,  specific staff member): CLINICAL STAFF      What was the call regarding: PATIENT CALLING TO INQUIRE IF MONJARO EFFECTS THE LIVER.  PLEASE CALL TO ADVISE

## 2025-03-14 NOTE — TELEPHONE ENCOUNTER
Caller: Li Kimball    Relationship: Self    Best call back number: [phone]         What test was performed: ADDITIONAL LABS        Where was the test performed: [facility]    Additional notes: PATIENT REQUESTING A CALL BACK FROM PCP TO DISCUSS RESULTS.

## 2025-03-19 ENCOUNTER — TELEPHONE (OUTPATIENT)
Dept: FAMILY MEDICINE CLINIC | Facility: CLINIC | Age: 55
End: 2025-03-19

## 2025-03-19 DIAGNOSIS — R74.8 ELEVATED SERUM GGT LEVEL: Primary | ICD-10-CM

## 2025-03-19 DIAGNOSIS — R74.8 ELEVATED ALKALINE PHOSPHATASE LEVEL: ICD-10-CM

## 2025-03-19 NOTE — TELEPHONE ENCOUNTER
Caller: Li Kimball    Relationship: Self    Best call back number: [phone]       Who are you requesting to speak with (clinical staff, provider,  specific staff member): CLINICAL STAFF        What was the call regarding: CALLING ABOUT AN ULTRA SOUND ON HER LIVER.  SHE WOULD LIKE TO GET SCHEDULED FOR THIS.  PATIENT DOES NOT WANT TO HAVE THE IMAGING AT THE HOSPITAL. PLEASE CALL TO ADVISE

## 2025-03-27 ENCOUNTER — HOSPITAL ENCOUNTER (OUTPATIENT)
Dept: ULTRASOUND IMAGING | Facility: HOSPITAL | Age: 55
Discharge: HOME | End: 2025-03-27
Payer: COMMERCIAL

## 2025-03-27 ENCOUNTER — HOSPITAL ENCOUNTER (OUTPATIENT)
Dept: BONE DENSITY | Facility: HOSPITAL | Age: 55
Discharge: HOME | End: 2025-03-27
Payer: COMMERCIAL

## 2025-03-27 DIAGNOSIS — R74.8 ELEVATED ALKALINE PHOSPHATASE LEVEL: ICD-10-CM

## 2025-03-27 DIAGNOSIS — R74.8 ELEVATED SERUM GGT LEVEL: ICD-10-CM

## 2025-03-27 PROCEDURE — 76700 US EXAM ABDOM COMPLETE: CPT

## 2025-03-27 PROCEDURE — 77080 DXA BONE DENSITY AXIAL: CPT

## 2025-04-01 DIAGNOSIS — E78.2 MIXED HYPERLIPIDEMIA: Chronic | ICD-10-CM

## 2025-04-01 RX ORDER — ATORVASTATIN CALCIUM 10 MG/1
10 TABLET, FILM COATED ORAL NIGHTLY
Qty: 90 TABLET | Refills: 1 | Status: SHIPPED | OUTPATIENT
Start: 2025-04-01

## 2025-04-02 ENCOUNTER — TELEPHONE (OUTPATIENT)
Dept: FAMILY MEDICINE CLINIC | Facility: CLINIC | Age: 55
End: 2025-04-02

## 2025-04-02 NOTE — TELEPHONE ENCOUNTER
Caller: Li Kimball    Relationship: Self    Best call back number: [phone]       Who are you requesting to speak with (clinical staff, provider,  specific staff member): ALESSIO BAXTER OR MA        What was the call regarding: PATIENT WANTED TO LET YOU KNOW THAT SHE IS STARTING HER MOUNJARO BACK AND HAS 2 WEEKS OF THAT MEDICATION LEFT.       PATIENT IS REQUESTING A CALL.

## 2025-04-09 NOTE — TELEPHONE ENCOUNTER
"Relay     \"I have already instructed patient multiple times to hold Mounjaro until clearance by GI specialist. Patient needs to see gastroenterology and get their approval prior to resuming Mounjaro. \"                "

## 2025-04-24 ENCOUNTER — TELEPHONE (OUTPATIENT)
Dept: FAMILY MEDICINE CLINIC | Facility: CLINIC | Age: 55
End: 2025-04-24

## 2025-04-24 NOTE — TELEPHONE ENCOUNTER
Caller: Li Kimball    Relationship: Self    Best call back number: [phone]     What is the best time to reach you:     Who are you requesting to speak with (clinical staff, provider,  specific staff member): ALESSIO BAXTER         What was the call regarding: PATIENT HAS AN APPOINTMENT ON MONDAY 4.28.25 WITH GASTROLOGY. PATIENT WANTED TO ASK SINCE YOU WONT GIVE REFILLS ON MOUNJARO IS THERE ANYTHING ELSE YOU CAN SUGGEST THAT WILL CURVE APPETITE? PATIENT HAS GAINED 10 POUNDS ALREADY.    PATIENT WATCHES HER MOTHER SO WOULD NOT BE ABLE TO DO ANY KIND OF SURGERY'S. PATIENTS MOTHER WOULDN'T HAVE ANYONE TO WATCH HER.

## 2025-04-28 ENCOUNTER — OFFICE VISIT (OUTPATIENT)
Dept: GASTROENTEROLOGY | Facility: CLINIC | Age: 55
End: 2025-04-28
Payer: MEDICAID

## 2025-04-28 ENCOUNTER — LAB (OUTPATIENT)
Dept: LAB | Facility: HOSPITAL | Age: 55
End: 2025-04-28
Payer: COMMERCIAL

## 2025-04-28 VITALS
DIASTOLIC BLOOD PRESSURE: 78 MMHG | HEIGHT: 63 IN | WEIGHT: 210.8 LBS | BODY MASS INDEX: 37.35 KG/M2 | SYSTOLIC BLOOD PRESSURE: 110 MMHG

## 2025-04-28 DIAGNOSIS — R74.8 ELEVATED SERUM GGT LEVEL: ICD-10-CM

## 2025-04-28 DIAGNOSIS — R74.8 ELEVATED ALKALINE PHOSPHATASE LEVEL: Primary | ICD-10-CM

## 2025-04-28 LAB
25(OH)D3 SERPL-MCNC: 23.3 NG/ML (ref 30–100)
ALBUMIN SERPL-MCNC: 4.1 G/DL (ref 3.5–5.2)
ALP SERPL-CCNC: 131 U/L (ref 39–117)
ALT SERPL W P-5'-P-CCNC: 22 U/L (ref 1–33)
AST SERPL-CCNC: 19 U/L (ref 1–32)
BILIRUB CONJ SERPL-MCNC: 0.1 MG/DL (ref 0–0.3)
BILIRUB INDIRECT SERPL-MCNC: 0.3 MG/DL
BILIRUB SERPL-MCNC: 0.4 MG/DL (ref 0–1.2)
LIPASE SERPL-CCNC: 63 U/L (ref 13–60)
PROT SERPL-MCNC: 7.7 G/DL (ref 6–8.5)

## 2025-04-28 PROCEDURE — 82306 VITAMIN D 25 HYDROXY: CPT

## 2025-04-28 PROCEDURE — 99214 OFFICE O/P EST MOD 30 MIN: CPT

## 2025-04-28 PROCEDURE — 80076 HEPATIC FUNCTION PANEL: CPT

## 2025-04-28 PROCEDURE — 84080 ASSAY ALKALINE PHOSPHATASES: CPT

## 2025-04-28 PROCEDURE — 1160F RVW MEDS BY RX/DR IN RCRD: CPT

## 2025-04-28 PROCEDURE — 83690 ASSAY OF LIPASE: CPT

## 2025-04-28 PROCEDURE — 82784 ASSAY IGA/IGD/IGG/IGM EACH: CPT

## 2025-04-28 PROCEDURE — 84075 ASSAY ALKALINE PHOSPHATASE: CPT

## 2025-04-28 PROCEDURE — 86364 TISS TRNSGLTMNASE EA IG CLAS: CPT

## 2025-04-28 PROCEDURE — 86015 ACTIN ANTIBODY EACH: CPT

## 2025-04-28 PROCEDURE — 86381 MITOCHONDRIAL ANTIBODY EACH: CPT

## 2025-04-28 PROCEDURE — 1159F MED LIST DOCD IN RCRD: CPT

## 2025-04-28 PROCEDURE — 36415 COLL VENOUS BLD VENIPUNCTURE: CPT

## 2025-04-28 PROCEDURE — 86038 ANTINUCLEAR ANTIBODIES: CPT

## 2025-04-28 PROCEDURE — 86231 EMA EACH IG CLASS: CPT

## 2025-04-28 NOTE — TELEPHONE ENCOUNTER
Caller: Li Kimball    Relationship to patient: Self    Best call back number: [phone]     Patient is needing: TO INFORM DANIEL BELL THAT SHE WENT TO VISIT DR ROBINSON JULIEN TODAY 4/28/25 AND SHE DID LAB WORK AND WAS ADVISED THAT SHE WOULD GET HER RESULTS BACK IN A WEEK AND THEN SHE WILL ADVISE PATIENT IF SHE CAN GO BACK ON THE [facility]

## 2025-04-28 NOTE — PROGRESS NOTES
Patient or patient representative verbalized consent for the use of Ambient Listening during the visit with  DANIEL Minaya for chart documentation. 4/28/2025  13:24 EDT    Chief Complaint   Patient presents with    Elevated Alkaline Phosphatase         Patient is a 54 y.o. who presents to the office as a new patient for further evaluation of elevation in alkaline phosphatase after referral received from DANIEL Shafer. patient has a significant past medical history of diverticulitis-with prior severe intrinsic stenosis at approximately 28 cm proximal to anus-successfully dilated by Dr. Verdin, hypothyroidism, DVT, BPPV, migraine and asthma.  Currently on tirzepatide    10/28/2021 Colonoscopy with Dr. Verdin:  Bowel prep described as excellent  ICV was grossly normal however with somewhat bulbous irregular appearance noted to 1 side of ICV  Path: Mild acute ileitis with congestion and edema  Presence of ink tattoo at site of prior sigmoid stricture however stricture widely patent   Sigmoid diverticulosis  Next Colonoscopy for screening recommended at  5 -year interval due  10/28/2026      Past Surgical History   2/2024-appendectomy for acute appendicitis  2014-vena cava filter insertion  3 previous colonoscopies    Social History  Denies use of tobacco, alcohol, or illicit drug use    Family History  No known family history of colon cancer, colon polyps, or IBD    History of Present Illness  The patient presents for evaluation of elevated alkaline phosphatase levels.    Elevated Alkaline Phosphatase Levels  - The elevation in alkaline phosphatase was first noted in March 2025, potentially associated with the use of Mounjaro, which was subsequently discontinued.  - The patient denies experiencing upper abdominal pain, nausea, or vomiting.    Cholelithiasis without evidence of acute cholecystitis  - Recently diagnosed with cholelithiasis.  - The patient expresses a preference for non-surgical  management due to chronic anticoagulant therapy.  - Denies abdominal pain, nausea, vomiting    Weight Changes  - The patient experienced significant weight loss of 80 pounds while on Wegovy, followed by weight gain upon cessation of the medication.  - Since discontinuing Mounjaro, the patient has gained 11 pounds, which is notable given the total weight loss of 12 pounds while on the medication.    Bowel habits:  - Reports regularly occurring bowel movements without visible melena or hematochezia.      Medications    Current Outpatient Medications:     apixaban (Eliquis) 2.5 MG tablet tablet, Take 1 tablet by mouth Every 12 (Twelve) Hours., Disp: 60 tablet, Rfl: 5    atorvastatin (LIPITOR) 10 MG tablet, TAKE 1 TABLET BY MOUTH EVERY NIGHT, Disp: 90 tablet, Rfl: 1    finasteride (PROSCAR) 5 MG tablet, Take 1 tablet by mouth Daily., Disp: 30 tablet, Rfl: 2    levothyroxine (SYNTHROID, LEVOTHROID) 25 MCG tablet, Take 1 tablet by mouth Daily., Disp: 90 tablet, Rfl: 1    meclizine (ANTIVERT) 12.5 MG tablet, Take 1 tablet by mouth 3 (Three) Times a Day As Needed for Dizziness (VERTIGO)., Disp: 30 tablet, Rfl: 1    minoxidil (LONITEN) 2.5 MG tablet, Take 0.5 tablets by mouth Daily., Disp: 30 tablet, Rfl: 2    Tirzepatide 10 MG/0.5ML solution auto-injector, Inject 10 mg under the skin into the appropriate area as directed 1 (One) Time Per Week., Disp: 2 mL, Rfl: 2    albuterol sulfate  (90 Base) MCG/ACT inhaler, Inhale 2 puffs Every 6 (Six) Hours As Needed for Shortness of Air (shortness of breath). (Patient not taking: Reported on 11/22/2024), Disp: 8 g, Rfl: 3    glucose blood test strip, Twice Daily (Patient not taking: Reported on 4/28/2025), Disp: 100 each, Rfl: 5    Lancets Ultra Thin 30G misc, Inject 1 each under the skin into the appropriate area as directed 2 (Two) Times a Day. (Patient not taking: Reported on 4/28/2025), Disp: 100 each, Rfl: 5  Result Review :      Common labs          8/21/2024    12:10  "11/22/2024    12:11 3/7/2025    13:05   Common Labs   Glucose 87  92  100    BUN 12  19  15    Creatinine 0.73  0.86  0.79    Sodium 140  140  142    Potassium 4.2  4.4  4.2    Chloride 104  105  104    Calcium 9.6  9.9  10.2    Albumin 4.3  4.5  4.6    Total Bilirubin 0.4  0.6  0.5    Alkaline Phosphatase 107  113  169    AST (SGOT) 15  14  18    ALT (SGPT) 19  19  27    WBC 7.5  9.3  10.1    Hemoglobin 13.6  13.1  14.0    Hematocrit 42.7  40.4  43.9    Platelets 407  377  425    Total Cholesterol 149   177    Triglycerides 186   260    HDL Cholesterol 38   39    LDL Cholesterol  79   94    Hemoglobin A1C  5.8  6.0    Microalbumin, Urine   7.5    Office Visit with Tank Shea APRN (03/07/2025)   Comprehensive Metabolic Panel (03/07/2025 13:05) Alk phos 169   Gamma GT (03/07/2025 13:05) ELEVATEed   DEXA Bone Density Axial (03/27/2025 13:20)   Normal  US Abdomen Complete (03/27/2025 12:53)   Large gallstone measuring 1.6 cm without evidence of Caren cystitis or dilation and CBD  Otherwise normal-appearing liver without intra or extrahepatic ductal dilatation  Vital Signs:   /78 (BP Location: Left arm, Patient Position: Sitting, Cuff Size: Large Adult)   Ht 160 cm (62.99\")   Wt 95.6 kg (210 lb 12.8 oz)   BMI 37.35 kg/m²     Body mass index is 37.35 kg/m².      Physical Exam  Constitutional:       General: She is not in acute distress.     Appearance: Normal appearance. She is not ill-appearing.   HENT:      Head: Normocephalic.   Eyes:      General: No scleral icterus.  Pulmonary:      Effort: No respiratory distress.   Abdominal:      General: Abdomen is flat. Bowel sounds are normal. There is no distension.      Palpations: Abdomen is soft. There is no mass.      Tenderness: There is no abdominal tenderness. There is no guarding or rebound.      Hernia: No hernia is present.   Skin:     General: Skin is warm and dry.   Neurological:      Mental Status: She is alert.      Gait: Gait normal. "   Psychiatric:         Mood and Affect: Mood normal.       Assessment and Plan    Diagnoses and all orders for this visit:    1. Elevated alkaline phosphatase level (Primary)  -     Alkaline Phosphatase, Isoenzymes  -     Anti-Smooth Muscle Antibody Titer  -     SO  -     Mitochondrial Antibodies, M2  -     Vitamin D,25-Hydroxy  -     Hepatic Function Panel  -     Lipase  -     Celiac Disease Panel    2. Elevated serum GGT level  -     Alkaline Phosphatase, Isoenzymes  -     Anti-Smooth Muscle Antibody Titer  -     SO  -     Mitochondrial Antibodies, M2  -     Vitamin D,25-Hydroxy  -     Hepatic Function Panel  -     Lipase       Assessment & Plan  Elevated alkaline phosphatase:  - Agreeable to proceed with autoimmune workup including AMA, ASMA, SO, recheck hepatic function panel for monitoring as well as assess lipase and vitamin D.  -Reviewed presence of gallstone as well as the presence itself does not require surgical intervention unless she becomes symptomatic such as intractable nausea and vomiting, postprandial upper abdominal pain patient confirms asymptomatic state and agreeable to closely monitor.  If acute severe symptoms arise such as intractable nausea vomiting she is encouraged to seek further  evaluation in the ER.  - Consider MRCP if blood work is negative and alkaline phosphatase remains elevated.    Follow up: 4 weeks for continued monitoring discussed need for possible MRCP    Patient is agreeable to the outlined above treatment plan.  Verbalizes understanding and will contact office for any new or worsening concerns.  All questions answered and support provided.  Patient Instructions   Blood work today - to further assess elevated Alkaline Phosphatase level     Once we receive these results, our office will contact you to discuss updating your treatment plan as indicated      If negative and liver enzyme remains elevated will plan to have you complete an MRI of the liver and pancreas to  further assess     EMR Dragon/Transcription Disclaimer:  This document has been Dictated utilizing Dragon dictation.     Zandra Guerrero, MSN, APRN, FNP-C   [facility]  Gastroenterology   [address] [id number] [phone] office  [phone] fax

## 2025-04-28 NOTE — PATIENT INSTRUCTIONS
Blood work today - to further assess elevated Alkaline Phosphatase level     Once we receive these results, our office will contact you to discuss updating your treatment plan as indicated      If negative and liver enzyme remains elevated will plan to have you complete an MRI of the liver and pancreas to further assess

## 2025-04-29 ENCOUNTER — TELEPHONE (OUTPATIENT)
Dept: GASTROENTEROLOGY | Facility: CLINIC | Age: 55
End: 2025-04-29
Payer: COMMERCIAL

## 2025-04-29 LAB
ANA SER QL: NEGATIVE
ENDOMYSIUM IGA SER QL: NEGATIVE
IGA SERPL-MCNC: 302 MG/DL (ref 87–352)
MITOCHONDRIA M2 IGG SER-ACNC: <20 UNITS (ref 0–20)
SMA IGG SER-ACNC: 7 UNITS (ref 0–19)
TTG IGA SER-ACNC: 2 U/ML (ref 0–3)

## 2025-04-29 NOTE — TELEPHONE ENCOUNTER
PT CALLED [facility] SHE IS SUPPOSE TO HAVE A VIDEO VISIT ON 6-2 SHE SAID SHE DOES NOT KNOW HOW TO USE THE [employer] TRYING TO GET ON IT BUT DONT KNOW HOW TO WORK IT. WONDERING IF ON 6-2  CAN CALL HER INSTEAD OVER [facility].

## 2025-04-30 LAB
ALP BONE CFR SERPL: 24 % (ref 14–68)
ALP INTEST CFR SERPL: 6 % (ref 0–18)
ALP LIVER CFR SERPL: 70 % (ref 18–85)
ALP SERPL-CCNC: 133 IU/L (ref 44–121)

## 2025-05-05 ENCOUNTER — TELEPHONE (OUTPATIENT)
Dept: GASTROENTEROLOGY | Facility: CLINIC | Age: 55
End: 2025-05-05
Payer: MEDICAID

## 2025-05-05 NOTE — TELEPHONE ENCOUNTER
PT IS REQUESTING A CALL FROM   SHE WANTS TO GO OVER HER TEST RESULTS  ASK IF SHE CAN START BACK ON HER :  Tirzepatide 10 MG/0.5ML solution auto-injector   AND REQUESTS THE MRI KP REF HER TO COULD BE SOME PLACE OTHER THAN A HOSPITAL   [phone]

## 2025-05-06 ENCOUNTER — TELEPHONE (OUTPATIENT)
Dept: FAMILY MEDICINE CLINIC | Facility: CLINIC | Age: 55
End: 2025-05-06
Payer: MEDICAID

## 2025-05-06 DIAGNOSIS — E66.811 OBESITY (BMI 30.0-34.9): Primary | ICD-10-CM

## 2025-05-06 NOTE — TELEPHONE ENCOUNTER
Caller: Li Kimball    Relationship: Self    Best call back number: [phone]     Which medication are you concerned about: IGNACIO    Who prescribed you this medication: MAGGIE      What are your concerns: MAGGIE IS WITH HOLDING THE MEDICATION UNTIL PATIENT HAS AN MRI OF THE ABDOMEN.  THAT IS SCHEDULED FOR 6/19 WHICH WAS THE EARLIEST SHE COULD GET.  SINCE BEING OFF THE MEDICATION SHE HAS GAINED 13 LBS.  ASKING IF HE CAN CALL IN A PILL THEY DISCUSSED AT HER APPOINTMENT TO HELP SUPPRESS HER APPETITE.  PLEASE CALL PATIENT TO ADVISE ON THIS.      [id number] - [place], [place] - [address] [facility] [address] [facility] [phone] [facility] [phone] FX

## 2025-05-07 NOTE — TELEPHONE ENCOUNTER
I would like patient follow up to bariatric surgery for non-surgical weight loss for Mounjaro refill, going forwards.   I have placed the referral in patient chart.    Patient informed and upset. Gave bariatrics and scheduling department numbers for contact.

## 2025-05-08 NOTE — TELEPHONE ENCOUNTER
PT CALLED  SHE WOULD REALLY LIKE  TO CALL HER  SHE HAS THINGS SHE WISHES TO DISCUSS WITH HER  NOT HER PCP  HER CALL BACK [phone]

## 2025-05-08 NOTE — TELEPHONE ENCOUNTER
Thank you for the update.  Unfortunately this is not something that is prescribed from a GI standpoint And recommend scheduling follow-up with primary care provider for further recommendations regarding weight management prescription therapy.  I agree with plans to have her meet with me in the office to further assess/address any questions or concerns that she may have.    Zandra Guerrero, APRN

## 2025-05-08 NOTE — TELEPHONE ENCOUNTER
Spoke with patient, stated PCP is not refilling Mounjaro.   She is not scheduled for MRI until 6/19. Advised patient that Zandra and PCP have been in contact.   Patient would like a prescription to help with her appetite.   Advised that I would send a message to , but ultimately it is PCP decision until MRI is complete.   Patient declined appointment on 5/9 with .

## 2025-05-08 NOTE — TELEPHONE ENCOUNTER
Spoke with patient and discussed scheduling a follow up with PCP. Patient is not scheduled until July.   Patient was addiment about speaking to KP. Advised patient to schedule a mychart appointment to discuss questions and concerns.   Patient is scheduled with APRN on 5/14/2025.

## 2025-05-14 ENCOUNTER — TELEMEDICINE (OUTPATIENT)
Dept: GASTROENTEROLOGY | Facility: CLINIC | Age: 55
End: 2025-05-14
Payer: MEDICAID

## 2025-05-14 DIAGNOSIS — R74.8 ELEVATED ALKALINE PHOSPHATASE LEVEL: Primary | ICD-10-CM

## 2025-05-14 DIAGNOSIS — R74.8 ELEVATED SERUM GGT LEVEL: ICD-10-CM

## 2025-05-14 NOTE — PATIENT INSTRUCTIONS
Continue with plan MRI of the biliary tract to further assess increase in alkaline phosphatase    As we discussed via telehealth it is possible that your low vitamin D is contributing to elevation which I am hopeful supplementation will help eliminate this as a potential cause.    Please keep scheduled consultation with bariatric surgery for further recommendations from their standpoint.

## 2025-05-14 NOTE — PROGRESS NOTES
Patient or patient representative verbalized consent for the use of Ambient Listening during the visit with  DANIEL Minaya for chart documentation. 5/14/2025  15:02 EDT    Chief Complaint   Patient presents with    Follow-up     test result follow up          Patient is a 54 y.o. who presents to the office follow up evaluation of   Patient has a significant past medical history of diverticulitis-with prior severe intrinsic stenosis at approximately 28 cm proximal to anus-successfully dilated by Dr. Verdin, hypothyroidism, DVT, BPPV, migraine and asthma.  Currently on tirzepatide     10/28/2021 Colonoscopy with Dr. Verdin:  Bowel prep described as excellent  ICV was grossly normal however with somewhat bulbous irregular appearance noted to 1 side of ICV  Path: Mild acute ileitis with congestion and edema  Presence of ink tattoo at site of prior sigmoid stricture however stricture widely patent      Sigmoid diverticulosis  Next Colonoscopy for screening recommended at  5 -year interval due  10/28/2026       Past Surgical History   2/2024-appendectomy for acute appendicitis  2014-vena cava filter insertion  3 previous colonoscopies     Social History  Denies use of tobacco, alcohol, or illicit drug use     Family History  No known family history of colon cancer, colon polyps, or IBD    History of Present Illness  The patient presents via virtual consultation for the evaluation of cholelithiasis, weight management, and prediabetes.    Cholelithiasis  - The patient is unable to schedule an MRI until 06/19/2025.  - Communication with the primary care physician's office regarding the medication Mounjaro has been limited to interactions with nursing staff or assistants.  - Despite adherence to diagnostic tests and consultations, the prescription for Mounjaro has not been refilled.  - The patient suspects the physician's concern regarding potential hepatic or cholelithiasis complications.  - Consequently, Mounjaro  was discontinued as advised.  - Cholelithiasis and nephrolithiasis were identified on ultrasound imaging.  - The patient reports no pain but expresses concern about the impact of Mounjaro on cholelithiasis.    Weight Management  - The patient's initial weight was approximately 250 lbs.  - The patient lost 80 lbs over two years while on Wegovy but discontinued the medication due to alopecia and subsequently regained weight.  - The patient lost 12 lbs while on Mounjaro.  - The patient is awaiting paperwork from a weight loss center and is considering resuming Wegovy despite previous episodes of nausea.  - The patient is curious about the impact of Wegovy on cholelithiasis.  - Currently, the patient is taking vitamin D3 1000 IU daily and is considering increasing the dosage to twice daily.    Prediabetes  - The patient has a long-standing history of prediabetes and is concerned about the progression to diabetes with weight gain.  - The patient underwent three colonoscopies within one year due to bowel obstruction, which was resolved with weight loss.    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 (Patient not taking: Reported on 11/22/2024), Disp: 8 g, Rfl: 3    apixaban (Eliquis) 2.5 MG tablet tablet, Take 1 tablet by mouth Every 12 (Twelve) Hours., Disp: 60 tablet, Rfl: 5    atorvastatin (LIPITOR) 10 MG tablet, TAKE 1 TABLET BY MOUTH EVERY NIGHT, Disp: 90 tablet, Rfl: 1    finasteride (PROSCAR) 5 MG tablet, Take 1 tablet by mouth Daily., Disp: 30 tablet, Rfl: 2    glucose blood test strip, Twice Daily (Patient not taking: Reported on 4/28/2025), Disp: 100 each, Rfl: 5    Lancets Ultra Thin 30G misc, Inject 1 each under the skin into the appropriate area as directed  2 (Two) Times a Day. (Patient not taking: Reported on 4/28/2025), Disp: 100 each, Rfl: 5    levothyroxine (SYNTHROID, LEVOTHROID) 25 MCG tablet, Take 1 tablet by mouth Daily., Disp: 90 tablet, Rfl: 1    meclizine (ANTIVERT) 12.5 MG tablet, Take 1 tablet by mouth 3 (Three) Times a Day As Needed for Dizziness (VERTIGO)., Disp: 30 tablet, Rfl: 1    minoxidil (LONITEN) 2.5 MG tablet, Take 0.5 tablets by mouth Daily., Disp: 30 tablet, Rfl: 2    Tirzepatide 10 MG/0.5ML solution auto-injector, Inject 10 mg under the skin into the appropriate area as directed 1 (One) Time Per Week., Disp: 2 mL, Rfl: 2  Result Review :      Common labs          11/22/2024    12:11 3/7/2025    13:05 4/28/2025    11:53   Common Labs   Glucose 92  100     BUN 19  15     Creatinine 0.86  0.79     Sodium 140  142     Potassium 4.4  4.2     Chloride 105  104     Calcium 9.9  10.2     Albumin 4.5  4.6  4.1    Total Bilirubin 0.6  0.5  0.4    Alkaline Phosphatase 113  169  133     131    AST (SGOT) 14  18  19    ALT (SGPT) 19  27  22    WBC 9.3  10.1     Hemoglobin 13.1  14.0     Hematocrit 40.4  43.9     Platelets 377  425     Total Cholesterol  177     Triglycerides  260     HDL Cholesterol  39     LDL Cholesterol   94     Hemoglobin A1C 5.8  6.0     Microalbumin, Urine  7.5     Office Visit with Zandra Guerrero APRN (04/28/2025)     Vital Signs:   There were no vitals taken for this visit.    There is no height or weight on file to calculate BMI.      Physical Exam  Assessment and Plan    There are no diagnoses linked to this encounter.   Assessment & Plan  Asymptomatic cholelithiasis with alkaline phosphatase  - Increase vitamin D3 to 2000 IU daily  - MRI scheduled for 06/19/2025 to further assess for biliary etiology due to elevation in alkaline phosphatase  - Discuss resuming Mounjaro with primary care physician  - We did discuss that in patients with gallstones less than 2 cm were asymptomatic cholecystectomy is not necessarily required  however could consider consultation with general surgery to obtain additional recommendations from their standpoint.  - At this time patient would like to postpone general surgery consultation and verbalizes understanding of alarm symptoms that would warrant ER evaluation to suggest acute cholecystitis such as postprandial persistent upper abdominal pain, nausea, vomiting.  - She is agreeable to proceed with MRCP to further assess and notify our office in the interim for any additional questions or concerns.  - If MRCP is overall unremarkable without visible cause elevation in alkaline phosphatase no further recommendations from GI standpoint.    GLP-1 use  - Agree with plans to proceed with bariatric consultation for possible nonpharmacologic/pharmacologic weight management recommendations  -Reviewed common side effects associated with GLP-1 use including cholelithiasis and associated increase risk of pancreatitis if gallstones were to travel to common bile duct.  - Patient did have presence of 1.6 cm gallstone on ultrasound which is not an absolute contraindication for use of GLP-1 agonist however is recommended to proceed cautiously.  - Informed patient that these cautions were reviewed with primary care provider as well as recommendations for them to follow-up with bariatric specialist for consideration of continued Mounjaro use.  - Unfortunately this class of medications is not prescribed from a GI standpoint and recommend continued follow-up with primary care provider and completion of bariatric consultation.      APRN reviewed recommendations with primary care provider via telephone call.  Concur with plan to await further recommendations from bariatric surgery before restarting tirzepatide given possible increased risk of gallstone pancreatitis.    Follow up: As needed pending clinical course     Patient is agreeable to the outlined above treatment plan.  Verbalizes understanding and will contact office for  any new or worsening concerns.  All questions answered and support provided.  Patient Instructions   Continue with plan MRI of the biliary tract to further assess increase in alkaline phosphatase    As we discussed via telehealth it is possible that your low vitamin D is contributing to elevation which I am hopeful supplementation will help eliminate this as a potential cause.    Please keep scheduled consultation with bariatric surgery for further recommendations from their standpoint.    EMR Dragon/Transcription Disclaimer:  This document has been Dictated utilizing Dragon dictation.     Zandra Guerrero, MSN, APRN, FNP-C   [facility]  Gastroenterology   [address] [facility]  [place] [place] [address]  [phone] office  [phone] fax

## 2025-06-19 ENCOUNTER — HOSPITAL ENCOUNTER (OUTPATIENT)
Dept: MRI IMAGING | Facility: HOSPITAL | Age: 55
Discharge: HOME | End: 2025-06-19
Payer: MEDICAID

## 2025-06-19 DIAGNOSIS — R74.8 ELEVATED SERUM GGT LEVEL: ICD-10-CM

## 2025-06-19 DIAGNOSIS — R74.8 ELEVATED ALKALINE PHOSPHATASE LEVEL: ICD-10-CM

## 2025-06-19 PROCEDURE — 74183 MRI ABD W/O CNTR FLWD CNTR: CPT

## 2025-06-19 PROCEDURE — A9577 INJ MULTIHANCE: HCPCS

## 2025-06-19 PROCEDURE — 25510000002 GADOBENATE DIMEGLUMINE 529 MG/ML SOLUTION

## 2025-06-19 RX ADMIN — GADOBENATE DIMEGLUMINE 20 ML: 529 INJECTION, SOLUTION INTRAVENOUS at 10:38

## 2025-06-25 ENCOUNTER — TELEPHONE (OUTPATIENT)
Dept: GASTROENTEROLOGY | Facility: CLINIC | Age: 55
End: 2025-06-25
Payer: MEDICAID

## 2025-06-25 ENCOUNTER — CONSULT (OUTPATIENT)
Dept: BARIATRICS/WEIGHT MGMT | Facility: CLINIC | Age: 55
End: 2025-06-25
Payer: MEDICAID

## 2025-06-25 VITALS
TEMPERATURE: 97.5 F | DIASTOLIC BLOOD PRESSURE: 76 MMHG | SYSTOLIC BLOOD PRESSURE: 143 MMHG | HEIGHT: 61 IN | WEIGHT: 217 LBS | HEART RATE: 89 BPM | BODY MASS INDEX: 40.97 KG/M2

## 2025-06-25 DIAGNOSIS — E66.813 OBESITY, CLASS III, BMI 40-49.9 (MORBID OBESITY): Primary | ICD-10-CM

## 2025-06-25 DIAGNOSIS — R74.8 ELEVATED ALKALINE PHOSPHATASE LEVEL: ICD-10-CM

## 2025-06-25 DIAGNOSIS — Z79.01 CHRONIC ANTICOAGULATION: ICD-10-CM

## 2025-06-25 DIAGNOSIS — Z71.3 DIETARY COUNSELING: ICD-10-CM

## 2025-06-25 PROBLEM — E66.812 CLASS 2 SEVERE OBESITY WITH SERIOUS COMORBIDITY AND BODY MASS INDEX (BMI) OF 37.0 TO 37.9 IN ADULT: Status: RESOLVED | Noted: 2022-08-12 | Resolved: 2025-06-25

## 2025-06-25 PROBLEM — E66.811 OBESITY (BMI 30.0-34.9): Status: RESOLVED | Noted: 2022-08-12 | Resolved: 2025-06-25

## 2025-06-25 PROBLEM — E66.01 CLASS 2 SEVERE OBESITY WITH SERIOUS COMORBIDITY AND BODY MASS INDEX (BMI) OF 37.0 TO 37.9 IN ADULT: Status: RESOLVED | Noted: 2022-08-12 | Resolved: 2025-06-25

## 2025-06-25 NOTE — PROGRESS NOTES
[facility] BARIATRIC SURGERY  [address]  [facility] [address] [phone]  [address]  [facility] [address] [phone]  Dept: [phone]  6/25/2025      Li Kimball.  [id number]  [id number]  1970  female      Chief Complaint of weight gain; unable to maintain weight loss    History of Present Illness:   Li is a 54 y.o. female who presents today for evaluation, education and consultation regarding medical weight loss.     Diet History:Li has been overweight for at least 18 years, has been 35 pounds or more overweight for at least 18 years, has been 100 pounds or more overweight for n/a or more years and started dieting at age 18.  The most weight Li lost was 78 pounds on Wegovy and maintained the weight loss for 2 yrs. Li describes her eating habits as snacking between meals, eating a lot of sweets, and drinking soda. Li Kimball has tried Weight Watchers, Physician monitored, exercising, prescription medications, and high protein, low carb among others with success of losing up to 78 pounds, but in each instance regained the weight.     See dietician documentation for complete history.    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 Pt following with GI and underwent MRI of the abdomen. Previous US showed 1.6cm gallstone.    Goal wt: 180lbs    Diet recall:  Usually skips bfast   Snacks throughout the day- chips/ little lukasz snack cakes/ pb sandwich  Chickfila- chicken sandwich and lemonade- daily  Dinner- turkey sausage and corn  Drinks sprite all day long  Eats a lot of  bread/sandwiches  Turkey cheese sandwich or pb sandwich and chips    Bariatric Surgery Evaluation: The patient is being seen for an initial visit for weight evaluation and education.     Bariatric Co-morbidities:  none    Patient Active Problem List   Diagnosis    Hypothyroidism, adult    Mixed hyperlipidemia    BPPV (benign paroxysmal positional vertigo)    Asthma    Migraines    Colonic stricture    Chronic anticoagulation    Recurrent deep vein thrombosis (DVT)    Hair loss    History of skin cancer    Right ureteral stone    Hypertriglyceridemia    Chronic low back pain    Presence of IVC filter    History of deep venous thrombosis (DVT) of distal vein of right lower extremity    Primary osteoarthritis of right knee    Deep vein thrombosis (DVT) of right lower extremity    Anxiety    Type 2 diabetes mellitus without complication, without long-term current use of insulin    Environmental and seasonal allergies    Bilateral impacted cerumen    Non-compliance    Elevated serum GGT level    Elevated alkaline phosphatase level    Obesity, Class III, BMI 40-49.9 (morbid obesity)       Past Medical History:   Diagnosis Date    Acute appendicitis 02/13/2024    Asthma     BPPV (benign paroxysmal positional vertigo)     Colitis 02/13/2024    Diverticulitis     DVT (deep venous thrombosis)     Right    Hospital discharge follow-up 03/06/2024    Hypothyroid     Kidney stones     Migraines     Mixed hyperlipidemia     Slow to wake up after anesthesia     Weight loss        Past Surgical History:   Procedure Laterality Date    ANKLE SURGERY Right 2015    Removal of hardware    APPENDECTOMY N/A 2/14/2024    Procedure: APPENDECTOMY LAPAROSCOPIC, POSSIBLE OPEN;  Surgeon: Jamee Panda MD;  Location: [facility] OR;  Service: General;  Laterality: N/A;    COLONOSCOPY N/A 08/2020    COLONOSCOPY N/A 9/17/2020    Procedure: COLONOSCOPY to cecum with 20mm colonic balloon dilation;  Surgeon: Walter Verdin MD;  Location: [facility]  ENDOSCOPY;  Service: General;  Laterality: N/A;  pre - diverticulitis induced stricture   post - colonic stricture    COLONOSCOPY N/A 10/28/2021    Procedure: COLONOSCOPY into cecum with bx;  Surgeon: Walter Verdin MD;  Location: [facility] ENDOSCOPY;  Service: General;  Laterality: N/A;  pre: hx of colonic stricture   post: diverticulosis     CYSTOSCOPY W/ URETERAL STENT PLACEMENT Right 7/1/2022    Procedure: CYSTOSCOPY, RIGHT URETEROSCOPY RIGHT STENT PLACEMENT, STONE BASKET EXTRACTION, LASER LITHOTRIPSY;  Surgeon: Cyrus Au MD;  Location: [facility] MAIN OR;  Service: Urology;  Laterality: Right;    ENDOMETRIAL ABLATION      FOOT SURGERY Left 2006    Fracture , postop DVT and PE    JOINT REPLACEMENT      ankle, s/p two surgery    LAPAROSCOPIC TUBAL LIGATION      ORIF ANKLE FRACTURE Right 2014    DVT and PE perioperatively    TUBAL ABDOMINAL LIGATION      VENA CAVA FILTER INSERTION  2014       Allergies   Allergen Reactions    Codeine Nausea And Vomiting         Current Outpatient Medications:     apixaban (Eliquis) 2.5 MG tablet tablet, Take 1 tablet by mouth Every 12 (Twelve) Hours., Disp: 60 tablet, Rfl: 5    atorvastatin (LIPITOR) 10 MG tablet, TAKE 1 TABLET BY MOUTH EVERY NIGHT, Disp: 90 tablet, Rfl: 1    finasteride (PROSCAR) 5 MG tablet, Take 1 tablet by mouth Daily., Disp: 30 tablet, Rfl: 2    levothyroxine (SYNTHROID, LEVOTHROID) 25 MCG tablet, Take 1 tablet by mouth Daily., Disp: 90 tablet, Rfl: 1    meclizine (ANTIVERT) 12.5 MG tablet, Take 1 tablet by mouth 3 (Three) Times a Day As Needed for Dizziness (VERTIGO)., Disp: 30 tablet, Rfl: 1    minoxidil (LONITEN) 2.5 MG tablet, Take 0.5 tablets by mouth Daily., Disp: 30 tablet, Rfl: 2    albuterol sulfate  (90 Base) MCG/ACT inhaler, Inhale 2 puffs Every 6 (Six) Hours As Needed for Shortness of Air (shortness of breath). (Patient not taking: Reported on 6/25/2025), Disp: 8 g, Rfl: 3    glucose blood test strip, Twice Daily (Patient not  taking: Reported on 3/7/2025), Disp: 100 each, Rfl: 5    Lancets Ultra Thin 30G misc, Inject 1 each under the skin into the appropriate area as directed 2 (Two) Times a Day. (Patient not taking: Reported on 3/7/2025), Disp: 100 each, Rfl: 5    Tirzepatide 10 MG/0.5ML solution auto-injector, Inject 10 mg under the skin into the appropriate area as directed 1 (One) Time Per Week. (Patient not taking: Reported on 6/25/2025), Disp: 2 mL, Rfl: 2    Social History     Socioeconomic History    Marital status:    Tobacco Use    Smoking status: Never     Passive exposure: Never    Smokeless tobacco: Never   Vaping Use    Vaping status: Never Used   Substance and Sexual Activity    Alcohol use: No    Drug use: No    Sexual activity: Defer       Family History   Problem Relation Age of Onset    Hyperlipidemia Mother     Dementia Mother     Cancer Father         hodgkins disease    Heart disease Brother     Malig Hyperthermia Neg Hx          Review of Systems:  Review of Systems   Constitutional:  Positive for appetite change. Negative for fatigue and unexpected weight change.   HENT: Negative.     Eyes: Negative.    Respiratory: Negative.     Cardiovascular: Negative.  Negative for leg swelling.   Gastrointestinal:  Negative for abdominal distention, abdominal pain, constipation, diarrhea, nausea and vomiting.   Genitourinary:  Negative for difficulty urinating, frequency and urgency.   Musculoskeletal:  Negative for back pain.   Skin: Negative.    Psychiatric/Behavioral: Negative.     All other systems reviewed and are negative.      Physical Exam:  Vital Signs:  Weight: 98.4 kg (217 lb)   Body mass index is 40.6 kg/m².  Temp: 97.5 °F (36.4 °C)   Heart Rate: 89   BP: 143/76     Physical Exam  Vitals reviewed.   Constitutional:       General: She is not in acute distress.     Appearance: Normal appearance. She is morbidly obese.   HENT:      Head: Normocephalic and atraumatic.      Mouth/Throat:      Mouth: Mucous  membranes are moist.   Eyes:      General: No scleral icterus.     Pupils: Pupils are equal, round, and reactive to light.   Cardiovascular:      Rate and Rhythm: Normal rate and regular rhythm.      Heart sounds: Normal heart sounds. No murmur heard.     No gallop.   Pulmonary:      Effort: Pulmonary effort is normal. No respiratory distress.      Breath sounds: Normal breath sounds.   Abdominal:      General: Abdomen is flat. Bowel sounds are normal. There is no distension.      Palpations: Abdomen is soft. There is no mass.      Tenderness: There is no abdominal tenderness. There is no guarding.   Musculoskeletal:         General: Normal range of motion.      Cervical back: Normal range of motion and neck supple.   Skin:     General: Skin is warm and dry.   Neurological:      General: No focal deficit present.      Mental Status: She is alert and oriented to person, place, and time.   Psychiatric:         Mood and Affect: Mood normal.         Behavior: Behavior normal.            Assessment:         Li Kimball is a 54 y.o. year old female with Body mass index is 40.6 kg/m². . Weight: 98.4 kg (217 lb), Body mass index is 40.6 kg/m². and weight related problems including none.    Reviewed labs, imaging as well as notes from both PCP and GI offices.    The patient was advised to start a high protein, low fat and low carbohydrate diet  start routine exercise including but not limited to 150 minutes per week. The patient received a resistance band along with a handout of exercises. The patient was given individualized information by our dietician along with handouts.       I think she is a good candidate for medical weight loss, and is interested in trying to track her macronutrient intake using a dietary giuliana.  Pt not interested in surgical options at this time due to the care she provides for her mother also pt concerned given hx of blood clots.    Advised pt she would need to follow up with GI regarding MRI and  see what their recommendation is in regards to continued GLP-1 therapy.    Encounter Diagnosis   Name Primary?    Obesity, Class III, BMI 40-49.9 (morbid obesity) Yes       Plan:  Diagnoses and all orders for this visit:    1. Obesity, Class III, BMI 40-49.9 (morbid obesity) (Primary)    Recommend pt implement 1 meal replacement shake daily for bfast.  Discussed high protein, low carb diet.  Track protein intake.  Discussed healthier snack and meal options.  Awaiting GI follow up to see recommendation in regards to GLP-1 therapy as well as upcoming labs with PCP.    Patient will be evaluated by a bariatric dietician individually. We discussed weight loss program options regarding different diet plans and structures and discussed physiology related to hunger, fullness, satiety, and how different macronutrient's are processed and may contribute to the sustainability of her dietary plan.     The patient was instructed to follow up in 4 weeks .     Total time spent during this encounter today was 60 minutes and includes preparing for the visit, reviewing tests, performing a medically appropriate examination and/or evaluations, counseling and educating the patient/family/caregiver, ordering medications, tests, or procedures, documenting information in the medical record, and independently interpreting results and communicating that information with the patient/family/caregiver  Kiana Muniz PA-C  6/25/2025

## 2025-06-27 ENCOUNTER — PATIENT ROUNDING (BHMG ONLY) (OUTPATIENT)
Dept: BARIATRICS/WEIGHT MGMT | Facility: CLINIC | Age: 55
End: 2025-06-27
Payer: MEDICAID

## 2025-06-27 NOTE — PROGRESS NOTES
Good afternoon,    My name is Viviana White, I am the Practice Manager for [facility] Bariatric Surgery.      I want to officially welcome you to our practice and ask about your recent visit.      If you could tell me about your recent visit with us. What things went well?      We're always looking for ways to make our patients experiences even better. Do you have recommendations on ways we may improve?      I appreciate you taking the time to answer a few questions today.      Thank you, and have a great day!      Message was sent to the patient via [employer] for [facility] [place] [facility]

## 2025-07-08 ENCOUNTER — CLINICAL SUPPORT (OUTPATIENT)
Dept: BARIATRICS/WEIGHT MGMT | Facility: CLINIC | Age: 55
End: 2025-07-08
Payer: MEDICAID

## 2025-07-08 ENCOUNTER — TELEPHONE (OUTPATIENT)
Dept: FAMILY MEDICINE CLINIC | Facility: CLINIC | Age: 55
End: 2025-07-08
Payer: MEDICAID

## 2025-07-08 ENCOUNTER — TELEMEDICINE (OUTPATIENT)
Dept: GASTROENTEROLOGY | Facility: CLINIC | Age: 55
End: 2025-07-08
Payer: MEDICAID

## 2025-07-08 DIAGNOSIS — K86.2 PANCREATIC CYST: ICD-10-CM

## 2025-07-08 DIAGNOSIS — R74.8 ELEVATED ALKALINE PHOSPHATASE LEVEL: Primary | ICD-10-CM

## 2025-07-08 DIAGNOSIS — Z71.3 DIETARY COUNSELING: ICD-10-CM

## 2025-07-08 DIAGNOSIS — E66.813 OBESITY, CLASS III, BMI 40-49.9 (MORBID OBESITY): Primary | ICD-10-CM

## 2025-07-08 PROCEDURE — 1160F RVW MEDS BY RX/DR IN RCRD: CPT

## 2025-07-08 PROCEDURE — 1159F MED LIST DOCD IN RCRD: CPT

## 2025-07-08 PROCEDURE — 99214 OFFICE O/P EST MOD 30 MIN: CPT

## 2025-07-08 NOTE — PATIENT INSTRUCTIONS
Keep scheduled follow-up with Kiana Muniz PA-C later this month for recommendations on Mounjaro    Next MRI for monitoring of pancreatic cyst due December 2025    Scheduling of your Ordered Diagnostic Tests :    A team member from [facility] scheduling department will contact you to schedule your tests.    You can also contact them directly at [phone]

## 2025-07-08 NOTE — TELEPHONE ENCOUNTER
Asked Ary (practice manager,) to contact patient to inform that [facility] would not be filling the Mounjaro. Pt did question why this would be stopped. Pt does not meet requirements, as well as the risks included with Gallstones present on MRI. GI is aware of Gallstone. Patient was previously referred to Bariatrics and she did have a visit with them.

## 2025-07-08 NOTE — PROGRESS NOTES
"Medical Weight Loss Nutrition Assessment    Patient Name: Li Kimball    YOB: 1970   Age: 54 y.o.  Sex: female  MRN: [id number]     ASSESSMENT    Anthropometric Measurements  Estimated body mass index is 40.6 kg/m² as calculated from the following:    Height as of 6/25/25: 155.7 cm (61.3\").    Weight as of 6/25/25: 98.4 kg (217 lb).    Medical History  Past Medical History:   Diagnosis Date    Acute appendicitis 02/13/2024    Asthma     BPPV (benign paroxysmal positional vertigo)     Colitis 02/13/2024    Diverticulitis     DVT (deep venous thrombosis)     Right    Hospital discharge follow-up 03/06/2024    Hypothyroid     Kidney stones     Migraines     Mixed hyperlipidemia     Slow to wake up after anesthesia     Weight loss      Past Surgical History:   Procedure Laterality Date    ANKLE SURGERY Right 2015    Removal of hardware    APPENDECTOMY N/A 2/14/2024    Procedure: APPENDECTOMY LAPAROSCOPIC, POSSIBLE OPEN;  Surgeon: Jamee Panda MD;  Location: [facility] OR;  Service: General;  Laterality: N/A;    COLONOSCOPY N/A 08/2020    COLONOSCOPY N/A 9/17/2020    Procedure: COLONOSCOPY to cecum with 20mm colonic balloon dilation;  Surgeon: Walter Verdin MD;  Location: [facility] ENDOSCOPY;  Service: General;  Laterality: N/A;  pre - diverticulitis induced stricture   post - colonic stricture    COLONOSCOPY N/A 10/28/2021    Procedure: COLONOSCOPY into cecum with bx;  Surgeon: Walter Verdin MD;  Location: [facility] ENDOSCOPY;  Service: General;  Laterality: N/A;  pre: hx of colonic stricture   post: diverticulosis     CYSTOSCOPY W/ URETERAL STENT PLACEMENT Right 7/1/2022    Procedure: CYSTOSCOPY, RIGHT URETEROSCOPY RIGHT STENT PLACEMENT, STONE BASKET EXTRACTION, LASER LITHOTRIPSY;  Surgeon: Cyrus Au MD;  Location: [facility] MAIN OR;  Service: Urology;  Laterality: Right;    ENDOMETRIAL ABLATION      FOOT SURGERY Left 2006    Fracture , postop DVT and PE    JOINT REPLACEMENT      " "ankle, s/p two surgery    LAPAROSCOPIC TUBAL LIGATION      ORIF ANKLE FRACTURE Right 2014    DVT and PE perioperatively    TUBAL ABDOMINAL LIGATION      VENA CAVA FILTER INSERTION  2014       Patient Goals  180 lbs    Visit Narrative  Li Kimball is participating in medical weight loss program under the supervision of a medical specialist and registered dietitian. Spoke with patient today for nutrition consult. High stress, taking care of mom with dementia going through menopause. Met with Kiana and she states they discussed multiple dietary changes.  Patient feels as though she can't be successful with weight loss without medication.  Reports she is \"mindful\" of calories and looks at nutrition labels but is not logging daily intake. Hunger and cravings make it difficult to stick with calorie control. She reports GI doctor approved use of GLP-1 and she is anxious to get restarted. Reiterated multiple times she is not sure why she can't get a prescription. She has been on ozempic and mounjaro in the past and reports having no appetite when taking them. Since her initial [facility] consult, she has increased protein foods, switched to keto low calorie bread and from regular Sprite to Sprite Zero. She has not noticed any change on the scale yet.    Diet recall from 6/25/25:  Usually skips bfast   Snacks throughout the day- chips/ little lukasz snack cakes/ pb sandwich  Chickfila- chicken sandwich and lemonade- daily  Dinner- turkey sausage and corn  Drinks sprite all day long  Eats a lot of bread/sandwiches  Turkey cheese sandwich or pb sandwich and chips    Estimated Nutrition Requirements  BMR (using [place]-St. Jeor equation): 1526 calories per day  TDEE (using activity factor 1.2): 1832 calories per day   Daily protein needs:  grams per day     DIAGNOSIS     Nutrition problem statement: Obesity related to multifactorial biochemical, behavioral and environmental contributors to disease as evidenced by BMI 40.6 " kg/m²    INTERVENTION    Nutrition education and coaching for behavior change completed. Educational materials provided. Reviewed the appropriate dietary choices with the patient and provided guidance and suggestions for making necessary changes. Discussed sustainable habit changes and setting small, achievable goals that can be maintained long term. Recommended focus on eating protein first, followed by vegetables/fruits/fiber rich foods. Weigh/measure portions sizes for high fat and calorie dense foods. Discussed portion plate model for guidelines on putting together balanced meals. Suggested the option of keeping a food journal which will help patient become more aware of the nutritional value of food, establish starting point and identify areas for improvement. Aim for at least 64 oz water daily. Be sure to do routine exercise, work up to 150 minutes per week, including both cardio and strength training. Dietitian to provide ongoing nutrition education and counseling to establish sustainable meal patterns and support overall health.    MONITORING & EVALUATION    Goals/Plan:   Discussed with patient the importance of making diet and lifestyle changes in addition to medications. We do like to get started with healthy habits before prescribing medications. I also reinforced multiple times that I do not prescribe medications and have no control over whether or not they are prescribed.     Begin to measure portion sizes and track intake on paper or electronically.        Recommendations for weight loss:   1400 calories   140-145 gm carbohydrate (40-45%)   gm protein (25-30%)  47 gm fat (30%)     Total time spent during this encounter today exceeded 30 minutes and includes preparing for the visit, reviewing tests, counseling and educating the patient/family/caregiver and documenting information in the medical record.    Electronically signed by:  Liz Garcia RD   07/08/25 16:29 EDT

## 2025-07-08 NOTE — PROGRESS NOTES
Chief Complaint   Patient presents with    Elevated Hepatic Enzymes             Mode of Visit: Video  Location of patient: -HOME-  Location of provider[facility]  You have chosen to receive care through a telehealth visit.  The patient has signed the video visit consent form.  The visit included audio and video interaction. No technical issues occurred during this visit.    Patient is a 54 y.o. who presents via telehealth for follow-up after undergoing MRI assessment.  Last visit completed on 5/14/2025 patient has a significant past medical history of diverticulitis-with prior severe intrinsic stenosis at approximately 28 cm proximal to anus-successfully dilated by Dr. Verdin, hypothyroidism, DVT, BPPV, migraine and asthma.  Awaiting recommendations from bariatric provider for restarting tirzepatide for weight loss.     10/28/2021 Colonoscopy with Dr. Verdin:  Bowel prep described as excellent  ICV was grossly normal however with somewhat bulbous irregular appearance noted to 1 side of ICV  Path: Mild acute ileitis with congestion and edema  Presence of ink tattoo at site of prior sigmoid stricture however stricture widely patent      Sigmoid diverticulosis  Next Colonoscopy for screening recommended at  5 -year interval due  10/28/2026      Past Surgical History   2/2024-appendectomy for acute appendicitis  2014-vena cava filter insertion  3 previous colonoscopies     Social History  Denies use of tobacco, alcohol, or illicit drug use     Family History  No known family history of colon cancer, colon polyps, or IBD    History of Present Illness  The patient presents via virtual consultation for the evaluation of cholelithiasis and pancreatic cysts.    Cholelithiasis  - Patient denies abdominal pain, nausea, vomiting, or heartburn.    Pancreatic Cysts  - The patient inquires about the etiology of pancreatic cysts.  - They ask about the potential for spontaneous resolution.  - They seek information on the  available treatment options.  - Denies unintentional weight loss or abdominal pain    Bowel habit  - Bowel movements are described as occurring regularly without straining, abdominal pain, melena, or hematochezia.    Supplemental information: None.    FAMILY HISTORY  - Negative for pancreatic cancer       Review of Systems      Result Review :   Telemedicine with Zandra Guerrero APRN (05/14/2025)   Comprehensive Metabolic Panel (03/07/2025 13:05) Alk phos 169   Gamma GT (03/07/2025 13:05) ELEVATEed   DEXA Bone Density Axial (03/27/2025 13:20)   Normal  US Abdomen Complete (03/27/2025 12:53)   Large gallstone measuring 1.6 cm without evidence of Caren cystitis or dilation and CBD  Otherwise normal-appearing liver without intra or extrahepatic ductal dilatation  MRI abdomen w wo contrast mrcp (06/19/2025 10:56)   Cholelithiasis without CBD dilation  Few scattered subcentimeter T2 hyperintense lesions in pancreas  radiologist recommended 12-month surveillance MRI- due 6/2026  CT Abdomen Pelvis With Contrast (02/13/2024 09:21)   Small fat-containing umbilical hernia with periumbilical scarring  Unremarkable liver, gallbladder, spleen, pancreas with incidental splenule  (-) pancreatic lesions  Hepatic Function Panel (04/28/2025 11:53)   Assessment and Plan    Diagnoses and all orders for this visit:    1. Elevated alkaline phosphatase level (Primary)    2. Pancreatic cyst         Assessment & Plan  Pancreatic cysts:  - Follow-up MRI in 6 months to assess stability as these were not visible on CT imaging of the abdomen and pelvis.  -Can consider increasing interval if stable at time of 6-month surveillance MRI due December 2025.    Cholelithiasis and GLP-1 use  -Notified patient of recommendations admitted to bariatric provider in which guidelines do not provide specific contraindication to proceeding only advise cautious monitoring for signs/symptoms of acute pancreatitis.  If acute pancreatitis occurs discontinue  without restarting.  - Discussed recommendations to keep planned follow-up later this month with Kiana Muniz PA-C for further recommendations on restarting tirzepatide.    Next Colonoscopy for screening recommended at  5 -year interval due  10/28/2026          Patient or patient representative verbalized consent for the use of Ambient Listening during the visit with  DANIEL Minaya for chart documentation. 7/8/2025  13:49 EDT    EMR Dragon/Transcription Disclaimer:  This document has been Dictated utilizing Dragon dictation.

## 2025-07-08 NOTE — TELEPHONE ENCOUNTER
Caller: Li Kimball    Relationship: Self    Best call back number: [phone]     Who are you requesting to speak with (clinical staff, provider,  specific staff member): MAGGIE    What was the call regarding: PATIENT HAS SEEN A GI SPECIALIST, DID MRI, AND DR. JULIEN (GI) SAID SHE DOESN'T SEE ANY REASON AT ALL WHY PATIENT CAN'T BE ON MOUNJARO.  PATIENT HAS AN APPOINTMENT WITH MAGGIE ON 7/11/25.  IF HE NEEDS TO VERIFY THIS INFORMATION, IT SHOULD BE IN HER CHART.  SHE WANTS TO GO BACK ON MOUNJARO AND WILL DISCUSS MORE AT APPOINTMENT.

## 2025-07-11 ENCOUNTER — OFFICE VISIT (OUTPATIENT)
Dept: FAMILY MEDICINE CLINIC | Facility: CLINIC | Age: 55
End: 2025-07-11
Payer: MEDICAID

## 2025-07-11 VITALS
SYSTOLIC BLOOD PRESSURE: 136 MMHG | HEART RATE: 82 BPM | TEMPERATURE: 97.5 F | OXYGEN SATURATION: 100 % | BODY MASS INDEX: 41.12 KG/M2 | DIASTOLIC BLOOD PRESSURE: 92 MMHG | WEIGHT: 217.8 LBS | HEIGHT: 61 IN

## 2025-07-11 DIAGNOSIS — N20.0 RENAL STONES: ICD-10-CM

## 2025-07-11 DIAGNOSIS — E66.813 OBESITY, CLASS III, BMI 40-49.9 (MORBID OBESITY): Primary | ICD-10-CM

## 2025-07-11 DIAGNOSIS — E03.9 HYPOTHYROIDISM, ADULT: Chronic | ICD-10-CM

## 2025-07-11 DIAGNOSIS — E78.1 HYPERTRIGLYCERIDEMIA: ICD-10-CM

## 2025-07-11 DIAGNOSIS — J45.909 MILD ASTHMA, UNSPECIFIED WHETHER COMPLICATED, UNSPECIFIED WHETHER PERSISTENT: ICD-10-CM

## 2025-07-11 DIAGNOSIS — I82.401 DEEP VEIN THROMBOSIS (DVT) OF RIGHT LOWER EXTREMITY, UNSPECIFIED CHRONICITY, UNSPECIFIED VEIN: Chronic | ICD-10-CM

## 2025-07-11 DIAGNOSIS — E55.9 VITAMIN D DEFICIENCY: ICD-10-CM

## 2025-07-11 DIAGNOSIS — L65.9 HAIR LOSS: ICD-10-CM

## 2025-07-11 DIAGNOSIS — F41.9 ANXIETY: ICD-10-CM

## 2025-07-11 DIAGNOSIS — E78.2 MIXED HYPERLIPIDEMIA: Chronic | ICD-10-CM

## 2025-07-11 DIAGNOSIS — K76.0 HEPATIC STEATOSIS: ICD-10-CM

## 2025-07-11 DIAGNOSIS — K80.20 CALCULUS OF GALLBLADDER WITHOUT CHOLECYSTITIS WITHOUT OBSTRUCTION: ICD-10-CM

## 2025-07-11 DIAGNOSIS — E11.9 TYPE 2 DIABETES MELLITUS WITHOUT COMPLICATION, WITHOUT LONG-TERM CURRENT USE OF INSULIN: Chronic | ICD-10-CM

## 2025-07-11 DIAGNOSIS — R63.2 EATING, EXCESSIVE INDULGENCE: ICD-10-CM

## 2025-07-11 PROCEDURE — 1159F MED LIST DOCD IN RCRD: CPT | Performed by: STUDENT IN AN ORGANIZED HEALTH CARE EDUCATION/TRAINING PROGRAM

## 2025-07-11 PROCEDURE — 99214 OFFICE O/P EST MOD 30 MIN: CPT | Performed by: STUDENT IN AN ORGANIZED HEALTH CARE EDUCATION/TRAINING PROGRAM

## 2025-07-11 PROCEDURE — 1160F RVW MEDS BY RX/DR IN RCRD: CPT | Performed by: STUDENT IN AN ORGANIZED HEALTH CARE EDUCATION/TRAINING PROGRAM

## 2025-07-11 PROCEDURE — 1125F AMNT PAIN NOTED PAIN PRSNT: CPT | Performed by: STUDENT IN AN ORGANIZED HEALTH CARE EDUCATION/TRAINING PROGRAM

## 2025-07-11 PROCEDURE — 3044F HG A1C LEVEL LT 7.0%: CPT | Performed by: STUDENT IN AN ORGANIZED HEALTH CARE EDUCATION/TRAINING PROGRAM

## 2025-07-11 RX ORDER — TOPIRAMATE 25 MG/1
50 TABLET, FILM COATED ORAL 2 TIMES DAILY
Qty: 100 TABLET | Refills: 2 | Status: SHIPPED | OUTPATIENT
Start: 2025-07-11 | End: 2025-07-15 | Stop reason: SDUPTHER

## 2025-07-11 RX ORDER — LEVOTHYROXINE SODIUM 25 UG/1
25 TABLET ORAL DAILY
Qty: 90 TABLET | Refills: 1 | Status: SHIPPED | OUTPATIENT
Start: 2025-07-11

## 2025-07-11 RX ORDER — FINASTERIDE 5 MG/1
5 TABLET, FILM COATED ORAL DAILY
Qty: 30 TABLET | Refills: 2 | Status: SHIPPED | OUTPATIENT
Start: 2025-07-11

## 2025-07-11 RX ORDER — ATORVASTATIN CALCIUM 10 MG/1
10 TABLET, FILM COATED ORAL NIGHTLY
Qty: 90 TABLET | Refills: 1 | Status: SHIPPED | OUTPATIENT
Start: 2025-07-11

## 2025-07-11 RX ORDER — MINOXIDIL 2.5 MG/1
1.25 TABLET ORAL DAILY
Qty: 30 TABLET | Refills: 2 | Status: SHIPPED | OUTPATIENT
Start: 2025-07-11

## 2025-07-11 NOTE — PROGRESS NOTES
"Chief Complaint  Diabetes (4 month follow up) and Hyperlipidemia (4 month follow up)    Subjective        Li Kimball presents to [facility]  History of Present Illness  54-year-old female here for chronic disease management visit.    Patient also here to discuss weight loss medication.  Patient s/p GI for further evaluation of gallstone noted on the ultrasound.  Discussed side effects of Mounjaro including those involving the hepatobiliary tree and inability to prescribe Mounjaro at this time.  However patient may follow-up with bariatric surgery for Mounjaro in the future.    Discussed alternatives to Mounjaro with patient today.  Patient is willing to start Topamax as she has appetite issues and states she eats her snacks too often.  Educated patient on a healthy diet as well and also psychiatry referral for indulgent since excessive eating, patient was understanding and agreeable to Topamax and psychiatry referral.      Instructed patient to follow-up with an eye doctor, either opthalmology or optometry with preference for opthalmology, for an annual diabetic eye exam.      Patient says she does not believe she has asthma and does not use albuterol inhaler.  Discussed primary testing for evaluation for asthma, patient was understanding.  Patient denied any anxiety symptoms.  No SI/HI.      Objective   Vital Signs:  /92 (BP Location: Left arm, Patient Position: Sitting, Cuff Size: Adult)   Pulse 82   Temp 97.5 °F (36.4 °C) (Infrared)   Ht 155.7 cm (61.3\")   Wt 98.8 kg (217 lb 12.8 oz)   SpO2 100%   BMI 40.75 kg/m²   Estimated body mass index is 40.75 kg/m² as calculated from the following:    Height as of this encounter: 155.7 cm (61.3\").    Weight as of this encounter: 98.8 kg (217 lb 12.8 oz).               Physical Exam  Constitutional:       Appearance: Normal appearance.   HENT:      Head: Normocephalic and atraumatic.   Eyes:      Conjunctiva/sclera: " Conjunctivae normal.   Cardiovascular:      Rate and Rhythm: Normal rate and regular rhythm.      Heart sounds: Normal heart sounds.   Pulmonary:      Effort: Pulmonary effort is normal.      Breath sounds: Normal breath sounds.   Abdominal:      General: Bowel sounds are normal.      Palpations: Abdomen is soft.      Comments: Non-tender   Skin:     General: Skin is warm.   Neurological:      General: No focal deficit present.      Mental Status: She is alert and oriented to person, place, and time.   Psychiatric:         Mood and Affect: Mood normal.         Behavior: Behavior normal.        Result Review :    The following data was reviewed by: DANIEL aMrtínez on 07/11/2025:  CMP          3/7/2025    13:05 4/28/2025    11:53 7/11/2025    13:14   CMP   Glucose 100   91    BUN 15   18    Creatinine 0.79   0.78    EGFR 89   90    Sodium 142   141    Potassium 4.2   4.4    Chloride 104   105    Calcium 10.2   9.7    Total Protein 8.0  7.7  7.6    Albumin 4.6  4.1  4.3    Globulin 3.4   3.3    Total Bilirubin 0.5  0.4  0.4    Alkaline Phosphatase 169  133     131  113    AST (SGOT) 18  19  16    ALT (SGPT) 27  22  18    BUN/Creatinine Ratio 19   23      CBC          11/22/2024    12:11 3/7/2025    13:05 7/11/2025    13:14   CBC   WBC 9.3  10.1  6.6    RBC 4.54  4.92  4.77    Hemoglobin 13.1  14.0  13.3    Hematocrit 40.4  43.9  42.8    MCV 89  89  90    MCH 28.9  28.5  27.9    MCHC 32.4  31.9  31.1    RDW 13.0  13.2  13.6    Platelets 377  425  314      Lipid Panel          8/21/2024    12:10 3/7/2025    13:05 7/11/2025    13:14   Lipid Panel   Total Cholesterol 149  177  144    Triglycerides 186  260  178    HDL Cholesterol 38  39  37    VLDL Cholesterol 32  44  30    LDL Cholesterol  79  94  77      TSH          11/22/2024    12:11 3/7/2025    13:05 7/11/2025    13:14   TSH   TSH 0.968  1.760  1.890      A1C Last 3 Results          11/22/2024    12:11 3/7/2025    13:05 7/11/2025    13:14   HGBA1C Last  "3 Results   Hemoglobin A1C 5.8  6.0  6.4      Microalbumin          3/7/2025    13:05 7/11/2025    13:14   Microalbumin   Microalbumin, Urine 7.5  5.2        Data reviewed : Consultant notes reviewed recent bariatric surgery consult note from June 2025 bariatric surgery offered patient education on tracking macronutrient intake using the dietary and wanted patient to follow-up with GI regarding an MRI and to seek gastroenterology opinion regarding GLP-1 therapy.  We reviewed GI consult note from April 2025 and GI ordered autoimmune workup for further evaluation of elevated alk phos and also discussed MRCP as needed. Also reviewed results of MRI of abdomen from June 19, 2025.    \"MRI AND MRCP ABDOMEN WITH AND WITHOUT CONTRAST     HISTORY: Elevated alkaline phosphatase     TECHNIQUE: Multiplanar multisequence MRI and MRCP of the abdomen was  performed before and after the IV administration of contrast.     COMPARISON: Multiple CT abdomen/pelvis and back to 1324     FINDINGS:  Suggestion of a hepatic steatosis. There is cholelithiasis. Gallbladder  is nondistended. No significant intra or extrahepatic bili duct  dilation. A few scattered subcentimeter T2 hyperintense lesions are  present within the pancreas. Main pancreatic duct is not distended.  Subcentimeter renal lesions are too small to characterize.     The main portal veins are patent. No suspicious enhancing lesion is seen  within the liver or adrenal glands.     IMPRESSION  1.  Cholelithiasis. No significant bili duct dilation. The gallbladder  is not distended.  2.  Suggestion of hepatic steatosis.  3.  A few scattered subcentimeter T2 hyperintense lesions in the  pancreas. Follow-up with abdominal MRI in 1 year is recommended to  ensure stability per ACR criteria.  4.  Other findings above.\"             Assessment and Plan     Diagnoses and all orders for this visit:    1. Obesity, Class III, BMI 40-49.9 (morbid obesity) (Primary)  Assessment & " Plan:  Discomfort patient's (Body mass index is 40.75 kg/m².) indicates that they are morbidly/severely obese (BMI > 40 or > 35 with obesity - related health condition) with health conditions that include diabetes mellitus and dyslipidemias . Weight is worsening. BMI  is above average; BMI management plan is completed. We discussed portion control and increasing exercise.     Discussed the importance of healthy diet, nutrition, and lifestyle. Recommend low salt, fat/cholesterol diet and avoid concentrated sweets. Encouraged DASH diet along with fresh fruits & vegetables and low fat dairy products. Counseled patient to exercise/walk as tolerated. Avoid tobacco and alcohol use.        2. Eating, excessive indulgence  -     Ambulatory Referral to Psychiatry  -     Discontinue: topiramate (TOPAMAX) 25 MG tablet; Take 2 tablets by mouth 2 (Two) Times a Day. Take 1 tab po qday x week, then take 1 tab PO BID x one week. Then increase weekly to max dose of 2 tab PO BID.  Dispense: 100 tablet; Refill: 2    3. Hypothyroidism, adult  Assessment & Plan:  Will recheck TFTs.    Orders:  -     TSH  -     T4, free  -     levothyroxine (SYNTHROID, LEVOTHROID) 25 MCG tablet; Take 1 tablet by mouth Daily.  Dispense: 90 tablet; Refill: 1    4. Hypertriglyceridemia  Assessment & Plan:  Discussed the importance of healthy diet, nutrition, and lifestyle. Recommend low salt, fat/cholesterol diet and avoid concentrated sweets. Encouraged DASH diet along with fresh fruits & vegetables and low fat dairy products. Counseled patient to exercise/walk as tolerated. Avoid tobacco and alcohol use.      Orders:  -     Comprehensive Metabolic Panel  -     Lipid Panel    5. Mixed hyperlipidemia  Assessment & Plan:   Lipid abnormalities are stable    Plan:  Continue same medication/s without change.      Discussed medication dosage, use, side effects, and goals of treatment in detail.    Counseled patient on lifestyle modifications to help control  hyperlipidemia.     Patient Treatment Goals:   LDL goal is under 100    Followup in 6 months.  Discussed the importance of healthy diet, nutrition, and lifestyle. Recommend low salt, fat/cholesterol diet and avoid concentrated sweets. Encouraged DASH diet along with fresh fruits & vegetables and low fat dairy products. Counseled patient to exercise/walk as tolerated. Avoid tobacco and alcohol use.        Orders:  -     Comprehensive Metabolic Panel  -     Lipid Panel  -     atorvastatin (LIPITOR) 10 MG tablet; Take 1 tablet by mouth Every Night.  Dispense: 90 tablet; Refill: 1    6. Type 2 diabetes mellitus without complication, without long-term current use of insulin  Assessment & Plan:  Diabetes is stable.   Continue current treatment regimen.  Reminded to bring in blood sugar diary at next visit.  Recommended an ADA diet.  Recommended a Mediterranean style of eating  Regular aerobic exercise.  Discussed ways to avoid symptomatic hypoglycemia.  Discussed sick day management.  Discussed foot care.  Reminded to get yearly retinal exam.  Diabetes will be reassessed in 3 months    Discussed the importance of healthy diet, nutrition, and lifestyle. Recommend low salt, fat/cholesterol diet and avoid concentrated sweets. Encouraged DASH diet along with fresh fruits & vegetables and low fat dairy products. Counseled patient to exercise/walk as tolerated. Avoid tobacco and alcohol use.      Orders:  -     CBC & Differential  -     Hemoglobin A1c  -     Microalbumin / Creatinine Urine Ratio - Urine, Clean Catch    7. Deep vein thrombosis (DVT) of right lower extremity, unspecified chronicity, unspecified vein  Assessment & Plan:  Under control.    Orders:  -     apixaban (Eliquis) 2.5 MG tablet tablet; Take 1 tablet by mouth Every 12 (Twelve) Hours.  Dispense: 60 tablet; Refill: 5    8. Anxiety  Assessment & Plan:  Patient denied any anxiety symptoms. No SI/HI.      9. Hair loss  Assessment & Plan:  Under  control    Orders:  -     minoxidil (LONITEN) 2.5 MG tablet; Take 0.5 tablets by mouth Daily.  Dispense: 30 tablet; Refill: 2  -     finasteride (PROSCAR) 5 MG tablet; Take 1 tablet by mouth Daily.  Dispense: 30 tablet; Refill: 2    10. Mild asthma, unspecified whether complicated, unspecified whether persistent  Assessment & Plan:  Stable.  Will evaluate for PFT test results.          Orders:  -     Complete PFT - Pre & Post Bronchodilator; Future    11. Vitamin D deficiency  -     Vitamin D,25-Hydroxy    12. Hepatic steatosis  Assessment & Plan:  Discussed the importance of healthy diet, nutrition, and lifestyle. Recommend low salt, fat/cholesterol diet and avoid concentrated sweets. Encouraged DASH diet along with fresh fruits & vegetables and low fat dairy products. Counseled patient to exercise/walk as tolerated. Avoid tobacco and alcohol use.      Avoid Acetaminophen, NSAIDs, Alcohol; and also avoid other hepatotoxic agents or OTCs without prior consultation with Provider; patient voiced  understanding.        13. Calculus of gallbladder without cholecystitis without obstruction  Assessment & Plan:  Pt s/p GI evaluation and MRI of abdomen.      14. Renal stones  Assessment & Plan:  Stable.      Other orders  -     Vitamin D,25-Hydroxy        All chronic conditions have been addressed and treated by the practice or other specialists. Medications have been reconciled and refilled as appropriate. Reiterated compliance and timely follow up appointments. Side effects of all new and old medications reviewed with the patient and patient willing to accept all risks involved. Advised RTO if no improvement or worsening of symptoms or if any new complaints arise. Patient advised to follow up with clinic or call after diagnostic tests, if patient does not hear from office 3 days after the test completion.          Follow Up     Return in about 3 months (around 10/11/2025) for Next scheduled follow up.  Patient was given  instructions and counseling regarding her condition or for health maintenance advice. Please see specific information pulled into the AVS if appropriate.

## 2025-07-12 LAB
25(OH)D3+25(OH)D2 SERPL-MCNC: 32.6 NG/ML (ref 30–100)
ALBUMIN SERPL-MCNC: 4.3 G/DL (ref 3.8–4.9)
ALBUMIN/CREAT UR: 4 MG/G CREAT (ref 0–29)
ALP SERPL-CCNC: 113 IU/L (ref 44–121)
ALT SERPL-CCNC: 18 IU/L (ref 0–32)
AST SERPL-CCNC: 16 IU/L (ref 0–40)
BASOPHILS # BLD AUTO: 0 X10E3/UL (ref 0–0.2)
BASOPHILS NFR BLD AUTO: 1 %
BILIRUB SERPL-MCNC: 0.4 MG/DL (ref 0–1.2)
BUN SERPL-MCNC: 18 MG/DL (ref 6–24)
BUN/CREAT SERPL: 23 (ref 9–23)
CALCIUM SERPL-MCNC: 9.7 MG/DL (ref 8.7–10.2)
CHLORIDE SERPL-SCNC: 105 MMOL/L (ref 96–106)
CHOLEST SERPL-MCNC: 144 MG/DL (ref 100–199)
CO2 SERPL-SCNC: 22 MMOL/L (ref 20–29)
CREAT SERPL-MCNC: 0.78 MG/DL (ref 0.57–1)
CREAT UR-MCNC: 138.3 MG/DL
EGFRCR SERPLBLD CKD-EPI 2021: 90 ML/MIN/1.73
EOSINOPHIL # BLD AUTO: 0.1 X10E3/UL (ref 0–0.4)
EOSINOPHIL NFR BLD AUTO: 2 %
ERYTHROCYTE [DISTWIDTH] IN BLOOD BY AUTOMATED COUNT: 13.6 % (ref 11.7–15.4)
GLOBULIN SER CALC-MCNC: 3.3 G/DL (ref 1.5–4.5)
GLUCOSE SERPL-MCNC: 91 MG/DL (ref 70–99)
HBA1C MFR BLD: 6.4 % (ref 4.8–5.6)
HCT VFR BLD AUTO: 42.8 % (ref 34–46.6)
HDLC SERPL-MCNC: 37 MG/DL
HGB BLD-MCNC: 13.3 G/DL (ref 11.1–15.9)
IMM GRANULOCYTES # BLD AUTO: 0 X10E3/UL (ref 0–0.1)
IMM GRANULOCYTES NFR BLD AUTO: 0 %
LDLC SERPL CALC-MCNC: 77 MG/DL (ref 0–99)
LYMPHOCYTES # BLD AUTO: 2.8 X10E3/UL (ref 0.7–3.1)
LYMPHOCYTES NFR BLD AUTO: 43 %
MCH RBC QN AUTO: 27.9 PG (ref 26.6–33)
MCHC RBC AUTO-ENTMCNC: 31.1 G/DL (ref 31.5–35.7)
MCV RBC AUTO: 90 FL (ref 79–97)
MICROALBUMIN UR-MCNC: 5.2 UG/ML
MONOCYTES # BLD AUTO: 0.7 X10E3/UL (ref 0.1–0.9)
MONOCYTES NFR BLD AUTO: 10 %
NEUTROPHILS # BLD AUTO: 2.9 X10E3/UL (ref 1.4–7)
NEUTROPHILS NFR BLD AUTO: 44 %
PLATELET # BLD AUTO: 314 X10E3/UL (ref 150–450)
POTASSIUM SERPL-SCNC: 4.4 MMOL/L (ref 3.5–5.2)
PROT SERPL-MCNC: 7.6 G/DL (ref 6–8.5)
RBC # BLD AUTO: 4.77 X10E6/UL (ref 3.77–5.28)
SODIUM SERPL-SCNC: 141 MMOL/L (ref 134–144)
T4 FREE SERPL-MCNC: 0.72 NG/DL (ref 0.82–1.77)
TRIGL SERPL-MCNC: 178 MG/DL (ref 0–149)
TSH SERPL DL<=0.005 MIU/L-ACNC: 1.89 UIU/ML (ref 0.45–4.5)
VLDLC SERPL CALC-MCNC: 30 MG/DL (ref 5–40)
WBC # BLD AUTO: 6.6 X10E3/UL (ref 3.4–10.8)

## 2025-07-14 DIAGNOSIS — R63.2 EATING, EXCESSIVE INDULGENCE: ICD-10-CM

## 2025-07-14 DIAGNOSIS — E11.9 TYPE 2 DIABETES MELLITUS WITHOUT COMPLICATION, WITHOUT LONG-TERM CURRENT USE OF INSULIN: Primary | ICD-10-CM

## 2025-07-14 RX ORDER — METFORMIN HYDROCHLORIDE 500 MG/1
500 TABLET, EXTENDED RELEASE ORAL
Qty: 30 TABLET | Refills: 5 | Status: SHIPPED | OUTPATIENT
Start: 2025-07-14

## 2025-07-15 RX ORDER — TOPIRAMATE 25 MG/1
25 TABLET, FILM COATED ORAL 2 TIMES DAILY
Qty: 60 TABLET | Refills: 2 | Status: SHIPPED | OUTPATIENT
Start: 2025-07-15

## 2025-07-16 PROBLEM — N20.0 RENAL STONES: Status: ACTIVE | Noted: 2025-07-16

## 2025-07-16 PROBLEM — E55.9 VITAMIN D DEFICIENCY: Status: ACTIVE | Noted: 2025-07-16

## 2025-07-16 PROBLEM — K76.0 HEPATIC STEATOSIS: Status: ACTIVE | Noted: 2025-07-16

## 2025-07-16 PROBLEM — R63.2 EATING, EXCESSIVE INDULGENCE: Status: ACTIVE | Noted: 2025-07-16

## 2025-07-16 PROBLEM — K80.20 CALCULUS OF GALLBLADDER WITHOUT CHOLECYSTITIS WITHOUT OBSTRUCTION: Status: ACTIVE | Noted: 2025-07-16

## 2025-07-16 NOTE — ASSESSMENT & PLAN NOTE
Lipid abnormalities are stable    Plan:  Continue same medication/s without change.      Discussed medication dosage, use, side effects, and goals of treatment in detail.    Counseled patient on lifestyle modifications to help control hyperlipidemia.     Patient Treatment Goals:   LDL goal is under 100    Followup in 6 months.  Discussed the importance of healthy diet, nutrition, and lifestyle. Recommend low salt, fat/cholesterol diet and avoid concentrated sweets. Encouraged DASH diet along with fresh fruits & vegetables and low fat dairy products. Counseled patient to exercise/walk as tolerated. Avoid tobacco and alcohol use.

## 2025-07-16 NOTE — ASSESSMENT & PLAN NOTE
Discussed the importance of healthy diet, nutrition, and lifestyle. Recommend low salt, fat/cholesterol diet and avoid concentrated sweets. Encouraged DASH diet along with fresh fruits & vegetables and low fat dairy products. Counseled patient to exercise/walk as tolerated. Avoid tobacco and alcohol use.      Avoid Acetaminophen, NSAIDs, Alcohol; and also avoid other hepatotoxic agents or OTCs without prior consultation with Provider; patient voiced  understanding.

## 2025-07-16 NOTE — ASSESSMENT & PLAN NOTE
Diabetes is stable.   Continue current treatment regimen.  Reminded to bring in blood sugar diary at next visit.  Recommended an ADA diet.  Recommended a Mediterranean style of eating  Regular aerobic exercise.  Discussed ways to avoid symptomatic hypoglycemia.  Discussed sick day management.  Discussed foot care.  Reminded to get yearly retinal exam.  Diabetes will be reassessed in 3 months    Discussed the importance of healthy diet, nutrition, and lifestyle. Recommend low salt, fat/cholesterol diet and avoid concentrated sweets. Encouraged DASH diet along with fresh fruits & vegetables and low fat dairy products. Counseled patient to exercise/walk as tolerated. Avoid tobacco and alcohol use.

## 2025-07-16 NOTE — ASSESSMENT & PLAN NOTE
Discomfort patient's (Body mass index is 40.75 kg/m².) indicates that they are morbidly/severely obese (BMI > 40 or > 35 with obesity - related health condition) with health conditions that include diabetes mellitus and dyslipidemias . Weight is worsening. BMI  is above average; BMI management plan is completed. We discussed portion control and increasing exercise.     Discussed the importance of healthy diet, nutrition, and lifestyle. Recommend low salt, fat/cholesterol diet and avoid concentrated sweets. Encouraged DASH diet along with fresh fruits & vegetables and low fat dairy products. Counseled patient to exercise/walk as tolerated. Avoid tobacco and alcohol use.

## 2025-07-25 ENCOUNTER — OFFICE VISIT (OUTPATIENT)
Dept: BARIATRICS/WEIGHT MGMT | Facility: CLINIC | Age: 55
End: 2025-07-25
Payer: MEDICAID

## 2025-07-25 VITALS
DIASTOLIC BLOOD PRESSURE: 87 MMHG | WEIGHT: 218 LBS | BODY MASS INDEX: 41.16 KG/M2 | HEART RATE: 81 BPM | HEIGHT: 61 IN | SYSTOLIC BLOOD PRESSURE: 125 MMHG | TEMPERATURE: 97.9 F

## 2025-07-25 DIAGNOSIS — E66.813 OBESITY, CLASS III, BMI 40-49.9 (MORBID OBESITY): Primary | ICD-10-CM

## 2025-07-25 DIAGNOSIS — Z71.3 DIETARY COUNSELING: ICD-10-CM

## 2025-07-25 NOTE — PROGRESS NOTES
[facility]  [address]  [facility] [id number] [phone]  [address]  [facility] [address] [phone]  Dept: [phone]  7/25/2025      Li Kimball.  [id number]  [id number]  1970  female      Chief Complaint   Patient presents with    Follow-up     Follow up mwl/  rx       [facility] Post-Op Bariatric Medicine:   Li Kimball presents today for follow up today for provider supervised medical weight loss.    HPI:   Today's weight is 98.9 kg (218 lb) pounds, today's BMI is Body mass index is 40.79 kg/m²., she has a  gain of 1 pounds since the last visit.     Li Kimball denies n/v/c/d/regurg/reflux and reports struggling with increased hunger. Pt voiced she has worked on dietary changes since her last appt and has implemented a protein shake for bfast. She reports increased stress with her mother in nursing home and working on getting custody of her niece. Pt concerned as recent labs with her PCP show A1C of 6.4%. CMP wnl. PCP started pt on topamax and metformin. Has not noticed a difference in appetite while on topamax and denies any side effects. Pt voiced needing help with weight loss as she is struggling on her own. Pt also followed up with GI- MRI showed pancreatic cysts- follow up imaging 6 months.     Diet and Exercise: Diet history reviewed and discussed with the patient. Weight loss/gains to date discussed with the patient. The patient states they are eating unknown- roughly 60 grams of protein per day. She reports eating 3 meals per day, a typical portion size of 1 cup, eating 2-3 snacks per day, drinking 2 or more 8-oz. glasses of water per day, no carbonated beverage consumption and exercising regularly.     Changed to diet lemonade  Changed to sprite zero  Trying to add in protein shakes- salted caramel truffle-premier  Switched bread- keto bread  More eggs, more salad- lettuce, green olives,  peppers, ranch, croutons (2-3x/day)  Occ. Snack granola bar    Yesterday- protein shake  Chick jamison- diet lemonade, 8 nuggets  3 sandwiches yesterday- chicken breast or turkey breast, cheese/ baked lays or pretzels, sprite zero      Review of Systems   Constitutional:  Positive for appetite change. Negative for fatigue and unexpected weight change.   HENT: Negative.     Eyes: Negative.    Respiratory: Negative.     Cardiovascular: Negative.  Negative for leg swelling.   Gastrointestinal:  Negative for abdominal distention, abdominal pain, constipation, diarrhea, nausea and vomiting.   Genitourinary:  Negative for difficulty urinating, frequency and urgency.   Musculoskeletal:  Negative for back pain.   Skin: Negative.    Psychiatric/Behavioral: Negative.     All other systems reviewed and are negative.      Patient Active Problem List   Diagnosis    Hypothyroidism, adult    Mixed hyperlipidemia    BPPV (benign paroxysmal positional vertigo)    Asthma    Migraines    Colonic stricture    Chronic anticoagulation    Recurrent deep vein thrombosis (DVT)    Hair loss    History of skin cancer    Right ureteral stone    Hypertriglyceridemia    Chronic low back pain    Presence of IVC filter    History of deep venous thrombosis (DVT) of distal vein of right lower extremity    Primary osteoarthritis of right knee    Deep vein thrombosis (DVT) of right lower extremity    Anxiety    Type 2 diabetes mellitus without complication, without long-term current use of insulin    Environmental and seasonal allergies    Bilateral impacted cerumen    Non-compliance    Elevated serum GGT level    Elevated alkaline phosphatase level    Obesity, Class III, BMI 40-49.9 (morbid obesity)    Dietary counseling    Hepatic steatosis    Vitamin D deficiency    Eating, excessive indulgence    Renal stones    Calculus of gallbladder without cholecystitis without obstruction       Past Medical History:   Diagnosis Date    Acute appendicitis  02/13/2024    Asthma     BPPV (benign paroxysmal positional vertigo)     Colitis 02/13/2024    Diverticulitis     DVT (deep venous thrombosis)     Right    Hospital discharge follow-up 03/06/2024    Hypothyroid     Kidney stones     Migraines     Mixed hyperlipidemia     Slow to wake up after anesthesia     Weight loss        The following portions of the patient's history were reviewed and updated as appropriate: allergies, current medications, past medical history, past surgical history, and problem list.    Vitals:    07/25/25 1102   BP: 125/87   Pulse: 81   Temp: 97.9 °F (36.6 °C)       Physical Exam  Vitals reviewed.   Constitutional:       Appearance: Normal appearance.   HENT:      Head: Normocephalic and atraumatic.   Eyes:      Pupils: Pupils are equal, round, and reactive to light.   Cardiovascular:      Rate and Rhythm: Normal rate.   Pulmonary:      Effort: Pulmonary effort is normal. No respiratory distress.   Musculoskeletal:         General: Normal range of motion.      Cervical back: Normal range of motion and neck supple.   Neurological:      General: No focal deficit present.      Mental Status: She is alert and oriented to person, place, and time.   Psychiatric:         Mood and Affect: Mood normal.         Behavior: Behavior normal.         Assessment:   The patient is struggling with weight loss.     Encounter Diagnoses   Name Primary?    Obesity, Class III, BMI 40-49.9 (morbid obesity) Yes    Dietary counseling        Plan:   Diagnoses and all orders for this visit:    1. Obesity, Class III, BMI 40-49.9 (morbid obesity) (Primary)    2. Dietary counseling    Had a long discussion with the pt regarding diet and weight loss medications.  Advised pt to food journal as well as track protein and carb intake. Monitor carb intake.  Reviewed nutrition from yesterdays food log- over 70g in carbs from bread- discussed focusing on fruits and veg as sources of carbs vs breads/processed foods.  Continue  current meds as prescribed by PCP.  Discussed with the pt I spoke with the GI NP she has seen and there is not a contraindication to prescribe GLP-1 med with gallstones, however there may be increased risk for developing gallstone pancreatitis. Advised pt this is a risk I am not willing to take.  Current A1C falls in prediabetic range. Mounjaro would not be an option.  Wegovy has cardiac indications, but getting approved by medicaid is challenging.  Again pt has potential increased risk for gallstone pancreatitis, so GLP-1 therapy not recommended.  May consider contrave in the future. Discussed how medication works. Unsure that insurance would pay for this and typically is over $100 monthly.  Pt verbalized agreement with above plan.    Encouraged patient to be sure to get plenty of lean protein per day through small frequent meals all with a protein source.   Activity restrictions: none.   Recommended patient be sure to get at least 70 grams of protein per day by eating small, frequent meals all with high lean protein choices. Be sure to limit/cut back on daily carbohydrate intake. Discussed with the patient the recommended amount of water per day to intake- half of body weight in ounces. Reviewed vitamin requirements. Be sure to do routine exercise, 150 minutes per week minimum, including both cardio and strength training.     Instructions / Recommendations: dietary counseling recommended, recommended a daily protein intake of  grams, vitamin supplement(s) recommended, recommended exercising at least 150 minutes per week, behavior modifications recommended and instructed to call the office for concerns, questions, or problems.     The patient was instructed to follow up in 4 weeks .     The patient was counseled regarding. Total time spent during this encounter today was 35 minutes.

## (undated) DEVICE — LN SMPL CO2 SHTRM SD STREAM W/M LUER

## (undated) DEVICE — SUT MNCRYL PLS ANTIB UD 4/0 PS2 18IN

## (undated) DEVICE — GLV SURG SIGNATURE ESSENTIAL PF LTX SZ8

## (undated) DEVICE — DISPOSABLE MONOPOLAR ENDOSCOPIC CORD 10 FT. (3M): Brand: KIRWAN

## (undated) DEVICE — APPL CHLORAPREP HI/LITE 26ML ORNG

## (undated) DEVICE — UNDRGLV SURG BIOGEL PUNCTUREINDICATION SZ7 PF STRL

## (undated) DEVICE — KT ORCA ORCAPOD DISP STRL

## (undated) DEVICE — ADAPT CLN BIOGUARD AIR/H2O DISP

## (undated) DEVICE — TIDISHIELD UROLOGY DRAIN BAGS FROSTY VINYL STERILE FITS SIEMENS UROSKOP ACCESS 20 PER CASE: Brand: TIDISHIELD

## (undated) DEVICE — TOTAL TRAY, 16FR 10ML SIL FOLEY, URN: Brand: MEDLINE

## (undated) DEVICE — CANN O2 ETCO2 FITS ALL CONN CO2 SMPL A/ 7IN DISP LF

## (undated) DEVICE — ADHS SKIN SURG TISS VISC PREMIERPRO EXOFIN HI/VISC FAST/DRY

## (undated) DEVICE — INTENDED FOR TISSUE SEPARATION, AND OTHER PROCEDURES THAT REQUIRE A SHARP SURGICAL BLADE TO PUNCTURE OR CUT.: Brand: BARD-PARKER ® CARBON RIB-BACK BLADES

## (undated) DEVICE — ENDOPOUCH RETRIEVER SPECIMEN RETRIEVAL BAGS: Brand: ENDOPOUCH RETRIEVER

## (undated) DEVICE — HARMONIC ACE +7 LAPAROSCOPIC SHEARS ADVANCED HEMOSTASIS 5MM DIAMETER 36CM SHAFT LENGTH  FOR USE WITH GRAY HAND PIECE ONLY: Brand: HARMONIC ACE

## (undated) DEVICE — ESOPHAGEAL/COLONIC/BILIARY WIREGUIDED BALLOON DILATATION CATHETER: Brand: CRE™ PRO

## (undated) DEVICE — LAPAROSCOPIC SMOKE FILTRATION SYSTEM: Brand: PALL LAPAROSHIELD® PLUS LAPAROSCOPIC SMOKE FILTRATION SYSTEM

## (undated) DEVICE — SENSR O2 OXIMAX FNGR A/ 18IN NONSTR

## (undated) DEVICE — GLV SURG BIOGEL M LTX PF 7

## (undated) DEVICE — URETERAL DILATATION SYSTEM

## (undated) DEVICE — TUBING, SUCTION, 1/4" X 10', STRAIGHT: Brand: MEDLINE

## (undated) DEVICE — ENDOPATH XCEL UNIVERSAL TROCAR STABLILITY SLEEVES: Brand: ENDOPATH XCEL

## (undated) DEVICE — ENDOPATH XCEL BLADELESS TROCARS WITH STABILITY SLEEVES: Brand: ENDOPATH XCEL

## (undated) DEVICE — TBG PENCL TELESCP MEGADYNE SMOKE EVAC 10FT

## (undated) DEVICE — LOU LAP CHOLE: Brand: MEDLINE INDUSTRIES, INC.

## (undated) DEVICE — SUT VIC 0/0 UR6 27IN DYED J603H

## (undated) DEVICE — FLEXIVA  PULSE  AND  FLEXIVA  PULSE  TRACTIP  LASER  FIBERS  ARE  HIGH  POWER  SINGLE-USE FIBER: Brand: FLEXIVA PULSE

## (undated) DEVICE — LOU CYSTO: Brand: MEDLINE INDUSTRIES, INC.

## (undated) DEVICE — DEV SUT GRSPR CLOSUR 15CM 14G

## (undated) DEVICE — ECHELON FLEX45 ENDOPATH STAPLER, ARTICULATING ENDOSCOPIC LINEAR CUTTER (NO CARTRIDGE): Brand: ECHELON ENDOPATH

## (undated) DEVICE — SINGLE-USE BIOPSY FORCEPS: Brand: RADIAL JAW 4

## (undated) DEVICE — ENDOPATH PNEUMONEEDLE INSUFFLATION NEEDLES WITH LUER LOCK CONNECTORS 120MM: Brand: ENDOPATH

## (undated) DEVICE — SYS IRR PUMP SGL ACTN VAC SYR 10CC

## (undated) DEVICE — DISPOSABLE GRASPER CARTRIDGE: Brand: DIRECT DRIVE REPOSABLE GRASPERS

## (undated) DEVICE — DEV INFL CRE STERIFLATE 60CC DISP

## (undated) DEVICE — THE TORRENT IRRIGATION SCOPE CONNECTOR IS USED WITH THE TORRENT IRRIGATION TUBING TO PROVIDE IRRIGATION FLUIDS SUCH AS STERILE WATER DURING GASTROINTESTINAL ENDOSCOPIC PROCEDURES WHEN USED IN CONJUNCTION WITH AN IRRIGATION PUMP (OR ELECTROSURGICAL UNIT).: Brand: TORRENT

## (undated) DEVICE — MONOPOLAR METZENBAUM SCISSOR TIP, DISPOSABLE: Brand: MONOPOLAR METZENBAUM SCISSOR TIP, DISPOSABLE

## (undated) DEVICE — LAPAROVUE VISIBILITY SYSTEM LAPAROSCOPIC SOLUTIONS: Brand: LAPAROVUE

## (undated) DEVICE — NITINOL WIRE WITH HYDROPHILIC TIP: Brand: SENSOR